# Patient Record
Sex: FEMALE | Race: WHITE | NOT HISPANIC OR LATINO | Employment: OTHER | URBAN - METROPOLITAN AREA
[De-identification: names, ages, dates, MRNs, and addresses within clinical notes are randomized per-mention and may not be internally consistent; named-entity substitution may affect disease eponyms.]

---

## 2017-01-19 ENCOUNTER — CLINICAL SUPPORT NO REQUIREMENTS (OUTPATIENT)
Dept: CARDIOLOGY | Facility: CLINIC | Age: 77
End: 2017-01-19

## 2017-01-19 DIAGNOSIS — Z45.018 PACEMAKER REPROGRAMMING/CHECK: Primary | ICD-10-CM

## 2017-01-19 PROCEDURE — 93280 PM DEVICE PROGR EVAL DUAL: CPT | Performed by: INTERNAL MEDICINE

## 2017-04-20 ENCOUNTER — CLINICAL SUPPORT NO REQUIREMENTS (OUTPATIENT)
Dept: CARDIOLOGY | Facility: CLINIC | Age: 77
End: 2017-04-20

## 2017-04-20 DIAGNOSIS — Z95.0 CARDIAC PACEMAKER IN SITU: Primary | ICD-10-CM

## 2017-04-20 PROCEDURE — 93288 INTERROG EVL PM/LDLS PM IP: CPT | Performed by: INTERNAL MEDICINE

## 2017-05-02 ENCOUNTER — OFFICE VISIT (OUTPATIENT)
Dept: CARDIOLOGY | Facility: CLINIC | Age: 77
End: 2017-05-02

## 2017-05-02 VITALS
WEIGHT: 151 LBS | BODY MASS INDEX: 26.75 KG/M2 | SYSTOLIC BLOOD PRESSURE: 115 MMHG | DIASTOLIC BLOOD PRESSURE: 77 MMHG | HEART RATE: 80 BPM

## 2017-05-02 DIAGNOSIS — I10 BENIGN ESSENTIAL HYPERTENSION: ICD-10-CM

## 2017-05-02 DIAGNOSIS — Z98.890 S/P TVR (TRICUSPID VALVE REPAIR): ICD-10-CM

## 2017-05-02 DIAGNOSIS — N18.30 CKD (CHRONIC KIDNEY DISEASE) STAGE 3, GFR 30-59 ML/MIN (HCC): ICD-10-CM

## 2017-05-02 DIAGNOSIS — Z95.2 S/P AVR (AORTIC VALVE REPLACEMENT): Primary | ICD-10-CM

## 2017-05-02 DIAGNOSIS — E78.2 MIXED HYPERLIPIDEMIA: ICD-10-CM

## 2017-05-02 PROBLEM — E78.5 HYPERLIPIDEMIA: Status: ACTIVE | Noted: 2017-05-02

## 2017-05-02 PROCEDURE — 93000 ELECTROCARDIOGRAM COMPLETE: CPT | Performed by: INTERNAL MEDICINE

## 2017-05-02 PROCEDURE — 99213 OFFICE O/P EST LOW 20 MIN: CPT | Performed by: INTERNAL MEDICINE

## 2017-05-02 RX ORDER — NALOXEGOL OXALATE 25 MG/1
TABLET, FILM COATED ORAL
Refills: 0 | COMMUNITY
Start: 2017-04-10 | End: 2017-05-02

## 2017-08-05 DIAGNOSIS — R60.9 EDEMA: ICD-10-CM

## 2017-08-07 RX ORDER — BUMETANIDE 2 MG/1
TABLET ORAL
Qty: 90 TABLET | Refills: 1 | Status: SHIPPED | OUTPATIENT
Start: 2017-08-07 | End: 2018-02-13 | Stop reason: SDUPTHER

## 2017-08-17 ENCOUNTER — CLINICAL SUPPORT NO REQUIREMENTS (OUTPATIENT)
Dept: CARDIOLOGY | Facility: CLINIC | Age: 77
End: 2017-08-17

## 2017-08-17 DIAGNOSIS — Z95.0 CARDIAC PACEMAKER: Primary | ICD-10-CM

## 2017-08-17 PROCEDURE — 93288 INTERROG EVL PM/LDLS PM IP: CPT | Performed by: INTERNAL MEDICINE

## 2017-11-01 ENCOUNTER — TRANSCRIBE ORDERS (OUTPATIENT)
Dept: ADMINISTRATIVE | Facility: HOSPITAL | Age: 77
End: 2017-11-01

## 2017-11-01 DIAGNOSIS — Z12.31 VISIT FOR SCREENING MAMMOGRAM: Primary | ICD-10-CM

## 2017-12-04 ENCOUNTER — HOSPITAL ENCOUNTER (OUTPATIENT)
Dept: MAMMOGRAPHY | Facility: HOSPITAL | Age: 77
Discharge: HOME | End: 2017-12-04
Admitting: FAMILY MEDICINE

## 2017-12-04 DIAGNOSIS — Z12.31 VISIT FOR SCREENING MAMMOGRAM: ICD-10-CM

## 2017-12-04 PROCEDURE — 77063 BREAST TOMOSYNTHESIS BI: CPT

## 2017-12-04 PROCEDURE — G0202 SCR MAMMO BI INCL CAD: HCPCS

## 2017-12-06 ENCOUNTER — TELEPHONE (OUTPATIENT)
Dept: CARDIOLOGY | Facility: CLINIC | Age: 77
End: 2017-12-06

## 2017-12-06 ENCOUNTER — OFFICE VISIT (OUTPATIENT)
Dept: CARDIOLOGY | Facility: CLINIC | Age: 77
End: 2017-12-06

## 2017-12-06 VITALS
SYSTOLIC BLOOD PRESSURE: 128 MMHG | HEIGHT: 63 IN | BODY MASS INDEX: 26.93 KG/M2 | WEIGHT: 152 LBS | HEART RATE: 76 BPM | DIASTOLIC BLOOD PRESSURE: 84 MMHG

## 2017-12-06 DIAGNOSIS — N18.30 CKD (CHRONIC KIDNEY DISEASE) STAGE 3, GFR 30-59 ML/MIN (HCC): ICD-10-CM

## 2017-12-06 DIAGNOSIS — Z98.890 S/P TVR (TRICUSPID VALVE REPAIR): ICD-10-CM

## 2017-12-06 DIAGNOSIS — Z95.2 S/P AVR (AORTIC VALVE REPLACEMENT): Primary | ICD-10-CM

## 2017-12-06 DIAGNOSIS — E78.2 MIXED HYPERLIPIDEMIA: ICD-10-CM

## 2017-12-06 DIAGNOSIS — I10 BENIGN ESSENTIAL HYPERTENSION: ICD-10-CM

## 2017-12-06 DIAGNOSIS — Z98.890 S/P MVR (MITRAL VALVE REPAIR): ICD-10-CM

## 2017-12-06 DIAGNOSIS — Z95.0 PACEMAKER: ICD-10-CM

## 2017-12-06 PROCEDURE — 93000 ELECTROCARDIOGRAM COMPLETE: CPT | Performed by: INTERNAL MEDICINE

## 2017-12-06 PROCEDURE — 99214 OFFICE O/P EST MOD 30 MIN: CPT | Performed by: INTERNAL MEDICINE

## 2017-12-06 NOTE — PROGRESS NOTES
Subjective:     Encounter Date:12/06/2017      Patient ID: Carly Gotti is a 77 y.o. female.    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    The patient was previously followed by Dr. Lopez who she last saw in 5/2017.  Her prior cardiac history includes mitral and tricuspid valve repair and aortic valve replacement back in 11/2015 by Dr. Veliz.  Postoperatively she developed symptomatic sick sinus syndrome and required dual chamber permanent pacemaker placement.  Since then she's done relatively well although she reports that she never quite recovered back to her baseline following the surgery.  She today denies any chest pain, significant shortness of breath, palpitations, PND or orthopnea, presyncope or syncope, or significant lower extremity edema.  She's had no significant changes in her medications.    Review of Systems   Constitution: Negative for weakness and malaise/fatigue.   HENT: Negative for hearing loss, hoarse voice, nosebleeds and sore throat.    Eyes: Negative for pain.   Cardiovascular: Positive for dyspnea on exertion and leg swelling. Negative for chest pain, claudication, cyanosis, irregular heartbeat, near-syncope, orthopnea, palpitations, paroxysmal nocturnal dyspnea and syncope.   Respiratory: Negative for shortness of breath and snoring.    Endocrine: Negative for cold intolerance, heat intolerance,  polydipsia, polyphagia and polyuria.   Skin: Negative for itching and rash.   Musculoskeletal: Negative for arthritis, falls, joint pain, joint swelling, muscle cramps, muscle weakness and myalgias.   Gastrointestinal: Negative for constipation, diarrhea, dysphagia, heartburn, hematemesis, hematochezia, melena, nausea and vomiting.   Genitourinary: Negative for frequency, hematuria and hesitancy.   Neurological: Negative for excessive daytime sleepiness, dizziness, headaches, light-headedness and numbness.   Psychiatric/Behavioral: Negative for depression. The patient is not nervous/anxious.        Current Outpatient Prescriptions:   •  aspirin 325 MG tablet, Take by mouth daily., Disp: , Rfl:   •  atorvastatin (LIPITOR) 20 MG tablet, take 1 tablet by mouth at bedtime, Disp: , Rfl: 0  •  bumetanide (BUMEX) 2 MG tablet, take 1 tablet by mouth once daily, Disp: 90 tablet, Rfl: 1  •  buPROPion XL (WELLBUTRIN XL) 300 MG 24 hr tablet, Take 300 mg by mouth Daily., Disp: , Rfl: 0  •  Coenzyme Q10 200 MG tablet, Take 1 tablet/day by mouth Daily., Disp: , Rfl:   •  estradiol (ESTRACE) 0.5 MG tablet, Take by mouth daily., Disp: , Rfl:   •  eszopiclone (LUNESTA) 3 MG tablet, Take 3 mg by mouth every night. Take immediately before bedtime, Disp: , Rfl:   •  fenofibrate micronized (LOFIBRA) 67 MG capsule, Take by mouth daily., Disp: , Rfl:   •  FLUoxetine (PROzac) 20 MG capsule, Take 60 mg by mouth daily., Disp: , Rfl:   •  folic acid (FOLVITE) 400 MCG tablet, Take 1 tablet by mouth Daily., Disp: 30 tablet, Rfl: 0  •  HYDROcodone-acetaminophen (NORCO)  MG per tablet, Take 1 tablet by mouth Every 4 (Four) Hours As Needed., Disp: , Rfl:   •  Multiple Vitamins-Minerals (OCUVITE ADULT 50+ PO), Take 1 tablet by mouth daily., Disp: , Rfl:   •  nebivolol (BYSTOLIC) 10 MG tablet, Take 10 mg by mouth 2 (Two) Times a Day., Disp: , Rfl:     Past Medical History:   Diagnosis Date   • Aneurysm    • Aortic valve disease    • Arthritis   "  • Atrophic kidney    • CAD (coronary artery disease)    • Depression    • Dysphagia    • Essential hypertension    • [place] (hard of hearing)    • Hyperlipidemia    • Mitral valve disease    • Pulmonary hypertension    • Sick sinus syndrome    • Sleep apnea    • Spinal stenosis      Past Surgical History:   Procedure Laterality Date   • AORTIC VALVE REPAIR/REPLACEMENT  2015    Dr Veliz   • CARDIAC CATHETERIZATION     •  SECTION     • HYSTERECTOMY     • MITRAL VALVE ANNULOPLASTY  2015    Dr Veliz   • TRICUSPID VALVE SURGERY  2015    Dr Veliz     Family History   Problem Relation Age of Onset   • ALS Mother    • Hypertension Father      Social History   Substance Use Topics   • Smoking status: Former Smoker     Quit date:    • Smokeless tobacco: Never Used   • Alcohol use No             ECG 12 Lead  Date/Time: 2017 2:46 PM  Performed by: MARIANELA GARCIA  Authorized by: MARIANELA GARCIA   Comparison: compared with previous ECG   Similar to previous ECG  Rhythm: sinus rhythm  Comments: Atrial paced rhythm               Objective:         Visit Vitals   • /84 (BP Location: Right arm, Patient Position: Sitting)  Comment: lft 136 86   • Pulse 76   • Ht 160 cm (63\")   • Wt 68.9 kg (152 lb)   • BMI 26.93 kg/m2          Physical Exam    Lab Review:       Assessment:          Diagnosis Plan   1. S/P AVR (aortic valve replacement)  Adult Transthoracic Echo Complete W/ Cont if Necessary Per Protocol   2. S/P TVR (tricuspid valve repair)  Adult Transthoracic Echo Complete W/ Cont if Necessary Per Protocol   3. S/P MVR (mitral valve repair)  Adult Transthoracic Echo Complete W/ Cont if Necessary Per Protocol   4. Benign essential hypertension     5. Mixed hyperlipidemia     6. CKD (chronic kidney disease) stage 3, GFR 30-59 ml/min     7. Pacemaker            Plan:       1.  Status post aortic valve replacement.  We'll get a repeat echocardiogram to follow-up on her valve function.  2.  " Status post tricuspid valve repair.  As per #1.  3.  Status post mitral valve repair.  As per # 1.  4.  Hypertension.  Well-controlled on current medications.  5.  Hyperlipidemia.  On atorvastatin followed by Dr. Reyes.  6.  Chronic kidney disease  7.  History of dual-chamber permanent pacemaker placement.  Will get her established in our pacemaker clinic for routine checks.    Will call and discuss the results of the echocardiogram with the patient.  We'll see the patient back again in 6 months.

## 2017-12-06 NOTE — TELEPHONE ENCOUNTER
Called pt regarding date of last pacemaker check. Pt returned call spoke with Luis Carlos stating she had pacemaker checked on 8/17/17. Luis Carlos scheduled patient to come in for check-up on 12/12/17 before Echo is performed. Pt use [employer]. Thanks, Nunu

## 2017-12-12 ENCOUNTER — CLINICAL SUPPORT NO REQUIREMENTS (OUTPATIENT)
Dept: CARDIOLOGY | Facility: CLINIC | Age: 77
End: 2017-12-12

## 2017-12-12 ENCOUNTER — TELEPHONE (OUTPATIENT)
Dept: CARDIOLOGY | Facility: CLINIC | Age: 77
End: 2017-12-12

## 2017-12-12 ENCOUNTER — HOSPITAL ENCOUNTER (OUTPATIENT)
Dept: CARDIOLOGY | Facility: HOSPITAL | Age: 77
Discharge: HOME | End: 2017-12-12
Attending: INTERNAL MEDICINE | Admitting: INTERNAL MEDICINE

## 2017-12-12 VITALS
WEIGHT: 152 LBS | HEIGHT: 63 IN | DIASTOLIC BLOOD PRESSURE: 66 MMHG | SYSTOLIC BLOOD PRESSURE: 116 MMHG | HEART RATE: 88 BPM | BODY MASS INDEX: 26.93 KG/M2

## 2017-12-12 DIAGNOSIS — Z95.2 S/P AVR (AORTIC VALVE REPLACEMENT): ICD-10-CM

## 2017-12-12 DIAGNOSIS — I49.5 SICK SINUS SYNDROME (HCC): Primary | ICD-10-CM

## 2017-12-12 DIAGNOSIS — Z98.890 S/P TVR (TRICUSPID VALVE REPAIR): ICD-10-CM

## 2017-12-12 DIAGNOSIS — Z98.890 S/P MVR (MITRAL VALVE REPAIR): ICD-10-CM

## 2017-12-12 LAB
AORTIC DIMENSIONLESS INDEX: 0.6 (DI)
BH CV ECHO MEAS - ACS: 1.6 CM
BH CV ECHO MEAS - AO MAX PG (FULL): 21.5 MMHG
BH CV ECHO MEAS - AO MAX PG: 26.4 MMHG
BH CV ECHO MEAS - AO MEAN PG (FULL): 10.4 MMHG
BH CV ECHO MEAS - AO MEAN PG: 13.8 MMHG
BH CV ECHO MEAS - AO ROOT AREA (BSA CORRECTED): 2.2
BH CV ECHO MEAS - AO ROOT AREA: 11.2 CM^2
BH CV ECHO MEAS - AO ROOT DIAM: 3.8 CM
BH CV ECHO MEAS - AO V2 MAX: 257 CM/SEC
BH CV ECHO MEAS - AO V2 MEAN: 165.5 CM/SEC
BH CV ECHO MEAS - AO V2 VTI: 51.9 CM
BH CV ECHO MEAS - ASC AORTA: 3.5 CM
BH CV ECHO MEAS - AVA(I,A): 1.2 CM^2
BH CV ECHO MEAS - AVA(I,D): 1.3 CM^2
BH CV ECHO MEAS - AVA(V,A): 0.93 CM^2
BH CV ECHO MEAS - AVA(V,D): 0.93 CM^2
BH CV ECHO MEAS - BSA(HAYCOCK): 1.8 M^2
BH CV ECHO MEAS - BSA: 1.7 M^2
BH CV ECHO MEAS - BZI_BMI: 26.9 KILOGRAMS/M^2
BH CV ECHO MEAS - BZI_METRIC_HEIGHT: 160 CM
BH CV ECHO MEAS - BZI_METRIC_WEIGHT: 68.9 KG
BH CV ECHO MEAS - CONTRAST EF (2CH): 66.7 ML/M^2
BH CV ECHO MEAS - CONTRAST EF 4CH: 64.1 ML/M^2
BH CV ECHO MEAS - EDV(MOD-SP2): 45 ML
BH CV ECHO MEAS - EDV(MOD-SP4): 39 ML
BH CV ECHO MEAS - EDV(TEICH): 52.1 ML
BH CV ECHO MEAS - EF(CUBED): 63.1 %
BH CV ECHO MEAS - EF(MOD-SP2): 66.7 %
BH CV ECHO MEAS - EF(MOD-SP4): 64.1 %
BH CV ECHO MEAS - EF(TEICH): 55.6 %
BH CV ECHO MEAS - ESV(MOD-SP2): 15 ML
BH CV ECHO MEAS - ESV(MOD-SP4): 14 ML
BH CV ECHO MEAS - ESV(TEICH): 23.1 ML
BH CV ECHO MEAS - FS: 28.3 %
BH CV ECHO MEAS - IVS/LVPW: 0.87
BH CV ECHO MEAS - IVSD: 1 CM
BH CV ECHO MEAS - LAT PEAK E' VEL: 7 CM/SEC
BH CV ECHO MEAS - LV DIASTOLIC VOL/BSA (35-75): 22.7 ML/M^2
BH CV ECHO MEAS - LV MASS(C)D: 116.7 GRAMS
BH CV ECHO MEAS - LV MASS(C)DI: 67.8 GRAMS/M^2
BH CV ECHO MEAS - LV MAX PG: 4.9 MMHG
BH CV ECHO MEAS - LV MEAN PG: 3.4 MMHG
BH CV ECHO MEAS - LV SYSTOLIC VOL/BSA (12-30): 8.1 ML/M^2
BH CV ECHO MEAS - LV V1 MAX: 111 CM/SEC
BH CV ECHO MEAS - LV V1 MEAN: 89.2 CM/SEC
BH CV ECHO MEAS - LV V1 VTI: 28.9 CM
BH CV ECHO MEAS - LVIDD: 3.5 CM
BH CV ECHO MEAS - LVIDS: 2.5 CM
BH CV ECHO MEAS - LVLD AP2: 6.7 CM
BH CV ECHO MEAS - LVLD AP4: 7 CM
BH CV ECHO MEAS - LVLS AP2: 6.3 CM
BH CV ECHO MEAS - LVLS AP4: 5.8 CM
BH CV ECHO MEAS - LVOT AREA (M): 2.3 CM^2
BH CV ECHO MEAS - LVOT AREA: 2.1 CM^2
BH CV ECHO MEAS - LVOT DIAM: 1.7 CM
BH CV ECHO MEAS - LVPWD: 1.2 CM
BH CV ECHO MEAS - MED PEAK E' VEL: 6 CM/SEC
BH CV ECHO MEAS - MV A DUR: 0.12 SEC
BH CV ECHO MEAS - MV A MAX VEL: 145.5 CM/SEC
BH CV ECHO MEAS - MV DEC SLOPE: 478.4 CM/SEC^2
BH CV ECHO MEAS - MV DEC TIME: 0.25 SEC
BH CV ECHO MEAS - MV E MAX VEL: 109.4 CM/SEC
BH CV ECHO MEAS - MV E/A: 0.75
BH CV ECHO MEAS - MV MAX PG: 9.4 MMHG
BH CV ECHO MEAS - MV MEAN PG: 4.9 MMHG
BH CV ECHO MEAS - MV P1/2T MAX VEL: 126 CM/SEC
BH CV ECHO MEAS - MV P1/2T: 77.1 MSEC
BH CV ECHO MEAS - MV V2 MAX: 153.1 CM/SEC
BH CV ECHO MEAS - MV V2 MEAN: 103.3 CM/SEC
BH CV ECHO MEAS - MV V2 VTI: 38.9 CM
BH CV ECHO MEAS - MVA P1/2T LCG: 1.7 CM^2
BH CV ECHO MEAS - MVA(P1/2T): 2.9 CM^2
BH CV ECHO MEAS - MVA(VTI): 1.6 CM^2
BH CV ECHO MEAS - PA MAX PG (FULL): 3.1 MMHG
BH CV ECHO MEAS - PA MAX PG: 4.5 MMHG
BH CV ECHO MEAS - PA V2 MAX: 106.4 CM/SEC
BH CV ECHO MEAS - PULM A REVS DUR: 0.09 SEC
BH CV ECHO MEAS - PULM A REVS VEL: 26.6 CM/SEC
BH CV ECHO MEAS - PULM DIAS VEL: 36 CM/SEC
BH CV ECHO MEAS - PULM S/D: 1.3
BH CV ECHO MEAS - PULM SYS VEL: 45.8 CM/SEC
BH CV ECHO MEAS - PVA(V,A): 1.2 CM^2
BH CV ECHO MEAS - PVA(V,D): 1.2 CM^2
BH CV ECHO MEAS - QP/QS: 0.46
BH CV ECHO MEAS - RAP SYSTOLE: 3 MMHG
BH CV ECHO MEAS - RV MAX PG: 1.4 MMHG
BH CV ECHO MEAS - RV MEAN PG: 0.8 MMHG
BH CV ECHO MEAS - RV V1 MAX: 59.2 CM/SEC
BH CV ECHO MEAS - RV V1 MEAN: 42.3 CM/SEC
BH CV ECHO MEAS - RV V1 VTI: 13.2 CM
BH CV ECHO MEAS - RVOT AREA: 2.2 CM^2
BH CV ECHO MEAS - RVOT DIAM: 1.7 CM
BH CV ECHO MEAS - RVSP: 17 MMHG
BH CV ECHO MEAS - SI(AO): 336.4 ML/M^2
BH CV ECHO MEAS - SI(CUBED): 16.2 ML/M^2
BH CV ECHO MEAS - SI(LVOT): 36 ML/M^2
BH CV ECHO MEAS - SI(MOD-SP2): 17.4 ML/M^2
BH CV ECHO MEAS - SI(MOD-SP4): 14.5 ML/M^2
BH CV ECHO MEAS - SI(TEICH): 16.8 ML/M^2
BH CV ECHO MEAS - SUP REN AO DIAM: 1.4 CM
BH CV ECHO MEAS - SV(AO): 578.9 ML
BH CV ECHO MEAS - SV(CUBED): 27.8 ML
BH CV ECHO MEAS - SV(LVOT): 62 ML
BH CV ECHO MEAS - SV(MOD-SP2): 30 ML
BH CV ECHO MEAS - SV(MOD-SP4): 25 ML
BH CV ECHO MEAS - SV(RVOT): 28.6 ML
BH CV ECHO MEAS - SV(TEICH): 28.9 ML
BH CV ECHO MEAS - TAPSE (>1.6): 2.1 CM2
BH CV ECHO MEAS - TR MAX VEL: 194.4 CM/SEC
BH CV XLRA - RV BASE: 2.8 CM
BH CV XLRA - TDI S': 9 CM/SEC
E/E' RATIO: 18
LEFT ATRIUM VOLUME INDEX: 20 ML/M2
TV PEAK GRADIENT: 4 MMHG
TV VTI: 25 CM

## 2017-12-12 PROCEDURE — 93280 PM DEVICE PROGR EVAL DUAL: CPT | Performed by: INTERNAL MEDICINE

## 2017-12-12 PROCEDURE — 93306 TTE W/DOPPLER COMPLETE: CPT

## 2017-12-12 PROCEDURE — 93306 TTE W/DOPPLER COMPLETE: CPT | Performed by: INTERNAL MEDICINE

## 2017-12-12 NOTE — TELEPHONE ENCOUNTER
This pt is now going to follow her pacemaker at [facility].  We checked her in clinic today.  She was previously followed by Dr. Dos Santos.  She remains on your [employer] website.  Can you please remove her, so we can enroll her.

## 2017-12-13 ENCOUNTER — TELEPHONE (OUTPATIENT)
Dept: CARDIOLOGY | Facility: CLINIC | Age: 77
End: 2017-12-13

## 2017-12-13 NOTE — PROGRESS NOTES
Please let patient know that her echocardiogram looked ok.  Her heart pumping function is normal and her valves are functioning well.  thanks

## 2017-12-13 NOTE — TELEPHONE ENCOUNTER
Patient has been released from [facility] so that [facility] can monitor patient 12/13/17-- AH

## 2018-02-13 DIAGNOSIS — R60.9 EDEMA: ICD-10-CM

## 2018-02-13 RX ORDER — BUMETANIDE 2 MG/1
2 TABLET ORAL DAILY
Qty: 90 TABLET | Refills: 1 | Status: SHIPPED | OUTPATIENT
Start: 2018-02-13 | End: 2018-08-14 | Stop reason: SDUPTHER

## 2018-02-14 RX ORDER — BUMETANIDE 2 MG/1
TABLET ORAL
Qty: 90 TABLET | Refills: 1 | Status: SHIPPED | OUTPATIENT
Start: 2018-02-14 | End: 2018-06-13 | Stop reason: SDUPTHER

## 2018-04-02 ENCOUNTER — CLINICAL SUPPORT NO REQUIREMENTS (OUTPATIENT)
Dept: CARDIOLOGY | Facility: CLINIC | Age: 78
End: 2018-04-02

## 2018-04-02 DIAGNOSIS — I49.5 SICK SINUS SYNDROME (HCC): Primary | ICD-10-CM

## 2018-04-02 PROCEDURE — 93294 REM INTERROG EVL PM/LDLS PM: CPT | Performed by: INTERNAL MEDICINE

## 2018-04-02 PROCEDURE — 93296 REM INTERROG EVL PM/IDS: CPT | Performed by: INTERNAL MEDICINE

## 2018-06-11 ENCOUNTER — CLINICAL SUPPORT NO REQUIREMENTS (OUTPATIENT)
Dept: CARDIOLOGY | Facility: CLINIC | Age: 78
End: 2018-06-11

## 2018-06-11 DIAGNOSIS — I49.5 SICK SINUS SYNDROME (HCC): Primary | ICD-10-CM

## 2018-06-13 ENCOUNTER — OFFICE VISIT (OUTPATIENT)
Dept: CARDIOLOGY | Facility: CLINIC | Age: 78
End: 2018-06-13

## 2018-06-13 ENCOUNTER — CLINICAL SUPPORT NO REQUIREMENTS (OUTPATIENT)
Dept: CARDIOLOGY | Facility: CLINIC | Age: 78
End: 2018-06-13

## 2018-06-13 VITALS
WEIGHT: 146 LBS | BODY MASS INDEX: 25.87 KG/M2 | SYSTOLIC BLOOD PRESSURE: 126 MMHG | HEIGHT: 63 IN | DIASTOLIC BLOOD PRESSURE: 80 MMHG | HEART RATE: 81 BPM

## 2018-06-13 DIAGNOSIS — Z98.890 S/P MVR (MITRAL VALVE REPAIR): ICD-10-CM

## 2018-06-13 DIAGNOSIS — I49.5 SICK SINUS SYNDROME (HCC): Primary | ICD-10-CM

## 2018-06-13 DIAGNOSIS — Z98.890 S/P TVR (TRICUSPID VALVE REPAIR): ICD-10-CM

## 2018-06-13 DIAGNOSIS — I10 BENIGN ESSENTIAL HYPERTENSION: ICD-10-CM

## 2018-06-13 DIAGNOSIS — Z95.0 PACEMAKER: ICD-10-CM

## 2018-06-13 DIAGNOSIS — N18.30 CKD (CHRONIC KIDNEY DISEASE) STAGE 3, GFR 30-59 ML/MIN (HCC): ICD-10-CM

## 2018-06-13 DIAGNOSIS — Z95.2 S/P AVR (AORTIC VALVE REPLACEMENT): Primary | ICD-10-CM

## 2018-06-13 DIAGNOSIS — E78.2 MIXED HYPERLIPIDEMIA: ICD-10-CM

## 2018-06-13 PROCEDURE — 99213 OFFICE O/P EST LOW 20 MIN: CPT | Performed by: INTERNAL MEDICINE

## 2018-06-13 PROCEDURE — 93000 ELECTROCARDIOGRAM COMPLETE: CPT | Performed by: INTERNAL MEDICINE

## 2018-06-13 PROCEDURE — 93280 PM DEVICE PROGR EVAL DUAL: CPT | Performed by: INTERNAL MEDICINE

## 2018-06-13 NOTE — PROGRESS NOTES
Subjective:     Encounter Date:06/13/2018      Patient ID: Carly Gotti is a 77 y.o. female.    Chief Complaint:  History of Present Illness    This is a 77-year-old female with a history of moderate to severe aortic regurgitation status post replacement with a bioprosthetic aortic valve, moderate to severe mitral regurgitation status post repair, severe tricuspid regurgitation status post repair, severe pulmonary hypertension, chronic kidney disease with an atrophic kidney, hyperlipidemia, hypertension, aberrant right subclavian artery, obstructive sleep apnea on CPAP, status post dual-chamber pacemaker placement for sick sinus syndrome, depression, who presents for follow up.      The patient was previously followed by Dr. Lopez who she last saw in 5/2017.  Her prior cardiac history includes mitral and tricuspid valve repair and aortic valve replacement back in 11/2015 by Dr. Veliz.  Postoperatively she developed symptomatic sick sinus syndrome and required dual chamber permanent pacemaker placement.  Since then she's done relatively well although she reports that she never quite recovered back to her baseline following the surgery.  Was at her last visit of her echocardiogram to follow-up on her valve function.    Her echocardiogram was performed in 12/2017 and showed normal left ventricular systolic function and wall motion with an estimated ejection fraction 64%, grade 1 diastolic dysfunction, grossly normal-appearing bioprosthetic aortic valve, no mitral valve regurgitation, trace tricuspid valve regurgitation, and a right ventricular systolic pressure of 17 mmHg.    Today she presents for routine follow-up.  She feels well overall.  She denies any chest pain, shortness of breath, PND or orthopnea, near-syncope or syncope, or palpitations.  She has chronic intermittent left lower extremity edema that is unchanged.  Her pacemaker check today shows that she is atrial paced 100% of the time with  no recent episodes.          Review of Systems   Constitution: Positive for malaise/fatigue. Negative for weakness.   HENT: Negative for hearing loss, hoarse voice, nosebleeds and sore throat.    Eyes: Negative for pain.   Cardiovascular: Positive for leg swelling. Negative for chest pain, claudication, cyanosis, dyspnea on exertion, irregular heartbeat, near-syncope, orthopnea, palpitations, paroxysmal nocturnal dyspnea and syncope.   Respiratory: Negative for shortness of breath and snoring.    Endocrine: Negative for cold intolerance, heat intolerance, polydipsia, polyphagia and polyuria.   Skin: Negative for itching and rash.   Musculoskeletal: Negative for arthritis, falls, joint pain, joint swelling, muscle cramps, muscle weakness and myalgias.   Gastrointestinal: Negative for constipation, diarrhea, dysphagia, heartburn, hematemesis, hematochezia, melena, nausea and vomiting.   Genitourinary: Negative for frequency, hematuria and hesitancy.   Neurological: Negative for excessive daytime sleepiness, dizziness, headaches, light-headedness and numbness.   Psychiatric/Behavioral: Negative for depression. The patient is not nervous/anxious.           Current Outpatient Prescriptions:   •  aspirin 325 MG tablet, Take by mouth daily., Disp: , Rfl:   •  atorvastatin (LIPITOR) 20 MG tablet, take 1 tablet by mouth at bedtime, Disp: , Rfl: 0  •  bumetanide (BUMEX) 2 MG tablet, Take 1 tablet by mouth Daily., Disp: 90 tablet, Rfl: 1  •  buPROPion XL (WELLBUTRIN XL) 300 MG 24 hr tablet, Take 300 mg by mouth Daily., Disp: , Rfl: 0  •  Coenzyme Q10 200 MG tablet, Take 1 tablet/day by mouth Daily., Disp: , Rfl:   •  estradiol (ESTRACE) 0.5 MG tablet, Take by mouth daily., Disp: , Rfl:   •  eszopiclone (LUNESTA) 3 MG tablet, Take 3 mg by mouth every night. Take immediately before bedtime, Disp: , Rfl:   •  FLUoxetine (PROzac) 20 MG capsule, Take 60 mg by mouth daily., Disp: , Rfl:   •  folic acid (FOLVITE) 400 MCG tablet, Take  "1 tablet by mouth Daily., Disp: 30 tablet, Rfl: 0  •  HYDROcodone-acetaminophen (NORCO)  MG per tablet, Take 1 tablet by mouth Every 4 (Four) Hours As Needed., Disp: , Rfl:   •  nebivolol (BYSTOLIC) 10 MG tablet, Take 10 mg by mouth 2 (Two) Times a Day., Disp: , Rfl:     Past Medical History:   Diagnosis Date   • Aneurysm    • Aortic valve disease    • Arthritis    • Atrophic kidney    • CAD (coronary artery disease)    • Depression    • Dysphagia    • Essential hypertension    • [place] (hard of hearing)    • Hyperlipidemia    • Mitral valve disease    • Pulmonary hypertension    • Sick sinus syndrome    • Sleep apnea    • Spinal stenosis      Past Surgical History:   Procedure Laterality Date   • AORTIC VALVE REPAIR/REPLACEMENT  2015    Dr Veliz   • CARDIAC CATHETERIZATION     •  SECTION     • HYSTERECTOMY     • MITRAL VALVE ANNULOPLASTY  2015    Dr Veliz   • TRICUSPID VALVE SURGERY  2015    Dr Veliz     Family History   Problem Relation Age of Onset   • ALS Mother    • Hypertension Father      Social History   Substance Use Topics   • Smoking status: Former Smoker     Quit date:    • Smokeless tobacco: Never Used   • Alcohol use No           ECG 12 Lead  Date/Time: 2018 5:08 PM  Performed by: MARIANELA GARCIA  Authorized by: MARIANELA GARCIA   Comparison: compared with previous ECG   Similar to previous ECG  Rhythm: sinus rhythm  Comments: Atrial paced rhythm               Objective:         Visit Vitals  /80 (BP Location: Right arm, Patient Position: Sitting)   Pulse 81   Ht 160 cm (63\")   Wt 66.2 kg (146 lb)   BMI 25.86 kg/m²            Physical Exam   Constitutional: She is oriented to person, place, and time. She appears well-developed and well-nourished.   HENT:   Head: Normocephalic and atraumatic.   Eyes: Conjunctivae, EOM and lids are normal. Pupils are equal, round, and reactive to light.   Neck: Normal range of motion and full passive range of motion " without pain. Neck supple. No JVD present. Carotid bruit is not present.   Cardiovascular: Normal rate, regular rhythm, S1 normal and S2 normal.  Exam reveals no gallop.    No murmur heard.  Pulses:       Radial pulses are 2+ on the right side, and 2+ on the left side.   No bilateral lower extremity edema   Pulmonary/Chest: Effort normal and breath sounds normal.   Abdominal: Soft. Normal appearance.   Lymphadenopathy:     She has no cervical adenopathy.   Neurological: She is alert and oriented to person, place, and time.   Skin: Skin is warm, dry and intact.   Psychiatric: She has a normal mood and affect.       Lab Review:       Assessment:          Diagnosis Plan   1. S/P AVR (aortic valve replacement)     2. S/P TVR (tricuspid valve repair)     3. S/P MVR (mitral valve repair)     4. Benign essential hypertension     5. Mixed hyperlipidemia     6. Pacemaker     7. CKD (chronic kidney disease) stage 3, GFR 30-59 ml/min            Plan:       1.  Status post bioprosthetic aortic valve replacement.  Normal appearing function on last echocardiogram.  2.  Status post tricuspid valve repair.  Only with residual trace tricuspid regurgitation on last echo.  3.  Status post mitral valve appear.  No significant residual regurgitation on last echocardiogram.  4.  Hypertension.  Well-controlled on her current medications.  5.  Status post permanent pacemaker placement.  Interrogation today shows normal function.  6.  Chronic kidney disease  7.  Hyperlipidemia.  On low-dose atorvastatin.    We'll plan on seeing the patient back in 6 months.

## 2018-06-18 ENCOUNTER — TELEPHONE (OUTPATIENT)
Dept: CARDIOLOGY | Facility: CLINIC | Age: 78
End: 2018-06-18

## 2018-06-18 NOTE — TELEPHONE ENCOUNTER
Pt daughter called stating Saturday morning on 6/16/18 pt was rushed to dental urgent care due to tooth pain. Daughter states they extracted 2 teeth, 1 is infected. Daughter questions due to heart problems should patient been on antibiotics? Please advise! Thanks, Nunu

## 2018-06-18 NOTE — TELEPHONE ENCOUNTER
Yes they should give her antibiotics before dental procedures because of her valve replacements.    If she has an infected tooth they may have already started her on antibiotics, did they?

## 2018-06-18 NOTE — TELEPHONE ENCOUNTER
No antibiotics given, daughter states she told them her mom needed antibiotic due to valves being replaced. Daughter states she will call dentist and PCP to get treatment for infection of tooth. Thanks, Nunu

## 2018-08-15 RX ORDER — BUMETANIDE 2 MG/1
TABLET ORAL
Qty: 90 TABLET | Refills: 1 | Status: SHIPPED | OUTPATIENT
Start: 2018-08-15 | End: 2019-02-11 | Stop reason: SDUPTHER

## 2018-09-07 ENCOUNTER — CLINICAL SUPPORT NO REQUIREMENTS (OUTPATIENT)
Dept: CARDIOLOGY | Facility: CLINIC | Age: 78
End: 2018-09-07

## 2018-09-07 DIAGNOSIS — I49.5 SICK SINUS SYNDROME (HCC): Primary | ICD-10-CM

## 2018-09-07 PROCEDURE — 93294 REM INTERROG EVL PM/LDLS PM: CPT | Performed by: INTERNAL MEDICINE

## 2018-09-07 PROCEDURE — 93296 REM INTERROG EVL PM/IDS: CPT | Performed by: INTERNAL MEDICINE

## 2018-12-05 ENCOUNTER — CLINICAL SUPPORT NO REQUIREMENTS (OUTPATIENT)
Dept: CARDIOLOGY | Facility: CLINIC | Age: 78
End: 2018-12-05

## 2018-12-05 DIAGNOSIS — I49.5 SICK SINUS SYNDROME (HCC): Primary | ICD-10-CM

## 2018-12-19 ENCOUNTER — OFFICE VISIT (OUTPATIENT)
Dept: CARDIOLOGY | Facility: CLINIC | Age: 78
End: 2018-12-19

## 2018-12-19 ENCOUNTER — CLINICAL SUPPORT NO REQUIREMENTS (OUTPATIENT)
Dept: CARDIOLOGY | Facility: CLINIC | Age: 78
End: 2018-12-19

## 2018-12-19 VITALS
WEIGHT: 144 LBS | DIASTOLIC BLOOD PRESSURE: 76 MMHG | BODY MASS INDEX: 25.52 KG/M2 | HEART RATE: 82 BPM | SYSTOLIC BLOOD PRESSURE: 138 MMHG | HEIGHT: 63 IN

## 2018-12-19 DIAGNOSIS — I10 BENIGN ESSENTIAL HYPERTENSION: ICD-10-CM

## 2018-12-19 DIAGNOSIS — Z95.0 PACEMAKER: ICD-10-CM

## 2018-12-19 DIAGNOSIS — N18.30 CKD (CHRONIC KIDNEY DISEASE) STAGE 3, GFR 30-59 ML/MIN (HCC): ICD-10-CM

## 2018-12-19 DIAGNOSIS — I49.5 SICK SINUS SYNDROME (HCC): Primary | ICD-10-CM

## 2018-12-19 DIAGNOSIS — E78.2 MIXED HYPERLIPIDEMIA: ICD-10-CM

## 2018-12-19 DIAGNOSIS — Z95.2 S/P AVR (AORTIC VALVE REPLACEMENT): Primary | ICD-10-CM

## 2018-12-19 DIAGNOSIS — Z98.890 S/P TVR (TRICUSPID VALVE REPAIR): ICD-10-CM

## 2018-12-19 DIAGNOSIS — Z98.890 S/P MVR (MITRAL VALVE REPAIR): ICD-10-CM

## 2018-12-19 PROCEDURE — 99213 OFFICE O/P EST LOW 20 MIN: CPT | Performed by: INTERNAL MEDICINE

## 2018-12-19 PROCEDURE — 93000 ELECTROCARDIOGRAM COMPLETE: CPT | Performed by: INTERNAL MEDICINE

## 2018-12-19 PROCEDURE — 93280 PM DEVICE PROGR EVAL DUAL: CPT | Performed by: INTERNAL MEDICINE

## 2018-12-19 NOTE — PROGRESS NOTES
Subjective:     Encounter Date:12/19/2018      Patient ID: Carly Gotti is a 78 y.o. female.    Chief Complaint:  History of Present Illness    This is a 78-year-old female with a history of moderate to severe aortic regurgitation status post replacement with a bioprosthetic aortic valve, moderate to severe mitral regurgitation status post repair, severe tricuspid regurgitation status post repair, severe pulmonary hypertension, chronic kidney disease with an atrophic kidney, hyperlipidemia, hypertension, aberrant right subclavian artery, obstructive sleep apnea on CPAP, status post dual-chamber pacemaker placement for sick sinus syndrome, depression, who presents for follow up.      The patient was previously followed by Dr. Lopez who she last saw in 5/2017.  Her prior cardiac history includes mitral and tricuspid valve repair and aortic valve replacement back in 11/2015 by Dr. Veliz.  Postoperatively she developed symptomatic sick sinus syndrome and required dual chamber permanent pacemaker placement.  Since then she's done relatively well although she reports that she never quite recovered back to her baseline following the surgery.       Following her initial visit with me in 12/2017 I set her up for a repeat echocardiogram that showed normal left ventricular systolic function and wall motion with an estimated ejection fraction 64%, grade 1 diastolic dysfunction, grossly normal-appearing bioprosthetic aortic valve, no mitral valve regurgitation, trace tricuspid valve regurgitation, and a right ventricular systolic pressure of 17 mmHg.    Today she presents for routine follow-up.  She feels well overall.  She has had chest pain, shortness of breath, PND orthopnea, near syncope or syncope, or palpitations.  She reports chronic, intermittent, and stable left lower extremity edema.  She denies any changes in her medications.    Review of Systems   Constitution: Negative for weakness and  malaise/fatigue.   HENT: Negative for hearing loss, hoarse voice, nosebleeds and sore throat.    Eyes: Negative for pain.   Cardiovascular: Positive for leg swelling. Negative for chest pain, claudication, cyanosis, dyspnea on exertion, irregular heartbeat, near-syncope, orthopnea, palpitations, paroxysmal nocturnal dyspnea and syncope.   Respiratory: Negative for shortness of breath and snoring.    Endocrine: Negative for cold intolerance, heat intolerance, polydipsia, polyphagia and polyuria.   Skin: Negative for itching and rash.   Musculoskeletal: Negative for arthritis, falls, joint pain, joint swelling, muscle cramps, muscle weakness and myalgias.   Gastrointestinal: Negative for constipation, diarrhea, dysphagia, heartburn, hematemesis, hematochezia, melena, nausea and vomiting.   Genitourinary: Negative for frequency, hematuria and hesitancy.   Neurological: Negative for excessive daytime sleepiness, dizziness, headaches, light-headedness and numbness.   Psychiatric/Behavioral: Negative for depression. The patient is not nervous/anxious.           Current Outpatient Medications:   •  aspirin 325 MG tablet, Take by mouth daily., Disp: , Rfl:   •  atorvastatin (LIPITOR) 20 MG tablet, take 1 tablet by mouth at bedtime, Disp: , Rfl: 0  •  bumetanide (BUMEX) 2 MG tablet, take 1 tablet by mouth once daily, Disp: 90 tablet, Rfl: 1  •  buPROPion XL (WELLBUTRIN XL) 300 MG 24 hr tablet, Take 300 mg by mouth Daily., Disp: , Rfl: 0  •  Coenzyme Q10 200 MG tablet, Take 1 tablet/day by mouth Daily., Disp: , Rfl:   •  estradiol (ESTRACE) 0.5 MG tablet, Take by mouth daily., Disp: , Rfl:   •  eszopiclone (LUNESTA) 3 MG tablet, Take 3 mg by mouth every night. Take immediately before bedtime, Disp: , Rfl:   •  FLUoxetine (PROzac) 20 MG capsule, Take 60 mg by mouth daily., Disp: , Rfl:   •  folic acid (FOLVITE) 400 MCG tablet, Take 1 tablet by mouth Daily., Disp: 30 tablet, Rfl: 0  •  HYDROcodone-acetaminophen (NORCO)   "MG per tablet, Take 1 tablet by mouth Every 4 (Four) Hours As Needed., Disp: , Rfl:   •  nebivolol (BYSTOLIC) 10 MG tablet, Take 10 mg by mouth 2 (Two) Times a Day., Disp: , Rfl:     Past Medical History:   Diagnosis Date   • Aneurysm (CMS/HCC)    • Aortic valve disease    • Arthritis    • Atrophic kidney    • CAD (coronary artery disease)    • CKD (chronic kidney disease) stage 3, GFR 30-59 ml/min (CMS/HCC)    • Depression    • Dysphagia    • Essential hypertension    • [place] (hard of hearing)    • Hyperlipidemia    • Mitral valve disease    • Pulmonary hypertension (CMS/HCC)    • Sick sinus syndrome (CMS/HCC)    • Sleep apnea    • Spinal stenosis      Past Surgical History:   Procedure Laterality Date   • AORTIC VALVE REPAIR/REPLACEMENT  2015    Dr Veliz   • CARDIAC CATHETERIZATION     •  SECTION     • HYSTERECTOMY     • MITRAL VALVE ANNULOPLASTY  2015    Dr Veliz   • TRICUSPID VALVE SURGERY  2015    Dr Veliz     Family History   Problem Relation Age of Onset   • ALS Mother    • Hypertension Father      Social History     Tobacco Use   • Smoking status: Former Smoker     Last attempt to quit: 1988     Years since quittin.9   • Smokeless tobacco: Never Used   Substance Use Topics   • Alcohol use: No   • Drug use: No           ECG 12 Lead  Date/Time: 2018 3:10 PM  Performed by: Laura Pak MD  Authorized by: Laura Pak MD   Comparison: compared with previous ECG   Comments: Atrial paced rhythm               Objective:         Visit Vitals  /76   Pulse 82   Ht 160 cm (63\")   Wt 65.3 kg (144 lb)   BMI 25.51 kg/m²          Physical Exam   Constitutional: She is oriented to person, place, and time. She appears well-developed and well-nourished.   HENT:   Head: Normocephalic and atraumatic.   Eyes: Conjunctivae, EOM and lids are normal. Pupils are equal, round, and reactive to light.   Neck: Normal range of motion and full passive range of motion without pain. " Neck supple. No JVD present. Carotid bruit is not present.   Cardiovascular: Normal rate, regular rhythm, S1 normal and S2 normal. Exam reveals no gallop.   Murmur heard.   Systolic murmur is present with a grade of 2/6.  Pulses:       Radial pulses are 2+ on the right side, and 2+ on the left side.   Pulmonary/Chest: Effort normal and breath sounds normal.   Abdominal: Soft. Normal appearance.   Lymphadenopathy:     She has no cervical adenopathy.   Neurological: She is alert and oriented to person, place, and time.   Skin: Skin is warm, dry and intact.   Psychiatric: She has a normal mood and affect.       Lab Review:       Assessment:          Diagnosis Plan   1. S/P AVR (aortic valve replacement)     2. S/P MVR (mitral valve repair)     3. S/P TVR (tricuspid valve repair)     4. Benign essential hypertension     5. Mixed hyperlipidemia     6. Pacemaker     7. CKD (chronic kidney disease) stage 3, GFR 30-59 ml/min (CMS/[facility])            Plan:       1.  Status post bioprosthetic aortic valve replacement.  She had normal-looking function on her last echo in 12/2017.  She does have a systolic murmur on exam but with no symptoms and echo within the last year is unremarkable.  We can just monitor this for now and plan on a repeat echocardiogram possibly in the next year if she remains stable.  2.  Status post tricuspid valve repair.  Essentially normal function on her last echocardiogram.  3.  Status post mitral valve repair.  Significant residual regurgitation or last echocardiogram.  Plan is per #1.  4.  Hypertension.  Well controlled on her current management.  5.  Status post permanent pacemaker placement.  Her interrogation today showed normal function with atrial pacing at 100%.  6.  Hyperlipidemia.  On low-dose atorvastatin which is managed by Dr. Reyes.  7.  Chronic kidney disease stage III    We'll plan on seeing the patient back again in one year or sooner if further issues arise.

## 2019-01-08 ENCOUNTER — TRANSCRIBE ORDERS (OUTPATIENT)
Dept: ADMINISTRATIVE | Facility: HOSPITAL | Age: 79
End: 2019-01-08

## 2019-01-08 DIAGNOSIS — Z12.39 SCREENING BREAST EXAMINATION: Primary | ICD-10-CM

## 2019-02-11 RX ORDER — BUMETANIDE 2 MG/1
2 TABLET ORAL DAILY
Qty: 90 TABLET | Refills: 1 | Status: SHIPPED | OUTPATIENT
Start: 2019-02-11 | End: 2019-08-10 | Stop reason: SDUPTHER

## 2019-02-27 ENCOUNTER — TRANSCRIBE ORDERS (OUTPATIENT)
Dept: ADMINISTRATIVE | Facility: HOSPITAL | Age: 79
End: 2019-02-27

## 2019-02-27 DIAGNOSIS — R10.31 RIGHT LOWER QUADRANT PAIN: ICD-10-CM

## 2019-02-27 DIAGNOSIS — R10.32 LEFT LOWER QUADRANT PAIN: Primary | ICD-10-CM

## 2019-02-28 ENCOUNTER — HOSPITAL ENCOUNTER (OUTPATIENT)
Dept: CT IMAGING | Facility: HOSPITAL | Age: 79
Discharge: HOME | End: 2019-02-28
Admitting: FAMILY MEDICINE

## 2019-02-28 DIAGNOSIS — R10.32 LEFT LOWER QUADRANT PAIN: ICD-10-CM

## 2019-02-28 DIAGNOSIS — R10.31 RIGHT LOWER QUADRANT PAIN: ICD-10-CM

## 2019-02-28 LAB — CREAT BLDA-MCNC: 1.7 MG/DL (ref 0.6–1.3)

## 2019-02-28 PROCEDURE — 82565 ASSAY OF CREATININE: CPT

## 2019-02-28 PROCEDURE — 74176 CT ABD & PELVIS W/O CONTRAST: CPT

## 2019-03-04 ENCOUNTER — HOSPITAL ENCOUNTER (EMERGENCY)
Facility: HOSPITAL | Age: 79
Discharge: HOME | End: 2019-03-04
Attending: EMERGENCY MEDICINE | Admitting: EMERGENCY MEDICINE

## 2019-03-04 VITALS
HEART RATE: 73 BPM | TEMPERATURE: 97.3 F | DIASTOLIC BLOOD PRESSURE: 92 MMHG | OXYGEN SATURATION: 98 % | HEIGHT: 63 IN | BODY MASS INDEX: 24.79 KG/M2 | WEIGHT: 139.9 LBS | RESPIRATION RATE: 20 BRPM | SYSTOLIC BLOOD PRESSURE: 146 MMHG

## 2019-03-04 DIAGNOSIS — R19.7 DIARRHEA, UNSPECIFIED TYPE: Primary | ICD-10-CM

## 2019-03-04 DIAGNOSIS — R11.0 NAUSEA: ICD-10-CM

## 2019-03-04 DIAGNOSIS — R10.9 ABDOMINAL PAIN, UNSPECIFIED ABDOMINAL LOCATION: ICD-10-CM

## 2019-03-04 LAB
ALBUMIN SERPL-MCNC: 4.5 G/DL (ref 3.5–5.2)
ALBUMIN/GLOB SERPL: 1.7 G/DL
ALP SERPL-CCNC: 49 U/L (ref 39–117)
ALT SERPL W P-5'-P-CCNC: 44 U/L (ref 1–33)
ANION GAP SERPL CALCULATED.3IONS-SCNC: 12.8 MMOL/L
AST SERPL-CCNC: 47 U/L (ref 1–32)
BASOPHILS # BLD AUTO: 0.01 10*3/MM3 (ref 0–0.2)
BASOPHILS NFR BLD AUTO: 0.1 % (ref 0–1.5)
BILIRUB SERPL-MCNC: 0.6 MG/DL (ref 0.1–1.2)
BILIRUB UR QL STRIP: NEGATIVE
BUN BLD-MCNC: 22 MG/DL (ref 8–23)
BUN/CREAT SERPL: 14.5 (ref 7–25)
CALCIUM SPEC-SCNC: 10.1 MG/DL (ref 8.6–10.5)
CHLORIDE SERPL-SCNC: 102 MMOL/L (ref 98–107)
CLARITY UR: CLEAR
CO2 SERPL-SCNC: 24.2 MMOL/L (ref 22–29)
COLOR UR: YELLOW
CREAT BLD-MCNC: 1.52 MG/DL (ref 0.57–1)
DEPRECATED RDW RBC AUTO: 47.9 FL (ref 37–54)
EOSINOPHIL # BLD AUTO: 0.11 10*3/MM3 (ref 0–0.4)
EOSINOPHIL NFR BLD AUTO: 1.3 % (ref 0.3–6.2)
ERYTHROCYTE [DISTWIDTH] IN BLOOD BY AUTOMATED COUNT: 13.6 % (ref 12.3–15.4)
GFR SERPL CREATININE-BSD FRML MDRD: 33 ML/MIN/1.73
GLOBULIN UR ELPH-MCNC: 2.6 GM/DL
GLUCOSE BLD-MCNC: 92 MG/DL (ref 65–99)
GLUCOSE UR STRIP-MCNC: NEGATIVE MG/DL
HCT VFR BLD AUTO: 44.6 % (ref 34–46.6)
HGB BLD-MCNC: 14.2 G/DL (ref 12–15.9)
HGB UR QL STRIP.AUTO: NEGATIVE
IMM GRANULOCYTES # BLD AUTO: 0.02 10*3/MM3 (ref 0–0.05)
IMM GRANULOCYTES NFR BLD AUTO: 0.2 % (ref 0–0.5)
KETONES UR QL STRIP: NEGATIVE
LEUKOCYTE ESTERASE UR QL STRIP.AUTO: NEGATIVE
LIPASE SERPL-CCNC: 49 U/L (ref 13–60)
LYMPHOCYTES # BLD AUTO: 0.88 10*3/MM3 (ref 0.7–3.1)
LYMPHOCYTES NFR BLD AUTO: 10.6 % (ref 19.6–45.3)
MCH RBC QN AUTO: 30.3 PG (ref 26.6–33)
MCHC RBC AUTO-ENTMCNC: 31.8 G/DL (ref 31.5–35.7)
MCV RBC AUTO: 95.3 FL (ref 79–97)
MONOCYTES # BLD AUTO: 0.9 10*3/MM3 (ref 0.1–0.9)
MONOCYTES NFR BLD AUTO: 10.9 % (ref 5–12)
NEUTROPHILS # BLD AUTO: 6.37 10*3/MM3 (ref 1.4–7)
NEUTROPHILS NFR BLD AUTO: 76.9 % (ref 42.7–76)
NITRITE UR QL STRIP: NEGATIVE
NRBC BLD AUTO-RTO: 0 /100 WBC (ref 0–0)
PH UR STRIP.AUTO: 8 [PH] (ref 5–8)
PLATELET # BLD AUTO: 207 10*3/MM3 (ref 140–450)
PMV BLD AUTO: 12.1 FL (ref 6–12)
POTASSIUM BLD-SCNC: 3.8 MMOL/L (ref 3.5–5.2)
PROT SERPL-MCNC: 7.1 G/DL (ref 6–8.5)
PROT UR QL STRIP: NEGATIVE
RBC # BLD AUTO: 4.68 10*6/MM3 (ref 3.77–5.28)
SODIUM BLD-SCNC: 139 MMOL/L (ref 136–145)
SP GR UR STRIP: 1.01 (ref 1–1.03)
UROBILINOGEN UR QL STRIP: NORMAL
WBC NRBC COR # BLD: 8.29 10*3/MM3 (ref 3.4–10.8)

## 2019-03-04 PROCEDURE — 99284 EMERGENCY DEPT VISIT MOD MDM: CPT

## 2019-03-04 PROCEDURE — 96375 TX/PRO/DX INJ NEW DRUG ADDON: CPT

## 2019-03-04 PROCEDURE — 85025 COMPLETE CBC W/AUTO DIFF WBC: CPT | Performed by: EMERGENCY MEDICINE

## 2019-03-04 PROCEDURE — 81003 URINALYSIS AUTO W/O SCOPE: CPT | Performed by: EMERGENCY MEDICINE

## 2019-03-04 PROCEDURE — 96374 THER/PROPH/DIAG INJ IV PUSH: CPT

## 2019-03-04 PROCEDURE — 83690 ASSAY OF LIPASE: CPT | Performed by: EMERGENCY MEDICINE

## 2019-03-04 PROCEDURE — 96361 HYDRATE IV INFUSION ADD-ON: CPT

## 2019-03-04 PROCEDURE — 80053 COMPREHEN METABOLIC PANEL: CPT | Performed by: EMERGENCY MEDICINE

## 2019-03-04 PROCEDURE — P9612 CATHETERIZE FOR URINE SPEC: HCPCS

## 2019-03-04 PROCEDURE — 25010000002 MORPHINE PER 10 MG: Performed by: EMERGENCY MEDICINE

## 2019-03-04 PROCEDURE — 25010000002 ONDANSETRON PER 1 MG: Performed by: EMERGENCY MEDICINE

## 2019-03-04 RX ORDER — MORPHINE SULFATE 2 MG/ML
4 INJECTION, SOLUTION INTRAMUSCULAR; INTRAVENOUS ONCE
Status: COMPLETED | OUTPATIENT
Start: 2019-03-04 | End: 2019-03-04

## 2019-03-04 RX ORDER — SODIUM CHLORIDE 0.9 % (FLUSH) 0.9 %
10 SYRINGE (ML) INJECTION AS NEEDED
Status: DISCONTINUED | OUTPATIENT
Start: 2019-03-04 | End: 2019-03-04 | Stop reason: HOSPADM

## 2019-03-04 RX ORDER — ONDANSETRON 2 MG/ML
4 INJECTION INTRAMUSCULAR; INTRAVENOUS ONCE
Status: COMPLETED | OUTPATIENT
Start: 2019-03-04 | End: 2019-03-04

## 2019-03-04 RX ORDER — DIPHENOXYLATE HYDROCHLORIDE AND ATROPINE SULFATE 2.5; .025 MG/1; MG/1
1 TABLET ORAL 4 TIMES DAILY PRN
Qty: 12 TABLET | Refills: 0 | Status: SHIPPED | OUTPATIENT
Start: 2019-03-04 | End: 2019-03-22 | Stop reason: HOSPADM

## 2019-03-04 RX ORDER — ONDANSETRON 4 MG/1
4 TABLET, ORALLY DISINTEGRATING ORAL EVERY 8 HOURS PRN
Qty: 12 TABLET | Refills: 0 | Status: ON HOLD | OUTPATIENT
Start: 2019-03-04 | End: 2019-06-27

## 2019-03-04 RX ADMIN — SODIUM CHLORIDE 1000 ML: 9 INJECTION, SOLUTION INTRAVENOUS at 19:52

## 2019-03-04 RX ADMIN — MORPHINE SULFATE 4 MG: 2 INJECTION, SOLUTION INTRAMUSCULAR; INTRAVENOUS at 18:19

## 2019-03-04 RX ADMIN — ONDANSETRON 4 MG: 2 INJECTION INTRAMUSCULAR; INTRAVENOUS at 18:17

## 2019-03-04 NOTE — ED NOTES
"Pt currently on abx. Pt here for worsening lower abd pain moving into bilat lower back x 1 week, had CT scan done 2/28. Pt reports trouble urinating, and having \"diarrhea every time I pee\".      Rayne Loaiza, RN  03/04/19 [id number]    "

## 2019-03-05 NOTE — ED PROVIDER NOTES
EMERGENCY DEPARTMENT ENCOUNTER    CHIEF COMPLAINT  Chief Complaint: Diarrhea   History given by: patient   History limited by: n/a  Room Number: 19/19  PMD: Reyes, Miriam Mercado, MD      HPI:  Pt is a 78 y.o. female who presents complaining of diarrhea that has been ongoing for the 5 days. She describes the stool as watery, and denies noticed blood in the stool. She also complains of lower abd pain and cramping, nausea, and decreased appetite. Pt states that she was seen by her PMD after the onset of sx, and had a CT abd/pelvis 4 days ago. Pt states that her sx have not improved. Pt states that she was started on abx (unsure which one) by her PMD around the time of onset of the diarrhea    Duration:  5 days   Onset: gradual  Timing: constant   Location: GI   Radiation: n/a   Quality: diarrhea  Intensity/Severity: moderate  Progression: unchanged   Associated Symptoms: abd pain, nausea, decreased appetite  Aggravating Factors: none    PAST MEDICAL HISTORY  Active Ambulatory Problems     Diagnosis Date Noted   • S/P AVR (aortic valve replacement) 04/04/2016   • S/P TVR (tricuspid valve repair) 04/04/2016   • S/P MVR (mitral valve repair) 04/04/2016   • Benign essential hypertension 05/03/2016   • Chest tightness 05/03/2016   • Mitral and aortic valve disease 05/03/2016   • CKD (chronic kidney disease) stage 3, GFR 30-59 ml/min (CMS/HCC) 11/01/2016   • Small bowel obstruction (CMS/HCC) 12/21/2016   • Abdominal pain 12/22/2016   • Slow transit constipation 12/27/2016   • Hyperlipidemia 05/02/2017   • Pacemaker 12/06/2017     Resolved Ambulatory Problems     Diagnosis Date Noted   • No Resolved Ambulatory Problems     Past Medical History:   Diagnosis Date   • Aneurysm (CMS/HCC)    • Aortic valve disease    • Arthritis    • Atrophic kidney    • Bradycardia    • CAD (coronary artery disease)    • CKD (chronic kidney disease) stage 4, GFR 15-29 ml/min (CMS/HCC)    • Depression    • Dysphagia    • Essential hypertension     • [place] (hard of hearing)    • Hyperlipidemia    • Mitral valve disease    • Pulmonary hypertension (CMS/HCC)    • Sick sinus syndrome (CMS/HCC)    • Sleep apnea    • Spinal stenosis        PAST SURGICAL HISTORY  Past Surgical History:   Procedure Laterality Date   • AORTIC VALVE REPAIR/REPLACEMENT  2015    Dr Veliz   • CARDIAC CATHETERIZATION     • CARDIAC DEFIBRILLATOR PLACEMENT     •  SECTION     • HYSTERECTOMY     • MITRAL VALVE ANNULOPLASTY  2015    Dr Veliz   • TRICUSPID VALVE SURGERY  2015    Dr Veliz       FAMILY HISTORY  Family History   Problem Relation Age of Onset   • ALS Mother    • Hypertension Father        SOCIAL HISTORY  Social History     Socioeconomic History   • Marital status:      Spouse name: Not on file   • Number of children: Not on file   • Years of education: Not on file   • Highest education level: Not on file   Social Needs   • Financial resource strain: Not on file   • Food insecurity - worry: Not on file   • Food insecurity - inability: Not on file   • Transportation needs - medical: Not on file   • Transportation needs - non-medical: Not on file   Occupational History   • Not on file   Tobacco Use   • Smoking status: Former Smoker     Last attempt to quit: 1988     Years since quittin.1   • Smokeless tobacco: Never Used   Substance and Sexual Activity   • Alcohol use: No   • Drug use: No   • Sexual activity: Defer   Other Topics Concern   • Not on file   Social History Narrative   • Not on file       ALLERGIES  Patient has no known allergies.    REVIEW OF SYSTEMS  Review of Systems   Constitutional: Positive for appetite change. Negative for fever.   HENT: Negative for sore throat.    Eyes: Negative.    Respiratory: Negative for cough and shortness of breath.    Cardiovascular: Negative for chest pain.   Gastrointestinal: Positive for abdominal pain, diarrhea and nausea. Negative for vomiting.   Genitourinary: Negative for dysuria.    Musculoskeletal: Negative for neck pain.   Skin: Negative for rash.   Neurological: Negative for weakness, numbness and headaches.   Hematological: Negative.    Psychiatric/Behavioral: Negative.    All other systems reviewed and are negative.      PHYSICAL EXAM  ED Triage Vitals   Temp Heart Rate Resp BP SpO2   03/04/19 1749 03/04/19 1749 03/04/19 1749 03/04/19 1800 03/04/19 1749   97.3 °F (36.3 °C) 79 18 (!) 178/109 100 %      Temp src Heart Rate Source Patient Position BP Location FiO2 (%)   03/04/19 1749 03/04/19 1749 -- -- --   Tympanic Monitor          Physical Exam   Constitutional: She is oriented to person, place, and time. No distress.   HENT:   Head: Normocephalic and atraumatic.   Mouth/Throat: Mucous membranes are dry.   Eyes: EOM are normal. Pupils are equal, round, and reactive to light.   Neck: Normal range of motion. Neck supple.   Cardiovascular: Normal rate, regular rhythm and normal heart sounds.   Pulmonary/Chest: Effort normal and breath sounds normal. No respiratory distress.   Abdominal: Soft. There is no tenderness. There is no rebound and no guarding.   Musculoskeletal: Normal range of motion. She exhibits no edema.   Neurological: She is alert and oriented to person, place, and time. She has normal sensation and normal strength.   Skin: Skin is warm and dry. No rash noted.   Psychiatric: Mood and affect normal.   Nursing note and vitals reviewed.      LAB RESULTS  Lab Results (last 24 hours)     Procedure Component Value Units Date/Time    CBC & Differential [[id number]] Collected:  03/04/19 1805    Specimen:  Blood Updated:  03/04/19 1826    Narrative:       The following orders were created for panel order CBC & Differential.  Procedure                               Abnormality         Status                     ---------                               -----------         ------                     CBC Auto Differential[[id number]]        Abnormal            Final result                  Please view results for these tests on the individual orders.    Comprehensive Metabolic Panel [[id number]]  (Abnormal) Collected:  03/04/19 1805    Specimen:  Blood Updated:  03/04/19 1848     Glucose 92 mg/dL      BUN 22 mg/dL      Creatinine 1.52 mg/dL      Sodium 139 mmol/L      Potassium 3.8 mmol/L      Chloride 102 mmol/L      CO2 24.2 mmol/L      Calcium 10.1 mg/dL      Total Protein 7.1 g/dL      Albumin 4.50 g/dL      ALT (SGPT) 44 U/L      AST (SGOT) 47 U/L      Alkaline Phosphatase 49 U/L      Total Bilirubin 0.6 mg/dL      eGFR Non African Amer 33 mL/min/1.73      Globulin 2.6 gm/dL      A/G Ratio 1.7 g/dL      BUN/Creatinine Ratio 14.5     Anion Gap 12.8 mmol/L     Narrative:       The MDRD GFR formula is only valid for adults with stable renal function between ages 18 and 70.    Lipase [[id number]]  (Normal) Collected:  03/04/19 1805    Specimen:  Blood Updated:  03/04/19 1848     Lipase 49 U/L     CBC Auto Differential [[id number]]  (Abnormal) Collected:  03/04/19 1805    Specimen:  Blood Updated:  03/04/19 1826     WBC 8.29 10*3/mm3      RBC 4.68 10*6/mm3      Hemoglobin 14.2 g/dL      Hematocrit 44.6 %      MCV 95.3 fL      MCH 30.3 pg      MCHC 31.8 g/dL      RDW 13.6 %      RDW-SD 47.9 fl      MPV 12.1 fL      Platelets 207 10*3/mm3      Neutrophil % 76.9 %      Lymphocyte % 10.6 %      Monocyte % 10.9 %      Eosinophil % 1.3 %      Basophil % 0.1 %      Immature Grans % 0.2 %      Neutrophils, Absolute 6.37 10*3/mm3      Lymphocytes, Absolute 0.88 10*3/mm3      Monocytes, Absolute 0.90 10*3/mm3      Eosinophils, Absolute 0.11 10*3/mm3      Basophils, Absolute 0.01 10*3/mm3      Immature Grans, Absolute 0.02 10*3/mm3      nRBC 0.0 /100 WBC     Urinalysis With Microscopic If Indicated (No Culture) - Urine, Catheter [[id number]]  (Normal) Collected:  03/04/19 1952    Specimen:  Urine, Catheter Updated:  03/04/19 2006     Color, UA Yellow     Appearance, UA Clear     pH, UA 8.0     Specific Gravity,  UA 1.006     Glucose, UA Negative     Ketones, UA Negative     Bilirubin, UA Negative     Blood, UA Negative     Protein, UA Negative     Leuk Esterase, UA Negative     Nitrite, UA Negative     Urobilinogen, UA 0.2 E.U./dL    Narrative:       Urine microscopic not indicated.          I ordered the above labs and reviewed the results    PROCEDURES  Procedures      PROGRESS AND CONSULTS       18:13  CMP, CBC, lipase, and UA. Morphine and Zofran ordered to treat pain and nausea.     19:06  BP- (!) 182/98 HR- 73 Temp- 97.3 °F (36.3 °C) (Tympanic) O2 sat- 99%  Informed pt of the plan for labs. Pt understands and agrees with the plan, all questions answered.    19:26  IVF ordered for hydration.     20:25  BP- 172/95 HR- 77 Temp- 97.3 °F (36.3 °C) (Tympanic) O2 sat- 100%  Rechecked the patient who is in NAD and is resting comfortably. Pt reports improvement of the IVF and medication. Informed pt that the labs show NAD. Plan for d/c with rx for Lomotil to treat diarrhea and Zofran to treat nausea  Pt understands and agrees with the plan, all questions answered.    MEDICAL DECISION MAKING  Results were reviewed/discussed with the patient and they were also made aware of online access. Pt also made aware that some labs, such as cultures, will not be resulted during ER visit and follow up with PMD is necessary.     MDM  Number of Diagnoses or Management Options     Amount and/or Complexity of Data Reviewed  Clinical lab tests: reviewed and ordered (WBC=8.29)  Decide to obtain previous medical records or to obtain history from someone other than the patient: yes  Review and summarize past medical records: yes (CT abd/pelvis on 2/28/19 showed atrophic left kidney, NAD. )    Patient Progress  Patient progress: stable         DIAGNOSIS  Final diagnoses:   Diarrhea, unspecified type   Nausea   Abdominal pain, unspecified abdominal location       DISPOSITION  DISCHARGE    Patient discharged in stable condition.    Reviewed  implications of results, diagnosis, meds, responsibility to follow up, warning signs and symptoms of possible worsening, potential complications and reasons to return to ER.    Patient/Family voiced understanding of above instructions.    Discussed plan for discharge, as there is no emergent indication for admission. Patient referred to primary care provider for BP management due to today's BP. Pt/family is agreeable and understands need for follow up and repeat testing.  Pt is aware that discharge does not mean that nothing is wrong but it indicates no emergency is present that requires admission and they must continue care with follow-up as given below or physician of their choice.     FOLLOW-UP  Reyes, Miriam Mercado, MD  [address] [address]  [phone]    Schedule an appointment as soon as possible for a visit            Medication List      New Prescriptions    diphenoxylate-atropine 2.5-0.025 MG per tablet  Commonly known as:  LOMOTIL  Take 1 tablet by mouth 4 (Four) Times a Day As Needed for Diarrhea.     ondansetron ODT 4 MG disintegrating tablet  Commonly known as:  ZOFRAN ODT  Take 1 tablet by mouth Every 8 (Eight) Hours As Needed for Nausea or   Vomiting.          Latest Documented Vital Signs:  As of 11:25 PM  BP- 146/92 HR- 73 Temp- 97.3 °F (36.3 °C) (Tympanic) O2 sat- 98%    --  Documentation assistance provided by jasmin Jansen for Dr Riggins.  Information recorded by the scribe was done at my direction and has been verified and validated by me.        Bonnie Jansen  03/04/19 2147       Jordan Riggins MD  03/04/19 1231

## 2019-03-18 ENCOUNTER — APPOINTMENT (OUTPATIENT)
Dept: CT IMAGING | Facility: HOSPITAL | Age: 79
End: 2019-03-18

## 2019-03-18 ENCOUNTER — HOSPITAL ENCOUNTER (INPATIENT)
Facility: HOSPITAL | Age: 79
LOS: 4 days | Discharge: NURSING, REHAB OR LONG-TERM CARE | End: 2019-03-22
Attending: EMERGENCY MEDICINE | Admitting: HOSPITALIST

## 2019-03-18 DIAGNOSIS — R10.84 GENERALIZED ABDOMINAL PAIN: ICD-10-CM

## 2019-03-18 DIAGNOSIS — Z74.09 IMPAIRED FUNCTIONAL MOBILITY AND ENDURANCE: ICD-10-CM

## 2019-03-18 DIAGNOSIS — K52.9 COLITIS: Primary | ICD-10-CM

## 2019-03-18 DIAGNOSIS — D72.829 LEUKOCYTOSIS, UNSPECIFIED TYPE: ICD-10-CM

## 2019-03-18 DIAGNOSIS — R19.7 DIARRHEA, UNSPECIFIED TYPE: ICD-10-CM

## 2019-03-18 PROBLEM — R74.8 ELEVATED LIPASE: Status: ACTIVE | Noted: 2019-03-18

## 2019-03-18 PROBLEM — A04.72 C. DIFFICILE COLITIS: Status: ACTIVE | Noted: 2019-03-18

## 2019-03-18 PROBLEM — K56.7 ILEUS: Status: ACTIVE | Noted: 2019-03-18

## 2019-03-18 LAB
ADV 40+41 DNA STL QL NAA+NON-PROBE: NOT DETECTED
ALBUMIN SERPL-MCNC: 4.3 G/DL (ref 3.5–5.2)
ALBUMIN/GLOB SERPL: 1.8 G/DL
ALP SERPL-CCNC: 55 U/L (ref 39–117)
ALT SERPL W P-5'-P-CCNC: 33 U/L (ref 1–33)
ANION GAP SERPL CALCULATED.3IONS-SCNC: 14.6 MMOL/L
AST SERPL-CCNC: 28 U/L (ref 1–32)
ASTRO TYP 1-8 RNA STL QL NAA+NON-PROBE: NOT DETECTED
BASOPHILS # BLD AUTO: 0.04 10*3/MM3 (ref 0–0.2)
BASOPHILS NFR BLD AUTO: 0.2 % (ref 0–1.5)
BILIRUB SERPL-MCNC: 0.4 MG/DL (ref 0.1–1.2)
BILIRUB UR QL STRIP: NEGATIVE
BUN BLD-MCNC: 25 MG/DL (ref 8–23)
BUN/CREAT SERPL: 16.7 (ref 7–25)
C CAYETANENSIS DNA STL QL NAA+NON-PROBE: NOT DETECTED
C DIFF TOX GENS STL QL NAA+PROBE: POSITIVE
CALCIUM SPEC-SCNC: 9.7 MG/DL (ref 8.6–10.5)
CAMPY SP DNA.DIARRHEA STL QL NAA+PROBE: NOT DETECTED
CHLORIDE SERPL-SCNC: 99 MMOL/L (ref 98–107)
CLARITY UR: CLEAR
CO2 SERPL-SCNC: 28.4 MMOL/L (ref 22–29)
COLOR UR: YELLOW
CREAT BLD-MCNC: 1.5 MG/DL (ref 0.57–1)
CRYPTOSP STL CULT: NOT DETECTED
D-LACTATE SERPL-SCNC: 1.2 MMOL/L (ref 0.5–2)
DEPRECATED RDW RBC AUTO: 49.9 FL (ref 37–54)
E COLI DNA SPEC QL NAA+PROBE: NOT DETECTED
E HISTOLYT AG STL-ACNC: NOT DETECTED
EAEC PAA PLAS AGGR+AATA ST NAA+NON-PRB: NOT DETECTED
EC STX1 + STX2 GENES STL NAA+PROBE: NOT DETECTED
EOSINOPHIL # BLD AUTO: 0.08 10*3/MM3 (ref 0–0.4)
EOSINOPHIL NFR BLD AUTO: 0.4 % (ref 0.3–6.2)
EPEC EAE GENE STL QL NAA+NON-PROBE: NOT DETECTED
ERYTHROCYTE [DISTWIDTH] IN BLOOD BY AUTOMATED COUNT: 13.7 % (ref 12.3–15.4)
ETEC LTA+ST1A+ST1B TOX ST NAA+NON-PROBE: NOT DETECTED
G LAMBLIA DNA SPEC QL NAA+PROBE: NOT DETECTED
GFR SERPL CREATININE-BSD FRML MDRD: 33 ML/MIN/1.73
GLOBULIN UR ELPH-MCNC: 2.4 GM/DL
GLUCOSE BLD-MCNC: 115 MG/DL (ref 65–99)
GLUCOSE UR STRIP-MCNC: NEGATIVE MG/DL
HCT VFR BLD AUTO: 43.1 % (ref 34–46.6)
HGB BLD-MCNC: 13.8 G/DL (ref 12–15.9)
HGB UR QL STRIP.AUTO: NEGATIVE
IMM GRANULOCYTES # BLD AUTO: 0.11 10*3/MM3 (ref 0–0.05)
IMM GRANULOCYTES NFR BLD AUTO: 0.6 % (ref 0–0.5)
KETONES UR QL STRIP: NEGATIVE
LEUKOCYTE ESTERASE UR QL STRIP.AUTO: NEGATIVE
LIPASE SERPL-CCNC: 85 U/L (ref 13–60)
LYMPHOCYTES # BLD AUTO: 0.32 10*3/MM3 (ref 0.7–3.1)
LYMPHOCYTES NFR BLD AUTO: 1.7 % (ref 19.6–45.3)
MCH RBC QN AUTO: 31.5 PG (ref 26.6–33)
MCHC RBC AUTO-ENTMCNC: 32 G/DL (ref 31.5–35.7)
MCV RBC AUTO: 98.4 FL (ref 79–97)
MONOCYTES # BLD AUTO: 1.2 10*3/MM3 (ref 0.1–0.9)
MONOCYTES NFR BLD AUTO: 6.4 % (ref 5–12)
NEUTROPHILS # BLD AUTO: 16.88 10*3/MM3 (ref 1.4–7)
NEUTROPHILS NFR BLD AUTO: 90.7 % (ref 42.7–76)
NITRITE UR QL STRIP: NEGATIVE
NOROVIRUS GI+II RNA STL QL NAA+NON-PROBE: NOT DETECTED
NRBC BLD AUTO-RTO: 0 /100 WBC (ref 0–0)
P SHIGELLOIDES DNA STL QL NAA+NON-PROBE: NOT DETECTED
PH UR STRIP.AUTO: 7 [PH] (ref 5–8)
PLATELET # BLD AUTO: 205 10*3/MM3 (ref 140–450)
PMV BLD AUTO: 12.3 FL (ref 6–12)
POTASSIUM BLD-SCNC: 4.2 MMOL/L (ref 3.5–5.2)
PROT SERPL-MCNC: 6.7 G/DL (ref 6–8.5)
PROT UR QL STRIP: NEGATIVE
RBC # BLD AUTO: 4.38 10*6/MM3 (ref 3.77–5.28)
RV RNA STL NAA+PROBE: NOT DETECTED
SALMONELLA DNA SPEC QL NAA+PROBE: NOT DETECTED
SAPO I+II+IV+V RNA STL QL NAA+NON-PROBE: NOT DETECTED
SHIGELLA SP+EIEC IPAH STL QL NAA+PROBE: NOT DETECTED
SODIUM BLD-SCNC: 142 MMOL/L (ref 136–145)
SP GR UR STRIP: 1.01 (ref 1–1.03)
UROBILINOGEN UR QL STRIP: NORMAL
V CHOLERAE DNA SPEC QL NAA+PROBE: NOT DETECTED
VIBRIO DNA SPEC NAA+PROBE: NOT DETECTED
WBC NRBC COR # BLD: 18.63 10*3/MM3 (ref 3.4–10.8)
YERSINIA STL CULT: NOT DETECTED

## 2019-03-18 PROCEDURE — 87493 C DIFF AMPLIFIED PROBE: CPT | Performed by: HOSPITALIST

## 2019-03-18 PROCEDURE — 99222 1ST HOSP IP/OBS MODERATE 55: CPT | Performed by: INTERNAL MEDICINE

## 2019-03-18 PROCEDURE — 83690 ASSAY OF LIPASE: CPT | Performed by: EMERGENCY MEDICINE

## 2019-03-18 PROCEDURE — 36415 COLL VENOUS BLD VENIPUNCTURE: CPT

## 2019-03-18 PROCEDURE — 81003 URINALYSIS AUTO W/O SCOPE: CPT | Performed by: EMERGENCY MEDICINE

## 2019-03-18 PROCEDURE — 74176 CT ABD & PELVIS W/O CONTRAST: CPT

## 2019-03-18 PROCEDURE — 87999 UNLISTED MICROBIOLOGY PX: CPT | Performed by: EMERGENCY MEDICINE

## 2019-03-18 PROCEDURE — 25010000002 MORPHINE PER 10 MG: Performed by: HOSPITALIST

## 2019-03-18 PROCEDURE — 87040 BLOOD CULTURE FOR BACTERIA: CPT | Performed by: HOSPITALIST

## 2019-03-18 PROCEDURE — 83605 ASSAY OF LACTIC ACID: CPT | Performed by: HOSPITALIST

## 2019-03-18 PROCEDURE — 25010000002 ONDANSETRON PER 1 MG: Performed by: NURSE PRACTITIONER

## 2019-03-18 PROCEDURE — 25010000002 ONDANSETRON PER 1 MG: Performed by: EMERGENCY MEDICINE

## 2019-03-18 PROCEDURE — 25010000002 MORPHINE PER 10 MG: Performed by: EMERGENCY MEDICINE

## 2019-03-18 PROCEDURE — 85025 COMPLETE CBC W/AUTO DIFF WBC: CPT | Performed by: EMERGENCY MEDICINE

## 2019-03-18 PROCEDURE — 80053 COMPREHEN METABOLIC PANEL: CPT | Performed by: EMERGENCY MEDICINE

## 2019-03-18 PROCEDURE — 99285 EMERGENCY DEPT VISIT HI MDM: CPT

## 2019-03-18 RX ORDER — BUPROPION HYDROCHLORIDE 300 MG/1
300 TABLET ORAL DAILY
Status: DISCONTINUED | OUTPATIENT
Start: 2019-03-18 | End: 2019-03-22 | Stop reason: HOSPADM

## 2019-03-18 RX ORDER — MORPHINE SULFATE 2 MG/ML
1 INJECTION, SOLUTION INTRAMUSCULAR; INTRAVENOUS
Status: DISCONTINUED | OUTPATIENT
Start: 2019-03-18 | End: 2019-03-21

## 2019-03-18 RX ORDER — ONDANSETRON 4 MG/1
4 TABLET, ORALLY DISINTEGRATING ORAL EVERY 6 HOURS PRN
Status: DISCONTINUED | OUTPATIENT
Start: 2019-03-18 | End: 2019-03-22 | Stop reason: HOSPADM

## 2019-03-18 RX ORDER — SODIUM CHLORIDE 9 MG/ML
100 INJECTION, SOLUTION INTRAVENOUS CONTINUOUS
Status: DISCONTINUED | OUTPATIENT
Start: 2019-03-18 | End: 2019-03-18

## 2019-03-18 RX ORDER — ASPIRIN 325 MG
325 TABLET ORAL DAILY
Status: DISCONTINUED | OUTPATIENT
Start: 2019-03-18 | End: 2019-03-22 | Stop reason: HOSPADM

## 2019-03-18 RX ORDER — NEBIVOLOL 10 MG/1
10 TABLET ORAL
Status: DISCONTINUED | OUTPATIENT
Start: 2019-03-18 | End: 2019-03-22 | Stop reason: HOSPADM

## 2019-03-18 RX ORDER — DONEPEZIL HYDROCHLORIDE 10 MG/1
10 TABLET, FILM COATED ORAL NIGHTLY
Refills: 4 | COMMUNITY
Start: 2019-02-21 | End: 2019-03-22 | Stop reason: HOSPADM

## 2019-03-18 RX ORDER — SODIUM CHLORIDE 0.9 % (FLUSH) 0.9 %
3-10 SYRINGE (ML) INJECTION AS NEEDED
Status: DISCONTINUED | OUTPATIENT
Start: 2019-03-18 | End: 2019-03-19

## 2019-03-18 RX ORDER — SODIUM CHLORIDE 0.9 % (FLUSH) 0.9 %
10 SYRINGE (ML) INJECTION AS NEEDED
Status: DISCONTINUED | OUTPATIENT
Start: 2019-03-18 | End: 2019-03-19

## 2019-03-18 RX ORDER — SODIUM CHLORIDE, SODIUM LACTATE, POTASSIUM CHLORIDE, CALCIUM CHLORIDE 600; 310; 30; 20 MG/100ML; MG/100ML; MG/100ML; MG/100ML
75 INJECTION, SOLUTION INTRAVENOUS CONTINUOUS
Status: DISCONTINUED | OUTPATIENT
Start: 2019-03-18 | End: 2019-03-20

## 2019-03-18 RX ORDER — ACETAMINOPHEN 325 MG/1
650 TABLET ORAL EVERY 4 HOURS PRN
Status: DISCONTINUED | OUTPATIENT
Start: 2019-03-18 | End: 2019-03-22 | Stop reason: HOSPADM

## 2019-03-18 RX ORDER — NITROGLYCERIN 0.4 MG/1
0.4 TABLET SUBLINGUAL
Status: DISCONTINUED | OUTPATIENT
Start: 2019-03-18 | End: 2019-03-22 | Stop reason: HOSPADM

## 2019-03-18 RX ORDER — SODIUM CHLORIDE 0.9 % (FLUSH) 0.9 %
3 SYRINGE (ML) INJECTION EVERY 12 HOURS SCHEDULED
Status: DISCONTINUED | OUTPATIENT
Start: 2019-03-18 | End: 2019-03-19

## 2019-03-18 RX ORDER — ONDANSETRON 4 MG/1
4 TABLET, FILM COATED ORAL EVERY 6 HOURS PRN
Status: DISCONTINUED | OUTPATIENT
Start: 2019-03-18 | End: 2019-03-22 | Stop reason: HOSPADM

## 2019-03-18 RX ORDER — HYDROCODONE BITARTRATE AND ACETAMINOPHEN 10; 325 MG/1; MG/1
1 TABLET ORAL EVERY 4 HOURS PRN
Status: DISCONTINUED | OUTPATIENT
Start: 2019-03-18 | End: 2019-03-22 | Stop reason: HOSPADM

## 2019-03-18 RX ORDER — ONDANSETRON 2 MG/ML
4 INJECTION INTRAMUSCULAR; INTRAVENOUS ONCE
Status: COMPLETED | OUTPATIENT
Start: 2019-03-18 | End: 2019-03-18

## 2019-03-18 RX ORDER — SODIUM CHLORIDE 0.9 % (FLUSH) 0.9 %
3 SYRINGE (ML) INJECTION EVERY 12 HOURS SCHEDULED
Status: DISCONTINUED | OUTPATIENT
Start: 2019-03-18 | End: 2019-03-22 | Stop reason: HOSPADM

## 2019-03-18 RX ORDER — MORPHINE SULFATE 2 MG/ML
2 INJECTION, SOLUTION INTRAMUSCULAR; INTRAVENOUS ONCE
Status: COMPLETED | OUTPATIENT
Start: 2019-03-18 | End: 2019-03-18

## 2019-03-18 RX ORDER — FLUOXETINE HYDROCHLORIDE 20 MG/1
60 CAPSULE ORAL DAILY
Status: DISCONTINUED | OUTPATIENT
Start: 2019-03-18 | End: 2019-03-22 | Stop reason: HOSPADM

## 2019-03-18 RX ORDER — SACCHAROMYCES BOULARDII 250 MG
250 CAPSULE ORAL 2 TIMES DAILY
Status: DISCONTINUED | OUTPATIENT
Start: 2019-03-18 | End: 2019-03-22 | Stop reason: HOSPADM

## 2019-03-18 RX ORDER — ONDANSETRON 2 MG/ML
4 INJECTION INTRAMUSCULAR; INTRAVENOUS EVERY 6 HOURS PRN
Status: DISCONTINUED | OUTPATIENT
Start: 2019-03-18 | End: 2019-03-22 | Stop reason: HOSPADM

## 2019-03-18 RX ORDER — LANOLIN ALCOHOL/MO/W.PET/CERES
400 CREAM (GRAM) TOPICAL DAILY
Status: DISCONTINUED | OUTPATIENT
Start: 2019-03-18 | End: 2019-03-22 | Stop reason: HOSPADM

## 2019-03-18 RX ORDER — SODIUM CHLORIDE 0.9 % (FLUSH) 0.9 %
1-10 SYRINGE (ML) INJECTION AS NEEDED
Status: DISCONTINUED | OUTPATIENT
Start: 2019-03-18 | End: 2019-03-22 | Stop reason: HOSPADM

## 2019-03-18 RX ADMIN — MORPHINE SULFATE 1 MG: 2 INJECTION, SOLUTION INTRAMUSCULAR; INTRAVENOUS at 12:11

## 2019-03-18 RX ADMIN — MORPHINE SULFATE 2 MG: 2 INJECTION, SOLUTION INTRAMUSCULAR; INTRAVENOUS at 05:08

## 2019-03-18 RX ADMIN — ONDANSETRON HYDROCHLORIDE 4 MG: 2 SOLUTION INTRAMUSCULAR; INTRAVENOUS at 06:29

## 2019-03-18 RX ADMIN — ONDANSETRON HYDROCHLORIDE 4 MG: 2 SOLUTION INTRAMUSCULAR; INTRAVENOUS at 16:39

## 2019-03-18 RX ADMIN — SODIUM CHLORIDE, PRESERVATIVE FREE 3 ML: 5 INJECTION INTRAVENOUS at 20:29

## 2019-03-18 RX ADMIN — MORPHINE SULFATE 1 MG: 2 INJECTION, SOLUTION INTRAMUSCULAR; INTRAVENOUS at 20:29

## 2019-03-18 RX ADMIN — MORPHINE SULFATE 1 MG: 2 INJECTION, SOLUTION INTRAMUSCULAR; INTRAVENOUS at 16:19

## 2019-03-18 RX ADMIN — FLUOXETINE HYDROCHLORIDE 60 MG: 20 CAPSULE ORAL at 09:17

## 2019-03-18 RX ADMIN — ONDANSETRON HYDROCHLORIDE 4 MG: 2 SOLUTION INTRAMUSCULAR; INTRAVENOUS at 05:08

## 2019-03-18 RX ADMIN — VANCOMYCIN 125 MG: KIT at 23:26

## 2019-03-18 RX ADMIN — HYDROCODONE BITARTRATE AND ACETAMINOPHEN 1 TABLET: 10; 325 TABLET ORAL at 19:21

## 2019-03-18 RX ADMIN — VANCOMYCIN 125 MG: KIT at 13:40

## 2019-03-18 RX ADMIN — HYDROCODONE BITARTRATE AND ACETAMINOPHEN 1 TABLET: 10; 325 TABLET ORAL at 10:33

## 2019-03-18 RX ADMIN — SODIUM CHLORIDE, POTASSIUM CHLORIDE, SODIUM LACTATE AND CALCIUM CHLORIDE 75 ML/HR: 600; 310; 30; 20 INJECTION, SOLUTION INTRAVENOUS at 16:40

## 2019-03-18 RX ADMIN — SODIUM CHLORIDE 100 ML/HR: 9 INJECTION, SOLUTION INTRAVENOUS at 06:32

## 2019-03-18 RX ADMIN — ONDANSETRON HYDROCHLORIDE 4 MG: 2 SOLUTION INTRAMUSCULAR; INTRAVENOUS at 23:26

## 2019-03-18 RX ADMIN — HYDROCODONE BITARTRATE AND ACETAMINOPHEN 1 TABLET: 10; 325 TABLET ORAL at 15:13

## 2019-03-18 RX ADMIN — SODIUM CHLORIDE, PRESERVATIVE FREE 3 ML: 5 INJECTION INTRAVENOUS at 10:02

## 2019-03-18 RX ADMIN — HYDROCODONE BITARTRATE AND ACETAMINOPHEN 1 TABLET: 10; 325 TABLET ORAL at 06:29

## 2019-03-18 RX ADMIN — Medication 400 MCG: at 09:16

## 2019-03-18 RX ADMIN — METRONIDAZOLE 500 MG: 500 INJECTION, SOLUTION INTRAVENOUS at 09:16

## 2019-03-18 RX ADMIN — Medication 250 MG: at 20:28

## 2019-03-18 RX ADMIN — SODIUM CHLORIDE 1000 ML: 9 INJECTION, SOLUTION INTRAVENOUS at 05:07

## 2019-03-18 RX ADMIN — VANCOMYCIN 125 MG: KIT at 17:53

## 2019-03-18 RX ADMIN — NEBIVOLOL HYDROCHLORIDE 10 MG: 10 TABLET ORAL at 09:16

## 2019-03-18 RX ADMIN — ASPIRIN 325 MG: 325 TABLET ORAL at 09:17

## 2019-03-18 RX ADMIN — BUPROPION HYDROCHLORIDE 300 MG: 300 TABLET, EXTENDED RELEASE ORAL at 09:17

## 2019-03-18 RX ADMIN — HYDROCODONE BITARTRATE AND ACETAMINOPHEN 1 TABLET: 10; 325 TABLET ORAL at 23:26

## 2019-03-18 RX ADMIN — SODIUM CHLORIDE, PRESERVATIVE FREE 3 ML: 5 INJECTION INTRAVENOUS at 12:12

## 2019-03-18 NOTE — PLAN OF CARE
Problem: Fall Risk (Adult)  Goal: Identify Related Risk Factors and Signs and Symptoms  Outcome: Ongoing (interventions implemented as appropriate)    Goal: Absence of Fall  Outcome: Ongoing (interventions implemented as appropriate)      Problem: Patient Care Overview  Goal: Plan of Care Review  Outcome: Ongoing (interventions implemented as appropriate)   03/18/19 1700   Coping/Psychosocial   Plan of Care Reviewed With patient   Plan of Care Review   Progress improving   OTHER   Outcome Summary No falls. Prn pain meds as requested. C/o nausea and eating very little. Stool positive for C-Diff this am. Started on po Vancomycin. Cont to monitor.     Goal: Individualization and Mutuality  Outcome: Ongoing (interventions implemented as appropriate)    Goal: Discharge Needs Assessment  Outcome: Ongoing (interventions implemented as appropriate)    Goal: Interprofessional Rounds/Family Conf  Outcome: Ongoing (interventions implemented as appropriate)      Problem: Pain, Acute (Adult)  Goal: Identify Related Risk Factors and Signs and Symptoms  Outcome: Ongoing (interventions implemented as appropriate)    Goal: Acceptable Pain Control/Comfort Level  Outcome: Ongoing (interventions implemented as appropriate)      Problem: Diarrhea (Adult)  Goal: Identify Related Risk Factors and Signs and Symptoms  Outcome: Ongoing (interventions implemented as appropriate)    Goal: Improved/Reduced Symptoms  Outcome: Ongoing (interventions implemented as appropriate)

## 2019-03-18 NOTE — PLAN OF CARE
Problem: Fall Risk (Adult)  Goal: Identify Related Risk Factors and Signs and Symptoms  Outcome: Ongoing (interventions implemented as appropriate)   03/18/19 0655   Fall Risk (Adult)   Related Risk Factors (Fall Risk) fatigue/slow reaction;depression/anxiety;fear of falling;environment unfamiliar   Signs and Symptoms (Fall Risk) presence of risk factors     Goal: Absence of Fall  Outcome: Ongoing (interventions implemented as appropriate)   03/18/19 0655   Fall Risk (Adult)   Absence of Fall making progress toward outcome       Problem: Patient Care Overview  Goal: Plan of Care Review  Outcome: Ongoing (interventions implemented as appropriate)   03/18/19 0655   Coping/Psychosocial   Plan of Care Reviewed With patient   Plan of Care Review   Progress no change   OTHER   Outcome Summary Pain meds as needed, IVF, IV antibx, NPO, nausea treated, attended, paced, will monitor closely,      Goal: Individualization and Mutuality  Outcome: Ongoing (interventions implemented as appropriate)   03/18/19 0655   Individualization   Patient Specific Preferences none   Patient Specific Goals (Include Timeframe) control pain, treat infection, prevent fall   Patient Specific Interventions oral pain meds, IV antibx, bed alrm on       Problem: Pain, Acute (Adult)  Goal: Identify Related Risk Factors and Signs and Symptoms  Outcome: Ongoing (interventions implemented as appropriate)   03/18/19 0655   Pain, Acute (Adult)   Related Risk Factors (Acute Pain) persistent pain;fear;positioning   Signs and Symptoms (Acute Pain) verbalization of pain descriptors;fatigue/weakness;guarding/abnormal posturing/positioning;nausea/vomiting/anorexia;questions meaning of pain     Goal: Acceptable Pain Control/Comfort Level  Outcome: Ongoing (interventions implemented as appropriate)   03/18/19 0655   Pain, Acute (Adult)   Acceptable Pain Control/Comfort Level making progress toward outcome       Problem: Diarrhea (Adult)  Goal: Identify Related Risk  Factors and Signs and Symptoms  Outcome: Ongoing (interventions implemented as appropriate)   03/18/19 0655   Diarrhea (Adult)   Related Risk Factors (Diarrhea) infectious process;anxiety/stress   Signs and Symptoms (Diarrhea) nausea and vomiting;decreased appetite;abdominal pain/cramps     Goal: Improved/Reduced Symptoms  Outcome: Ongoing (interventions implemented as appropriate)   03/18/19 0655   Diarrhea (Adult)   Improved/Reduced Symptoms making progress toward outcome

## 2019-03-18 NOTE — CONSULTS
[facility], [facility]  Initial Inpatient Consult Note    Referring Provider: Dr. Maradiaga    Reason for Consultation: C. difficile colitis    Subjective     History of present illness:    78 y.o. female with a history of chronic kidney disease, hypertension, dysphasia, admitted with diarrhea abdominal pain and fever.  Pain began 3 weeks ago, lower quadrants bilaterally, worse with movement better at rest.  Pain is worse with meals, no relation to bowel movements.  It is colicky in nature.  She was seeing bright red blood per rectum, mucus.    She has tried antibiotics from her primary care doctor and Lomotil.  No previous colonoscopy    Past Medical History:  Past Medical History:   Diagnosis Date   • Aneurysm (CMS/HCC)    • Aortic valve disease    • Arthritis    • Atrophic kidney    • Bradycardia    • CAD (coronary artery disease)    • CKD (chronic kidney disease) stage 4, GFR 15-29 ml/min (CMS/[facility])    • Depression    • Dysphagia    • Essential hypertension    • [place] (hard of hearing)    • Hyperlipidemia    • Mitral valve disease    • Pulmonary hypertension (CMS/HCC)    • Sick sinus syndrome (CMS/[facility])    • Sleep apnea    • Spinal stenosis      Past Surgical History:  Past Surgical History:   Procedure Laterality Date   • AORTIC VALVE REPAIR/REPLACEMENT  2015    Dr Veliz   • CARDIAC CATHETERIZATION     • CARDIAC DEFIBRILLATOR PLACEMENT     •  SECTION     • HYSTERECTOMY     • MITRAL VALVE ANNULOPLASTY  2015    Dr Veliz   • TRICUSPID VALVE SURGERY  2015    Dr Veliz      Social History:   Social History     Tobacco Use   • Smoking status: Former Smoker     Last attempt to quit: 1988     Years since quittin.2   • Smokeless tobacco: Never Used   Substance Use Topics   • Alcohol use: No      Family History:  Family History   Problem Relation Age of Onset   • ALS Mother    • Hypertension Father        Home Meds:  Medications Prior to Admission   Medication Sig Dispense Refill  Last Dose   • aspirin 325 MG tablet Take by mouth daily.   Taking   • atorvastatin (LIPITOR) 20 MG tablet take 1 tablet by mouth at bedtime  0 Taking   • bumetanide (BUMEX) 2 MG tablet Take 1 tablet by mouth Daily. 90 tablet 1    • buPROPion XL (WELLBUTRIN XL) 300 MG 24 hr tablet Take 300 mg by mouth Daily.  0 Taking   • Coenzyme Q10 200 MG tablet Take 1 tablet/day by mouth Daily.   Taking   • diphenoxylate-atropine (LOMOTIL) 2.5-0.025 MG per tablet Take 1 tablet by mouth 4 (Four) Times a Day As Needed for Diarrhea. 12 tablet 0    • estradiol (ESTRACE) 0.5 MG tablet Take by mouth daily.   Taking   • eszopiclone (LUNESTA) 3 MG tablet Take 3 mg by mouth every night. Take immediately before bedtime   Taking   • FLUoxetine (PROzac) 20 MG capsule Take 60 mg by mouth daily.   Taking   • folic acid (FOLVITE) 400 MCG tablet Take 1 tablet by mouth Daily. 30 tablet 0 Taking   • HYDROcodone-acetaminophen (NORCO)  MG per tablet Take 1 tablet by mouth Every 4 (Four) Hours As Needed.   Taking   • nebivolol (BYSTOLIC) 10 MG tablet Take 10 mg by mouth 2 (Two) Times a Day.   Taking   • ondansetron ODT (ZOFRAN ODT) 4 MG disintegrating tablet Take 1 tablet by mouth Every 8 (Eight) Hours As Needed for Nausea or Vomiting. 12 tablet 0    • donepezil (ARICEPT) 10 MG tablet 10 mg Every Night.  4      Current Meds:     aspirin 325 mg Oral Daily   buPROPion  mg Oral Daily   FLUoxetine 60 mg Oral Daily   folic acid 400 mcg Oral Daily   nebivolol 10 mg Oral Q24H   sodium chloride 3 mL Intravenous Q12H   sodium chloride 3 mL Intravenous Q12H   vancomycin 125 mg Oral Q6H     Allergies:  No Known Allergies  Review of Systems  She has weakness and fatigue all other systems reviewed and negative     Objective     Vital Signs  Temp:  [98.7 °F (37.1 °C)-100.9 °F (38.3 °C)] 99.4 °F (37.4 °C)  Heart Rate:  [73-78] 74  Resp:  [16-18] 16  BP: (105-140)/(58-72) 105/58  Physical Exam:  General Appearance:    Alert, cooperative, in no acute  distress   Head:    Normocephalic, without obvious abnormality, atraumatic   Eyes:            Lids and lashes normal, conjunctivae and sclerae normal, no   icterus   Throat:   No oral lesions, no thrush, oral mucosa moist   Neck:   No adenopathy, supple, trachea midline, no thyromegaly, no   carotid bruit, no JVD   Lungs:     Clear to auscultation,respirations regular, even and                   unlabored    Heart:    Regular rhythm and normal rate, normal S1 and S2, no            murmur, no gallop, no rub, no click   Chest Wall:    No abnormalities observed   Abdomen:     Normal bowel sounds, no masses, no organomegaly, soft        tender, non-distended, no guarding, no rebound                 tenderness   Rectal:     Deferred   Extremities:   no edema, no cyanosis, no redness   Skin:   No bleeding, bruising or rash   Lymph nodes:   No palpable adenopathy   Psychiatric:  Judgement and insight: normal   Orientation to person place and time: normal   Mood and affect: normal   Results Review:   I reviewed the patient's new clinical results.    Results from last 7 days   Lab Units 03/18/19  0335   WBC 10*3/mm3 18.63*   HEMOGLOBIN g/dL 13.8   HEMATOCRIT % 43.1   PLATELETS 10*3/mm3 205     Results from last 7 days   Lab Units 03/18/19  0335   SODIUM mmol/L 142   POTASSIUM mmol/L 4.2   CHLORIDE mmol/L 99   CO2 mmol/L 28.4   BUN mg/dL 25*   CREATININE mg/dL 1.50*   CALCIUM mg/dL 9.7   BILIRUBIN mg/dL 0.4   ALK PHOS U/L 55   ALT (SGPT) U/L 33   AST (SGOT) U/L 28   GLUCOSE mg/dL 115*         Lab Results   Lab Value Date/Time    LIPASE 85 (H) 03/18/2019 0335    LIPASE 49 03/04/2019 1805    LIPASE 46 12/21/2016 1842       Radiology:  CT Abdomen Pelvis Without Contrast   Final Result          Assessment/Plan   Patient Active Problem List   Diagnosis   • S/P AVR (aortic valve replacement)   • S/P TVR (tricuspid valve repair)   • S/P MVR (mitral valve repair)   • Benign essential hypertension   • Chest tightness   • Mitral and  aortic valve disease   • CKD (chronic kidney disease) stage 3, GFR 30-59 ml/min (CMS/HCC)   • Small bowel obstruction (CMS/HCC)   • Abdominal pain   • Slow transit constipation   • Hyperlipidemia   • Pacemaker   • Colitis   • Leukocytosis   • Elevated lipase   • C. difficile colitis   • Ileus (CMS/HCC)     Problem list    C. difficile colitis  Diarrhea  Abdominal pain  Abnormal imaging with mild wall thickening of the colon  Abnormal imaging with mild ileus      Assessment/Plan    I would continue p.o. vancomycin, with her white blood cell count greater than 15, I would use vancomycin instead of Flagyl in this scenario  Begin a probiotic  I see no indication to add vancomycin enemas, she has a mild ileus but p.o. vancomycin should work without issue  I would recommend an outpatient colonoscopy in about 6-8 weeks  Lipase can be mildly elevated in gastrointestinal tract infection, no evidence of pancreatitis    I discussed the patients findings and my recommendations with patient and nursing staff.    Blayne Cai MD

## 2019-03-18 NOTE — ED PROVIDER NOTES
EMERGENCY DEPARTMENT ENCOUNTER    CHIEF COMPLAINT  Chief Complaint: Diarrhea  History given by: patient   History limited by: n/a   Room Number: N640/1  PMD: Reyes, Miriam Mercado, MD      HPI:  Pt is a 78 y.o. female who presents complaining of diarrhea that has been ongoing for the past 3 weeks. Pt reports 4-5 episodes of watery stool each day, and denies blood in the stool. She also complains of lower abd cramping, but denies nausea or vomiting. Pt states that she was seen in the ED after the onset of the sx, and was d/c home with an rx for lomotil.    Duration:  3 weeks   Onset: gradual  Timin-5 times per day  Location: GI   Radiation: n/a   Quality: watery stool  Intensity/Severity: moderate  Progression: unchanged   Associated Symptoms: abd pain  Aggravating Factors: none  Alleviating Factors: none    PAST MEDICAL HISTORY  Active Ambulatory Problems     Diagnosis Date Noted   • S/P AVR (aortic valve replacement) 2016   • S/P TVR (tricuspid valve repair) 2016   • S/P MVR (mitral valve repair) 2016   • Benign essential hypertension 2016   • Chest tightness 2016   • Mitral and aortic valve disease 2016   • CKD (chronic kidney disease) stage 3, GFR 30-59 ml/min (CMS/HCC) 2016   • Small bowel obstruction (CMS/HCC) 2016   • Abdominal pain 2016   • Slow transit constipation 2016   • Hyperlipidemia 2017   • Pacemaker 2017   • Colitis 2019     Resolved Ambulatory Problems     Diagnosis Date Noted   • No Resolved Ambulatory Problems     Past Medical History:   Diagnosis Date   • Aneurysm (CMS/HCC)    • Aortic valve disease    • Arthritis    • Atrophic kidney    • Bradycardia    • CAD (coronary artery disease)    • CKD (chronic kidney disease) stage 4, GFR 15-29 ml/min (CMS/HCC)    • Depression    • Dysphagia    • Essential hypertension    • [place] (hard of hearing)    • Hyperlipidemia    • Mitral valve disease    • Pulmonary hypertension  (CMS/[facility])    • Sick sinus syndrome (CMS/[facility])    • Sleep apnea    • Spinal stenosis        PAST SURGICAL HISTORY  Past Surgical History:   Procedure Laterality Date   • AORTIC VALVE REPAIR/REPLACEMENT  2015    Dr Veliz   • CARDIAC CATHETERIZATION     • CARDIAC DEFIBRILLATOR PLACEMENT     •  SECTION     • HYSTERECTOMY     • MITRAL VALVE ANNULOPLASTY  2015    Dr Veliz   • TRICUSPID VALVE SURGERY  2015    Dr Veliz       FAMILY HISTORY  Family History   Problem Relation Age of Onset   • ALS Mother    • Hypertension Father        SOCIAL HISTORY  Social History     Socioeconomic History   • Marital status:      Spouse name: Not on file   • Number of children: Not on file   • Years of education: Not on file   • Highest education level: Not on file   Social Needs   • Financial resource strain: Not on file   • Food insecurity - worry: Not on file   • Food insecurity - inability: Not on file   • Transportation needs - medical: Not on file   • Transportation needs - non-medical: Not on file   Occupational History   • Not on file   Tobacco Use   • Smoking status: Former Smoker     Last attempt to quit: 1988     Years since quittin.2   • Smokeless tobacco: Never Used   Substance and Sexual Activity   • Alcohol use: No   • Drug use: No   • Sexual activity: Defer   Other Topics Concern   • Not on file   Social History Narrative   • Not on file       ALLERGIES  Patient has no known allergies.    REVIEW OF SYSTEMS  Review of Systems   Constitutional: Negative for fever.   HENT: Negative for sore throat.    Eyes: Negative.    Respiratory: Negative for cough and shortness of breath.    Cardiovascular: Negative for chest pain.   Gastrointestinal: Positive for abdominal pain and diarrhea. Negative for vomiting.   Genitourinary: Negative for dysuria.   Musculoskeletal: Negative for neck pain.   Skin: Negative for rash.   Neurological: Negative for weakness, numbness and headaches.    Hematological: Negative.    Psychiatric/Behavioral: Negative.    All other systems reviewed and are negative.      PHYSICAL EXAM  ED Triage Vitals [03/18/19 0310]   Temp Heart Rate Resp BP SpO2   (!) 100.9 °F (38.3 °C) 73 16 140/65 98 %      Temp src Heart Rate Source Patient Position BP Location FiO2 (%)   Tympanic -- -- -- --       Physical Exam   Constitutional: She is oriented to person, place, and time and well-developed, well-nourished, and in no distress. No distress.   HENT:   Head: Normocephalic and atraumatic.   Eyes: EOM are normal. Pupils are equal, round, and reactive to light.   Neck: Normal range of motion. Neck supple.   Cardiovascular: Normal rate, regular rhythm and normal heart sounds.   Pulmonary/Chest: Effort normal and breath sounds normal. No respiratory distress.   Abdominal: Soft. She exhibits distension (diffuse, lower). There is no tenderness. There is no rebound and no guarding.   Musculoskeletal: Normal range of motion. She exhibits no edema.   Neurological: She is alert and oriented to person, place, and time.   Skin: Skin is warm and dry. No rash noted.   Psychiatric: Mood and affect normal.   Nursing note and vitals reviewed.      LAB RESULTS  Lab Results (last 24 hours)     Procedure Component Value Units Date/Time    Gastrointestinal Panel, PCR - Stool, Per Rectum [[id number]]  (Normal) Collected:  03/18/19 0711    Specimen:  Stool from Per Rectum Updated:  03/18/19 0855     Campylobacter Not Detected     Plesiomonas shigelloides Not Detected     Salmonella Not Detected     Vibrio Not Detected     Vibrio cholerae Not Detected     Yersinia enterocolitica Not Detected     Enteroaggregative E. coli (EAEC) Not Detected     Enteropathogenic E. coli (EPEC) Not Detected     Enterotoxigenic E. coli (ETEC) lt/st Not Detected     Shiga-like toxin-producing E. coli (STEC) stx1/stx2 Not Detected     E. coli O157 Not Detected     Shigella/Enteroinvasive E. coli (EIEC) Not Detected      Cryptosporidium Not Detected     Cyclospora cayetanensis Not Detected     Entamoeba histolytica Not Detected     Giardia lamblia Not Detected     Adenovirus F40/41 Not Detected     Astrovirus Not Detected     Norovirus GI/GII Not Detected     Rotavirus A Not Detected     Sapovirus (I, II, IV or V) Not Detected    Narrative:       If Aeromonas, Staphylococcus aureus or Bacillus cereus are suspected, please order [id number]: Stool Culture, Aeromonas, S aureus, B Cereus.    Clostridium Difficile Toxin - Stool, Per Rectum [[id number]] Collected:  03/18/19 0711    Specimen:  Stool from Per Rectum Updated:  03/18/19 0900    Narrative:       The following orders were created for panel order Clostridium Difficile Toxin - Stool, Per Rectum.  Procedure                               Abnormality         Status                     ---------                               -----------         ------                     Clostridium Difficile To...[[id number]]  Abnormal            Final result                 Please view results for these tests on the individual orders.    Clostridium Difficile Toxin, PCR - Stool, Per Rectum [[id number]]  (Abnormal) Collected:  03/18/19 0711    Specimen:  Stool from Per Rectum Updated:  03/18/19 0900     C. Difficile Toxins by PCR Positive    Lipase [[id number]] Collected:  03/19/19 0615    Specimen:  Blood Updated:  03/19/19 0645    Basic Metabolic Panel [[id number]] Collected:  03/19/19 0615    Specimen:  Blood Updated:  03/19/19 0645          I ordered the above labs and reviewed the results    PROCEDURES  Procedures      PROGRESS AND CONSULTS       03:10  CMP, CBC, lipase, and UA ordered.     03;56  BP- 140/65 HR- 73 Temp- (!) 100.9 °F (38.3 °C) (Tympanic) O2 sat- 98%  Informed pt of the plan for labs. Pt understands and agrees with the plan, all questions answered.    04:22  GI PCR panel ordered. Call placed to [facility] for admission.    04;29  Discussed pt's case with TORSTEN Abdul ([facility]), who agrees  to admit the pt.     04:53  BP- 140/65 HR- 73 Temp- (!) 100.9 °F (38.3 °C) (Tympanic) O2 sat- 98%  Rechecked the patient who is in NAD and is resting comfortably.informed pt that her WBC is markedly elevated compared to the other day, informed her of the plan for admission. Pt understands and agrees with the plan, all questions answered.    MEDICAL DECISION MAKING  Results were reviewed/discussed with the patient and they were also made aware of online access. Pt also made aware that some labs, such as cultures, will not be resulted during ER visit and follow up with PMD is necessary.     MDM  Number of Diagnoses or Management Options     Amount and/or Complexity of Data Reviewed  Clinical lab tests: ordered and reviewed (WBC=18.63)  Decide to obtain previous medical records or to obtain history from someone other than the patient: yes  Review and summarize past medical records: yes (Seen in the ED on 3/4/19 s/t diarrhea. Pt was d/c home with rx for Lomotil )  Discuss the patient with other providers: yes ([facility])    Patient Progress  Patient progress: stable         DIAGNOSIS  Final diagnoses:   Colitis   Diarrhea, unspecified type   Leukocytosis, unspecified type   Generalized abdominal pain       DISPOSITION  ADMISSION    Discussed treatment plan and reason for admission with pt/family and admitting physician.  Pt/family voiced understanding of the plan for admission for further testing/treatment as needed.       Latest Documented Vital Signs:  As of 6:47 AM  BP- 96/53 HR- 74 Temp- 98 °F (36.7 °C) (Oral) O2 sat- 92%    --  Documentation assistance provided by jasmin Jansen for Dr Riggins.  Information recorded by the jasmin was done at my direction and has been verified and validated by me.        Bonnie Jansen  03/18/19 8960       Jordan Riggins MD  03/19/19 0044

## 2019-03-18 NOTE — ED TRIAGE NOTES
Pt c/o abd pain, nausea and diarrhea x3 weeks-c/o fever. Pt has kidney failure and has been taking meds as prescribed. Denies c/p ,denies SOB.

## 2019-03-18 NOTE — H&P
[facility]    Patient Identification:  Name: Carly Gotti  Age: 78 y.o.  Sex: female  :  1940  MRN: [id number]         Primary Care Physician: Reyes, Miriam Mercado, MD    Chief Complaint   Patient presents with   • Abdominal Pain   • Nausea   • Diarrhea     Subjective   Patient is a 78 y.o. female with a history of hypertension, dysphasia, HLD, CKD 4 and SSS s/p ppm who presents to [facility] ER with complaints of diarrhea, abdominal pain and fever.  This began 3 weeks ago and has progressively gotten worse.  She saw her primary care physician who put her on an antibiotic for 10 days.  She was also seen in the ER and started on Lomotil. She is having 4-5 episodes of diarrhea a day and reports some mixed blood, denies mucus.  She denies history of Crohn's and ulcerative colitis.  She reports subjective fevers and nausea but denies vomiting, chest pain, palpitations, shortness of air and edema.  Her pain is located in her lower quadrants, colicky in nature and radiates to her back. Pain medication slightly relieved symptoms, movement and eating makes symptoms worse.    Workup in ER showed lipase 85, white count 18.6, negative GI PCR, positive C difficile toxin, CT abdomen/pelvis showed fluid throughout the colon, mild wall thickening of the rectum and low sigmoid. The cecum is within the low pelvis adjacent to this area and is mildly dilated, question regional ileus. blood cultures were drawn and are pending.      Past Medical History:   Diagnosis Date   • Aneurysm (CMS/HCC)    • Aortic valve disease    • Arthritis    • Atrophic kidney    • Bradycardia    • CAD (coronary artery disease)    • CKD (chronic kidney disease) stage 4, GFR 15-29 ml/min (CMS/HCC)    • Depression    • Dysphagia    • Essential hypertension    • [place] (hard of hearing)    • Hyperlipidemia    • Mitral valve disease    • Pulmonary hypertension (CMS/HCC)    • Sick  sinus syndrome (CMS/HCC)    • Sleep apnea    • Spinal stenosis      Past Surgical History:   Procedure Laterality Date   • AORTIC VALVE REPAIR/REPLACEMENT  2015    Dr Veliz   • CARDIAC CATHETERIZATION     • CARDIAC DEFIBRILLATOR PLACEMENT     •  SECTION     • HYSTERECTOMY     • MITRAL VALVE ANNULOPLASTY  2015    Dr Veliz   • TRICUSPID VALVE SURGERY  2015    Dr Veliz     Current medications reviewed.    Family History   Problem Relation Age of Onset   • ALS Mother    • Hypertension Father      Social History     Tobacco Use   • Smoking status: Former Smoker     Last attempt to quit: 1988     Years since quittin.2   • Smokeless tobacco: Never Used   Substance Use Topics   • Alcohol use: No   • Drug use: No     Review of Systems   Constitutional: Positive for fever. Negative for chills.   HENT: Negative for congestion and dental problem.    Eyes: Negative for discharge and itching.   Respiratory: Negative for chest tightness and shortness of breath.    Cardiovascular: Negative for chest pain and leg swelling.   Gastrointestinal: Positive for abdominal pain, diarrhea and nausea. Negative for abdominal distention and vomiting.   Endocrine: Negative for cold intolerance and heat intolerance.   Genitourinary: Negative for difficulty urinating and dysuria.   Musculoskeletal: Positive for back pain. Negative for gait problem.   Skin: Negative for color change and pallor.   Allergic/Immunologic: Negative for environmental allergies and food allergies.   Neurological: Positive for headaches. Negative for dizziness.   Hematological: Negative for adenopathy. Does not bruise/bleed easily.   Psychiatric/Behavioral: Negative for agitation and behavioral problems.     Objective      Vital Signs  Temp:  [98.7 °F (37.1 °C)-100.9 °F (38.3 °C)] 99.4 °F (37.4 °C)  Heart Rate:  [73-78] 74  Resp:  [16-18] 16  BP: (105-140)/(58-72) 105/58  Body mass index is 25.53 kg/m².    Physical Exam    Constitutional: She is oriented to person, place, and time. She appears well-developed and well-nourished. She has a sickly appearance.   HENT:   Head: Normocephalic and atraumatic.   Eyes: EOM are normal. Pupils are equal, round, and reactive to light.   Neck: Neck supple.   Cardiovascular: Normal rate and regular rhythm.   Murmur heard.  Pulmonary/Chest: Effort normal and breath sounds normal. No respiratory distress.   Abdominal: Soft. She exhibits no distension. There is tenderness. There is no rebound and no guarding.   Musculoskeletal: Normal range of motion. She exhibits no edema.   Neurological: She is alert and oriented to person, place, and time.   Skin: Skin is warm and dry.   Psychiatric: She has a normal mood and affect. Her behavior is normal.   Nursing note and vitals reviewed.    Results Review:  I reviewed the patient's new clinical results.  I reviewed the patient's new imaging results and agree with the interpretation.  I personally viewed and interpreted the patient's EKG/Telemetry data.    Lab Results   Component Value Date    ANIONGAP 14.6 03/18/2019     Estimated Creatinine Clearance: 27.2 mL/min (A) (by C-G formula based on SCr of 1.5 mg/dL (H)).    Assessment/Plan   Active Hospital Problems    Diagnosis  POA   • **C. difficile colitis [A04.72]  Unknown   • Leukocytosis [D72.829]  Unknown   • Elevated lipase [R74.8]  Unknown   • Ileus (CMS/HCC) [K56.7]  Unknown      Resolved Hospital Problems   No resolved problems to display.     78 y.o. female with diarrhea for three weeks.     C. difficile colitis  -PO vancomycin q6h  -DC IV flagyl  -monitor labs  -IVFs  -pain control    Questionable ileus  -consult GI  -clear liquid diet    Elevated lipase  -re check in AM    CKD 4  -creatinine stable    Fever  -blood cultures pending    TORSTEN Stanley  03/18/19  11:19 AM    Addendum: I have reviewed the history and plan as obtained by TORSTEN Guo. I have independently examined the  patient. My exam confirms her physical findings and I agree with the plan as listed above, with the addition to the followin-year-old admitted with diarrhea for 3 weeks.  She went to see her primary care physician and she was placed on antibiotic for 10 days.  She also was given Lomotil.  She has subjective fevers and nausea and decreased appetite.  On exam she has diffuse abdominal tenderness. No guarding or rebound.  She is awake alert, nontoxic appearing.  Heart is regular.  Her lungs are clear.  She has no edema.  C. difficile toxin is positive.  Physician stool PCR is negative.  WDC is 18,000.  Creatinine was 1.50.  CT scan shows wall thickening of the rectum in the low sigmoid and possibly regional ileus.  Plan: PO vancomycin.  GI consult.  Clear liquid diet. IVF.    David Maradiaga MD  19  3:19 PM

## 2019-03-19 PROBLEM — A41.9 SEPSIS (HCC): Status: ACTIVE | Noted: 2019-03-19

## 2019-03-19 LAB
ANION GAP SERPL CALCULATED.3IONS-SCNC: 10.3 MMOL/L
BASOPHILS # BLD AUTO: 0.01 10*3/MM3 (ref 0–0.2)
BASOPHILS NFR BLD AUTO: 0.1 % (ref 0–1.5)
BUN BLD-MCNC: 30 MG/DL (ref 8–23)
BUN/CREAT SERPL: 15.6 (ref 7–25)
CALCIUM SPEC-SCNC: 8.8 MG/DL (ref 8.6–10.5)
CHLORIDE SERPL-SCNC: 101 MMOL/L (ref 98–107)
CO2 SERPL-SCNC: 22.7 MMOL/L (ref 22–29)
CREAT BLD-MCNC: 1.92 MG/DL (ref 0.57–1)
DEPRECATED RDW RBC AUTO: 52.4 FL (ref 37–54)
EOSINOPHIL # BLD AUTO: 0.01 10*3/MM3 (ref 0–0.4)
EOSINOPHIL NFR BLD AUTO: 0.1 % (ref 0.3–6.2)
ERYTHROCYTE [DISTWIDTH] IN BLOOD BY AUTOMATED COUNT: 14.4 % (ref 12.3–15.4)
GFR SERPL CREATININE-BSD FRML MDRD: 25 ML/MIN/1.73
GLUCOSE BLD-MCNC: 107 MG/DL (ref 65–99)
HCT VFR BLD AUTO: 36 % (ref 34–46.6)
HGB BLD-MCNC: 11.5 G/DL (ref 12–15.9)
IMM GRANULOCYTES # BLD AUTO: 0.04 10*3/MM3 (ref 0–0.05)
IMM GRANULOCYTES NFR BLD AUTO: 0.6 % (ref 0–0.5)
LARGE PLATELETS: NORMAL
LIPASE SERPL-CCNC: 14 U/L (ref 13–60)
LYMPHOCYTES # BLD AUTO: 0.37 10*3/MM3 (ref 0.7–3.1)
LYMPHOCYTES NFR BLD AUTO: 5.2 % (ref 19.6–45.3)
MCH RBC QN AUTO: 31.5 PG (ref 26.6–33)
MCHC RBC AUTO-ENTMCNC: 31.9 G/DL (ref 31.5–35.7)
MCV RBC AUTO: 98.6 FL (ref 79–97)
MONOCYTES # BLD AUTO: 0.58 10*3/MM3 (ref 0.1–0.9)
MONOCYTES NFR BLD AUTO: 8.2 % (ref 5–12)
NEUTROPHILS # BLD AUTO: 6.05 10*3/MM3 (ref 1.4–7)
NEUTROPHILS NFR BLD AUTO: 85.8 % (ref 42.7–76)
NRBC BLD AUTO-RTO: 0 /100 WBC (ref 0–0)
PLATELET # BLD AUTO: 132 10*3/MM3 (ref 140–450)
PMV BLD AUTO: 12.5 FL (ref 6–12)
POTASSIUM BLD-SCNC: 3.8 MMOL/L (ref 3.5–5.2)
RBC # BLD AUTO: 3.65 10*6/MM3 (ref 3.77–5.28)
RBC MORPH BLD: NORMAL
SODIUM BLD-SCNC: 134 MMOL/L (ref 136–145)
WBC MORPH BLD: NORMAL
WBC NRBC COR # BLD: 7.06 10*3/MM3 (ref 3.4–10.8)

## 2019-03-19 PROCEDURE — 83690 ASSAY OF LIPASE: CPT | Performed by: NURSE PRACTITIONER

## 2019-03-19 PROCEDURE — 85025 COMPLETE CBC W/AUTO DIFF WBC: CPT | Performed by: NURSE PRACTITIONER

## 2019-03-19 PROCEDURE — 97161 PT EVAL LOW COMPLEX 20 MIN: CPT

## 2019-03-19 PROCEDURE — 85007 BL SMEAR W/DIFF WBC COUNT: CPT | Performed by: NURSE PRACTITIONER

## 2019-03-19 PROCEDURE — 25010000002 ONDANSETRON PER 1 MG: Performed by: NURSE PRACTITIONER

## 2019-03-19 PROCEDURE — 80048 BASIC METABOLIC PNL TOTAL CA: CPT | Performed by: NURSE PRACTITIONER

## 2019-03-19 PROCEDURE — 99232 SBSQ HOSP IP/OBS MODERATE 35: CPT | Performed by: INTERNAL MEDICINE

## 2019-03-19 RX ORDER — LOPERAMIDE HYDROCHLORIDE 2 MG/1
4 CAPSULE ORAL ONCE
Status: COMPLETED | OUTPATIENT
Start: 2019-03-19 | End: 2019-03-19

## 2019-03-19 RX ADMIN — SODIUM CHLORIDE, PRESERVATIVE FREE 3 ML: 5 INJECTION INTRAVENOUS at 08:20

## 2019-03-19 RX ADMIN — HYDROCODONE BITARTRATE AND ACETAMINOPHEN 1 TABLET: 10; 325 TABLET ORAL at 04:02

## 2019-03-19 RX ADMIN — BUPROPION HYDROCHLORIDE 300 MG: 300 TABLET, EXTENDED RELEASE ORAL at 08:19

## 2019-03-19 RX ADMIN — HYDROCODONE BITARTRATE AND ACETAMINOPHEN 1 TABLET: 10; 325 TABLET ORAL at 20:48

## 2019-03-19 RX ADMIN — HYDROCODONE BITARTRATE AND ACETAMINOPHEN 1 TABLET: 10; 325 TABLET ORAL at 08:19

## 2019-03-19 RX ADMIN — ONDANSETRON HYDROCHLORIDE 4 MG: 2 SOLUTION INTRAMUSCULAR; INTRAVENOUS at 05:19

## 2019-03-19 RX ADMIN — Medication 400 MCG: at 08:19

## 2019-03-19 RX ADMIN — LOPERAMIDE HYDROCHLORIDE 4 MG: 2 CAPSULE ORAL at 16:39

## 2019-03-19 RX ADMIN — FLUOXETINE HYDROCHLORIDE 60 MG: 20 CAPSULE ORAL at 08:19

## 2019-03-19 RX ADMIN — ASPIRIN 325 MG: 325 TABLET ORAL at 08:19

## 2019-03-19 RX ADMIN — ONDANSETRON HYDROCHLORIDE 4 MG: 2 SOLUTION INTRAMUSCULAR; INTRAVENOUS at 10:20

## 2019-03-19 RX ADMIN — SODIUM CHLORIDE, POTASSIUM CHLORIDE, SODIUM LACTATE AND CALCIUM CHLORIDE 75 ML/HR: 600; 310; 30; 20 INJECTION, SOLUTION INTRAVENOUS at 05:19

## 2019-03-19 RX ADMIN — VANCOMYCIN 125 MG: KIT at 23:23

## 2019-03-19 RX ADMIN — Medication 250 MG: at 20:48

## 2019-03-19 RX ADMIN — VANCOMYCIN 125 MG: KIT at 17:51

## 2019-03-19 RX ADMIN — Medication 250 MG: at 08:19

## 2019-03-19 RX ADMIN — ONDANSETRON HYDROCHLORIDE 4 MG: 2 SOLUTION INTRAMUSCULAR; INTRAVENOUS at 23:23

## 2019-03-19 RX ADMIN — SODIUM CHLORIDE, POTASSIUM CHLORIDE, SODIUM LACTATE AND CALCIUM CHLORIDE 75 ML/HR: 600; 310; 30; 20 INJECTION, SOLUTION INTRAVENOUS at 18:24

## 2019-03-19 RX ADMIN — VANCOMYCIN 125 MG: KIT at 12:43

## 2019-03-19 RX ADMIN — VANCOMYCIN 125 MG: KIT at 05:19

## 2019-03-19 RX ADMIN — HYDROCODONE BITARTRATE AND ACETAMINOPHEN 1 TABLET: 10; 325 TABLET ORAL at 12:43

## 2019-03-19 RX ADMIN — ONDANSETRON HYDROCHLORIDE 4 MG: 2 SOLUTION INTRAMUSCULAR; INTRAVENOUS at 16:39

## 2019-03-19 RX ADMIN — SODIUM CHLORIDE, PRESERVATIVE FREE 3 ML: 5 INJECTION INTRAVENOUS at 20:48

## 2019-03-19 RX ADMIN — HYDROCODONE BITARTRATE AND ACETAMINOPHEN 1 TABLET: 10; 325 TABLET ORAL at 16:39

## 2019-03-19 RX ADMIN — NEBIVOLOL HYDROCHLORIDE 10 MG: 10 TABLET ORAL at 08:19

## 2019-03-19 RX ADMIN — SODIUM CHLORIDE, PRESERVATIVE FREE 3 ML: 5 INJECTION INTRAVENOUS at 08:19

## 2019-03-19 NOTE — PROGRESS NOTES
"    Name: Carly Gotti ADMIT: 3/18/2019   : 1940  PCP: Reyes, Miriam Mercado, MD    MRN: [id number] LOS: 1 days   AGE/SEX: 78 y.o. female  ROOM: [facility]     Subjective   Subjective   Subjective:  Symptoms:  Improved.  She reports weakness and diarrhea.  No shortness of breath or chest pain.  (Feels generally improved compared to admission.  No new complaints.).    Diet:  Dietary issues: tolerating clears.  No nausea or vomiting.    Activity level: Impaired due to weakness.    Pain:  She complains of pain that is mild.  She reports pain is improving.  Pain is partially controlled.         Objective   Objective   Vital Signs  Temp:  [98 °F (36.7 °C)-99.4 °F (37.4 °C)] 98.9 °F (37.2 °C)  Heart Rate:  [73-75] 75  Resp:  [16] 16  BP: ()/(44-58) 107/58  SpO2:  [92 %-94 %] 94 %  on   ;   Device (Oxygen Therapy): room air  Body mass index is 25.53 kg/m².  Objective:  General Appearance:  Comfortable and in no acute distress.    Vital signs: (most recent): Blood pressure 128/68, pulse 75, temperature 98 °F (36.7 °C), temperature source Oral, resp. rate 16, height 160 cm (63\"), weight 65.4 kg (144 lb 1.6 oz), SpO2 100 %.  No fever.    Output: Producing stool.    Lungs:  Normal effort.  Breath sounds clear to auscultation.    Heart: Normal rate.  Regular rhythm.  Positive for murmur.    Abdomen: Abdomen is soft and non-distended.  Hyperactive bowel sounds.   There is generalized tenderness.     Extremities: Normal range of motion.  There is no dependent edema.    Neurological: Patient is alert and oriented to person, place and time.    Skin:  Warm and dry.        Results Review:       I reviewed the patient's new clinical results.  Results from last 7 days   Lab Units 19  0615 19  0335   WBC 10*3/mm3 7.06 18.63*   HEMOGLOBIN g/dL 11.5* 13.8   PLATELETS 10*3/mm3 132* 205     Results from last 7 days   Lab Units 19  0615 19  0335   SODIUM mmol/L 134* 142   POTASSIUM mmol/L 3.8 4.2 "   CHLORIDE mmol/L 101 99   CO2 mmol/L 22.7 28.4   BUN mg/dL 30* 25*   CREATININE mg/dL 1.92* 1.50*   GLUCOSE mg/dL 107* 115*   Estimated Creatinine Clearance: 21.3 mL/min (A) (by C-G formula based on SCr of 1.92 mg/dL (H)).  Results from last 7 days   Lab Units 03/18/19  0335   ALBUMIN g/dL 4.30   BILIRUBIN mg/dL 0.4   ALK PHOS U/L 55   AST (SGOT) U/L 28   ALT (SGPT) U/L 33     Results from last 7 days   Lab Units 03/19/19  0615 03/18/19  0335   CALCIUM mg/dL 8.8 9.7   ALBUMIN g/dL  --  4.30     Results from last 7 days   Lab Units 03/18/19  0521   LACTATE mmol/L 1.2     Results from last 7 days   Lab Units 03/18/19  0711   CDIFFPCR  Positive*             aspirin 325 mg Oral Daily   buPROPion  mg Oral Daily   FLUoxetine 60 mg Oral Daily   folic acid 400 mcg Oral Daily   nebivolol 10 mg Oral Q24H   saccharomyces boulardii 250 mg Oral BID   sodium chloride 3 mL Intravenous Q12H   sodium chloride 3 mL Intravenous Q12H   vancomycin 125 mg Oral Q6H       lactated ringers 75 mL/hr Last Rate: 75 mL/hr (03/19/19 0519)   Pharmacy Consult     Diet Clear Liquid       Assessment/Plan     Active Hospital Problems    Diagnosis  POA   • **C. difficile colitis [A04.72]  Yes   • Sepsis (CMS/[facility]) [A41.9]  Yes   • Elevated lipase [R74.8]  Yes   • Ileus (CMS/[facility]) [K56.7]  Yes   • CKD (chronic kidney disease) stage 3, GFR 30-59 ml/min (CMS/[facility]) [N18.3]  Yes   • Benign essential hypertension [I10]  Yes   • S/P MVR (mitral valve repair) [Z98.890]  Not Applicable   • S/P TVR (tricuspid valve repair) [Z98.890]  Not Applicable   • S/P AVR (aortic valve replacement) [Z95.2]  Not Applicable      Resolved Hospital Problems   No resolved problems to display.       Ms. Gotti is a 78 y.o. with a history of valvular heart disease and prior bowel obstruction who has been admitted with CDiff colitis suspected based on history and confirmed by positive Cdiff toxin assay.    · Patient has sepsis, present on admit, due to cdiff colitis evidenced  by fever, leukocytosis and elevated lactic acid.  This now seems resolved with proper treatment.   · Cr elevation likely represents CKD3 and is not far from baseline.  Continue to monitor.    · Appreciate GI eval.  No indication for vanc enema at this time. Ileus resolving.   · Continue contact isolation precautions.  · She still has profuse diarrhea.  She is on appropriate antibiotic therapy will give single dose of Imodium 4 mg.  · Likely advance diet tomorrow.  · Continue current IV fluids.  Bumex held for now given diarrhea.    VTE Prophylaxis - SCDs   Code Status - Full code  Disposition - Anticipate discharge to home in 2-3 days.      Oskar Triana MD  [facility]  03/19/19  8:57 AM

## 2019-03-19 NOTE — PLAN OF CARE
Problem: Fall Risk (Adult)  Goal: Identify Related Risk Factors and Signs and Symptoms  Outcome: Ongoing (interventions implemented as appropriate)    Goal: Absence of Fall  Outcome: Ongoing (interventions implemented as appropriate)      Problem: Patient Care Overview  Goal: Plan of Care Review  Outcome: Ongoing (interventions implemented as appropriate)   03/19/19 1722   Coping/Psychosocial   Plan of Care Reviewed With patient   Plan of Care Review   Progress improving   OTHER   Outcome Summary No falls. C/o nausea x1 so far. Persistent diarrhea and Immodium x1 dose given as ordered. Monitor for results. Pain better controlled this shift with po pain meds. Cont to monitor.     Goal: Individualization and Mutuality  Outcome: Ongoing (interventions implemented as appropriate)    Goal: Discharge Needs Assessment  Outcome: Ongoing (interventions implemented as appropriate)    Goal: Interprofessional Rounds/Family Conf  Outcome: Ongoing (interventions implemented as appropriate)      Problem: Pain, Acute (Adult)  Goal: Identify Related Risk Factors and Signs and Symptoms  Outcome: Ongoing (interventions implemented as appropriate)    Goal: Acceptable Pain Control/Comfort Level  Outcome: Ongoing (interventions implemented as appropriate)      Problem: Diarrhea (Adult)  Goal: Identify Related Risk Factors and Signs and Symptoms  Outcome: Ongoing (interventions implemented as appropriate)    Goal: Improved/Reduced Symptoms  Outcome: Ongoing (interventions implemented as appropriate)

## 2019-03-19 NOTE — THERAPY EVALUATION
Acute Care - Physical Therapy Initial Evaluation  [facility]     Patient Name: Carly Gotti  : 1940  MRN: [id number]  Today's Date: 3/19/2019   Onset of Illness/Injury or Date of Surgery: 19  Date of Referral to PT: 19  Referring Physician: Kong      Admit Date: 3/18/2019    Visit Dx:     ICD-10-CM ICD-9-CM   1. Colitis K52.9 558.9   2. Diarrhea, unspecified type R19.7 787.91   3. Leukocytosis, unspecified type D72.829 288.60   4. Generalized abdominal pain R10.84 789.07   5. Impaired functional mobility and endurance Z74.09 V49.89     Patient Active Problem List   Diagnosis   • S/P AVR (aortic valve replacement)   • S/P TVR (tricuspid valve repair)   • S/P MVR (mitral valve repair)   • Benign essential hypertension   • Chest tightness   • Mitral and aortic valve disease   • CKD (chronic kidney disease) stage 3, GFR 30-59 ml/min (CMS/HCC)   • Small bowel obstruction (CMS/HCC)   • Abdominal pain   • Slow transit constipation   • Hyperlipidemia   • Pacemaker   • Colitis   • Elevated lipase   • C. difficile colitis   • Ileus (CMS/HCC)   • Sepsis (CMS/HCC)     Past Medical History:   Diagnosis Date   • Aneurysm (CMS/HCC)    • Aortic valve disease    • Arthritis    • Atrophic kidney    • Bradycardia    • CAD (coronary artery disease)    • CKD (chronic kidney disease) stage 4, GFR 15-29 ml/min (CMS/HCC)    • Depression    • Dysphagia    • Essential hypertension    • [place] (hard of hearing)    • Hyperlipidemia    • Mitral valve disease    • Pulmonary hypertension (CMS/HCC)    • Sick sinus syndrome (CMS/HCC)    • Sleep apnea    • Spinal stenosis      Past Surgical History:   Procedure Laterality Date   • AORTIC VALVE REPAIR/REPLACEMENT  2015    Dr Veliz   • CARDIAC CATHETERIZATION     • CARDIAC DEFIBRILLATOR PLACEMENT     •  SECTION     • HYSTERECTOMY     • MITRAL VALVE ANNULOPLASTY  2015    Dr Veliz   • TRICUSPID VALVE SURGERY  2015    Dr Veliz        PT ASSESSMENT  (last 12 hours)      Physical Therapy Evaluation     Row Name 03/19/19 1320          PT Evaluation Time/Intention    Subjective Information  complains of;weakness;fatigue;dizziness  -SV     Document Type  evaluation  -SV     Mode of Treatment  individual therapy;physical therapy  -SV     Patient Effort  good  -SV     Symptoms Noted During/After Treatment  dizziness;fatigue;shortness of breath  -SV     Comment  Cdiff positive  -SV     Row Name 03/19/19 1320          General Information    Patient Profile Reviewed?  yes  -SV     Onset of Illness/Injury or Date of Surgery  03/18/19  -SV     Referring Physician  Ray  -SV     Patient Observations  alert;cooperative;agree to therapy  -SV     Patient/Family Observations  none  -SV     General Observations of Patient  NAD  -SV     Prior Level of Function  independent:  -SV     Equipment Currently Used at Home  none  -SV     Pertinent History of Current Functional Problem  Pt reports diarrhea for 3 weeks. Pt admit iwth Cdiff, sepsis PMH: HTN, SBO, Pacemaker, CKD, AVR, mitral valve repair  -SV     Existing Precautions/Restrictions  -- falls risk  -SV     Row Name 03/19/19 1320          Relationship/Environment    Primary Source of Support/Comfort  child(silvano)  -SV     Row Name 03/19/19 1320          Cognitive Assessment/Intervention- PT/OT    Orientation Status (Cognition)  oriented x 4  -SV     Follows Commands (Cognition)  WFL  -SV     Row Name 03/19/19 1320          Bed Mobility Assessment/Treatment    Bed Mobility Assessment/Treatment  supine-sit-supine  -SV     Supine-Sit-Supine [place] (Bed Mobility)  minimum assist (75% patient effort)  -SV     Assistive Device (Bed Mobility)  bed rails;head of bed elevated  -SV     Row Name 03/19/19 1320          Transfer Assessment/Treatment    Transfer Assessment/Treatment  sit-stand transfer;stand-sit transfer  -SV     Sit-Stand [place] (Transfers)  minimum assist (75% patient effort)  -SV     Stand-Sit [place]  (Transfers)  contact guard;minimum assist (75% patient effort)  -SV     Row Name 03/19/19 1320          Sit-Stand Transfer    Assistive Device (Sit-Stand Transfers)  -- HHA  -SV     Row Name 03/19/19 1320          Stand-Sit Transfer    Assistive Device (Stand-Sit Transfers)  -- HHA  -SV     Row Name 03/19/19 1320          Gait/Stairs Assessment/Training    Gait/Stairs Assessment/Training  gait/ambulation independence  -SV     [place] Level (Gait)  minimum assist (75% patient effort)  -SV     Assistive Device (Gait)  -- HHA  -SV     Distance in Feet (Gait)  10,25' seated rest  -SV     Deviations/Abnormal Patterns (Gait)  -- shuffle steps, flexed posture  -SV     Row Name 03/19/19 1320          General ROM    GENERAL ROM COMMENTS  BUE, BLE arom grossly observed with mobility wfl  -SV     Row Name 03/19/19 1320          MMT (Manual Muscle Testing)    General MMT Comments  BUE/BLE gross strength observed >3/5   -SV     Row Name 03/19/19 1320          Pain Assessment    Additional Documentation  -- abd pain   -SV     Row Name 03/19/19 1320          Physical Therapy Clinical Impression    Date of Referral to PT  03/19/19  -SV     PT Diagnosis (PT Clinical Impression)  sepsis, general weakness  -SV     Functional Level at Time of Evaluation (PT Clinical Impression)  minasst oob and magdy 25' HHA  -SV     Patient/Family Goals Statement (PT Clinical Impression)  indep amb  -SV     Criteria for Skilled Interventions Met (PT Clinical Impression)  treatment indicated  -SV     Pathology/Pathophysiology Noted (Describe Specifically for Each System)  musculoskeletal;pulmonary  -SV     Impairments Found (describe specific impairments)  aerobic capacity/endurance;gait, locomotion, and balance  -SV     Functional Limitations in Following Categories (Describe Specific Limitations)  self-care;home management;community/leisure  -SV     Rehab Potential (PT Clinical Summary)  good, to achieve stated therapy goals  -SV     Predicted  Duration of Therapy (PT)  1-2 weeks  -SV     Row Name 03/19/19 1320          Vital Signs    Pre SpO2 (%)  95  -SV     O2 Delivery Pre Treatment  room air  -SV     Intra SpO2 (%)  94  -SV     O2 Delivery Intra Treatment  room air  -SV     Post SpO2 (%)  95  -SV     O2 Delivery Post Treatment  room air  -SV     Pre Patient Position  Supine  -SV     Intra Patient Position  Standing  -SV     Post Patient Position  Supine  -SV     Row Name 03/19/19 1320          Physical Therapy Goals    Bed Mobility Goal Selection (PT)  bed mobility, PT goal 1  -SV     Transfer Goal Selection (PT)  transfer, PT goal 1  -SV     Gait Training Goal Selection (PT)  gait training, PT goal 1  -SV     Row Name 03/19/19 1320          Bed Mobility Goal 1 (PT)    Activity/Assistive Device (Bed Mobility Goal 1, PT)  sit to supine/supine to sit  -SV     [place] Level/Cues Needed (Bed Mobility Goal 1, PT)  independent  -SV     Time Frame (Bed Mobility Goal 1, PT)  1 week  -SV     Row Name 03/19/19 1320          Transfer Goal 1 (PT)    Activity/Assistive Device (Transfer Goal 1, PT)  sit-to-stand/stand-to-sit  -SV     [place] Level/Cues Needed (Transfer Goal 1, PT)  independent  -SV     Time Frame (Transfer Goal 1, PT)  1 week  -SV     Barriers (Transfers Goal 1, PT)  least vs no Ad  -SV     Row Name 03/19/19 1320          Gait Training Goal 1 (PT)    Activity/Assistive Device (Gait Training Goal 1, PT)  gait (walking locomotion)  -SV     [place] Level (Gait Training Goal 1, PT)  independent  -SV     Distance (Gait Goal 1, PT)  200  -SV     Time Frame (Gait Training Goal 1, PT)  1 week  -SV     Barriers (Gait Training Goal 1, PT)  least vs noAD  -SV     Row Name 03/19/19 1320          Positioning and Restraints    Pre-Treatment Position  in bed  -SV     Post Treatment Position  bed  -SV     In Bed  notified nsg;fowlers;call light within reach;encouraged to call for assist  -SV       User Key  (r) = Recorded By, (t) = Taken By, (c) =  Cosigned By    Initials Name Provider Type    Gabriela Miller, PT Physical Therapist          PT Recommendation and Plan  Anticipated Discharge Disposition (PT): home with home health  Planned Therapy Interventions (PT Eval): balance training, bed mobility training, gait training, home exercise program, patient/family education, stair training, strengthening, stretching, transfer training  Therapy Frequency (PT Clinical Impression): daily  Outcome Summary/Treatment Plan (PT)  Anticipated Discharge Disposition (PT): home with home health  Plan of Care Reviewed With: patient  Outcome Summary: Pt admit with Ddiff + and sepsis. Pt reports diarrhea for over 3 weeks. Pt is generally weak , requiring external asst of 1 for OOB and for amb. Pt is at risk for falls at this time and will likely benefit from cont skilled PT for progression toward indep amb with least vs no AD prior to home.   Outcome Measures     Row Name 03/19/19 1400             How much help from another person do you currently need...    Turning from your back to your side while in flat bed without using bedrails?  4  -SV      Moving from lying on back to sitting on the side of a flat bed without bedrails?  3  -SV      Moving to and from a bed to a chair (including a wheelchair)?  3  -SV      Standing up from a chair using your arms (e.g., wheelchair, bedside chair)?  3  -SV      Climbing 3-5 steps with a railing?  2  -SV      To walk in hospital room?  3  -SV      AM-PAC 6 Clicks Score  18  -SV        User Key  (r) = Recorded By, (t) = Taken By, (c) = Cosigned By    Initials Name Provider Type    Gabriela Miller, PT Physical Therapist         Time Calculation:   PT Charges     Row Name 03/19/19 1400             Time Calculation    Start Time  1320  -SV      Stop Time  1350  -SV      Time Calculation (min)  30 min  -SV      PT Received On  03/19/19  -SV      PT - Next Appointment  03/20/19  -SV      PT Goal Re-Cert Due Date  03/26/19  -SV         User Key  (r) = Recorded By, (t) = Taken By, (c) = Cosigned By    Initials Name Provider Type    SV Gabriela Walker, PT Physical Therapist        Therapy Charges for Today     Code Description Service Date Service Provider Modifiers Qty    38258872896 HC PT EVAL LOW COMPLEXITY 1 3/19/2019 Gabriela Walker, PT GP 1          PT G-Codes  AM-PAC 6 Clicks Score: 18      Gabriela Walker, PT  3/19/2019

## 2019-03-19 NOTE — PLAN OF CARE
Problem: Patient Care Overview  Goal: Plan of Care Review  Outcome: Ongoing (interventions implemented as appropriate)   03/19/19 2854   Coping/Psychosocial   Plan of Care Reviewed With patient   OTHER   Outcome Summary Pt admit with Ddiff + and sepsis. Pt reports diarrhea for over 3 weeks. Pt is generally weak , requiring external asst of 1 for OOB and for amb. Pt is at risk for falls at this time and will likely benefit from cont skilled PT for progression toward indep amb with least vs no AD prior to home.

## 2019-03-19 NOTE — PROGRESS NOTES
Adult Nutrition  Assessment/PES    Patient Name:  Carly Gotti  YOB: 1940  MRN: [id number]  Admit Date:  3/18/2019    Assessment Date:  3/19/2019    Comments:  Assessment triggered by MST score of 4 per nurse admission screen.  Admitted with abdominal pain, nausea, diarrhea x 3 weeks, worsening.  Mild ileus.  Positive for Cdiff.      Currently on clear liquid diet.  Will follow up once diet is advanced.    Reason for Assessment     Row Name 03/19/19 1359          Reason for Assessment    Reason For Assessment  identified at risk by screening criteria     Diagnosis  cardiac disease;renal disease;neurologic conditions;psychosocial;pulmonary disease;infection/sepsis HTN, dysphagia, CKD4, SSS, aneurysm, CAD, depression, [place], sleep apnea; adm with Cdiff     Identified At Risk by Screening Criteria  MST SCORE 2+         Nutrition/Diet History     Row Name 03/19/19 1400          Nutrition/Diet History    Typical Food/Fluid Intake  currently on clear liquid diet         Anthropometrics     Row Name 03/19/19 1403          Admit Weight    Admit Weight  -- 144# 3/18        Body Mass Index (BMI)    BMI Assessment  BMI 25-29.9: overweight         Labs/Tests/Procedures/Meds     Row Name 03/19/19 1403          Labs/Procedures/Meds    Lab Results Reviewed  reviewed, pertinent     Lab Results Comments  Gluc, Na+, Creat, BUN, GFR, Hgb        Diagnostic Tests/Procedures    Diagnostic Test/Procedure Reviewed  reviewed, pertinent        Medications    Pertinent Medications Reviewed  reviewed, pertinent     Pertinent Medications Comments  folic acid, florastor, vanc         Physical Findings     Row Name 03/19/19 1404          Physical Findings    Overall Physical Appearance  overweight     Gastrointestinal  diarrhea;nausea;other (see comments) abd pain     Skin  -- B=20, intact           Nutrition Prescription Ordered     Row Name 03/19/19 1404          Nutrition Prescription PO    Current PO Diet  Clear Liquid          Evaluation of Received Nutrient/Fluid Intake     Row Name 03/19/19 1404          PO Evaluation    Number of Meals  3     % PO Intake  25 CLD               Problem/Interventions:  Problem 1     Row Name 03/19/19 1405          Nutrition Diagnoses Problem 1    Problem 1  Inadequate Intake/Infusion     Inadequate Intake Type  Oral     Macronutrient  Kcal;Protein     Etiology (related to)  MNT for Treatment/Condition     Signs/Symptoms (evidenced by)  Clear Liquid Diet                 Intervention Goal     Row Name 03/19/19 1405          Intervention Goal    General  Maintain nutrition;Reduce/improve symptoms;Disease management/therapy     PO  Advance diet;Tolerate PO;PO intake (%)     PO Intake %  75 %     Weight  No significant weight loss         Nutrition Intervention     Row Name 03/19/19 1405          Nutrition Intervention    RD/Tech Action  Follow Tx progress;Care plan reviewd           Education/Evaluation     Row Name 03/19/19 1401          Education    Education  Will Instruct as appropriate        Monitor/Evaluation    Monitor  Per protocol;I&O;Pertinent labs;Weight;Symptoms     Education Follow-up  Reinforce PRN           Electronically signed by:  Britney Her RD  03/19/19 2:06 PM

## 2019-03-19 NOTE — PROGRESS NOTES
[facility]  Inpatient Progress Note    Reason for Follow Up: C. difficile colitis, nausea, ileus    Subjective     Interval History:     She is having nausea with regurgitation while trying clear liquids, but she does tell me she is improved compared to day of admission with less abdominal pain and diarrhea, he is having incontinence in the bed and that is frustrating her the most      Current Facility-Administered Medications:   •  acetaminophen (TYLENOL) tablet 650 mg, 650 mg, Oral, Q4H PRN, Oma Sy, APRIAM  •  aspirin tablet 325 mg, 325 mg, Oral, Daily, Demetrio Castaneda MD, 325 mg at 03/19/19 0819  •  buPROPion XL (WELLBUTRIN XL) 24 hr tablet 300 mg, 300 mg, Oral, Daily, Demetrio Castaneda MD, 300 mg at 03/19/19 0819  •  FLUoxetine (PROzac) capsule 60 mg, 60 mg, Oral, Daily, Demetrio Castaneda MD, 60 mg at 03/19/19 0819  •  folic acid (FOLVITE) tablet 400 mcg, 400 mcg, Oral, Daily, Demetrio Castaneda MD, 400 mcg at 03/19/19 0819  •  HYDROcodone-acetaminophen (NORCO)  MG per tablet 1 tablet, 1 tablet, Oral, Q4H PRN, Demetrio Castaneda MD, 1 tablet at 03/19/19 0819  •  lactated ringers infusion, 75 mL/hr, Intravenous, Continuous, David Maradiaga MD, Last Rate: 75 mL/hr at 03/19/19 0519, 75 mL/hr at 03/19/19 0519  •  morphine injection 1 mg, 1 mg, Intravenous, Q2H PRN, David Maradiaga MD, 1 mg at 03/18/19 2029  •  nebivolol (BYSTOLIC) tablet 10 mg, 10 mg, Oral, Q24H, Demetrio Castaneda MD, 10 mg at 03/19/19 0819  •  nitroglycerin (NITROSTAT) SL tablet 0.4 mg, 0.4 mg, Sublingual, Q5 Min PRN, Linnette Melo, APRN  •  ondansetron (ZOFRAN) tablet 4 mg, 4 mg, Oral, Q6H PRN **OR** ondansetron ODT (ZOFRAN-ODT) disintegrating tablet 4 mg, 4 mg, Oral, Q6H PRN **OR** ondansetron (ZOFRAN) injection 4 mg, 4 mg, Intravenous, Q6H PRN, Oma Sy APRN, 4 mg at 03/19/19 0519  •  Pharmacy Consult, , Does not apply, Continuous PRN, Linnette Melo APRN  •   saccharomyces boulardii (FLORASTOR) capsule 250 mg, 250 mg, Oral, BID, Blayne Cai MD, 250 mg at 03/19/19 0819  •  sodium chloride 0.9 % flush 1-10 mL, 1-10 mL, Intravenous, PRN, Oma Sy, APRN  •  [COMPLETED] Insert peripheral IV, , , Once **AND** sodium chloride 0.9 % flush 10 mL, 10 mL, Intravenous, PRN, Jordan Riggins MD  •  sodium chloride 0.9 % flush 3 mL, 3 mL, Intravenous, Q12H, Oma Sy, APRN, 3 mL at 03/19/19 0820  •  sodium chloride 0.9 % flush 3 mL, 3 mL, Intravenous, Q12H, Linnette Melo APRN, 3 mL at 03/19/19 0819  •  sodium chloride 0.9 % flush 3-10 mL, 3-10 mL, Intravenous, PRN, Linnette Melo, APRN  •  vancomycin oral solution reconstituted 125 mg, 125 mg, Oral, Q6H, MeloLinnette anne, APRN, 125 mg at 03/19/19 0519  Review of Systems:    She still with abdominal pain, diarrhea and anal incontinence, all other systems reviewed and negative    Objective     Vital Signs  Temp:  [98 °F (36.7 °C)-99.4 °F (37.4 °C)] 98.9 °F (37.2 °C)  Heart Rate:  [73-75] 75  Resp:  [16] 16  BP: ()/(44-58) 107/58  Body mass index is 25.53 kg/m².    Intake/Output Summary (Last 24 hours) at 3/19/2019 0829  Last data filed at 3/19/2019 0600  Gross per 24 hour   Intake 1395 ml   Output --   Net 1395 ml     No intake/output data recorded.     Physical Exam:   General: patient awake, alert and cooperative   Eyes: Normal lids and lashes, no scleral icterus   Neck: supple, normal ROM   Skin: warm and dry, not jaundiced   Cardiovascular: regular rhythm and rate, no murmurs auscultated   Pulm: clear to auscultation bilaterally, regular and unlabored   Abdomen: soft, nontender, nondistended; normal bowel sounds   Rectal: deferred   Extremities: no rash or edema   Psychiatric: Normal mood and behavior; memory intact     Results Review:     I reviewed the patient's new clinical results.    Results from last 7 days   Lab Units 03/19/19  0615 03/18/19  0335   WBC 10*3/mm3 7.06 18.63*    HEMOGLOBIN g/dL 11.5* 13.8   HEMATOCRIT % 36.0 43.1   PLATELETS 10*3/mm3 132* 205     Results from last 7 days   Lab Units 03/19/19  0615 03/18/19  0335   SODIUM mmol/L 134* 142   POTASSIUM mmol/L 3.8 4.2   CHLORIDE mmol/L 101 99   CO2 mmol/L 22.7 28.4   BUN mg/dL 30* 25*   CREATININE mg/dL 1.92* 1.50*   CALCIUM mg/dL 8.8 9.7   BILIRUBIN mg/dL  --  0.4   ALK PHOS U/L  --  55   ALT (SGPT) U/L  --  33   AST (SGOT) U/L  --  28   GLUCOSE mg/dL 107* 115*         Lab Results   Lab Value Date/Time    LIPASE 14 03/19/2019 0615    LIPASE 85 (H) 03/18/2019 0335    LIPASE 49 03/04/2019 1805    LIPASE 46 12/21/2016 1842       Radiology:  CT Abdomen Pelvis Without Contrast   Final Result          Assessment/Plan     Patient Active Problem List   Diagnosis   • S/P AVR (aortic valve replacement)   • S/P TVR (tricuspid valve repair)   • S/P MVR (mitral valve repair)   • Benign essential hypertension   • Chest tightness   • Mitral and aortic valve disease   • CKD (chronic kidney disease) stage 3, GFR 30-59 ml/min (CMS/HCC)   • Small bowel obstruction (CMS/HCC)   • Abdominal pain   • Slow transit constipation   • Hyperlipidemia   • Pacemaker   • Colitis   • Leukocytosis   • Elevated lipase   • C. difficile colitis   • Ileus (CMS/HCC)     Problem list     C. difficile colitis  Diarrhea  Abdominal pain  Abnormal imaging with mild wall thickening of the colon  Abnormal imaging with mild ileus        Assessment/Plan     I would continue p.o. vancomycin, with her white blood cell count greater than 15 (on admission), I would use vancomycin instead of Flagyl in this scenario.  Leukocytosis has resolved   probiotic  I see no indication to add vancomycin enemas, she has a mild ileus but p.o. vancomycin should work without issue, and she is improving not only with regard to symptoms but also wbc count  I would recommend an outpatient colonoscopy in about 6-8 weeks  Lipase can be mildly elevated in gastrointestinal tract infection, no  evidence of pancreatitis      I discussed the patients findings and my recommendations with patient and nursing staff.    Blayne Cai MD

## 2019-03-19 NOTE — PROGRESS NOTES
Discharge Planning Assessment  [facility]     Patient Name: Carly Gotti  MRN: [id number]  Today's Date: 3/19/2019    Admit Date: 3/18/2019    Discharge Needs Assessment     Row Name 03/19/19 1107       Living Environment    Lives With  child(silvano), adult    Current Living Arrangements  home/apartment/condo    Primary Care Provided by  self    Family Caregiver if Needed  child(silvano), adult       Transition Planning    Patient/Family Anticipates Transition to  home with family    Transportation Anticipated  car, drives self;family or friend will provide       Discharge Needs Assessment    Readmission Within the Last 30 Days  no previous admission in last 30 days    Concerns to be Addressed  basic needs    Equipment Currently Used at Home  bipap/cpap    Equipment Needed After Discharge  none        Discharge Plan     Row Name 03/19/19 1111       Plan    Plan  To be determined; awaiting PT eval     Patient/Family in Agreement with Plan  yes    Plan Comments  Spoke with pt at bedside. Role of CCP explained. Facesheet info verified. Pharmacy verified. Pt denies any issues affording her medications or remembering to take them. Pt denies having a Living Will or a POA. Pt wears a CPAP at  provided by [employer]. Pt has used [facility] in the past. Pt has been to [facility] in the past. Pt lives with her dtr at [address] [place] [address]. They live in a S/S home without any exterior steps. Pt was [employer]'s PTA and still drives. Pt is unsure of her plan for dc. Await PT eval. Dtr will provide transportation at [place]. CCP to follow. JChasteenRN/CCP         Destination      No service coordination in this encounter.      Durable Medical Equipment      No service coordination in this encounter.      Dialysis/Infusion      No service coordination in this encounter.      Home Medical Care      No service coordination in this encounter.      Community Resources      No service coordination in this encounter.           Demographic Summary    No documentation.       Functional Status    No documentation.       Psychosocial    No documentation.       Abuse/Neglect    No documentation.       Legal    No documentation.       Substance Abuse    No documentation.       Patient Forms    No documentation.           Rayne Sheehan RN

## 2019-03-19 NOTE — PLAN OF CARE
Problem: Fall Risk (Adult)  Goal: Identify Related Risk Factors and Signs and Symptoms  Outcome: Ongoing (interventions implemented as appropriate)   03/19/19 0318   Fall Risk (Adult)   Related Risk Factors (Fall Risk) fatigue/slow reaction;depression/anxiety;environment unfamiliar;fear of falling   Signs and Symptoms (Fall Risk) presence of risk factors     Goal: Absence of Fall  Outcome: Ongoing (interventions implemented as appropriate)   03/19/19 0318   Fall Risk (Adult)   Absence of Fall making progress toward outcome       Problem: Patient Care Overview  Goal: Plan of Care Review  Outcome: Ongoing (interventions implemented as appropriate)   03/19/19 0318   Coping/Psychosocial   Plan of Care Reviewed With patient   Plan of Care Review   Progress improving   OTHER   Outcome Summary Feeling better as per pt, less nausea, pain at back head getting better, BM multiple times and still watery, tolerating some clears, will monitor closely     Goal: Individualization and Mutuality  Outcome: Ongoing (interventions implemented as appropriate)   03/19/19 0318   Individualization   Patient Specific Preferences none   Patient Specific Goals (Include Timeframe) treat infection, control pain and prevent fall, feel beeter   Patient Specific Interventions Vanc PO, oral and IV pain meds, IV nausea meds       Problem: Pain, Acute (Adult)  Goal: Identify Related Risk Factors and Signs and Symptoms  Outcome: Ongoing (interventions implemented as appropriate)   03/19/19 0318   Pain, Acute (Adult)   Related Risk Factors (Acute Pain) persistent pain;positioning   Signs and Symptoms (Acute Pain) verbalization of pain descriptors;fatigue/weakness;nausea/vomiting/anorexia;questions meaning of pain;constipation/diarrhea     Goal: Acceptable Pain Control/Comfort Level  Outcome: Ongoing (interventions implemented as appropriate)   03/19/19 0318   Pain, Acute (Adult)   Acceptable Pain Control/Comfort Level making progress toward outcome        Problem: Diarrhea (Adult)  Goal: Identify Related Risk Factors and Signs and Symptoms  Outcome: Ongoing (interventions implemented as appropriate)   03/19/19 0318   Diarrhea (Adult)   Related Risk Factors (Diarrhea) infectious process;inflammatory process;anxiety/stress   Signs and Symptoms (Diarrhea) nausea and vomiting;decreased appetite;headache;loose, watery stool;urgency;fecal incontinence     Goal: Improved/Reduced Symptoms  Outcome: Ongoing (interventions implemented as appropriate)   03/19/19 0318   Diarrhea (Adult)   Improved/Reduced Symptoms making progress toward outcome

## 2019-03-20 PROBLEM — D69.6 THROMBOCYTOPENIA (HCC): Status: ACTIVE | Noted: 2019-03-20

## 2019-03-20 LAB
ANION GAP SERPL CALCULATED.3IONS-SCNC: 14.7 MMOL/L
BUN BLD-MCNC: 23 MG/DL (ref 8–23)
BUN/CREAT SERPL: 14.9 (ref 7–25)
CALCIUM SPEC-SCNC: 9.1 MG/DL (ref 8.6–10.5)
CHLORIDE SERPL-SCNC: 103 MMOL/L (ref 98–107)
CO2 SERPL-SCNC: 20.3 MMOL/L (ref 22–29)
CREAT BLD-MCNC: 1.54 MG/DL (ref 0.57–1)
DEPRECATED RDW RBC AUTO: 49.5 FL (ref 37–54)
ERYTHROCYTE [DISTWIDTH] IN BLOOD BY AUTOMATED COUNT: 14.1 % (ref 12.3–15.4)
GFR SERPL CREATININE-BSD FRML MDRD: 32 ML/MIN/1.73
GLUCOSE BLD-MCNC: 69 MG/DL (ref 65–99)
HCT VFR BLD AUTO: 36.8 % (ref 34–46.6)
HGB BLD-MCNC: 12.2 G/DL (ref 12–15.9)
MCH RBC QN AUTO: 31.4 PG (ref 26.6–33)
MCHC RBC AUTO-ENTMCNC: 33.2 G/DL (ref 31.5–35.7)
MCV RBC AUTO: 94.6 FL (ref 79–97)
PLATELET # BLD AUTO: 115 10*3/MM3 (ref 140–450)
PMV BLD AUTO: 13.2 FL (ref 6–12)
POTASSIUM BLD-SCNC: 3.8 MMOL/L (ref 3.5–5.2)
RBC # BLD AUTO: 3.89 10*6/MM3 (ref 3.77–5.28)
SODIUM BLD-SCNC: 138 MMOL/L (ref 136–145)
WBC NRBC COR # BLD: 6.78 10*3/MM3 (ref 3.4–10.8)

## 2019-03-20 PROCEDURE — 97110 THERAPEUTIC EXERCISES: CPT

## 2019-03-20 PROCEDURE — 99232 SBSQ HOSP IP/OBS MODERATE 35: CPT | Performed by: INTERNAL MEDICINE

## 2019-03-20 PROCEDURE — 25010000002 ONDANSETRON PER 1 MG: Performed by: NURSE PRACTITIONER

## 2019-03-20 PROCEDURE — 80048 BASIC METABOLIC PNL TOTAL CA: CPT | Performed by: HOSPITALIST

## 2019-03-20 PROCEDURE — 85027 COMPLETE CBC AUTOMATED: CPT | Performed by: HOSPITALIST

## 2019-03-20 RX ORDER — LOPERAMIDE HYDROCHLORIDE 2 MG/1
2 CAPSULE ORAL 3 TIMES DAILY
Status: DISCONTINUED | OUTPATIENT
Start: 2019-03-20 | End: 2019-03-21

## 2019-03-20 RX ORDER — LOPERAMIDE HYDROCHLORIDE 2 MG/1
2 CAPSULE ORAL 3 TIMES DAILY PRN
Status: DISCONTINUED | OUTPATIENT
Start: 2019-03-20 | End: 2019-03-20

## 2019-03-20 RX ADMIN — NEBIVOLOL HYDROCHLORIDE 10 MG: 10 TABLET ORAL at 09:02

## 2019-03-20 RX ADMIN — BUPROPION HYDROCHLORIDE 300 MG: 300 TABLET, EXTENDED RELEASE ORAL at 09:02

## 2019-03-20 RX ADMIN — VANCOMYCIN 125 MG: KIT at 05:43

## 2019-03-20 RX ADMIN — VANCOMYCIN 125 MG: KIT at 18:01

## 2019-03-20 RX ADMIN — Medication 400 MCG: at 09:02

## 2019-03-20 RX ADMIN — ONDANSETRON HYDROCHLORIDE 4 MG: 2 SOLUTION INTRAMUSCULAR; INTRAVENOUS at 05:42

## 2019-03-20 RX ADMIN — SODIUM CHLORIDE, PRESERVATIVE FREE 3 ML: 5 INJECTION INTRAVENOUS at 21:04

## 2019-03-20 RX ADMIN — LOPERAMIDE HYDROCHLORIDE 2 MG: 2 CAPSULE ORAL at 15:14

## 2019-03-20 RX ADMIN — LOPERAMIDE HYDROCHLORIDE 2 MG: 2 CAPSULE ORAL at 21:04

## 2019-03-20 RX ADMIN — HYDROCODONE BITARTRATE AND ACETAMINOPHEN 1 TABLET: 10; 325 TABLET ORAL at 05:43

## 2019-03-20 RX ADMIN — ONDANSETRON HYDROCHLORIDE 4 MG: 2 SOLUTION INTRAMUSCULAR; INTRAVENOUS at 17:05

## 2019-03-20 RX ADMIN — Medication 250 MG: at 21:04

## 2019-03-20 RX ADMIN — VANCOMYCIN 125 MG: KIT at 11:55

## 2019-03-20 RX ADMIN — ASPIRIN 325 MG: 325 TABLET ORAL at 09:02

## 2019-03-20 RX ADMIN — Medication 250 MG: at 09:02

## 2019-03-20 RX ADMIN — FLUOXETINE HYDROCHLORIDE 60 MG: 20 CAPSULE ORAL at 09:01

## 2019-03-20 NOTE — PROGRESS NOTES
Continued Stay Note  [facility]     Patient Name: Carly Gotti  MRN: [id number]  Today's Date: 3/20/2019    Admit Date: 3/18/2019    Discharge Plan     Row Name 03/20/19 1427       Plan    Plan  [facility], referral pending     Plan Comments  PT nichelle noted. Pt would like to go to [facility] if JON needed at [place], referral made to Mariya/Julio. Pt states she doesnt have anybody at home to help her and if she is still needing help going to the bathroom and getting cleaned up, like she is today then she will need to go to [facility]. CCP to follow. JChastjamieRN/CCP         Discharge Codes    No documentation.             Rayne Sheehan RN

## 2019-03-20 NOTE — PLAN OF CARE
Problem: Fall Risk (Adult)  Goal: Identify Related Risk Factors and Signs and Symptoms  Outcome: Outcome(s) achieved Date Met: 03/20/19    Goal: Absence of Fall  Outcome: Ongoing (interventions implemented as appropriate)      Problem: Patient Care Overview  Goal: Plan of Care Review  Outcome: Ongoing (interventions implemented as appropriate)   03/20/19 2896   Coping/Psychosocial   Plan of Care Reviewed With patient   Plan of Care Review   Progress no change   OTHER   Outcome Summary Loose liquid stools continue. Denies nausea or pain. Rudi bland diet. Started imodium. Cont to monitor.       Problem: Pain, Acute (Adult)  Goal: Identify Related Risk Factors and Signs and Symptoms  Outcome: Outcome(s) achieved Date Met: 03/20/19    Goal: Acceptable Pain Control/Comfort Level  Outcome: Ongoing (interventions implemented as appropriate)      Problem: Diarrhea (Adult)  Goal: Identify Related Risk Factors and Signs and Symptoms  Outcome: Outcome(s) achieved Date Met: 03/20/19    Goal: Improved/Reduced Symptoms  Outcome: Ongoing (interventions implemented as appropriate)

## 2019-03-20 NOTE — PROGRESS NOTES
"    Name: Carly Gotti ADMIT: 3/18/2019   : 1940  PCP: Reyes, Miriam Mercado, MD    MRN: [id number] LOS: 2 days   AGE/SEX: 78 y.o. female  ROOM: [facility]     Subjective   Subjective   Subjective:  Symptoms:  Improved.  She reports weakness and diarrhea.  No shortness of breath or chest pain.  (Feels generally improved compared to admission.  No new complaints.).    Diet:  Dietary issues: tolerating clears.  No nausea or vomiting.    Activity level: Impaired due to weakness.    Pain:  She complains of pain that is mild.  She reports pain is improving.  Pain is partially controlled.         Objective   Objective   Vital Signs  Temp:  [98 °F (36.7 °C)-98.8 °F (37.1 °C)] 98.3 °F (36.8 °C)  Heart Rate:  [75-78] 75  Resp:  [16] 16  BP: (107-140)/(58-80) 140/71  SpO2:  [92 %-100 %] 92 %  on   ;   Device (Oxygen Therapy): room air  Body mass index is 25.53 kg/m².  Objective:  General Appearance:  Comfortable and in no acute distress.    Vital signs: (most recent): Blood pressure 140/71, pulse 75, temperature 98.3 °F (36.8 °C), temperature source Oral, resp. rate 16, height 160 cm (63\"), weight 65.4 kg (144 lb 1.6 oz), SpO2 92 %.  No fever.    Output: Producing stool.    Lungs:  Normal effort.  Breath sounds clear to auscultation.    Heart: Normal rate.  Regular rhythm.  Positive for murmur.    Abdomen: Abdomen is soft and non-distended.  Hyperactive bowel sounds.   There is generalized tenderness.     Extremities: Normal range of motion.  There is no dependent edema.    Neurological: Patient is alert and oriented to person, place and time.    Skin:  Warm and dry.          Results Review:       I reviewed the patient's new clinical results.  Results from last 7 days   Lab Units 19  0623 19  0615 19  0335   WBC 10*3/mm3 6.78 7.06 18.63*   HEMOGLOBIN g/dL 12.2 11.5* 13.8   PLATELETS 10*3/mm3 115* 132* 205     Results from last 7 days   Lab Units 19  0623 19  0615 19  0335   SODIUM " mmol/L 138 134* 142   POTASSIUM mmol/L 3.8 3.8 4.2   CHLORIDE mmol/L 103 101 99   CO2 mmol/L 20.3* 22.7 28.4   BUN mg/dL 23 30* 25*   CREATININE mg/dL 1.54* 1.92* 1.50*   GLUCOSE mg/dL 69 107* 115*   Estimated Creatinine Clearance: 26.5 mL/min (A) (by C-G formula based on SCr of 1.54 mg/dL (H)).  Results from last 7 days   Lab Units 03/18/19  0335   ALBUMIN g/dL 4.30   BILIRUBIN mg/dL 0.4   ALK PHOS U/L 55   AST (SGOT) U/L 28   ALT (SGPT) U/L 33     Results from last 7 days   Lab Units 03/20/19  0623 03/19/19  0615 03/18/19  0335   CALCIUM mg/dL 9.1 8.8 9.7   ALBUMIN g/dL  --   --  4.30     Results from last 7 days   Lab Units 03/18/19  0521   LACTATE mmol/L 1.2     Results from last 7 days   Lab Units 03/18/19  0711   CDIFFPCR  Positive*         aspirin 325 mg Oral Daily   buPROPion  mg Oral Daily   FLUoxetine 60 mg Oral Daily   folic acid 400 mcg Oral Daily   nebivolol 10 mg Oral Q24H   saccharomyces boulardii 250 mg Oral BID   sodium chloride 3 mL Intravenous Q12H   vancomycin 125 mg Oral Q6H       lactated ringers 75 mL/hr Last Rate: 75 mL/hr (03/19/19 1824)   Pharmacy Consult     Diet Clear Liquid       Assessment/Plan     Active Hospital Problems    Diagnosis  POA   • **C. difficile colitis [A04.72]  Yes   • Sepsis (CMS/HCC) [A41.9]  Yes   • Elevated lipase [R74.8]  Yes   • Ileus (CMS/HCC) [K56.7]  Yes   • CKD (chronic kidney disease) stage 3, GFR 30-59 ml/min (CMS/HCC) [N18.3]  Yes   • Benign essential hypertension [I10]  Yes   • S/P MVR (mitral valve repair) [Z98.890]  Not Applicable   • S/P TVR (tricuspid valve repair) [Z98.890]  Not Applicable   • S/P AVR (aortic valve replacement) [Z95.2]  Not Applicable      Resolved Hospital Problems   No resolved problems to display.       Ms. Gotti is a 78 y.o. with a history of valvular heart disease and prior bowel obstruction who has been admitted with CDiff colitis suspected based on history and confirmed by positive Cdiff toxin assay.    · Patient has  sepsis, present on admit, due to cdiff colitis evidenced by fever, leukocytosis and elevated lactic acid.  This now seems resolved with proper treatment.   · Cr elevation likely represents CKD3 and is not far from baseline.  Continue to monitor.    · Appreciate GI eval.  No indication for vanc enema at this time. Ileus resolving.    · Continue contact isolation precautions.  · She still has profuse diarrhea.  Will schedule Imodium.  · Tolerating regular diet.  · Continue current IV fluids.  Bumex held for now given diarrhea.    VTE Prophylaxis - SCDs   Code Status - Full code  Disposition - Anticipate discharge to home in 2-3 days.      Oskar Triana MD  [facility]  03/20/19  8:57 AM

## 2019-03-20 NOTE — PLAN OF CARE
Problem: Fall Risk (Adult)  Goal: Identify Related Risk Factors and Signs and Symptoms  Outcome: Ongoing (interventions implemented as appropriate)   03/20/19 0446   Fall Risk (Adult)   Related Risk Factors (Fall Risk) environment unfamiliar;fatigue/slow reaction;depression/anxiety   Signs and Symptoms (Fall Risk) presence of risk factors     Goal: Absence of Fall  Outcome: Ongoing (interventions implemented as appropriate)   03/20/19 0446   Fall Risk (Adult)   Absence of Fall making progress toward outcome       Problem: Patient Care Overview  Goal: Plan of Care Review  Outcome: Ongoing (interventions implemented as appropriate)   03/20/19 0446   Coping/Psychosocial   Plan of Care Reviewed With patient   Plan of Care Review   Progress improving   OTHER   Outcome Summary Slept better, less headache and nausea, pt feeling better, stool still loose, will monitor closely, pt ambulating with help now to the bathroom     Goal: Individualization and Mutuality  Outcome: Ongoing (interventions implemented as appropriate)   03/20/19 0446   Individualization   Patient Specific Preferences NONE   Patient Specific Goals (Include Timeframe) treat infection, control pain and nausea, prevent fall, go home soon   Patient Specific Interventions Vanc PO, oral pain meds and IV nausea meds       Problem: Pain, Acute (Adult)  Goal: Identify Related Risk Factors and Signs and Symptoms  Outcome: Ongoing (interventions implemented as appropriate)   03/20/19 0446   Pain, Acute (Adult)   Related Risk Factors (Acute Pain) infection;disease process   Signs and Symptoms (Acute Pain) verbalization of pain descriptors;nausea/vomiting/anorexia;constipation/diarrhea     Goal: Acceptable Pain Control/Comfort Level  Outcome: Ongoing (interventions implemented as appropriate)   03/20/19 0446   Pain, Acute (Adult)   Acceptable Pain Control/Comfort Level making progress toward outcome       Problem: Diarrhea (Adult)  Goal: Identify Related Risk Factors  and Signs and Symptoms  Outcome: Ongoing (interventions implemented as appropriate)   03/20/19 0446   Diarrhea (Adult)   Related Risk Factors (Diarrhea) antibiotics;infectious process;inflammatory process   Signs and Symptoms (Diarrhea) nausea and vomiting;loose, watery stool;urgency     Goal: Improved/Reduced Symptoms  Outcome: Ongoing (interventions implemented as appropriate)   03/20/19 0446   Diarrhea (Adult)   Improved/Reduced Symptoms making progress toward outcome

## 2019-03-20 NOTE — DISCHARGE PLACEMENT REQUEST
"Leena Villegas (78 y.o. Female)     Date of Birth Social Security Number Address Home Phone MRN    1940  [id number]  [place] [address] [phone] [id number]    Anabaptism Marital Status          Zoroastrian        Admission Date Admission Type Admitting Provider Attending Provider Department, Room/Bed    3/18/19 Emergency David Maradiaga MD Ray, Jonathan, MD [address], [address]    Discharge Date Discharge Disposition Discharge Destination                       Attending Provider:  Oskar Triana MD    Allergies:  No Known Allergies    Isolation:  Spore   Infection:  C.difficile (03/18/19)   Code Status:  CPR    Ht:  160 cm (63\")   Wt:  65.4 kg (144 lb 1.6 oz)    Admission Cmt:  None   Principal Problem:  C. difficile colitis [A04.72]                 Active Insurance as of 3/18/2019     Primary Coverage     Payor Plan Insurance Group Employer/Plan Group    MEDICARE MEDICARE A & B      Payor Plan Address Payor Plan Phone Number Payor Plan Fax Number Effective Dates    [address] [phone]  8/1/2003 - None Entered    [facility] [id number]       Subscriber Name Subscriber Birth Date Member ID       LEENA VILLEGAS 1940 [id number]           Secondary Coverage     Payor Plan Insurance Group Employer/Plan Group    [facility] SUPP [id number]     Payor Plan Address Payor Plan Phone Number Payor Plan Fax Number Effective Dates    [address]   11/1/2016 - None Entered    [facility] [address]       Subscriber Name Subscriber Birth Date Member ID       LEENA VILLEGAS 1940 [id number]                 Emergency Contacts      (Rel.) Home Phone Work Phone Mobile Phone    Weilage,Chena (Daughter) [phone]    justinmingo valencia (Son) [phone] -- --              "

## 2019-03-20 NOTE — THERAPY TREATMENT NOTE
Acute Care - Physical Therapy Treatment Note  [facility]     Patient Name: Carly Gotti  : 1940  MRN: [id number]  Today's Date: 3/20/2019  Onset of Illness/Injury or Date of Surgery: 19  Date of Referral to PT: 19  Referring Physician: Kong    Admit Date: 3/18/2019    Visit Dx:    ICD-10-CM ICD-9-CM   1. Colitis K52.9 558.9   2. Diarrhea, unspecified type R19.7 787.91   3. Leukocytosis, unspecified type D72.829 288.60   4. Generalized abdominal pain R10.84 789.07   5. Impaired functional mobility and endurance Z74.09 V49.89     Patient Active Problem List   Diagnosis   • S/P AVR (aortic valve replacement)   • S/P TVR (tricuspid valve repair)   • S/P MVR (mitral valve repair)   • Benign essential hypertension   • Chest tightness   • Mitral and aortic valve disease   • CKD (chronic kidney disease) stage 3, GFR 30-59 ml/min (CMS/HCC)   • Small bowel obstruction (CMS/HCC)   • Abdominal pain   • Slow transit constipation   • Hyperlipidemia   • Pacemaker   • Colitis   • Elevated lipase   • C. difficile colitis   • Ileus (CMS/HCC)   • Sepsis (CMS/HCC)   • Thrombocytopenia (CMS/HCC)       Therapy Treatment    Rehabilitation Treatment Summary     Row Name 19 0850             Treatment Time/Intention    Discipline  physical therapist  -      Document Type  therapy note (daily note)  -LH      Subjective Information  no complaints  -LH      Mode of Treatment  individual therapy;physical therapy  -LH      Therapy Frequency (PT Clinical Impression)  daily  -LH      Patient Effort  good  -LH      Patient Response to Treatment  current tx limited by BM issues  -LH      Recorded by [LH] Jalyn Alan, PT 19 0852      Row Name 19 0850             Cognitive Assessment/Intervention- PT/OT    Orientation Status (Cognition)  oriented x 4  -LH      Follows Commands (Cognition)  WFL  -LH      Recorded by [LH] Jalyn Alan, PT 19 0852      Row Name 19 0850             Bed Mobility  Assessment/Treatment    Supine-Sit-Supine Shiloh (Bed Mobility)  minimum assist (75% patient effort);verbal cues  -      Assistive Device (Bed Mobility)  bed rails  -LH      Recorded by [] Jalyn Alan, PT 03/20/19 0852      Row Name 03/20/19 0850             Sit-Stand Transfer    Sit-Stand Shiloh (Transfers)  contact guard;verbal cues  -      Assistive Device (Sit-Stand Transfers)  -- HHA  -LH      Recorded by [] Jalyn Alan, PT 03/20/19 0852      Row Name 03/20/19 0850             Stand-Sit Transfer    Stand-Sit [place]iloh (Transfers)  contact guard;verbal cues HHA  -LH      Recorded by [] Jalyn Alan, PT 03/20/19 0852      Row Name 03/20/19 0850             Gait/Stairs Assessment/Training    Shiloh Level (Gait)  contact guard;verbal cues  -      Assistive Device (Gait)  -- HHA  -LH      Distance in Feet (Gait)  15  -      Pattern (Gait)  step-through  -      Deviations/Abnormal Patterns (Gait)  gait speed decreased  -      Comment (Gait/Stairs)  assist to bathroom, BM limiting gait distance  -LH      Recorded by [] Jalyn Alan, PT 03/20/19 0852      Row Name 03/20/19 0850             Positioning and Restraints    Pre-Treatment Position  in bed  -      Post Treatment Position  bathroom  -      Bathroom  sitting;call light within reach;encouraged to call for assist;with nsg aide assisting  -LH      Recorded by [] Jalyn Alan, PT 03/20/19 0852      Row Name 03/20/19 0850             Pain Assessment    Additional Documentation  Pain Scale: FACES Pre/Post-Treatment (Group)  -LH      Recorded by [] Jalyn Alan, PT 03/20/19 0852      Row Name 03/20/19 0850             Pain Scale: FACES Pre/Post-Treatment    Pain: FACES Scale, Pretreatment  0-->no hurt  -LH      Pain: FACES Scale, Post-Treatment  0-->no hurt  -LH      Recorded by [] Jalyn Alan, PT 03/20/19 0852        User Key  (r) = Recorded By, (t) = Taken By, (c) = Cosigned By    Initials Name Effective  Dates Discipline     Jalyn Alan, PT 04/03/18 -  PT                   Physical Therapy Education     Title: PT OT SLP Therapies (In Progress)     Topic: Physical Therapy (In Progress)     Point: Mobility training (In Progress)     Learning Progress Summary           Patient Acceptance, E, NR by  at 3/20/2019  8:52 AM                   Point: Home exercise program (In Progress)     Learning Progress Summary           Patient Acceptance, E, NR by  at 3/20/2019  8:52 AM                               User Key     Initials Effective Dates Name Provider Type Discipline     04/03/18 -  Jalyn Alan, PT Physical Therapist PT                PT Recommendation and Plan  Therapy Frequency (PT Clinical Impression): daily  Plan of Care Reviewed With: patient  Outcome Summary: pt agreeable to ambulate this date CGA/HHA however limited by BM/bathroom issues. will cont to monitor progress.   Outcome Measures     Row Name 03/20/19 0800 03/19/19 1400          How much help from another person do you currently need...    Turning from your back to your side while in flat bed without using bedrails?  3  -  4  -SV     Moving from lying on back to sitting on the side of a flat bed without bedrails?  3  -  3  -SV     Moving to and from a bed to a chair (including a wheelchair)?  3  -  3  -SV     Standing up from a chair using your arms (e.g., wheelchair, bedside chair)?  3  -  3  -SV     Climbing 3-5 steps with a railing?  3  -  2  -SV     To walk in hospital room?  3  -  3  -SV     AM-PAC 6 Clicks Score  18  -  18  -SV        Functional Assessment    Outcome Measure Options  AM-PAC 6 Clicks Basic Mobility (PT)  -  --       User Key  (r) = Recorded By, (t) = Taken By, (c) = Cosigned By    Initials Name Provider Type     Jalyn Alan, PT Physical Therapist    SV Gabriela Walker, PT Physical Therapist         Time Calculation:   PT Charges     Row Name 03/20/19 0854             Time Calculation    Start Time   0837  -      Stop Time  0847  -      Time Calculation (min)  10 min  -      PT Received On  03/20/19  -      PT - Next Appointment  03/21/19  -        User Key  (r) = Recorded By, (t) = Taken By, (c) = Cosigned By    Initials Name Provider Type     Jalyn Alan, PT Physical Therapist        Therapy Charges for Today     Code Description Service Date Service Provider Modifiers Qty    18929247215 HC PT THER PROC EA 15 MIN 3/20/2019 Jalyn Alan, PT GP 1          PT G-Codes  Outcome Measure Options: AM-PAC 6 Clicks Basic Mobility (PT)  AM-PAC 6 Clicks Score: 18    Jalyn Alan, PT  3/20/2019

## 2019-03-20 NOTE — PLAN OF CARE
Problem: Patient Care Overview  Goal: Plan of Care Review   03/20/19 0899   Coping/Psychosocial   Plan of Care Reviewed With patient   OTHER   Outcome Summary pt agreeable to ambulate this date CGA/HHA however limited by BM/bathroom issues. will cont to monitor progress.

## 2019-03-20 NOTE — PROGRESS NOTES
[facility]  Inpatient Progress Note    Reason for Follow Up: C. difficile colitis, ileus    Subjective     Interval History:   Symptoms improving, no abdominal pain, no nausea or vomiting, tolerating clear liquids, ileus resolving, still with loose stools    Current Facility-Administered Medications:   •  acetaminophen (TYLENOL) tablet 650 mg, 650 mg, Oral, Q4H PRN, Oma Sy, APRN  •  aspirin tablet 325 mg, 325 mg, Oral, Daily, Demetrio Castaneda MD, 325 mg at 03/20/19 0902  •  buPROPion XL (WELLBUTRIN XL) 24 hr tablet 300 mg, 300 mg, Oral, Daily, Demetrio Castaneda MD, 300 mg at 03/20/19 0902  •  FLUoxetine (PROzac) capsule 60 mg, 60 mg, Oral, Daily, Demetrio Castaneda MD, 60 mg at 03/20/19 0901  •  folic acid (FOLVITE) tablet 400 mcg, 400 mcg, Oral, Daily, Demetrio Castaneda MD, 400 mcg at 03/20/19 0902  •  HYDROcodone-acetaminophen (NORCO)  MG per tablet 1 tablet, 1 tablet, Oral, Q4H PRN, Demetrio Castaneda MD, 1 tablet at 03/20/19 0543  •  loperamide (IMODIUM) capsule 2 mg, 2 mg, Oral, TID PRN, Oskar Triana MD  •  morphine injection 1 mg, 1 mg, Intravenous, Q2H PRN, David Maradiaga MD, 1 mg at 03/18/19 2029  •  nebivolol (BYSTOLIC) tablet 10 mg, 10 mg, Oral, Q24H, Demetrio Castaneda MD, 10 mg at 03/20/19 0902  •  nitroglycerin (NITROSTAT) SL tablet 0.4 mg, 0.4 mg, Sublingual, Q5 Min PRN, Linnette Melo APRN  •  ondansetron (ZOFRAN) tablet 4 mg, 4 mg, Oral, Q6H PRN **OR** ondansetron ODT (ZOFRAN-ODT) disintegrating tablet 4 mg, 4 mg, Oral, Q6H PRN **OR** ondansetron (ZOFRAN) injection 4 mg, 4 mg, Intravenous, Q6H PRN, Oma Sy APRN, 4 mg at 03/20/19 0542  •  Pharmacy Consult, , Does not apply, Continuous PRN, Linnette Melo, TORSTEN  •  saccharomyces boulardii (FLORASTOR) capsule 250 mg, 250 mg, Oral, BID, Blayne Cai MD, 250 mg at 03/20/19 0902  •  sodium chloride 0.9 % flush 1-10 mL, 1-10 mL, Intravenous, PRN, Oma Sy, APRN  •   sodium chloride 0.9 % flush 3 mL, 3 mL, Intravenous, Q12H, Oma Sy, APRN, 3 mL at 03/19/19 2048  •  vancomycin oral solution reconstituted 125 mg, 125 mg, Oral, Q6H, MeloTcLinnette JANENE, APRN, 125 mg at 03/20/19 1155  Review of Systems:    He endorses weakness and fatigue, anal incontinence all other systems reviewed and    Objective     Vital Signs  Temp:  [98 °F (36.7 °C)-98.8 °F (37.1 °C)] 98.2 °F (36.8 °C)  Heart Rate:  [75-81] 81  Resp:  [16] 16  BP: (107-144)/(58-80) 144/76  Body mass index is 25.53 kg/m².    Intake/Output Summary (Last 24 hours) at 3/20/2019 1329  Last data filed at 3/20/2019 1322  Gross per 24 hour   Intake 1585 ml   Output --   Net 1585 ml     I/O this shift:  In: 480 [P.O.:480]  Out: -      Physical Exam:   General: patient awake, alert and cooperative   Eyes: Normal lids and lashes, no scleral icterus   Neck: supple, normal ROM   Skin: warm and dry, not jaundiced   Cardiovascular: regular rhythm and rate, no murmurs auscultated   Pulm: clear to auscultation bilaterally, regular and unlabored   Abdomen: soft, nontender, nondistended; normal bowel sounds   Rectal: deferred   Extremities: no rash or edema   Psychiatric: Normal mood and behavior; memory intact     Results Review:     I reviewed the patient's new clinical results.    Results from last 7 days   Lab Units 03/20/19  0623 03/19/19  0615 03/18/19  0335   WBC 10*3/mm3 6.78 7.06 18.63*   HEMOGLOBIN g/dL 12.2 11.5* 13.8   HEMATOCRIT % 36.8 36.0 43.1   PLATELETS 10*3/mm3 115* 132* 205     Results from last 7 days   Lab Units 03/20/19  0623 03/19/19  0615 03/18/19  0335   SODIUM mmol/L 138 134* 142   POTASSIUM mmol/L 3.8 3.8 4.2   CHLORIDE mmol/L 103 101 99   CO2 mmol/L 20.3* 22.7 28.4   BUN mg/dL 23 30* 25*   CREATININE mg/dL 1.54* 1.92* 1.50*   CALCIUM mg/dL 9.1 8.8 9.7   BILIRUBIN mg/dL  --   --  0.4   ALK PHOS U/L  --   --  55   ALT (SGPT) U/L  --   --  33   AST (SGOT) U/L  --   --  28   GLUCOSE mg/dL 69 107* 115*          Lab Results   Lab Value Date/Time    LIPASE 14 03/19/2019 0615    LIPASE 85 (H) 03/18/2019 0335    LIPASE 49 03/04/2019 1805    LIPASE 46 12/21/2016 1842       Radiology:  CT Abdomen Pelvis Without Contrast   Final Result          Assessment/Plan     Patient Active Problem List   Diagnosis   • S/P AVR (aortic valve replacement)   • S/P TVR (tricuspid valve repair)   • S/P MVR (mitral valve repair)   • Benign essential hypertension   • Chest tightness   • Mitral and aortic valve disease   • CKD (chronic kidney disease) stage 3, GFR 30-59 ml/min (CMS/HCC)   • Small bowel obstruction (CMS/HCC)   • Abdominal pain   • Slow transit constipation   • Hyperlipidemia   • Pacemaker   • Colitis   • Elevated lipase   • C. difficile colitis   • Ileus (CMS/HCC)   • Sepsis (CMS/HCC)   • Thrombocytopenia (CMS/HCC)   Problem list     C. difficile colitis  Diarrhea  Abdominal pain  Abnormal imaging with mild wall thickening of the colon  Abnormal imaging with mild ileus        Assessment/Plan     I would continue p.o. vancomycin, with her white blood cell count greater than 15 (on admission), I would use vancomycin instead of Flagyl in this scenario.  Leukocytosis has resolved   add probiotic  I see no indication to add vancomycin enemas, she has a mild ileus but p.o. vancomycin should work without issue, and she is improving not only with regard to symptoms but also wbc count, agree with advancing her diet as ileus has resolved  I would recommend an outpatient colonoscopy in about 6-8 weeks  Lipase can be mildly elevated in gastrointestinal tract infection, no evidence of pancreatitis    Dr. Triana has added Imodium that is fine, Questran is also an option    I discussed the patients findings and my recommendations with patient and nursing staff.    Blayne Cai MD

## 2019-03-21 LAB
ANION GAP SERPL CALCULATED.3IONS-SCNC: 10.3 MMOL/L
BUN BLD-MCNC: 18 MG/DL (ref 8–23)
BUN/CREAT SERPL: 12.9 (ref 7–25)
CALCIUM SPEC-SCNC: 9.4 MG/DL (ref 8.6–10.5)
CHLORIDE SERPL-SCNC: 106 MMOL/L (ref 98–107)
CO2 SERPL-SCNC: 24.7 MMOL/L (ref 22–29)
CREAT BLD-MCNC: 1.39 MG/DL (ref 0.57–1)
DEPRECATED RDW RBC AUTO: 50.5 FL (ref 37–54)
ERYTHROCYTE [DISTWIDTH] IN BLOOD BY AUTOMATED COUNT: 13.9 % (ref 12.3–15.4)
GFR SERPL CREATININE-BSD FRML MDRD: 36 ML/MIN/1.73
GLUCOSE BLD-MCNC: 83 MG/DL (ref 65–99)
HCT VFR BLD AUTO: 35.7 % (ref 34–46.6)
HGB BLD-MCNC: 11.2 G/DL (ref 12–15.9)
MAGNESIUM SERPL-MCNC: 1.9 MG/DL (ref 1.6–2.4)
MCH RBC QN AUTO: 30.9 PG (ref 26.6–33)
MCHC RBC AUTO-ENTMCNC: 31.4 G/DL (ref 31.5–35.7)
MCV RBC AUTO: 98.6 FL (ref 79–97)
PLATELET # BLD AUTO: 146 10*3/MM3 (ref 140–450)
PMV BLD AUTO: 12.3 FL (ref 6–12)
POTASSIUM BLD-SCNC: 3.5 MMOL/L (ref 3.5–5.2)
RBC # BLD AUTO: 3.62 10*6/MM3 (ref 3.77–5.28)
SODIUM BLD-SCNC: 141 MMOL/L (ref 136–145)
WBC NRBC COR # BLD: 5.1 10*3/MM3 (ref 3.4–10.8)

## 2019-03-21 PROCEDURE — 99232 SBSQ HOSP IP/OBS MODERATE 35: CPT | Performed by: INTERNAL MEDICINE

## 2019-03-21 PROCEDURE — 97110 THERAPEUTIC EXERCISES: CPT

## 2019-03-21 PROCEDURE — 25010000002 ONDANSETRON PER 1 MG: Performed by: NURSE PRACTITIONER

## 2019-03-21 PROCEDURE — 80048 BASIC METABOLIC PNL TOTAL CA: CPT | Performed by: HOSPITALIST

## 2019-03-21 PROCEDURE — 83735 ASSAY OF MAGNESIUM: CPT | Performed by: HOSPITALIST

## 2019-03-21 PROCEDURE — 85027 COMPLETE CBC AUTOMATED: CPT | Performed by: HOSPITALIST

## 2019-03-21 RX ORDER — TEMAZEPAM 15 MG/1
15 CAPSULE ORAL NIGHTLY PRN
Status: DISCONTINUED | OUTPATIENT
Start: 2019-03-21 | End: 2019-03-22 | Stop reason: HOSPADM

## 2019-03-21 RX ORDER — DIPHENOXYLATE HYDROCHLORIDE AND ATROPINE SULFATE 2.5; .025 MG/1; MG/1
1 TABLET ORAL 4 TIMES DAILY
Status: DISCONTINUED | OUTPATIENT
Start: 2019-03-21 | End: 2019-03-22 | Stop reason: HOSPADM

## 2019-03-21 RX ORDER — LOPERAMIDE HYDROCHLORIDE 2 MG/1
4 CAPSULE ORAL 3 TIMES DAILY
Status: DISCONTINUED | OUTPATIENT
Start: 2019-03-21 | End: 2019-03-21

## 2019-03-21 RX ADMIN — TEMAZEPAM 15 MG: 15 CAPSULE ORAL at 21:56

## 2019-03-21 RX ADMIN — HYDROCODONE BITARTRATE AND ACETAMINOPHEN 1 TABLET: 10; 325 TABLET ORAL at 12:22

## 2019-03-21 RX ADMIN — DIPHENOXYLATE HYDROCHLORIDE AND ATROPINE SULFATE 1 TABLET: 2.5; .025 TABLET ORAL at 21:56

## 2019-03-21 RX ADMIN — VANCOMYCIN 125 MG: KIT at 18:00

## 2019-03-21 RX ADMIN — VANCOMYCIN 125 MG: KIT at 05:43

## 2019-03-21 RX ADMIN — VANCOMYCIN 125 MG: KIT at 23:53

## 2019-03-21 RX ADMIN — Medication 400 MCG: at 08:39

## 2019-03-21 RX ADMIN — ONDANSETRON HYDROCHLORIDE 4 MG: 2 SOLUTION INTRAMUSCULAR; INTRAVENOUS at 05:43

## 2019-03-21 RX ADMIN — Medication 250 MG: at 19:52

## 2019-03-21 RX ADMIN — FLUOXETINE HYDROCHLORIDE 60 MG: 20 CAPSULE ORAL at 08:39

## 2019-03-21 RX ADMIN — VANCOMYCIN 125 MG: KIT at 01:00

## 2019-03-21 RX ADMIN — BUPROPION HYDROCHLORIDE 300 MG: 300 TABLET, EXTENDED RELEASE ORAL at 08:39

## 2019-03-21 RX ADMIN — Medication 250 MG: at 08:39

## 2019-03-21 RX ADMIN — DIPHENOXYLATE HYDROCHLORIDE AND ATROPINE SULFATE 1 TABLET: 2.5; .025 TABLET ORAL at 18:00

## 2019-03-21 RX ADMIN — NEBIVOLOL HYDROCHLORIDE 10 MG: 10 TABLET ORAL at 08:39

## 2019-03-21 RX ADMIN — ASPIRIN 325 MG: 325 TABLET ORAL at 08:39

## 2019-03-21 RX ADMIN — VANCOMYCIN 125 MG: KIT at 12:10

## 2019-03-21 RX ADMIN — LOPERAMIDE HYDROCHLORIDE 2 MG: 2 CAPSULE ORAL at 08:39

## 2019-03-21 NOTE — THERAPY TREATMENT NOTE
Acute Care - Physical Therapy Treatment Note  [facility]     Patient Name: Carly Gotti  : 1940  MRN: [id number]  Today's Date: 3/21/2019  Onset of Illness/Injury or Date of Surgery: 19  Date of Referral to PT: 19  Referring Physician: Kong    Admit Date: 3/18/2019    Visit Dx:    ICD-10-CM ICD-9-CM   1. Colitis K52.9 558.9   2. Diarrhea, unspecified type R19.7 787.91   3. Leukocytosis, unspecified type D72.829 288.60   4. Generalized abdominal pain R10.84 789.07   5. Impaired functional mobility and endurance Z74.09 V49.89     Patient Active Problem List   Diagnosis   • S/P AVR (aortic valve replacement)   • S/P TVR (tricuspid valve repair)   • S/P MVR (mitral valve repair)   • Benign essential hypertension   • Chest tightness   • Mitral and aortic valve disease   • CKD (chronic kidney disease) stage 3, GFR 30-59 ml/min (CMS/HCC)   • Small bowel obstruction (CMS/HCC)   • Abdominal pain   • Slow transit constipation   • Hyperlipidemia   • Pacemaker   • Colitis   • Elevated lipase   • C. difficile colitis   • Ileus (CMS/HCC)   • Sepsis (CMS/HCC)   • Thrombocytopenia (CMS/HCC)       Therapy Treatment    Rehabilitation Treatment Summary     Row Name 19 1535             Treatment Time/Intention    Discipline  physical therapist  -      Document Type  therapy note (daily note)  -LH      Subjective Information  no complaints  -LH      Mode of Treatment  individual therapy;physical therapy  -LH      Therapy Frequency (PT Clinical Impression)  daily  -LH      Patient Effort  good  -LH      Recorded by [] Jalyn Alan, PT 19 153      Row Name 19 1535             Cognitive Assessment/Intervention- PT/OT    Orientation Status (Cognition)  oriented x 4  -LH      Follows Commands (Cognition)  WFL  -LH      Recorded by [] Jalyn Alan, PT 19      Row Name 19 1535             Bed Mobility Assessment/Treatment    Bed Mobility Assessment/Treatment   supine-sit;sit-supine  -      Supine-Sit [place] (Bed Mobility)  contact guard  -      Sit-Supine [place] (Bed Mobility)  not tested  -      Assistive Device (Bed Mobility)  bed rails  -LH      Recorded by [] Jalyn Alan, PT 03/21/19 1536      Row Name 03/21/19 1535             Sit-Stand Transfer    Sit-Stand [place] (Transfers)  contact guard  -      Assistive Device (Sit-Stand Transfers)  walker, front-wheeled  -LH      Recorded by [] Jalyn Alan, PT 03/21/19 1536      Row Name 03/21/19 1535             Stand-Sit Transfer    Stand-Sit [place] (Transfers)  contact guard  -      Assistive Device (Stand-Sit Transfers)  walker, front-wheeled  -LH      Recorded by [] Jalyn Alan, PT 03/21/19 1536      Row Name 03/21/19 1535             Gait/Stairs Assessment/Training    [place] Level (Gait)  contact guard;verbal cues  -      Assistive Device (Gait)  walker, front-wheeled  -      Distance in Feet (Gait)  150  -      Pattern (Gait)  swing-through  -      Deviations/Abnormal Patterns (Gait)  balwinder decreased  -LH      Recorded by [] Jalyn Alan, PT 03/21/19 1536      Row Name 03/21/19 1535             Motor Skills Assessment/Interventions    Additional Documentation  Therapeutic Exercise (Group);Therapeutic Exercise Interventions (Group)  -      Recorded by [] Jalyn Alan, PT 03/21/19 1536      Row Name 03/21/19 1535             Therapeutic Exercise    Therapeutic Exercise  seated, lower extremities  -      Additional Documentation  Therapeutic Exercise (Row)  -      Recorded by [] Jalyn Alan, PT 03/21/19 1536      Row Name 03/21/19 1535             Lower Extremity Seated Therapeutic Exercise    Performed, Seated Lower Extremity (Therapeutic Exercise)  LAQ (long arc quad), knee extension;ankle dorsiflexion/plantarflexion  -      Exercise Type, Seated Lower Extremity (Therapeutic Exercise)  AROM (active range of motion)  -      Sets/Reps Detail,  Seated Lower Extremity (Therapeutic Exercise)  1/5  -      Recorded by [] Jalyn Alan, PT 03/21/19 1536      Row Name 03/21/19 1535             Positioning and Restraints    Pre-Treatment Position  in bed  -      Post Treatment Position  bed  -      In Bed  call light within reach;encouraged to call for assist;exit alarm on;sitting EOB;notified nsg  -      Recorded by [] Jalyn Alan, PT 03/21/19 1536      Row Name 03/21/19 1535             Pain Scale: FACES Pre/Post-Treatment    Pain: FACES Scale, Pretreatment  0-->no hurt  -LH      Pain: FACES Scale, Post-Treatment  0-->no hurt  -LH      Recorded by [] Jalyn Alan, PT 03/21/19 1536        User Key  (r) = Recorded By, (t) = Taken By, (c) = Cosigned By    Initials Name Effective Dates Discipline     Jalyn Alan, PT 04/03/18 -  PT                   Physical Therapy Education     Title: PT OT SLP Therapies (In Progress)     Topic: Physical Therapy (In Progress)     Point: Mobility training (In Progress)     Learning Progress Summary           Patient Acceptance, E, NR by  at 3/21/2019  3:36 PM    Acceptance, E, NR by  at 3/20/2019  8:52 AM                   Point: Home exercise program (In Progress)     Learning Progress Summary           Patient Acceptance, E, NR by  at 3/21/2019  3:36 PM    Acceptance, E, NR by  at 3/20/2019  8:52 AM                               User Key     Initials Effective Dates Name Provider Type Discipline     04/03/18 -  Jalyn Alan, PT Physical Therapist PT                PT Recommendation and Plan  Therapy Frequency (PT Clinical Impression): daily  Plan of Care Reviewed With: patient  Outcome Summary: pt w good tolerance to inc gait distance this session, CGA 150ft rwx. pt reports not sleeping well, inquired about restarting home med [place]- Rn notified. will cont to progress mobility as yi, HHC vs SNU.   Outcome Measures     Row Name 03/21/19 1500 03/20/19 0800 03/19/19 1400       How much help from  another person do you currently need...    Turning from your back to your side while in flat bed without using bedrails?  4  -  3  -  4  -SV    Moving from lying on back to sitting on the side of a flat bed without bedrails?  4  -  3  -  3  -SV    Moving to and from a bed to a chair (including a wheelchair)?  3  -  3  -  3  -SV    Standing up from a chair using your arms (e.g., wheelchair, bedside chair)?  3  -  3  -  3  -SV    Climbing 3-5 steps with a railing?  3  -  3  -  2  -SV    To walk in hospital room?  3  -  3  -LH  3  -SV    AM-PAC 6 Clicks Score  20  -  18  -  18  -SV       Functional Assessment    Outcome Measure Options  AM-PAC 6 Clicks Basic Mobility (PT)  -  AM-PAC 6 Clicks Basic Mobility (PT)  -  --      User Key  (r) = Recorded By, (t) = Taken By, (c) = Cosigned By    Initials Name Provider Type     Jalyn Alan, PT Physical Therapist     Gabriela Walker, PT Physical Therapist         Time Calculation:   PT Charges     Row Name 03/21/19 1538             Time Calculation    Start Time  1520  -      Stop Time  1532  -      Time Calculation (min)  12 min  -      PT Received On  03/21/19  -      PT - Next Appointment  03/22/19  -        User Key  (r) = Recorded By, (t) = Taken By, (c) = Cosigned By    Initials Name Provider Type     Jalyn Alan, PT Physical Therapist        Therapy Charges for Today     Code Description Service Date Service Provider Modifiers Qty    07415042259 HC PT THER PROC EA 15 MIN 3/20/2019 Jalyn Alan, PT GP 1    20529471626 HC PT THER PROC EA 15 MIN 3/21/2019 Jalyn Alan, PT GP 1          PT G-Codes  Outcome Measure Options: AM-PAC 6 Clicks Basic Mobility (PT)  AM-PAC 6 Clicks Score: 20    Jalyn Alan PT  3/21/2019

## 2019-03-21 NOTE — PROGRESS NOTES
[facility]  Inpatient Progress Note    Reason for Follow Up:  c diff colitis    Subjective     Interval History:   Still having diarrhea but it has improved.  Not much abdominal pain.  Tolerating diet    Current Facility-Administered Medications:   •  acetaminophen (TYLENOL) tablet 650 mg, 650 mg, Oral, Q4H PRN, Oma Sy, APRN  •  aspirin tablet 325 mg, 325 mg, Oral, Daily, Demetrio Castaneda MD, 325 mg at 03/21/19 0839  •  buPROPion XL (WELLBUTRIN XL) 24 hr tablet 300 mg, 300 mg, Oral, Daily, Demetrio Castaneda MD, 300 mg at 03/21/19 0839  •  diphenoxylate-atropine (LOMOTIL) 2.5-0.025 MG per tablet 1 tablet, 1 tablet, Oral, 4x Daily, Oskar Triana MD  •  FLUoxetine (PROzac) capsule 60 mg, 60 mg, Oral, Daily, Demetrio Castaneda MD, 60 mg at 03/21/19 0839  •  folic acid (FOLVITE) tablet 400 mcg, 400 mcg, Oral, Daily, Demetrio Castaneda MD, 400 mcg at 03/21/19 0839  •  HYDROcodone-acetaminophen (NORCO)  MG per tablet 1 tablet, 1 tablet, Oral, Q4H PRN, Demetrio Castaneda MD, 1 tablet at 03/21/19 1222  •  nebivolol (BYSTOLIC) tablet 10 mg, 10 mg, Oral, Q24H, Demetrio Castaneda MD, 10 mg at 03/21/19 0839  •  nitroglycerin (NITROSTAT) SL tablet 0.4 mg, 0.4 mg, Sublingual, Q5 Min PRN, Linnette Melo, APRN  •  ondansetron (ZOFRAN) tablet 4 mg, 4 mg, Oral, Q6H PRN **OR** ondansetron ODT (ZOFRAN-ODT) disintegrating tablet 4 mg, 4 mg, Oral, Q6H PRN **OR** ondansetron (ZOFRAN) injection 4 mg, 4 mg, Intravenous, Q6H PRN, Oma Sy, APRN, 4 mg at 03/21/19 0543  •  Pharmacy Consult, , Does not apply, Continuous PRN, Linnette Melo APRN  •  saccharomyces boulardii (FLORASTOR) capsule 250 mg, 250 mg, Oral, BID, Blayne Cai MD, 250 mg at 03/21/19 0839  •  sodium chloride 0.9 % flush 1-10 mL, 1-10 mL, Intravenous, PRN, Oma Sy, APRIAM  •  sodium chloride 0.9 % flush 3 mL, 3 mL, Intravenous, Q12H, Oma Sy APRN, 3 mL at 03/20/19 2150  •   vancomycin oral solution reconstituted 125 mg, 125 mg, Oral, Q6H, Linnette Melo APRN, 125 mg at 03/21/19 1210  Review of Systems:    All systems were reviewed and negative except for:  Behavioral/Psych: positive for  sleep disturbance    Objective     Vital Signs  Temp:  [97.8 °F (36.6 °C)-98.8 °F (37.1 °C)] 97.8 °F (36.6 °C)  Heart Rate:  [73-87] 87  Resp:  [16] 16  BP: (110-134)/(64-79) 133/68  Body mass index is 25.53 kg/m².    Intake/Output Summary (Last 24 hours) at 3/21/2019 1707  Last data filed at 3/21/2019 1053  Gross per 24 hour   Intake 450 ml   Output --   Net 450 ml     I/O this shift:  In: 210 [P.O.:210]  Out: -      Physical Exam:   General: patient awake, alert and cooperative   Eyes: Normal lids and lashes, no scleral icterus   Neck: supple, normal ROM   Skin: warm and dry, not jaundiced   Abdomen: soft, nontender, nondistended      Psychiatric: Normal mood and behavior; memory intact     Results Review:     I reviewed the patient's new clinical results.    Results from last 7 days   Lab Units 03/21/19  0642 03/20/19  0623 03/19/19  0615   WBC 10*3/mm3 5.10 6.78 7.06   HEMOGLOBIN g/dL 11.2* 12.2 11.5*   HEMATOCRIT % 35.7 36.8 36.0   PLATELETS 10*3/mm3 146 115* 132*     Results from last 7 days   Lab Units 03/21/19  0642 03/20/19  0623 03/19/19  0615 03/18/19  0335   SODIUM mmol/L 141 138 134* 142   POTASSIUM mmol/L 3.5 3.8 3.8 4.2   CHLORIDE mmol/L 106 103 101 99   CO2 mmol/L 24.7 20.3* 22.7 28.4   BUN mg/dL 18 23 30* 25*   CREATININE mg/dL 1.39* 1.54* 1.92* 1.50*   CALCIUM mg/dL 9.4 9.1 8.8 9.7   BILIRUBIN mg/dL  --   --   --  0.4   ALK PHOS U/L  --   --   --  55   ALT (SGPT) U/L  --   --   --  33   AST (SGOT) U/L  --   --   --  28   GLUCOSE mg/dL 83 69 107* 115*         Lab Results   Lab Value Date/Time    LIPASE 14 03/19/2019 0615    LIPASE 85 (H) 03/18/2019 0335    LIPASE 49 03/04/2019 1805    LIPASE 46 12/21/2016 1842       Radiology:  CT Abdomen Pelvis Without Contrast   Final Result           Assessment/Plan     Patient Active Problem List   Diagnosis   • S/P AVR (aortic valve replacement)   • S/P TVR (tricuspid valve repair)   • S/P MVR (mitral valve repair)   • Benign essential hypertension   • Chest tightness   • Mitral and aortic valve disease   • CKD (chronic kidney disease) stage 3, GFR 30-59 ml/min (CMS/HCC)   • Small bowel obstruction (CMS/HCC)   • Abdominal pain   • Slow transit constipation   • Hyperlipidemia   • Pacemaker   • Colitis   • Elevated lipase   • C. difficile colitis   • Ileus (CMS/HCC)   • Sepsis (CMS/HCC)   • Thrombocytopenia (CMS/HCC)     Assesssment:  1. c diff colitis- presented with fever, leukocytosis and elevated lactic acid   2. Diarrhea due to #2    Plan:  Continue vanc x 14 days    Consider cholestyramine if diarrhea does not start to roderick but patient reports improvement - has lomotil prn    Will continue to follow    I discussed the patients findings and my recommendations with patient.    Brittnee Mccauley MD

## 2019-03-21 NOTE — PROGRESS NOTES
Continued Stay Note  [facility]     Patient Name: Carly Gotti  MRN: [id number]  Today's Date: 3/21/2019    Admit Date: 3/18/2019    Discharge Plan     Row Name 03/21/19 1603       Plan    Plan  [facility], accepted, bed available     Plan Comments  Spoke with Mariya/[facility] pt has been accepted to [facility] and has a bed available tomorrow. Pt updated at bedside. Pt states [facility] will provide transportation at [place]. CCP to follow. JChasteenRN/CCP         Discharge Codes    No documentation.             Rayne Sheehan RN

## 2019-03-21 NOTE — PLAN OF CARE
Problem: Fall Risk (Adult)  Goal: Absence of Fall  Outcome: Ongoing (interventions implemented as appropriate)      Problem: Patient Care Overview  Goal: Plan of Care Review  Outcome: Ongoing (interventions implemented as appropriate)   03/21/19 0529   Coping/Psychosocial   Plan of Care Reviewed With patient   Plan of Care Review   Progress improving   OTHER   Outcome Summary Pt rested well. No c/o pain. Tolerating GI bland diet. Cont PO Vanc. VSS, no s/s acute distress, will continue to monitor     Goal: Individualization and Mutuality  Outcome: Ongoing (interventions implemented as appropriate)      Problem: Pain, Acute (Adult)  Goal: Acceptable Pain Control/Comfort Level  Outcome: Ongoing (interventions implemented as appropriate)      Problem: Diarrhea (Adult)  Goal: Improved/Reduced Symptoms  Outcome: Ongoing (interventions implemented as appropriate)

## 2019-03-21 NOTE — PROGRESS NOTES
"    Name: Carly Gotti ADMIT: 3/18/2019   : 1940  PCP: Reyes, Miriam Mercado, MD    MRN: [id number] LOS: 3 days   AGE/SEX: 78 y.o. female  ROOM: [facility]     Subjective   Subjective   Subjective:  Symptoms:  Improved.  She reports weakness and diarrhea (6x since last night).  No shortness of breath or chest pain.  (Feels generally improved compared to admission.  No new complaints.).    Diet:  Dietary issues: tolerating clears.  No nausea or vomiting.    Activity level: Impaired due to weakness.    Pain:  She complains of pain that is mild.  She reports pain is improving.  Pain is partially controlled.         Objective   Objective   Vital Signs  Temp:  [98.1 °F (36.7 °C)-98.8 °F (37.1 °C)] 98.8 °F (37.1 °C)  Heart Rate:  [73-81] 78  Resp:  [16] 16  BP: (110-144)/(64-79) 134/79  SpO2:  [91 %-96 %] 91 %  on   ;   Device (Oxygen Therapy): room air  Body mass index is 25.53 kg/m².  Objective:  General Appearance:  Comfortable and in no acute distress.    Vital signs: (most recent): Blood pressure 134/79, pulse 78, temperature 98.8 °F (37.1 °C), temperature source Oral, resp. rate 16, height 160 cm (63\"), weight 65.4 kg (144 lb 1.6 oz), SpO2 91 %.  No fever.    Output: Producing stool.    Lungs:  Normal effort.  Breath sounds clear to auscultation.    Heart: Normal rate.  Regular rhythm.  Positive for murmur.    Abdomen: Abdomen is soft and non-distended.  Hyperactive bowel sounds.   There is generalized tenderness.     Extremities: Normal range of motion.  There is no dependent edema.    Neurological: Patient is alert and oriented to person, place and time.    Skin:  Warm and dry.          Results Review:       I reviewed the patient's new clinical results.  Results from last 7 days   Lab Units 19  0623 19  0615 19  0335   WBC 10*3/mm3 6.78 7.06 18.63*   HEMOGLOBIN g/dL 12.2 11.5* 13.8   PLATELETS 10*3/mm3 115* 132* 205     Results from last 7 days   Lab Units 19  0642 19  0623 " 03/19/19  0615 03/18/19  0335   SODIUM mmol/L 141 138 134* 142   POTASSIUM mmol/L 3.5 3.8 3.8 4.2   CHLORIDE mmol/L 106 103 101 99   CO2 mmol/L 24.7 20.3* 22.7 28.4   BUN mg/dL 18 23 30* 25*   CREATININE mg/dL 1.39* 1.54* 1.92* 1.50*   GLUCOSE mg/dL 83 69 107* 115*   Estimated Creatinine Clearance: 29.4 mL/min (A) (by C-G formula based on SCr of 1.39 mg/dL (H)).  Results from last 7 days   Lab Units 03/18/19  0335   ALBUMIN g/dL 4.30   BILIRUBIN mg/dL 0.4   ALK PHOS U/L 55   AST (SGOT) U/L 28   ALT (SGPT) U/L 33     Results from last 7 days   Lab Units 03/21/19  0642 03/20/19  0623 03/19/19  0615 03/18/19  0335   CALCIUM mg/dL 9.4 9.1 8.8 9.7   ALBUMIN g/dL  --   --   --  4.30   MAGNESIUM mg/dL 1.9  --   --   --      Results from last 7 days   Lab Units 03/18/19  0521   LACTATE mmol/L 1.2     Results from last 7 days   Lab Units 03/18/19  0711   CDIFFPCR  Positive*         aspirin 325 mg Oral Daily   buPROPion  mg Oral Daily   FLUoxetine 60 mg Oral Daily   folic acid 400 mcg Oral Daily   loperamide 2 mg Oral TID   nebivolol 10 mg Oral Q24H   saccharomyces boulardii 250 mg Oral BID   sodium chloride 3 mL Intravenous Q12H   vancomycin 125 mg Oral Q6H       Pharmacy Consult    Diet Regular; GI Soft/[place]       Assessment/Plan     Active Hospital Problems    Diagnosis  POA   • **C. difficile colitis [A04.72]  Yes   • Thrombocytopenia (CMS/HCC) [D69.6]  No   • Sepsis (CMS/HCC) [A41.9]  Yes   • Elevated lipase [R74.8]  Yes   • Ileus (CMS/HCC) [K56.7]  Yes   • CKD (chronic kidney disease) stage 3, GFR 30-59 ml/min (CMS/HCC) [N18.3]  Yes   • Benign essential hypertension [I10]  Yes   • S/P MVR (mitral valve repair) [Z98.890]  Not Applicable   • S/P TVR (tricuspid valve repair) [Z98.890]  Not Applicable   • S/P AVR (aortic valve replacement) [Z95.2]  Not Applicable      Resolved Hospital Problems   No resolved problems to display.       Ms. Gotti is a 78 y.o. with a history of valvular heart disease and prior bowel  obstruction who has been admitted with CDiff colitis suspected based on history and confirmed by positive Cdiff toxin assay.    · Patient has sepsis, present on admit, due to cdiff colitis evidenced by fever, leukocytosis and elevated lactic acid.  This now seems resolved with proper treatment.   · Cr elevation likely represents CKD3 and is not far from baseline.  Continue to monitor.    · Appreciate GI eval.  No indication for vanc enema at this time. Ileus resolving.    · Continue contact isolation precautions.  · She still has profuse diarrhea.  Will try Lomotil.  · Tolerating regular diet.    VTE Prophylaxis - SCDs   Code Status - Full code  Disposition - Anticipate discharge to home in 1-2 days.      Oskar Triana MD  [facility]  03/21/19  8:57 AM

## 2019-03-21 NOTE — PLAN OF CARE
Problem: Fall Risk (Adult)  Goal: Absence of Fall  Outcome: Ongoing (interventions implemented as appropriate)      Problem: Patient Care Overview  Goal: Plan of Care Review  Outcome: Ongoing (interventions implemented as appropriate)   03/21/19 6818   Coping/Psychosocial   Plan of Care Reviewed With patient   Plan of Care Review   Progress no change   OTHER   Outcome Summary Frequent liquid BMs. Started lomotil. Rudi po vanc. [place] diet. Cont to monitor.       Problem: Pain, Acute (Adult)  Goal: Acceptable Pain Control/Comfort Level  Outcome: Ongoing (interventions implemented as appropriate)      Problem: Diarrhea (Adult)  Goal: Improved/Reduced Symptoms  Outcome: Ongoing (interventions implemented as appropriate)

## 2019-03-21 NOTE — PLAN OF CARE
Problem: Patient Care Overview  Goal: Plan of Care Review   03/21/19 9284   Coping/Psychosocial   Plan of Care Reviewed With patient   OTHER   Outcome Summary pt w good tolerance to inc gait distance this session, CGA 150ft rwx. pt reports not sleeping well, inquired about restarting home med Lunesta- Rn notified. will cont to progress mobility as yi, HHC vs SNU.

## 2019-03-22 VITALS
BODY MASS INDEX: 25.53 KG/M2 | HEIGHT: 63 IN | RESPIRATION RATE: 16 BRPM | HEART RATE: 75 BPM | DIASTOLIC BLOOD PRESSURE: 90 MMHG | OXYGEN SATURATION: 97 % | SYSTOLIC BLOOD PRESSURE: 141 MMHG | TEMPERATURE: 98 F | WEIGHT: 144.1 LBS

## 2019-03-22 LAB
ANION GAP SERPL CALCULATED.3IONS-SCNC: 12.8 MMOL/L
BUN BLD-MCNC: 15 MG/DL (ref 8–23)
BUN/CREAT SERPL: 13.3 (ref 7–25)
CALCIUM SPEC-SCNC: 8.7 MG/DL (ref 8.6–10.5)
CHLORIDE SERPL-SCNC: 108 MMOL/L (ref 98–107)
CO2 SERPL-SCNC: 23.2 MMOL/L (ref 22–29)
CREAT BLD-MCNC: 1.13 MG/DL (ref 0.57–1)
DEPRECATED RDW RBC AUTO: 51 FL (ref 37–54)
ERYTHROCYTE [DISTWIDTH] IN BLOOD BY AUTOMATED COUNT: 14.1 % (ref 12.3–15.4)
GFR SERPL CREATININE-BSD FRML MDRD: 46 ML/MIN/1.73
GLUCOSE BLD-MCNC: 113 MG/DL (ref 65–99)
HCT VFR BLD AUTO: 35.1 % (ref 34–46.6)
HGB BLD-MCNC: 11.2 G/DL (ref 12–15.9)
MCH RBC QN AUTO: 31.3 PG (ref 26.6–33)
MCHC RBC AUTO-ENTMCNC: 31.9 G/DL (ref 31.5–35.7)
MCV RBC AUTO: 98 FL (ref 79–97)
PLATELET # BLD AUTO: 151 10*3/MM3 (ref 140–450)
PMV BLD AUTO: 12.2 FL (ref 6–12)
POTASSIUM BLD-SCNC: 3.4 MMOL/L (ref 3.5–5.2)
RBC # BLD AUTO: 3.58 10*6/MM3 (ref 3.77–5.28)
SODIUM BLD-SCNC: 144 MMOL/L (ref 136–145)
WBC NRBC COR # BLD: 4.77 10*3/MM3 (ref 3.4–10.8)

## 2019-03-22 PROCEDURE — 80048 BASIC METABOLIC PNL TOTAL CA: CPT | Performed by: HOSPITALIST

## 2019-03-22 PROCEDURE — 97110 THERAPEUTIC EXERCISES: CPT

## 2019-03-22 PROCEDURE — 25010000002 ONDANSETRON PER 1 MG: Performed by: NURSE PRACTITIONER

## 2019-03-22 PROCEDURE — 85027 COMPLETE CBC AUTOMATED: CPT | Performed by: HOSPITALIST

## 2019-03-22 RX ORDER — HYDROCODONE BITARTRATE AND ACETAMINOPHEN 10; 325 MG/1; MG/1
1 TABLET ORAL EVERY 4 HOURS PRN
Qty: 20 TABLET | Refills: 0 | Status: SHIPPED | OUTPATIENT
Start: 2019-03-22 | End: 2021-02-08 | Stop reason: SDUPTHER

## 2019-03-22 RX ORDER — DIPHENOXYLATE HYDROCHLORIDE AND ATROPINE SULFATE 2.5; .025 MG/1; MG/1
1 TABLET ORAL 4 TIMES DAILY
Qty: 20 TABLET | Refills: 0 | Status: ON HOLD | OUTPATIENT
Start: 2019-03-22 | End: 2019-06-27

## 2019-03-22 RX ORDER — SACCHAROMYCES BOULARDII 250 MG
250 CAPSULE ORAL 2 TIMES DAILY
Status: ON HOLD
Start: 2019-03-22 | End: 2019-06-27

## 2019-03-22 RX ADMIN — ASPIRIN 325 MG: 325 TABLET ORAL at 09:34

## 2019-03-22 RX ADMIN — VANCOMYCIN 125 MG: KIT at 09:33

## 2019-03-22 RX ADMIN — BUPROPION HYDROCHLORIDE 300 MG: 300 TABLET, EXTENDED RELEASE ORAL at 09:34

## 2019-03-22 RX ADMIN — FLUOXETINE HYDROCHLORIDE 60 MG: 20 CAPSULE ORAL at 09:34

## 2019-03-22 RX ADMIN — SODIUM CHLORIDE, PRESERVATIVE FREE 3 ML: 5 INJECTION INTRAVENOUS at 09:34

## 2019-03-22 RX ADMIN — DIPHENOXYLATE HYDROCHLORIDE AND ATROPINE SULFATE 1 TABLET: 2.5; .025 TABLET ORAL at 09:34

## 2019-03-22 RX ADMIN — ONDANSETRON HYDROCHLORIDE 4 MG: 2 SOLUTION INTRAMUSCULAR; INTRAVENOUS at 12:14

## 2019-03-22 RX ADMIN — Medication 250 MG: at 09:34

## 2019-03-22 RX ADMIN — DIPHENOXYLATE HYDROCHLORIDE AND ATROPINE SULFATE 1 TABLET: 2.5; .025 TABLET ORAL at 12:21

## 2019-03-22 RX ADMIN — VANCOMYCIN 125 MG: KIT at 12:14

## 2019-03-22 RX ADMIN — Medication 400 MCG: at 09:34

## 2019-03-22 RX ADMIN — NEBIVOLOL HYDROCHLORIDE 10 MG: 10 TABLET ORAL at 09:34

## 2019-03-22 NOTE — DISCHARGE SUMMARY
Patient Name: Carly Gotti  : 1940  MRN: [id number]    Date of Admission: 3/18/2019  Date of Discharge:  3/22/2019  Primary Care Physician: Reyes, Miriam Mercado, MD      Hospital Course     Chief Complaint:   Abdominal Pain; Nausea; and Diarrhea      Active Hospital Problems    Diagnosis  POA   • **C. difficile colitis [A04.72]  Yes   • Thrombocytopenia (CMS/HCC) [D69.6]  No   • Sepsis (CMS/HCC) [A41.9]  Yes   • Elevated lipase [R74.8]  Yes   • Ileus (CMS/HCC) [K56.7]  Yes   • CKD (chronic kidney disease) stage 3, GFR 30-59 ml/min (CMS/[facility]) [N18.3]  Yes   • Benign essential hypertension [I10]  Yes   • S/P MVR (mitral valve repair) [Z98.890]  Not Applicable   • S/P TVR (tricuspid valve repair) [Z98.890]  Not Applicable   • S/P AVR (aortic valve replacement) [Z95.2]  Not Applicable      Resolved Hospital Problems   No resolved problems to display.        Hospital Course:  Ms. Gotti is a 78 y.o. female with a history of valvular heart disease and previous bowel obstruction who presented to [facility] initially complaining of diarrhea. Please see the admitting history and physical for further details. She was found to have positive C. difficile toxin assay and was admitted to the hospital for further evaluation and treatment.  She was given oral vancomycin and was seen by gastroenterology.  They agreed with diagnosis and planned treatment.  Her symptoms have improved but her diarrhea has persisted.  She has been started on Lomotil and has had some improvement in the volume of diarrhea.  She has no vomiting and is tolerating a diet.  She seems on pace to have around 4-6 stools per day but this seems to be improving with the Lomotil.  Will continue scheduled Lomotil for now until diarrhea resolves.  She will complete 10-day course of treatment with vancomycin.  I recommend holding Bumex until her diarrhea resolves.  She has had no signs of congestive heart failure during her stay or  volume overload.  With her diarrhea she would be at risk for dehydration.  She is otherwise stable and appears well enough for discharge to a SNU facility prior to transitioning back home.      Day of Discharge     HPI:   Feels better.  Less diarrhea overnight.  No abdominal pain.  Mild nausea but no vomiting.  Tolerating a diet    Physical Exam:  Temp:  [96.2 °F (35.7 °C)-98 °F (36.7 °C)] 98 °F (36.7 °C)  Heart Rate:  [75-87] 75  Resp:  [16-17] 16  BP: (131-141)/(68-90) 141/90  Body mass index is 25.53 kg/m².  Physical Exam   Constitutional: She is oriented to person, place, and time. No distress.   Cardiovascular: Normal rate and regular rhythm.   Murmur heard.  Pulmonary/Chest: Effort normal and breath sounds normal.   Abdominal: Soft. Bowel sounds are normal. She exhibits no distension. There is no tenderness.   Musculoskeletal: Normal range of motion. She exhibits no edema.   Neurological: She is alert and oriented to person, place, and time.   Skin: Skin is warm and dry. She is not diaphoretic.       Consultants     Consult Orders (all) (From admission, onward)    Start     Ordered    03/18/19 1112  Inpatient Gastroenterology Consult  Once     Specialty:  Gastroenterology  Provider:  Brittnee Mccauley MD    03/18/19 1112        Pertinent Labs and Procedures     Results from last 7 days   Lab Units 03/22/19  0456 03/21/19  0642 03/20/19  0623 03/19/19  0615   WBC 10*3/mm3 4.77 5.10 6.78 7.06   HEMOGLOBIN g/dL 11.2* 11.2* 12.2 11.5*   PLATELETS 10*3/mm3 151 146 115* 132*     Results from last 7 days   Lab Units 03/22/19  0456 03/21/19  0642 03/20/19  0623 03/19/19  0615   SODIUM mmol/L 144 141 138 134*   POTASSIUM mmol/L 3.4* 3.5 3.8 3.8   CHLORIDE mmol/L 108* 106 103 101   CO2 mmol/L 23.2 24.7 20.3* 22.7   BUN mg/dL 15 18 23 30*   CREATININE mg/dL 1.13* 1.39* 1.54* 1.92*   GLUCOSE mg/dL 113* 83 69 107*   Estimated Creatinine Clearance: 36.1 mL/min (A) (by C-G formula based on SCr of 1.13 mg/dL (H)).  Results  from last 7 days   Lab Units 03/18/19  0335   ALBUMIN g/dL 4.30   BILIRUBIN mg/dL 0.4   ALK PHOS U/L 55   AST (SGOT) U/L 28   ALT (SGPT) U/L 33     Results from last 7 days   Lab Units 03/22/19  0456 03/21/19  0642 03/20/19  0623 03/19/19  0615 03/18/19  0335   CALCIUM mg/dL 8.7 9.4 9.1 8.8 9.7   ALBUMIN g/dL  --   --   --   --  4.30   MAGNESIUM mg/dL  --  1.9  --   --   --      Results from last 7 days   Lab Units 03/19/19  0615 03/18/19  0335   LIPASE U/L 14 85*       Results from last 7 days   Lab Units 03/18/19  0540 03/18/19  0521   BLOODCX  No growth at 4 days No growth at 4 days     CT ABDOMEN PELVIS WITHOUT CONTRAST  Fluid is demonstrated throughout the colon, there is mild  wall thickening of the rectum and low sigmoid. The cecum is within the  low pelvis adjacent to this area and is mildly dilated, question  regional ileus. The small bowel is satisfactory in appearance and the  remainder is unremarkable.    Test Results Pending at Discharge      Order Current Status    Blood Culture - Blood, Arm, Left Preliminary result    Blood Culture - Blood, Wrist, Right Preliminary result        Discharge Details        Discharge Medications      New Medications      Instructions Start Date   saccharomyces boulardii 250 MG capsule  Commonly known as:  FLORASTOR   250 mg, Oral, 2 Times Daily      vancomycin 50 MG/ML reconstituted solution oral solution reconstituted   125 mg, Oral, Every 6 Hours Scheduled         Changes to Medications      Instructions Start Date   diphenoxylate-atropine 2.5-0.025 MG per tablet  Commonly known as:  LOMOTIL  What changed:    · when to take this  · reasons to take this   1 tablet, Oral, 4 Times Daily      HYDROcodone-acetaminophen  MG per tablet  Commonly known as:  NORCO  What changed:  reasons to take this   1 tablet, Oral, Every 4 Hours PRN         Continue These Medications      Instructions Start Date   aspirin 325 MG tablet   Oral, Daily      atorvastatin 20 MG  tablet  Commonly known as:  LIPITOR   take 1 tablet by mouth at bedtime      bumetanide 2 MG tablet  Commonly known as:  BUMEX   2 mg, Oral, Daily      buPROPion  MG 24 hr tablet  Commonly known as:  WELLBUTRIN XL   300 mg, Oral, Daily      Coenzyme Q10 200 MG tablet   1 tablet/day, Oral, Daily      estradiol 0.5 MG tablet  Commonly known as:  ESTRACE   Oral, Daily      FLUoxetine 20 MG capsule  Commonly known as:  PROzac   60 mg, Oral, Daily      folic acid 400 MCG tablet  Commonly known as:  FOLVITE   400 mcg, Oral, Daily      LUNESTA 3 MG tablet  Generic drug:  eszopiclone   3 mg, Oral, Nightly, Take immediately before bedtime       nebivolol 10 MG tablet  Commonly known as:  BYSTOLIC   10 mg, Oral, 2 Times Daily      ondansetron ODT 4 MG disintegrating tablet  Commonly known as:  ZOFRAN ODT   4 mg, Oral, Every 8 Hours PRN         Stop These Medications    donepezil 10 MG tablet  Commonly known as:  ARICEPT            No Known Allergies      Discharge Disposition:  Skilled Nursing Facility (DC - External)    Discharge Diet:  Diet Order   Procedures   • Diet Regular; GI Soft/[place]       Discharge Activity:   Activity Instructions     Activity as Tolerated            CODE STATUS:    Code Status and Medical Interventions:   Ordered at: 03/18/19 0455     Code Status:    CPR     Medical Interventions (Level of Support Prior to Arrest):    Full       Future Appointments   Date Time Provider Department Center   3/27/2019 12:00 AM YOLETTE REMOTE, JESSY SANTILLAN CD [facility] None   12/18/2019 12:00 PM YOLETTE IN OFFICE, JESSY SANTILLAN CD [facility] None   12/18/2019 12:30 PM Laura Pak MD [facility] None      Contact information for follow-up providers     Reyes, Miriam Mercado, MD Follow up in 2 week(s).    Specialty:  Family Medicine  Contact information:  [address] [facility] [address] [phone]             Alfredo Ga MD .    Specialty:  Pain Medicine  Contact information:  [address]  [place] [address]  [phone]                   Contact information for after-discharge care     Destination     [facility] Follow up.    Service:  Skilled Nursing  Contact information:  [address] [facility] [address] [phone]                           Time Spent on Discharge:  Greater than 30 minutes      Electronically signed by Oskar Triana MD, 3/22/2019, 12:49 PM

## 2019-03-22 NOTE — THERAPY TREATMENT NOTE
Acute Care - Physical Therapy Treatment Note  [facility]     Patient Name: Carly Gotti  : 1940  MRN: [id number]  Today's Date: 3/22/2019  Onset of Illness/Injury or Date of Surgery: 19  Date of Referral to PT: 19  Referring Physician: Kong    Admit Date: 3/18/2019    Visit Dx:    ICD-10-CM ICD-9-CM   1. Colitis K52.9 558.9   2. Diarrhea, unspecified type R19.7 787.91   3. Leukocytosis, unspecified type D72.829 288.60   4. Generalized abdominal pain R10.84 789.07   5. Impaired functional mobility and endurance Z74.09 V49.89     Patient Active Problem List   Diagnosis   • S/P AVR (aortic valve replacement)   • S/P TVR (tricuspid valve repair)   • S/P MVR (mitral valve repair)   • Benign essential hypertension   • Chest tightness   • Mitral and aortic valve disease   • CKD (chronic kidney disease) stage 3, GFR 30-59 ml/min (CMS/HCC)   • Small bowel obstruction (CMS/HCC)   • Abdominal pain   • Slow transit constipation   • Hyperlipidemia   • Pacemaker   • Colitis   • Elevated lipase   • C. difficile colitis   • Ileus (CMS/HCC)   • Sepsis (CMS/HCC)   • Thrombocytopenia (CMS/HCC)       Therapy Treatment    Rehabilitation Treatment Summary     Row Name 19             Treatment Time/Intention    Discipline  physical therapist  -      Document Type  therapy note (daily note)  -      Subjective Information  no complaints  -      Mode of Treatment  individual therapy;physical therapy  -LH      Patient/Family Observations  pt supine in bed no acute distress  -      Therapy Frequency (PT Clinical Impression)  daily  -LH      Patient Effort  good  -LH      Recorded by [] Jalyn Alan, PT 19      Row Name 19             Cognitive Assessment/Intervention- PT/OT    Orientation Status (Cognition)  oriented x 4  -LH      Follows Commands (Cognition)  WFL  -LH      Recorded by [] Jalyn Alan, PT 19      Row Name 19             Bed Mobility  Assessment/Treatment    Supine-Sit [place] (Bed Mobility)  contact guard  -LH      Sit-Supine [place] (Bed Mobility)  not tested  -LH      Recorded by [] Jalyn Alan, PT 03/22/19 0944      Row Name 03/22/19 0942             Sit-Stand Transfer    Sit-Stand [place] (Transfers)  contact guard  -      Assistive Device (Sit-Stand Transfers)  walker, front-wheeled  -LH      Recorded by [] Jalyn Alan, PT 03/22/19 0944      Row Name 03/22/19 0942             Stand-Sit Transfer    Stand-Sit [place] (Transfers)  contact guard  -      Assistive Device (Stand-Sit Transfers)  walker, front-wheeled  -LH      Recorded by [] Jalyn Alan, PT 03/22/19 0944      Row Name 03/22/19 0942             Gait/Stairs Assessment/Training    Tavernier Level (Gait)  contact guard;verbal cues  -      Assistive Device (Gait)  walker, front-wheeled  -LH      Distance in Feet (Gait)  120  -LH      Pattern (Gait)  swing-through  -LH      Deviations/Abnormal Patterns (Gait)  balwinder decreased  -LH      Recorded by [] Jalyn Alan, PT 03/22/19 0944      Row Name 03/22/19 0942             Positioning and Restraints    Pre-Treatment Position  in bed  -LH      Post Treatment Position  chair  -LH      In Chair  sitting;call light within reach;encouraged to call for assist;exit alarm on pad in place, nsg to find room box  -LH      Recorded by [] Jalyn Alan, PT 03/22/19 0944      Row Name 03/22/19 0942             Pain Scale: FACES Pre/Post-Treatment    Pain: FACES Scale, Pretreatment  0-->no hurt  -LH      Pain: FACES Scale, Post-Treatment  0-->no hurt  -LH      Recorded by [] Jalyn Alan, PT 03/22/19 0944        User Key  (r) = Recorded By, (t) = Taken By, (c) = Cosigned By    Initials Name Effective Dates Discipline     Jalyn Alan, PT 04/03/18 -  PT                   Physical Therapy Education     Title: PT OT SLP Therapies (In Progress)     Topic: Physical Therapy (In Progress)     Point: Mobility  training (In Progress)     Learning Progress Summary           Patient Acceptance, E, NR by  at 3/22/2019  9:45 AM    Acceptance, E, NR by  at 3/21/2019  3:36 PM    Acceptance, E, NR by  at 3/20/2019  8:52 AM                   Point: Home exercise program (In Progress)     Learning Progress Summary           Patient Acceptance, E, NR by  at 3/22/2019  9:45 AM    Acceptance, E, NR by  at 3/21/2019  3:36 PM    Acceptance, E, NR by  at 3/20/2019  8:52 AM                               User Key     Initials Effective Dates Name Provider Type Discipline     04/03/18 -  Jalyn Alan, PT Physical Therapist PT                PT Recommendation and Plan  Therapy Frequency (PT Clinical Impression): daily  Plan of Care Reviewed With: patient  Progress: improving  Outcome Summary: pt continued to be agreeable and cooperative w PT, good tolerance to household ambulation CGA rwx. pt reports slept finally last night. will cont to prepare for DC home vs SNU.   Outcome Measures     Row Name 03/22/19 0945 03/21/19 1500 03/20/19 0800       How much help from another person do you currently need...    Turning from your back to your side while in flat bed without using bedrails?  4  -  4  -  3  -LH    Moving from lying on back to sitting on the side of a flat bed without bedrails?  4  -  4  -  3  -LH    Moving to and from a bed to a chair (including a wheelchair)?  3  -  3  -  3  -LH    Standing up from a chair using your arms (e.g., wheelchair, bedside chair)?  3  -  3  -  3  -LH    Climbing 3-5 steps with a railing?  3  -  3  -  3  -LH    To walk in hospital room?  3  -  3  -LH  3  -LH    AM-PAC 6 Clicks Score  20  -  20  -  18  -LH       Functional Assessment    Outcome Measure Options  AM-PAC 6 Clicks Basic Mobility (PT)  -  AM-PAC 6 Clicks Basic Mobility (PT)  -  AM-PAC 6 Clicks Basic Mobility (PT)  -    Row Name 03/19/19 1400             How much help from another person do you  currently need...    Turning from your back to your side while in flat bed without using bedrails?  4  -SV      Moving from lying on back to sitting on the side of a flat bed without bedrails?  3  -SV      Moving to and from a bed to a chair (including a wheelchair)?  3  -SV      Standing up from a chair using your arms (e.g., wheelchair, bedside chair)?  3  -SV      Climbing 3-5 steps with a railing?  2  -SV      To walk in hospital room?  3  -SV      AM-PAC 6 Clicks Score  18  -SV        User Key  (r) = Recorded By, (t) = Taken By, (c) = Cosigned By    Initials Name Provider Type     Jalyn Alan, PT Physical Therapist    SV Gabriela Walker, PT Physical Therapist         Time Calculation:   PT Charges     Row Name 03/22/19 0945             Time Calculation    Start Time  0915  -      Stop Time  0926  -      Time Calculation (min)  11 min  -      PT Received On  03/22/19  -      PT - Next Appointment  03/23/19  -        User Key  (r) = Recorded By, (t) = Taken By, (c) = Cosigned By    Initials Name Provider Type     Jalyn Alan, PT Physical Therapist        Therapy Charges for Today     Code Description Service Date Service Provider Modifiers Qty    80143107361 HC PT THER PROC EA 15 MIN 3/21/2019 Jalyn Alan, PT GP 1    98516175351 HC PT THER PROC EA 15 MIN 3/22/2019 Jalyn Alan, PT GP 1          PT G-Codes  Outcome Measure Options: AM-PAC 6 Clicks Basic Mobility (PT)  AM-PAC 6 Clicks Score: 20    Jalyn Alan, PT  3/22/2019

## 2019-03-22 NOTE — PLAN OF CARE
Problem: Patient Care Overview  Goal: Plan of Care Review   03/22/19 0944   Coping/Psychosocial   Plan of Care Reviewed With patient   Plan of Care Review   Progress improving   OTHER   Outcome Summary pt continued to be agreeable and cooperative w PT, good tolerance to household ambulation CGA rwx. pt reports slept finally last night. will cont to prepare for DC home vs SNU.

## 2019-03-22 NOTE — PROGRESS NOTES
Continued Stay Note  [facility]     Patient Name: Carly Gotti  MRN: [id number]  Today's Date: 3/22/2019    Admit Date: 3/18/2019    Discharge Plan     Row Name 03/22/19 1433       Plan    Plan  Bradley Alicia     Plan Comments  Pt is being discharged today to [place]. Spoke with pt at bedside, Pts family will provide transportation at [place]. Mariya/[employer] updated. Kimberly DECKER updated. JChasteenRN/CCP         Discharge Codes    No documentation.       Expected Discharge Date and Time     Expected Discharge Date Expected Discharge Time    Mar 22, 2019             Rayne Sheehan RN

## 2019-03-22 NOTE — PLAN OF CARE
Problem: Fall Risk (Adult)  Goal: Absence of Fall  Outcome: Ongoing (interventions implemented as appropriate)      Problem: Patient Care Overview  Goal: Plan of Care Review  Outcome: Ongoing (interventions implemented as appropriate)   03/22/19 0546   Coping/Psychosocial   Plan of Care Reviewed With patient   Plan of Care Review   Progress improving   OTHER   Outcome Summary Pt rested well. No c/o pain or nausea. Restoril given at bedtime. BM less frequent. VSS, no s/s acute distress, will continue to monitor     Goal: Individualization and Mutuality  Outcome: Ongoing (interventions implemented as appropriate)      Problem: Pain, Acute (Adult)  Goal: Acceptable Pain Control/Comfort Level  Outcome: Ongoing (interventions implemented as appropriate)      Problem: Diarrhea (Adult)  Goal: Improved/Reduced Symptoms  Outcome: Ongoing (interventions implemented as appropriate)

## 2019-03-23 LAB
BACTERIA SPEC AEROBE CULT: NORMAL
BACTERIA SPEC AEROBE CULT: NORMAL

## 2019-03-25 NOTE — PROGRESS NOTES
Case Management Discharge Note    Final Note: Pt dc'd to [facility]     Destination - Selection Complete      Service Provider Request Status Selected Services Address Phone Number Fax Number    [facility] Selected Skilled Nursing [address] [address] [phone]      Durable Medical Equipment      No service has been selected for the patient.      Dialysis/Infusion      No service has been selected for the patient.      Home Medical Care      No service has been selected for the patient.      Community Resources      No service has been selected for the patient.        Transportation Services  Private: Car    Final Discharge Disposition Code: 03 - skilled nursing facility (SNF)

## 2019-04-05 ENCOUNTER — CLINICAL SUPPORT NO REQUIREMENTS (OUTPATIENT)
Dept: CARDIOLOGY | Facility: CLINIC | Age: 79
End: 2019-04-05

## 2019-04-05 DIAGNOSIS — I49.5 SICK SINUS SYNDROME (HCC): Primary | ICD-10-CM

## 2019-04-05 PROCEDURE — 93294 REM INTERROG EVL PM/LDLS PM: CPT | Performed by: INTERNAL MEDICINE

## 2019-04-05 PROCEDURE — 93296 REM INTERROG EVL PM/IDS: CPT | Performed by: INTERNAL MEDICINE

## 2019-05-08 ENCOUNTER — TRANSCRIBE ORDERS (OUTPATIENT)
Dept: ADMINISTRATIVE | Facility: HOSPITAL | Age: 79
End: 2019-05-08

## 2019-05-08 DIAGNOSIS — R13.19 CERVICAL DYSPHAGIA: Primary | ICD-10-CM

## 2019-05-10 ENCOUNTER — APPOINTMENT (OUTPATIENT)
Dept: GENERAL RADIOLOGY | Facility: HOSPITAL | Age: 79
End: 2019-05-10

## 2019-05-22 ENCOUNTER — HOSPITAL ENCOUNTER (OUTPATIENT)
Dept: GENERAL RADIOLOGY | Facility: HOSPITAL | Age: 79
Discharge: HOME | End: 2019-05-22
Admitting: OTOLARYNGOLOGY

## 2019-05-22 DIAGNOSIS — R13.19 CERVICAL DYSPHAGIA: ICD-10-CM

## 2019-05-22 PROCEDURE — 92611 MOTION FLUOROSCOPY/SWALLOW: CPT

## 2019-05-22 PROCEDURE — 74230 X-RAY XM SWLNG FUNCJ C+: CPT

## 2019-05-22 RX ADMIN — BARIUM SULFATE 50 ML: 400 SUSPENSION ORAL at 13:50

## 2019-05-22 RX ADMIN — BARIUM SULFATE 4 ML: 980 POWDER, FOR SUSPENSION ORAL at 13:50

## 2019-05-22 RX ADMIN — BARIUM SULFATE 65 ML: 960 POWDER, FOR SUSPENSION ORAL at 13:50

## 2019-05-22 NOTE — MBS/VFSS/FEES
Outpatient Speech Language Pathology   Adult Swallow Initial Evaluation  [facility]     Patient Name: Carly Gotti  : 1940  MRN: [id number]  Today's Date: 2019         Visit Date: 2019   Patient Active Problem List   Diagnosis   • S/P AVR (aortic valve replacement)   • S/P TVR (tricuspid valve repair)   • S/P MVR (mitral valve repair)   • Benign essential hypertension   • Chest tightness   • Mitral and aortic valve disease   • CKD (chronic kidney disease) stage 3, GFR 30-59 ml/min (CMS/HCC)   • Small bowel obstruction (CMS/HCC)   • Abdominal pain   • Slow transit constipation   • Hyperlipidemia   • Pacemaker   • Colitis   • Elevated lipase   • C. difficile colitis   • Ileus (CMS/HCC)   • Sepsis (CMS/HCC)   • Thrombocytopenia (CMS/HCC)        Past Medical History:   Diagnosis Date   • Aneurysm (CMS/HCC)    • Aortic valve disease    • Arthritis    • Atrophic kidney    • Bradycardia    • CAD (coronary artery disease)    • CKD (chronic kidney disease) stage 4, GFR 15-29 ml/min (CMS/HCC)    • Depression    • Dysphagia    • Essential hypertension    • [place] (hard of hearing)    • Hyperlipidemia    • Mitral valve disease    • Pulmonary hypertension (CMS/HCC)    • Sick sinus syndrome (CMS/HCC)    • Sleep apnea    • Spinal stenosis         Past Surgical History:   Procedure Laterality Date   • AORTIC VALVE REPAIR/REPLACEMENT  2015    Dr Veliz   • CARDIAC CATHETERIZATION     • CARDIAC DEFIBRILLATOR PLACEMENT     •  SECTION     • ENDOSCOPY  2012    Hypertonic lower esophageal sphincter, gastritis. Path: Chronic gastritis, moderate.    • HYSTERECTOMY     • MITRAL VALVE ANNULOPLASTY  2015    Dr Veliz   • TRICUSPID VALVE SURGERY  2015    Dr Veliz         Visit Dx:     ICD-10-CM ICD-9-CM   1. Cervical dysphagia R13.19 787.29           SLP Adult Swallow Evaluation - 19 1300        Rehab Evaluation    Document Type  evaluation   -    Subjective Information   "complains of dysphagia with solids   -    Patient Observations  alert;cooperative   -    Patient Effort  excellent   -       General Information    Patient Profile Reviewed  yes   -    Pertinent History Of Current Problem  Pt c/o obstruction at level of larynx with solids x1 a meal. Pt reported, \"sometimes it goes down with water and sometimes in comes back up\". VFSS 2015 revealing a functional swallow.    -    Current Method of Nutrition  regular textures;thin liquids   -    Precautions/Limitations, Vision  WFL with corrective lenses   -    Precautions/Limitations, Hearing  WFL;for purposes of eval   -    Prior Level of Function-Communication  WFL   -    Prior Level of Function-Swallowing  no diet consistency restrictions   -    Plans/Goals Discussed with  patient;agreed upon   [facility]    Barriers to Rehab  none identified   -    Patient's Goals for Discharge  eat/drink without coughing/choking   -       Pain Assessment    Additional Documentation  Pain Scale: Numbers Pre/Post-Treatment (Group)   -       Pain Scale: Numbers Pre/Post-Treatment    Pain Scale: Numbers, Pretreatment  0/10 - no pain   -    Pain Scale: Numbers, Post-Treatment  0/10 - no pain   -       Oral Musculature and Cranial Nerve Assessment    Oral Motor General Assessment  [facility]   -       MBS/VFSS Interpretation    VFSS Summary  Pt presents with an age appropriate oropharyngeal swallow with question of esophageal dysphagia. Question small anterior cervical esophageal web. Normal swallow initiation observed with liquids. Transient shallow penetration during the swallow with nectar via cup. Deeper transient penetration during the swallow with thins via cup. Backflow observed to pyriforms post swallow. Intermittent, transient, and deep penetration noted with thins via straw. Backflow past cricopharyngeus observed when patient was instructed to clear her throat. Normal swallow initiation and bolus propulsion with puree. " Rotary mastication pattern with mech soft and regular. Spillage to pyriforms before the swallow with thin portion of mech soft without penetration. No significant residue post swallow with solids. Intra esophageal reflux observed upon esophageal scan with nectar thick liquid. Question possible spasms. Slow esophageal clearance. SLP recs GI consult. Pt reports GI appointment is scheduled for the next date.  Pacemaker and sternal wires observed upon esophageal scan.    -       SLP Communication to Radiology    Summary Statement  Pt presents with an age appropriate oropharyngeal swallow with question of esophageal dysphagia. Question small anterior cervical esophageal web. Transient penetration with thins and nectar. Adequate bolus preparation and manipulation with solids. Intra esophageal reflux observed upon esophageal scan with nectar thick liquid. Question possible spasms. Slow esophageal clearance. Pacemaker and sternal wires observed upon esophageal scan.     -       Clinical Impression    SLP Swallowing Diagnosis  functional oral phase;functional pharyngeal phase;esophageal dysfunction   -    Functional Impact  risk of aspiration/pneumonia   -    Swallow Criteria for Skilled Therapeutic Interventions Met  no problems identified which require skilled intervention   -       Recommendations    Therapy Frequency (Swallow)  evaluation only   -    SLP Diet Recommendation  regular textures;thin liquids   -    Recommended Precautions and Strategies  other (see comments) reflux precautions   -    SLP Rec. for Method of Medication Administration  meds whole;with thin liquids;as tolerated   -    Anticipated Dischage Disposition  home   -    Demonstrates Need for Referral to Another Service  gastroenterology   -      User Key  (r) = Recorded By, (t) = Taken By, (c) = Cosigned By    Initials Name Provider Type     May Jansen MS CCC-SLP Speech and Language Pathologist                        OP SLP  Education     Row Name 05/22/19 1545       Education    Barriers to Learning  No barriers identified  -    Education Provided  Described results of evaluation  -    Assessed  Learning needs;Learning motivation;Learning preferences;Learning readiness  -    Learning Motivation  Strong  -    Learning Method  Explanation  -    Teaching Response  Verbalized understanding  -      User Key  (r) = Recorded By, (t) = Taken By, (c) = Cosigned By    Initials Name Effective Dates     May Jansen MS CCC-SLP 03/07/18 -               OP SLP Assessment/Plan - 05/22/19 1543        SLP Assessment    Functional Problems  Swallowing   -    Impact on Function: Swallowing  Risk of pneumonia;Risk of aspiration;Other (comment) from reflux    from reflux  -    Clinical Impression: Swallowing  esophageal dysphagia   -    Patient/caregiver participated in establishment of treatment plan and goals  Yes   -    Patient would benefit from skilled therapy intervention  No   -       SLP Plan    Plan Comments  f/u with GI   -      User Key  (r) = Recorded By, (t) = Taken By, (c) = Cosigned By    Initials Name Provider Type     aMy Jansen MS CCC-SLP Speech and Language Pathologist              SLP Outcome Measures (last 72 hours)      SLP Outcome Measures     Row Name 05/22/19 1500             SLP Outcome Measures    Outcome Measure Used?  Adult NOMS  -         Adult FCM Scores    FCM Chosen  Swallowing  -      Swallowing FCM Score  7  -        User Key  (r) = Recorded By, (t) = Taken By, (c) = Cosigned By    Initials Name Effective Dates     May Jansen MS CCC-SLP 03/07/18 -                Time Calculation:   SLP Start Time: 1300    Therapy Charges for Today     Code Description Service Date Service Provider Modifiers Qty    06175040530 HC ST MOTION FLUORO EVAL SWALLOW 4 5/22/2019 May Jansen MS CCC-SLP GN 1                   May Jansen MS CCC-SLP  5/22/2019

## 2019-05-23 ENCOUNTER — OFFICE VISIT (OUTPATIENT)
Dept: GASTROENTEROLOGY | Facility: CLINIC | Age: 79
End: 2019-05-23

## 2019-05-23 VITALS
WEIGHT: 142.8 LBS | SYSTOLIC BLOOD PRESSURE: 136 MMHG | DIASTOLIC BLOOD PRESSURE: 78 MMHG | HEIGHT: 63 IN | TEMPERATURE: 98.1 F | BODY MASS INDEX: 25.3 KG/M2

## 2019-05-23 DIAGNOSIS — K56.7 ILEUS (HCC): ICD-10-CM

## 2019-05-23 DIAGNOSIS — A04.72 C. DIFFICILE COLITIS: Primary | ICD-10-CM

## 2019-05-23 DIAGNOSIS — R74.8 ELEVATED LIPASE: ICD-10-CM

## 2019-05-23 DIAGNOSIS — K59.01 SLOW TRANSIT CONSTIPATION: Chronic | ICD-10-CM

## 2019-05-23 DIAGNOSIS — R13.10 DYSPHAGIA, UNSPECIFIED TYPE: ICD-10-CM

## 2019-05-23 DIAGNOSIS — Q39.4 ESOPHAGEAL WEB: ICD-10-CM

## 2019-05-23 PROCEDURE — 99214 OFFICE O/P EST MOD 30 MIN: CPT | Performed by: NURSE PRACTITIONER

## 2019-05-23 NOTE — PROGRESS NOTES
Chief Complaint   Patient presents with   • Follow-up   • Abdominal Pain   • Diarrhea       Carly Gotti is a  78 y.o. female here for a hospital follow up visit for C-diff.     HPI  79 yo f presents today for hospital follow up visit for C-diff colitis, ileus and elevated Lipase. She is a patient of Dr. Cai. She was seen by our service at [facility] on 3/18/19-3/22/19. She was having abd pain, diarrhea and tested POSITIVE for C-diff.     She had CT scan done that showed:    CONCLUSION: Fluid is demonstrated throughout the colon, there is mild  wall thickening of the rectum and low sigmoid. The cecum is within the  low pelvis adjacent to this area and is mildly dilated, question  regional ileus. The small bowel is satisfactory in appearance and the  remainder is unremarkable.       She was started on Vancomycin and IVFs. Her Bumex was held due to dehydration. Diarrhea persisted and she was given lomotil. She was then discharged to Rehab facility. She admits she finished the Vancomycin and was able to come off the lomotil. She reports normally she is constipated. She admits with taking NORCO for her chronic pain she tends to be constipated several times a month. She will take a dulcolax OTC sometimes. Her last colonoscopy she thinks was in 2011 and it was normal per her account. She denies any previous colon polyps or FH colon cancer.       She also has hx dysphagia and had Video Swallow study done yesterday that showed:    Pt presents with an age appropriate oropharyngeal swallow with question of esophageal dysphagia. Question small anterior cervical esophageal web. Transient penetration with thins and nectar. Adequate bolus preparation and manipulation with solids. Intra esophageal reflux observed upon esophageal scan with nectar thick liquid. Question possible spasms. Slow esophageal clearance. Pacemaker and sternal wires observed upon esophageal scan. She admits she also has hx  chronic nausea and occasional heartburn. She does not take anything routinely for this. She admits she has had trouble swallowing for years. She cannot recall a previous EGD but her chart has one listed from  with hx chronic gastritis. She denies any abd pain, vomiting, diarrhea, rectal bleeding or melena. She admits her appetite is ok and her weight is stable. Lipase level was elevated but the repeat level before discharge was normal.       Past Medical History:   Diagnosis Date   • Aneurysm (CMS/[facility])    • Aortic valve disease    • Arthritis    • Atrophic kidney    • Bradycardia    • CAD (coronary artery disease)    • CKD (chronic kidney disease) stage 4, GFR 15-29 ml/min (CMS/[facility])    • Depression    • Dysphagia    • Essential hypertension    • [place] (hard of hearing)    • Hyperlipidemia    • Mitral valve disease    • Pulmonary hypertension (CMS/[facility])    • Sick sinus syndrome (CMS/[facility])    • Sleep apnea    • Spinal stenosis        Past Surgical History:   Procedure Laterality Date   • AORTIC VALVE REPAIR/REPLACEMENT  2015    Dr Veliz   • CARDIAC CATHETERIZATION     • CARDIAC DEFIBRILLATOR PLACEMENT     •  SECTION     • ENDOSCOPY  2012    Hypertonic lower esophageal sphincter, gastritis. Path: Chronic gastritis, moderate.    • HYSTERECTOMY     • MITRAL VALVE ANNULOPLASTY  2015    Dr Veliz   • TRICUSPID VALVE SURGERY  2015    Dr Veliz       Scheduled Meds:    Continuous Infusions:  No current facility-administered medications for this visit.     PRN Meds:.    No Known Allergies    Social History     Socioeconomic History   • Marital status:      Spouse name: Not on file   • Number of children: Not on file   • Years of education: Not on file   • Highest education level: Not on file   Tobacco Use   • Smoking status: Former Smoker     Last attempt to quit: 1988     Years since quittin.4   • Smokeless tobacco: Never Used   Substance and Sexual Activity   • Alcohol use: No    • Drug use: No   • Sexual activity: Defer       Family History   Problem Relation Age of Onset   • ALS Mother    • Hypertension Father        Review of Systems   Constitutional: Negative for appetite change, chills, diaphoresis, fatigue, fever and unexpected weight change.   HENT: Positive for trouble swallowing. Negative for nosebleeds, postnasal drip, sore throat and voice change.    Respiratory: Negative for cough, choking, chest tightness, shortness of breath and wheezing.    Cardiovascular: Negative for chest pain.   Gastrointestinal: Positive for constipation and nausea. Negative for abdominal distention, abdominal pain, anal bleeding, blood in stool, diarrhea, rectal pain and vomiting.   Endocrine: Negative for polydipsia, polyphagia and polyuria.   Musculoskeletal: Negative for gait problem.   Skin: Negative for rash and wound.   Allergic/Immunologic: Negative for food allergies.   Neurological: Negative for dizziness, speech difficulty and light-headedness.   Psychiatric/Behavioral: Negative for confusion, self-injury, sleep disturbance and suicidal ideas.       Vitals:    05/23/19 1526   BP: 136/78   Temp: 98.1 °F (36.7 °C)       Physical Exam   Constitutional: She is oriented to person, place, and time. She appears well-developed and well-nourished. She does not appear ill. No distress.   HENT:   Head: Normocephalic.   Eyes: Pupils are equal, round, and reactive to light.   Cardiovascular: Normal rate, regular rhythm and normal heart sounds.   Pulmonary/Chest: Effort normal and breath sounds normal.   Abdominal: Soft. Bowel sounds are normal. She exhibits no distension and no mass. There is no hepatosplenomegaly. There is no tenderness. There is no rebound and no guarding. No hernia.   Musculoskeletal: Normal range of motion.   Neurological: She is alert and oriented to person, place, and time.   Skin: Skin is warm and dry.   Psychiatric: She has a normal mood and affect. Her speech is normal and  behavior is normal. Judgment normal.       No images are attached to the encounter.    Assessment & Plan     1. C. difficile colitis  - CBC & Differential  - Comprehensive Metabolic Panel  - Lipase  - Amylase  - Case Request; Standing    2. Ileus (CMS/HCC)  - Case Request; Standing    3. Slow transit constipation  - Case Request; Standing    4. Dysphagia, unspecified type  - Case Request; Standing    5. Elevated lipase  - Lipase  - Amylase    6. Esophageal web  - Case Request; Standing      I reviewed the hospital records with the patient. She seems to be doing well. Diarrhea has resolved. Now having issues with chronic constipation again secondary to narcotic pain meds. Recommend she take the dulcolax more routinely for now. Will recheck labs today. She is due for colonoscopy to assess healing. With her continued dysphagia and abnormal swallow study she needs EGD with possible dilation as well. Will have her schedule EGD and colonoscopy with Dr. Cai in the next few weeks. Patient is agreeable to the scopes. Call office with any issues.

## 2019-05-24 LAB
ALBUMIN SERPL-MCNC: 4.1 G/DL (ref 3.5–5.2)
ALBUMIN/GLOB SERPL: 1.7 G/DL
ALP SERPL-CCNC: 57 U/L (ref 39–117)
ALT SERPL-CCNC: 31 U/L (ref 1–33)
AMYLASE SERPL-CCNC: 148 U/L (ref 28–100)
AST SERPL-CCNC: 31 U/L (ref 1–32)
BASOPHILS # BLD AUTO: 0.02 10*3/MM3 (ref 0–0.2)
BASOPHILS NFR BLD AUTO: 0.4 % (ref 0–1.5)
BILIRUB SERPL-MCNC: 0.3 MG/DL (ref 0.2–1.2)
BUN SERPL-MCNC: 38 MG/DL (ref 8–23)
BUN/CREAT SERPL: 22.6 (ref 7–25)
CALCIUM SERPL-MCNC: 9.8 MG/DL (ref 8.6–10.5)
CHLORIDE SERPL-SCNC: 99 MMOL/L (ref 98–107)
CO2 SERPL-SCNC: 27.8 MMOL/L (ref 22–29)
CREAT SERPL-MCNC: 1.68 MG/DL (ref 0.57–1)
EOSINOPHIL # BLD AUTO: 0.15 10*3/MM3 (ref 0–0.4)
EOSINOPHIL NFR BLD AUTO: 2.7 % (ref 0.3–6.2)
ERYTHROCYTE [DISTWIDTH] IN BLOOD BY AUTOMATED COUNT: 13.8 % (ref 12.3–15.4)
GLOBULIN SER CALC-MCNC: 2.4 GM/DL
GLUCOSE SERPL-MCNC: 83 MG/DL (ref 65–99)
HCT VFR BLD AUTO: 40.8 % (ref 34–46.6)
HGB BLD-MCNC: 12.9 G/DL (ref 12–15.9)
IMM GRANULOCYTES # BLD AUTO: 0.02 10*3/MM3 (ref 0–0.05)
IMM GRANULOCYTES NFR BLD AUTO: 0.4 % (ref 0–0.5)
LIPASE SERPL-CCNC: 104 U/L (ref 13–60)
LYMPHOCYTES # BLD AUTO: 0.97 10*3/MM3 (ref 0.7–3.1)
LYMPHOCYTES NFR BLD AUTO: 17.2 % (ref 19.6–45.3)
MCH RBC QN AUTO: 31.5 PG (ref 26.6–33)
MCHC RBC AUTO-ENTMCNC: 31.6 G/DL (ref 31.5–35.7)
MCV RBC AUTO: 99.5 FL (ref 79–97)
MONOCYTES # BLD AUTO: 0.76 10*3/MM3 (ref 0.1–0.9)
MONOCYTES NFR BLD AUTO: 13.5 % (ref 5–12)
NEUTROPHILS # BLD AUTO: 3.73 10*3/MM3 (ref 1.7–7)
NEUTROPHILS NFR BLD AUTO: 65.8 % (ref 42.7–76)
NRBC BLD AUTO-RTO: 0 /100 WBC (ref 0–0.2)
PLATELET # BLD AUTO: 205 10*3/MM3 (ref 140–450)
POTASSIUM SERPL-SCNC: 4.5 MMOL/L (ref 3.5–5.2)
PROT SERPL-MCNC: 6.5 G/DL (ref 6–8.5)
RBC # BLD AUTO: 4.1 10*6/MM3 (ref 3.77–5.28)
SODIUM SERPL-SCNC: 140 MMOL/L (ref 136–145)
WBC # BLD AUTO: 5.65 10*3/MM3 (ref 3.4–10.8)

## 2019-05-30 ENCOUNTER — TELEPHONE (OUTPATIENT)
Dept: GASTROENTEROLOGY | Facility: CLINIC | Age: 79
End: 2019-05-30

## 2019-05-30 DIAGNOSIS — A04.72 C. DIFFICILE COLITIS: Primary | ICD-10-CM

## 2019-05-30 DIAGNOSIS — R74.8 ELEVATED LIPASE: ICD-10-CM

## 2019-05-30 NOTE — TELEPHONE ENCOUNTER
----- Message from TORSTEN Doe sent at 5/28/2019  8:49 AM EDT -----  Please call patient and see how she is doing and go over her labs with her. CREAT and BUN are elevated. She needs to be drinking more water. WBC back to normal. Hgb stable. Lipase and Amylase are back up. Not sure why they are elevated again after being normal before she left the hospital? How is the patient feeling today? Diarrhea still gone? Constipation better? Would like to repeat labs in 1 week (CBC, CMP, amylase and lipase). Thanks.

## 2019-05-30 NOTE — TELEPHONE ENCOUNTER
Call to pt.  Advise per M Patti that BUN/Cr elevated.  Needs to be drinking more water.  WBC back to normal.  Hgb stable.  Lipase and Amylase are back up.  Not sure why they are elevated again after being normal before left the hosp.      Pt verb understanding.  States follows with Nephrologist.    States not feeling well - continually nauseated.  States using dulcolax as instructed with good management of constipation.  Denies diarrhea.    Lab appt scheduled for 6/5 @ 2pm.  Order placed for cbc, cmp, amylase,lipase - message to M Patti.

## 2019-06-06 LAB
ALBUMIN SERPL-MCNC: 4.1 G/DL (ref 3.5–5.2)
ALBUMIN/GLOB SERPL: 2.2 G/DL
ALP SERPL-CCNC: 48 U/L (ref 39–117)
ALT SERPL-CCNC: 22 U/L (ref 1–33)
AMYLASE SERPL-CCNC: 83 U/L (ref 28–100)
AST SERPL-CCNC: 23 U/L (ref 1–32)
BASOPHILS # BLD AUTO: 0.03 10*3/MM3 (ref 0–0.2)
BASOPHILS NFR BLD AUTO: 0.5 % (ref 0–1.5)
BILIRUB SERPL-MCNC: 0.4 MG/DL (ref 0.2–1.2)
BUN SERPL-MCNC: 22 MG/DL (ref 8–23)
BUN/CREAT SERPL: 16.1 (ref 7–25)
CALCIUM SERPL-MCNC: 10.1 MG/DL (ref 8.6–10.5)
CHLORIDE SERPL-SCNC: 105 MMOL/L (ref 98–107)
CO2 SERPL-SCNC: 29.2 MMOL/L (ref 22–29)
CREAT SERPL-MCNC: 1.37 MG/DL (ref 0.57–1)
EOSINOPHIL # BLD AUTO: 0.1 10*3/MM3 (ref 0–0.4)
EOSINOPHIL NFR BLD AUTO: 1.7 % (ref 0.3–6.2)
ERYTHROCYTE [DISTWIDTH] IN BLOOD BY AUTOMATED COUNT: 14.2 % (ref 12.3–15.4)
GLOBULIN SER CALC-MCNC: 1.9 GM/DL
GLUCOSE SERPL-MCNC: 103 MG/DL (ref 65–99)
HCT VFR BLD AUTO: 40.1 % (ref 34–46.6)
HGB BLD-MCNC: 12.6 G/DL (ref 12–15.9)
IMM GRANULOCYTES # BLD AUTO: 0.01 10*3/MM3 (ref 0–0.05)
IMM GRANULOCYTES NFR BLD AUTO: 0.2 % (ref 0–0.5)
LIPASE SERPL-CCNC: 84 U/L (ref 13–60)
LYMPHOCYTES # BLD AUTO: 0.89 10*3/MM3 (ref 0.7–3.1)
LYMPHOCYTES NFR BLD AUTO: 14.9 % (ref 19.6–45.3)
MCH RBC QN AUTO: 31.4 PG (ref 26.6–33)
MCHC RBC AUTO-ENTMCNC: 31.4 G/DL (ref 31.5–35.7)
MCV RBC AUTO: 100 FL (ref 79–97)
MONOCYTES # BLD AUTO: 0.66 10*3/MM3 (ref 0.1–0.9)
MONOCYTES NFR BLD AUTO: 11.1 % (ref 5–12)
NEUTROPHILS # BLD AUTO: 4.27 10*3/MM3 (ref 1.7–7)
NEUTROPHILS NFR BLD AUTO: 71.6 % (ref 42.7–76)
NRBC BLD AUTO-RTO: 0 /100 WBC (ref 0–0.2)
PLATELET # BLD AUTO: 183 10*3/MM3 (ref 140–450)
POTASSIUM SERPL-SCNC: 4.1 MMOL/L (ref 3.5–5.2)
PROT SERPL-MCNC: 6 G/DL (ref 6–8.5)
RBC # BLD AUTO: 4.01 10*6/MM3 (ref 3.77–5.28)
SODIUM SERPL-SCNC: 144 MMOL/L (ref 136–145)
WBC # BLD AUTO: 5.96 10*3/MM3 (ref 3.4–10.8)

## 2019-06-18 ENCOUNTER — TELEPHONE (OUTPATIENT)
Dept: GASTROENTEROLOGY | Facility: CLINIC | Age: 79
End: 2019-06-18

## 2019-06-18 ENCOUNTER — OFFICE VISIT (OUTPATIENT)
Dept: GASTROENTEROLOGY | Facility: CLINIC | Age: 79
End: 2019-06-18

## 2019-06-18 VITALS
TEMPERATURE: 97.9 F | HEIGHT: 63 IN | SYSTOLIC BLOOD PRESSURE: 110 MMHG | BODY MASS INDEX: 25.12 KG/M2 | DIASTOLIC BLOOD PRESSURE: 82 MMHG | WEIGHT: 141.8 LBS

## 2019-06-18 DIAGNOSIS — R13.10 DYSPHAGIA, UNSPECIFIED TYPE: ICD-10-CM

## 2019-06-18 DIAGNOSIS — K59.01 SLOW TRANSIT CONSTIPATION: Primary | ICD-10-CM

## 2019-06-18 DIAGNOSIS — K62.5 RECTAL BLEEDING: ICD-10-CM

## 2019-06-18 DIAGNOSIS — Q39.4 ESOPHAGEAL WEB: ICD-10-CM

## 2019-06-18 DIAGNOSIS — A04.72 C. DIFFICILE COLITIS: ICD-10-CM

## 2019-06-18 PROCEDURE — 99214 OFFICE O/P EST MOD 30 MIN: CPT | Performed by: NURSE PRACTITIONER

## 2019-06-18 RX ORDER — FAMOTIDINE 40 MG/1
40 TABLET, FILM COATED ORAL DAILY
Qty: 90 TABLET | Refills: 3 | Status: SHIPPED | OUTPATIENT
Start: 2019-06-18 | End: 2020-07-30 | Stop reason: SDUPTHER

## 2019-06-18 NOTE — TELEPHONE ENCOUNTER
----- Message from Blayne Cai MD sent at 6/18/2019  4:15 PM EDT -----  Regarding: yes    Sure she has to be done at [facility] because she has a defibrillator, so put her in either next Monday, Tuesday June 24, 25 or July 1 or 2 thx!    ----- Message -----  From: Lissett Flannery, TORSTEN  Sent: 6/18/2019   3:40 PM  To: Blayne Cai MD    I saw this patient in follow-up.  She was hospitalized in May for C. difficile colitis and ileus.  Diarrhea has resolved.  She is struggling with rectal bleeding constipation now.  Also has issues with dysphagia.  She is planned for an EGD and colonoscopy in July.  Any chance we can move this up?  Thanks BG

## 2019-06-18 NOTE — PROGRESS NOTES
Chief Complaint   Patient presents with   • Follow-up   • Nausea   • Constipation     rectal bleeding     HPI    Carly Gotti is a  78 y.o. female here for a follow up visit for C. difficile colitis.    This is an established patient of Dr. Richardsons, new to me.  She was last seen in office in May for hospital follow-up for C. difficile colitis and ileus.  Patient was discharged to rehab facility on vancomycin.  In the past patient struggled with opioid-induced constipation.    Patient also has a history of dysphagia.  Recent video swallow study with possible small anterior cervical esophageal web.  Reflux with nectar thick liquids.  Possible spasms.    On visit today she complains of rectal bleeding, lower abdominal cramping, and constipation.  Her bowels are moving every 2 to 3 days.  She is taking Dulcolax once a day.  She is on Norco daily.  The blood is bright red and present on the toilet paper several times a week.    Patient still struggling with dysphagia almost daily.  Denies regurgitation.  Will drink water for relief.  Mainly with meats and breads.  No issues with liquids.  There is associated nausea but no vomiting.  Daily acid reflux.  Patient is not on acid suppression medication.    Labs from 6/5/2019 with lipase 84 (down from 104), amylase 83, BUN 22, creatinine 1.37, normal liver function.  White blood cells 5.96, hemoglobin 12.6, hematocrit 40.1, platelet count 183.    Patient is scheduled for EGD and colonoscopy in July.  Past Medical History:   Diagnosis Date   • Aneurysm (CMS/HCC)    • Aortic valve disease    • Arthritis    • Atrophic kidney    • Bradycardia    • CAD (coronary artery disease)    • CKD (chronic kidney disease) stage 4, GFR 15-29 ml/min (CMS/HCC)    • Depression    • Dysphagia    • Essential hypertension    • [place] (hard of hearing)    • Hyperlipidemia    • Mitral valve disease    • Pulmonary hypertension (CMS/HCC)    • Sick sinus syndrome (CMS/HCC)    • Sleep apnea    • Spinal  stenosis        Past Surgical History:   Procedure Laterality Date   • AORTIC VALVE REPAIR/REPLACEMENT  2015    Dr Veliz   • CARDIAC CATHETERIZATION     • CARDIAC DEFIBRILLATOR PLACEMENT     •  SECTION     • ENDOSCOPY  2012    Hypertonic lower esophageal sphincter, gastritis. Path: Chronic gastritis, moderate.    • HYSTERECTOMY     • MITRAL VALVE ANNULOPLASTY  2015    Dr Veliz   • TRICUSPID VALVE SURGERY  2015    Dr Veliz       Scheduled Meds:  Outpatient Encounter Medications as of 2019   Medication Sig Dispense Refill   • aspirin 325 MG tablet Take by mouth daily.     • atorvastatin (LIPITOR) 20 MG tablet take 1 tablet by mouth at bedtime  0   • bumetanide (BUMEX) 2 MG tablet Take 1 tablet by mouth Daily. 90 tablet 1   • buPROPion XL (WELLBUTRIN XL) 300 MG 24 hr tablet Take 300 mg by mouth Daily.  0   • Coenzyme Q10 200 MG tablet Take 1 tablet/day by mouth Daily.     • diphenoxylate-atropine (LOMOTIL) 2.5-0.025 MG per tablet Take 1 tablet by mouth 4 (Four) Times a Day. 20 tablet 0   • estradiol (ESTRACE) 0.5 MG tablet Take by mouth daily.     • eszopiclone (LUNESTA) 3 MG tablet Take 3 mg by mouth every night. Take immediately before bedtime     • FLUoxetine (PROzac) 20 MG capsule Take 60 mg by mouth daily.     • folic acid (FOLVITE) 400 MCG tablet Take 1 tablet by mouth Daily. 30 tablet 0   • HYDROcodone-acetaminophen (NORCO)  MG per tablet Take 1 tablet by mouth Every 4 (Four) Hours As Needed for Moderate Pain . 20 tablet 0   • nebivolol (BYSTOLIC) 10 MG tablet Take 10 mg by mouth 2 (Two) Times a Day.     • ondansetron ODT (ZOFRAN ODT) 4 MG disintegrating tablet Take 1 tablet by mouth Every 8 (Eight) Hours As Needed for Nausea or Vomiting. 12 tablet 0   • saccharomyces boulardii (FLORASTOR) 250 MG capsule Take 1 capsule by mouth 2 (Two) Times a Day.     • famotidine (PEPCID) 40 MG tablet Take 1 tablet by mouth Daily. 90 tablet 3     No facility-administered  encounter medications on file as of 2019.        Continuous Infusions:  No current facility-administered medications for this visit.     PRN Meds:.    No Known Allergies    Social History     Socioeconomic History   • Marital status:      Spouse name: Not on file   • Number of children: Not on file   • Years of education: Not on file   • Highest education level: Not on file   Tobacco Use   • Smoking status: Former Smoker     Last attempt to quit: 1988     Years since quittin.4   • Smokeless tobacco: Never Used   Substance and Sexual Activity   • Alcohol use: No   • Drug use: No   • Sexual activity: Defer       Family History   Problem Relation Age of Onset   • ALS Mother    • Hypertension Father        Review of Systems   Constitutional: Negative for activity change, appetite change, fatigue, fever and unexpected weight change.   HENT: Positive for trouble swallowing.    Respiratory: Negative for apnea, cough, choking, chest tightness, shortness of breath and wheezing.    Cardiovascular: Negative for chest pain, palpitations and leg swelling.   Gastrointestinal: Positive for anal bleeding, constipation and nausea. Negative for abdominal distention, abdominal pain, blood in stool, diarrhea, rectal pain and vomiting.       Vitals:    19 1503   BP: 110/82   Temp: 97.9 °F (36.6 °C)       Physical Exam   Constitutional: She is oriented to person, place, and time. She appears well-developed and well-nourished.   Eyes: Pupils are equal, round, and reactive to light.   Cardiovascular: Normal rate, regular rhythm and normal heart sounds.   Pulmonary/Chest: Effort normal and breath sounds normal. No respiratory distress. She has no wheezes.   Abdominal: Soft. Bowel sounds are normal. She exhibits no distension and no mass. There is no tenderness. There is no guarding. No hernia.   Musculoskeletal: Normal range of motion.   Neurological: She is alert and oriented to person, place, and time.   Skin: Skin  is warm and dry. Capillary refill takes less than 2 seconds.   Psychiatric: She has a normal mood and affect. Her behavior is normal.       No images are attached to the encounter.    Carly was seen today for follow-up, nausea and constipation.    Diagnoses and all orders for this visit:    Slow transit constipation    Dysphagia, unspecified type    Rectal bleeding    C. difficile colitis    Esophageal web    Other orders  -     famotidine (PEPCID) 40 MG tablet; Take 1 tablet by mouth Daily.      Impression:    This is an 78-year-old female seen today in follow-up with multiple GI complaints.  Recently treated for C. difficile colitis with vancomycin.  Now struggling with constipation, rectal bleeding.  This point recommend trial of Linzess 72 mcg taken once daily, samples provided. Hold Dulcolax.  Continue probiotics.    Regarding dysphagia and nausea, recommend patient start daily H2 blocker therapy.  Would consider PPI however patient with chronic kidney disease.  I will update Dr. Cai as to patient issues.  She is currently scheduled for EGD and colonoscopy in July, may need to consider moving up procedures.    Further recommendations pending endoscopic evaluation.

## 2019-06-26 ENCOUNTER — HOSPITAL ENCOUNTER (OUTPATIENT)
Facility: HOSPITAL | Age: 79
Setting detail: OBSERVATION
LOS: 1 days | Discharge: HOME | End: 2019-06-29
Attending: EMERGENCY MEDICINE | Admitting: HOSPITALIST

## 2019-06-26 ENCOUNTER — TELEPHONE (OUTPATIENT)
Dept: GASTROENTEROLOGY | Facility: CLINIC | Age: 79
End: 2019-06-26

## 2019-06-26 DIAGNOSIS — A04.71 RECURRENT CLOSTRIDIUM DIFFICILE DIARRHEA: Primary | ICD-10-CM

## 2019-06-26 DIAGNOSIS — E86.0 DEHYDRATION: ICD-10-CM

## 2019-06-26 LAB
ALBUMIN SERPL-MCNC: 4.2 G/DL (ref 3.5–5.2)
ALBUMIN/GLOB SERPL: 2 G/DL
ALP SERPL-CCNC: 48 U/L (ref 39–117)
ALT SERPL W P-5'-P-CCNC: 23 U/L (ref 1–33)
ANION GAP SERPL CALCULATED.3IONS-SCNC: 11 MMOL/L (ref 5–15)
AST SERPL-CCNC: 26 U/L (ref 1–32)
BASOPHILS # BLD AUTO: 0.01 10*3/MM3 (ref 0–0.2)
BASOPHILS NFR BLD AUTO: 0.1 % (ref 0–1.5)
BILIRUB SERPL-MCNC: 0.3 MG/DL (ref 0.2–1.2)
BILIRUB UR QL STRIP: NEGATIVE
BUN BLD-MCNC: 41 MG/DL (ref 8–23)
BUN/CREAT SERPL: 24.3 (ref 7–25)
CALCIUM SPEC-SCNC: 9.8 MG/DL (ref 8.6–10.5)
CHLORIDE SERPL-SCNC: 104 MMOL/L (ref 98–107)
CLARITY UR: CLEAR
CO2 SERPL-SCNC: 25 MMOL/L (ref 22–29)
COLOR UR: YELLOW
CREAT BLD-MCNC: 1.69 MG/DL (ref 0.57–1)
D-LACTATE SERPL-SCNC: 1.4 MMOL/L (ref 0.5–2)
DEPRECATED RDW RBC AUTO: 50.6 FL (ref 37–54)
EOSINOPHIL # BLD AUTO: 0.11 10*3/MM3 (ref 0–0.4)
EOSINOPHIL NFR BLD AUTO: 1.2 % (ref 0.3–6.2)
ERYTHROCYTE [DISTWIDTH] IN BLOOD BY AUTOMATED COUNT: 14.2 % (ref 12.3–15.4)
GFR SERPL CREATININE-BSD FRML MDRD: 29 ML/MIN/1.73
GLOBULIN UR ELPH-MCNC: 2.1 GM/DL
GLUCOSE BLD-MCNC: 136 MG/DL (ref 65–99)
GLUCOSE UR STRIP-MCNC: NEGATIVE MG/DL
HCT VFR BLD AUTO: 39.4 % (ref 34–46.6)
HGB BLD-MCNC: 12.8 G/DL (ref 12–15.9)
HGB UR QL STRIP.AUTO: NEGATIVE
IMM GRANULOCYTES # BLD AUTO: 0.01 10*3/MM3 (ref 0–0.05)
IMM GRANULOCYTES NFR BLD AUTO: 0.1 % (ref 0–0.5)
KETONES UR QL STRIP: NEGATIVE
LEUKOCYTE ESTERASE UR QL STRIP.AUTO: NEGATIVE
LIPASE SERPL-CCNC: 55 U/L (ref 13–60)
LYMPHOCYTES # BLD AUTO: 0.25 10*3/MM3 (ref 0.7–3.1)
LYMPHOCYTES NFR BLD AUTO: 2.8 % (ref 19.6–45.3)
MCH RBC QN AUTO: 31.4 PG (ref 26.6–33)
MCHC RBC AUTO-ENTMCNC: 32.5 G/DL (ref 31.5–35.7)
MCV RBC AUTO: 96.8 FL (ref 79–97)
MONOCYTES # BLD AUTO: 0.52 10*3/MM3 (ref 0.1–0.9)
MONOCYTES NFR BLD AUTO: 5.7 % (ref 5–12)
NEUTROPHILS # BLD AUTO: 8.16 10*3/MM3 (ref 1.7–7)
NEUTROPHILS NFR BLD AUTO: 90.2 % (ref 42.7–76)
NITRITE UR QL STRIP: NEGATIVE
PH UR STRIP.AUTO: 6 [PH] (ref 5–8)
PLATELET # BLD AUTO: 207 10*3/MM3 (ref 140–450)
PMV BLD AUTO: 11.7 FL (ref 6–12)
POTASSIUM BLD-SCNC: 4 MMOL/L (ref 3.5–5.2)
PROT SERPL-MCNC: 6.3 G/DL (ref 6–8.5)
PROT UR QL STRIP: NEGATIVE
RBC # BLD AUTO: 4.07 10*6/MM3 (ref 3.77–5.28)
SODIUM BLD-SCNC: 140 MMOL/L (ref 136–145)
SP GR UR STRIP: 1.01 (ref 1–1.03)
UROBILINOGEN UR QL STRIP: NORMAL
WBC NRBC COR # BLD: 9.05 10*3/MM3 (ref 3.4–10.8)

## 2019-06-26 PROCEDURE — 87493 C DIFF AMPLIFIED PROBE: CPT | Performed by: EMERGENCY MEDICINE

## 2019-06-26 PROCEDURE — 99284 EMERGENCY DEPT VISIT MOD MDM: CPT

## 2019-06-26 PROCEDURE — 85025 COMPLETE CBC W/AUTO DIFF WBC: CPT | Performed by: PHYSICIAN ASSISTANT

## 2019-06-26 PROCEDURE — 87999 UNLISTED MICROBIOLOGY PX: CPT | Performed by: EMERGENCY MEDICINE

## 2019-06-26 PROCEDURE — 83690 ASSAY OF LIPASE: CPT | Performed by: PHYSICIAN ASSISTANT

## 2019-06-26 PROCEDURE — 80053 COMPREHEN METABOLIC PANEL: CPT | Performed by: PHYSICIAN ASSISTANT

## 2019-06-26 PROCEDURE — 81003 URINALYSIS AUTO W/O SCOPE: CPT | Performed by: PHYSICIAN ASSISTANT

## 2019-06-26 PROCEDURE — 83605 ASSAY OF LACTIC ACID: CPT | Performed by: PHYSICIAN ASSISTANT

## 2019-06-26 RX ORDER — SODIUM CHLORIDE 0.9 % (FLUSH) 0.9 %
10 SYRINGE (ML) INJECTION AS NEEDED
Status: DISCONTINUED | OUTPATIENT
Start: 2019-06-26 | End: 2019-06-28

## 2019-06-26 RX ADMIN — SODIUM CHLORIDE 500 ML: 9 INJECTION, SOLUTION INTRAVENOUS at 22:08

## 2019-06-26 NOTE — TELEPHONE ENCOUNTER
Patient called, advised as per Diana CROSS's note. She verb understanding and is in agreement with the plan. Requesting her EGD be moved up. Advised will send an update to the schedulers. She verb understanding.

## 2019-06-26 NOTE — TELEPHONE ENCOUNTER
----- Message from TORSTEN Doe sent at 6/6/2019  8:22 AM EDT -----  Please call patient and let her know the labs are much better. CREAT is still elevated but better than previous. She needs to drink more water. Amylase is back to normal. Lipase is still elevated but much lower than previous. That is good.

## 2019-06-27 ENCOUNTER — APPOINTMENT (OUTPATIENT)
Dept: CT IMAGING | Facility: HOSPITAL | Age: 79
End: 2019-06-27

## 2019-06-27 PROBLEM — R10.84 GENERALIZED ABDOMINAL PAIN: Status: ACTIVE | Noted: 2019-06-27

## 2019-06-27 PROBLEM — A04.71 RECURRENT CLOSTRIDIUM DIFFICILE DIARRHEA: Status: ACTIVE | Noted: 2019-06-27

## 2019-06-27 PROBLEM — N18.4 CKD (CHRONIC KIDNEY DISEASE) STAGE 4, GFR 15-29 ML/MIN (HCC): Status: ACTIVE | Noted: 2019-06-27

## 2019-06-27 LAB
ADV 40+41 DNA STL QL NAA+NON-PROBE: NOT DETECTED
ANION GAP SERPL CALCULATED.3IONS-SCNC: 7.6 MMOL/L (ref 5–15)
ASTRO TYP 1-8 RNA STL QL NAA+NON-PROBE: NOT DETECTED
BUN BLD-MCNC: 34 MG/DL (ref 8–23)
BUN/CREAT SERPL: 22.2 (ref 7–25)
C CAYETANENSIS DNA STL QL NAA+NON-PROBE: NOT DETECTED
C DIFF TOX GENS STL QL NAA+PROBE: POSITIVE
CALCIUM SPEC-SCNC: 8.9 MG/DL (ref 8.6–10.5)
CAMPY SP DNA.DIARRHEA STL QL NAA+PROBE: NOT DETECTED
CHLORIDE SERPL-SCNC: 107 MMOL/L (ref 98–107)
CO2 SERPL-SCNC: 28.4 MMOL/L (ref 22–29)
CREAT BLD-MCNC: 1.53 MG/DL (ref 0.57–1)
CRYPTOSP STL CULT: NOT DETECTED
DEPRECATED RDW RBC AUTO: 51.4 FL (ref 37–54)
E COLI DNA SPEC QL NAA+PROBE: NOT DETECTED
E HISTOLYT AG STL-ACNC: NOT DETECTED
EAEC PAA PLAS AGGR+AATA ST NAA+NON-PRB: NOT DETECTED
EC STX1 + STX2 GENES STL NAA+PROBE: NOT DETECTED
EPEC EAE GENE STL QL NAA+NON-PROBE: DETECTED
ERYTHROCYTE [DISTWIDTH] IN BLOOD BY AUTOMATED COUNT: 14.1 % (ref 12.3–15.4)
ETEC LTA+ST1A+ST1B TOX ST NAA+NON-PROBE: NOT DETECTED
G LAMBLIA DNA SPEC QL NAA+PROBE: NOT DETECTED
GFR SERPL CREATININE-BSD FRML MDRD: 33 ML/MIN/1.73
GLUCOSE BLD-MCNC: 82 MG/DL (ref 65–99)
HCT VFR BLD AUTO: 36.6 % (ref 34–46.6)
HGB BLD-MCNC: 11.5 G/DL (ref 12–15.9)
MCH RBC QN AUTO: 31.3 PG (ref 26.6–33)
MCHC RBC AUTO-ENTMCNC: 31.4 G/DL (ref 31.5–35.7)
MCV RBC AUTO: 99.7 FL (ref 79–97)
NOROVIRUS GI+II RNA STL QL NAA+NON-PROBE: NOT DETECTED
P SHIGELLOIDES DNA STL QL NAA+NON-PROBE: NOT DETECTED
PLATELET # BLD AUTO: 175 10*3/MM3 (ref 140–450)
PMV BLD AUTO: 11.2 FL (ref 6–12)
POTASSIUM BLD-SCNC: 3.7 MMOL/L (ref 3.5–5.2)
RBC # BLD AUTO: 3.67 10*6/MM3 (ref 3.77–5.28)
RV RNA STL NAA+PROBE: NOT DETECTED
SALMONELLA DNA SPEC QL NAA+PROBE: NOT DETECTED
SAPO I+II+IV+V RNA STL QL NAA+NON-PROBE: NOT DETECTED
SHIGELLA SP+EIEC IPAH STL QL NAA+PROBE: NOT DETECTED
SODIUM BLD-SCNC: 143 MMOL/L (ref 136–145)
V CHOLERAE DNA SPEC QL NAA+PROBE: NOT DETECTED
VIBRIO DNA SPEC NAA+PROBE: NOT DETECTED
WBC NRBC COR # BLD: 5.66 10*3/MM3 (ref 3.4–10.8)
YERSINIA STL CULT: NOT DETECTED

## 2019-06-27 PROCEDURE — 80048 BASIC METABOLIC PNL TOTAL CA: CPT | Performed by: NURSE PRACTITIONER

## 2019-06-27 PROCEDURE — 25810000003 SODIUM CHLORIDE 0.9 % WITH KCL 20 MEQ 20-0.9 MEQ/L-% SOLUTION: Performed by: NURSE PRACTITIONER

## 2019-06-27 PROCEDURE — 99222 1ST HOSP IP/OBS MODERATE 55: CPT | Performed by: INTERNAL MEDICINE

## 2019-06-27 PROCEDURE — 74176 CT ABD & PELVIS W/O CONTRAST: CPT

## 2019-06-27 PROCEDURE — 85027 COMPLETE CBC AUTOMATED: CPT | Performed by: NURSE PRACTITIONER

## 2019-06-27 PROCEDURE — 36415 COLL VENOUS BLD VENIPUNCTURE: CPT | Performed by: NURSE PRACTITIONER

## 2019-06-27 PROCEDURE — 96375 TX/PRO/DX INJ NEW DRUG ADDON: CPT

## 2019-06-27 PROCEDURE — 25010000002 ONDANSETRON PER 1 MG: Performed by: EMERGENCY MEDICINE

## 2019-06-27 RX ORDER — FAMOTIDINE 40 MG/1
40 TABLET, FILM COATED ORAL DAILY
Status: DISCONTINUED | OUTPATIENT
Start: 2019-06-27 | End: 2019-06-28

## 2019-06-27 RX ORDER — HYDROCODONE BITARTRATE AND ACETAMINOPHEN 10; 325 MG/1; MG/1
1 TABLET ORAL EVERY 4 HOURS PRN
Status: DISCONTINUED | OUTPATIENT
Start: 2019-06-27 | End: 2019-06-28

## 2019-06-27 RX ORDER — SODIUM CHLORIDE 0.9 % (FLUSH) 0.9 %
3 SYRINGE (ML) INJECTION EVERY 12 HOURS SCHEDULED
Status: DISCONTINUED | OUTPATIENT
Start: 2019-06-27 | End: 2019-06-27

## 2019-06-27 RX ORDER — SODIUM CHLORIDE 0.9 % (FLUSH) 0.9 %
3-10 SYRINGE (ML) INJECTION AS NEEDED
Status: DISCONTINUED | OUTPATIENT
Start: 2019-06-27 | End: 2019-06-27

## 2019-06-27 RX ORDER — ONDANSETRON 4 MG/1
4 TABLET, FILM COATED ORAL EVERY 6 HOURS PRN
Status: DISCONTINUED | OUTPATIENT
Start: 2019-06-27 | End: 2019-06-29 | Stop reason: HOSPADM

## 2019-06-27 RX ORDER — ATORVASTATIN CALCIUM 20 MG/1
20 TABLET, FILM COATED ORAL DAILY
Status: DISCONTINUED | OUTPATIENT
Start: 2019-06-27 | End: 2019-06-29 | Stop reason: HOSPADM

## 2019-06-27 RX ORDER — NEBIVOLOL 10 MG/1
10 TABLET ORAL
Status: DISCONTINUED | OUTPATIENT
Start: 2019-06-27 | End: 2019-06-29 | Stop reason: HOSPADM

## 2019-06-27 RX ORDER — ONDANSETRON 2 MG/ML
4 INJECTION INTRAMUSCULAR; INTRAVENOUS EVERY 6 HOURS PRN
Status: DISCONTINUED | OUTPATIENT
Start: 2019-06-27 | End: 2019-06-29 | Stop reason: HOSPADM

## 2019-06-27 RX ORDER — ZOLPIDEM TARTRATE 5 MG/1
5 TABLET ORAL NIGHTLY
Status: DISCONTINUED | OUTPATIENT
Start: 2019-06-27 | End: 2019-06-29 | Stop reason: HOSPADM

## 2019-06-27 RX ORDER — SODIUM CHLORIDE AND POTASSIUM CHLORIDE 150; 900 MG/100ML; MG/100ML
100 INJECTION, SOLUTION INTRAVENOUS ONCE
Status: COMPLETED | OUTPATIENT
Start: 2019-06-27 | End: 2019-06-27

## 2019-06-27 RX ORDER — ACETAMINOPHEN 325 MG/1
650 TABLET ORAL EVERY 4 HOURS PRN
Status: DISCONTINUED | OUTPATIENT
Start: 2019-06-27 | End: 2019-06-29 | Stop reason: HOSPADM

## 2019-06-27 RX ORDER — ASPIRIN 325 MG
325 TABLET ORAL DAILY
Status: DISCONTINUED | OUTPATIENT
Start: 2019-06-27 | End: 2019-06-29 | Stop reason: HOSPADM

## 2019-06-27 RX ORDER — HYDROCODONE BITARTRATE AND ACETAMINOPHEN 10; 325 MG/1; MG/1
1 TABLET ORAL EVERY 4 HOURS PRN
Status: DISCONTINUED | OUTPATIENT
Start: 2019-06-27 | End: 2019-06-29 | Stop reason: HOSPADM

## 2019-06-27 RX ORDER — SODIUM CHLORIDE 0.9 % (FLUSH) 0.9 %
1-10 SYRINGE (ML) INJECTION AS NEEDED
Status: DISCONTINUED | OUTPATIENT
Start: 2019-06-27 | End: 2019-06-28

## 2019-06-27 RX ORDER — SODIUM CHLORIDE 9 MG/ML
100 INJECTION, SOLUTION INTRAVENOUS CONTINUOUS
Status: DISCONTINUED | OUTPATIENT
Start: 2019-06-27 | End: 2019-06-27

## 2019-06-27 RX ORDER — FENOFIBRATE 48 MG/1
48 TABLET, COATED ORAL DAILY
Status: DISCONTINUED | OUTPATIENT
Start: 2019-06-27 | End: 2019-06-29 | Stop reason: HOSPADM

## 2019-06-27 RX ORDER — SODIUM CHLORIDE 0.9 % (FLUSH) 0.9 %
3 SYRINGE (ML) INJECTION EVERY 12 HOURS SCHEDULED
Status: DISCONTINUED | OUTPATIENT
Start: 2019-06-27 | End: 2019-06-28

## 2019-06-27 RX ORDER — ONDANSETRON 2 MG/ML
4 INJECTION INTRAMUSCULAR; INTRAVENOUS ONCE
Status: COMPLETED | OUTPATIENT
Start: 2019-06-27 | End: 2019-06-27

## 2019-06-27 RX ORDER — BUPROPION HYDROCHLORIDE 300 MG/1
300 TABLET ORAL DAILY
Status: DISCONTINUED | OUTPATIENT
Start: 2019-06-27 | End: 2019-06-29 | Stop reason: HOSPADM

## 2019-06-27 RX ORDER — FENOFIBRATE 54 MG/1
67 TABLET ORAL DAILY
COMMUNITY
End: 2021-06-15 | Stop reason: ALTCHOICE

## 2019-06-27 RX ADMIN — VANCOMYCIN 125 MG: KIT at 08:09

## 2019-06-27 RX ADMIN — ONDANSETRON HYDROCHLORIDE 4 MG: 2 SOLUTION INTRAMUSCULAR; INTRAVENOUS at 00:42

## 2019-06-27 RX ADMIN — ZOLPIDEM TARTRATE 5 MG: 5 TABLET ORAL at 21:04

## 2019-06-27 RX ADMIN — BUPROPION HYDROCHLORIDE 300 MG: 300 TABLET, EXTENDED RELEASE ORAL at 18:06

## 2019-06-27 RX ADMIN — VANCOMYCIN 125 MG: KIT at 23:49

## 2019-06-27 RX ADMIN — ASPIRIN 325 MG: 325 TABLET ORAL at 18:06

## 2019-06-27 RX ADMIN — HYDROCODONE BITARTRATE AND ACETAMINOPHEN 1 TABLET: 10; 325 TABLET ORAL at 09:25

## 2019-06-27 RX ADMIN — SODIUM CHLORIDE 100 ML/HR: 9 INJECTION, SOLUTION INTRAVENOUS at 02:23

## 2019-06-27 RX ADMIN — FENOFIBRATE 48 MG: 48 TABLET, FILM COATED ORAL at 18:07

## 2019-06-27 RX ADMIN — VANCOMYCIN 125 MG: KIT at 00:42

## 2019-06-27 RX ADMIN — POTASSIUM CHLORIDE AND SODIUM CHLORIDE 100 ML/HR: 900; 150 INJECTION, SOLUTION INTRAVENOUS at 14:32

## 2019-06-27 RX ADMIN — SODIUM CHLORIDE, PRESERVATIVE FREE 3 ML: 5 INJECTION INTRAVENOUS at 09:25

## 2019-06-27 RX ADMIN — SODIUM CHLORIDE, PRESERVATIVE FREE 10 ML: 5 INJECTION INTRAVENOUS at 00:42

## 2019-06-27 RX ADMIN — VANCOMYCIN 125 MG: KIT at 14:36

## 2019-06-27 RX ADMIN — FAMOTIDINE 40 MG: 40 TABLET, FILM COATED ORAL at 18:06

## 2019-06-27 RX ADMIN — ATORVASTATIN CALCIUM 20 MG: 20 TABLET, FILM COATED ORAL at 18:06

## 2019-06-27 RX ADMIN — VANCOMYCIN 125 MG: KIT at 18:06

## 2019-06-27 RX ADMIN — NEBIVOLOL HYDROCHLORIDE 10 MG: 10 TABLET ORAL at 18:06

## 2019-06-28 PROBLEM — A04.0 INTESTINAL INFECTION DUE TO ENTEROPATHOGENIC E. COLI: Status: ACTIVE | Noted: 2019-06-28

## 2019-06-28 LAB
ANION GAP SERPL CALCULATED.3IONS-SCNC: 9.2 MMOL/L (ref 5–15)
BUN BLD-MCNC: 27 MG/DL (ref 8–23)
BUN/CREAT SERPL: 15 (ref 7–25)
CALCIUM SPEC-SCNC: 8.8 MG/DL (ref 8.6–10.5)
CHLORIDE SERPL-SCNC: 106 MMOL/L (ref 98–107)
CO2 SERPL-SCNC: 24.8 MMOL/L (ref 22–29)
CREAT BLD-MCNC: 1.8 MG/DL (ref 0.57–1)
DEPRECATED RDW RBC AUTO: 50.7 FL (ref 37–54)
ERYTHROCYTE [DISTWIDTH] IN BLOOD BY AUTOMATED COUNT: 13.7 % (ref 12.3–15.4)
GFR SERPL CREATININE-BSD FRML MDRD: 27 ML/MIN/1.73
GLUCOSE BLD-MCNC: 82 MG/DL (ref 65–99)
HCT VFR BLD AUTO: 34.4 % (ref 34–46.6)
HGB BLD-MCNC: 10.7 G/DL (ref 12–15.9)
MAGNESIUM SERPL-MCNC: 1.9 MG/DL (ref 1.6–2.4)
MCH RBC QN AUTO: 31.5 PG (ref 26.6–33)
MCHC RBC AUTO-ENTMCNC: 31.1 G/DL (ref 31.5–35.7)
MCV RBC AUTO: 101.2 FL (ref 79–97)
PLATELET # BLD AUTO: 146 10*3/MM3 (ref 140–450)
PMV BLD AUTO: 11.8 FL (ref 6–12)
POTASSIUM BLD-SCNC: 4.1 MMOL/L (ref 3.5–5.2)
RBC # BLD AUTO: 3.4 10*6/MM3 (ref 3.77–5.28)
SODIUM BLD-SCNC: 140 MMOL/L (ref 136–145)
WBC NRBC COR # BLD: 4.39 10*3/MM3 (ref 3.4–10.8)

## 2019-06-28 PROCEDURE — 99232 SBSQ HOSP IP/OBS MODERATE 35: CPT | Performed by: INTERNAL MEDICINE

## 2019-06-28 PROCEDURE — 83735 ASSAY OF MAGNESIUM: CPT | Performed by: NURSE PRACTITIONER

## 2019-06-28 PROCEDURE — G0378 HOSPITAL OBSERVATION PER HR: HCPCS

## 2019-06-28 PROCEDURE — 80048 BASIC METABOLIC PNL TOTAL CA: CPT | Performed by: NURSE PRACTITIONER

## 2019-06-28 PROCEDURE — 85027 COMPLETE CBC AUTOMATED: CPT | Performed by: HOSPITALIST

## 2019-06-28 RX ORDER — FAMOTIDINE 20 MG/1
20 TABLET, FILM COATED ORAL DAILY
Status: DISCONTINUED | OUTPATIENT
Start: 2019-06-28 | End: 2019-06-29 | Stop reason: HOSPADM

## 2019-06-28 RX ADMIN — NEBIVOLOL HYDROCHLORIDE 10 MG: 10 TABLET ORAL at 10:01

## 2019-06-28 RX ADMIN — VANCOMYCIN 125 MG: KIT at 19:39

## 2019-06-28 RX ADMIN — VANCOMYCIN 125 MG: KIT at 12:26

## 2019-06-28 RX ADMIN — BUPROPION HYDROCHLORIDE 300 MG: 300 TABLET, EXTENDED RELEASE ORAL at 10:01

## 2019-06-28 RX ADMIN — ASPIRIN 325 MG: 325 TABLET ORAL at 10:01

## 2019-06-28 RX ADMIN — ATORVASTATIN CALCIUM 20 MG: 20 TABLET, FILM COATED ORAL at 10:01

## 2019-06-28 RX ADMIN — ONDANSETRON 4 MG: 4 TABLET, FILM COATED ORAL at 16:03

## 2019-06-28 RX ADMIN — FAMOTIDINE 20 MG: 20 TABLET, FILM COATED ORAL at 10:01

## 2019-06-28 RX ADMIN — SODIUM CHLORIDE, PRESERVATIVE FREE 3 ML: 5 INJECTION INTRAVENOUS at 10:02

## 2019-06-28 RX ADMIN — FENOFIBRATE 48 MG: 48 TABLET, FILM COATED ORAL at 10:02

## 2019-06-29 VITALS
RESPIRATION RATE: 18 BRPM | HEART RATE: 79 BPM | WEIGHT: 143.8 LBS | SYSTOLIC BLOOD PRESSURE: 124 MMHG | OXYGEN SATURATION: 97 % | HEIGHT: 64 IN | DIASTOLIC BLOOD PRESSURE: 66 MMHG | BODY MASS INDEX: 24.55 KG/M2 | TEMPERATURE: 98 F

## 2019-06-29 PROCEDURE — G0378 HOSPITAL OBSERVATION PER HR: HCPCS

## 2019-06-29 RX ADMIN — ASPIRIN 325 MG: 325 TABLET ORAL at 09:15

## 2019-06-29 RX ADMIN — NEBIVOLOL HYDROCHLORIDE 10 MG: 10 TABLET ORAL at 09:15

## 2019-06-29 RX ADMIN — FENOFIBRATE 48 MG: 48 TABLET, FILM COATED ORAL at 09:15

## 2019-06-29 RX ADMIN — BUPROPION HYDROCHLORIDE 300 MG: 300 TABLET, EXTENDED RELEASE ORAL at 09:15

## 2019-06-29 RX ADMIN — VANCOMYCIN 125 MG: KIT at 00:29

## 2019-06-29 RX ADMIN — VANCOMYCIN 125 MG: KIT at 12:03

## 2019-06-29 RX ADMIN — ONDANSETRON 4 MG: 4 TABLET, FILM COATED ORAL at 13:01

## 2019-06-29 RX ADMIN — FAMOTIDINE 20 MG: 20 TABLET, FILM COATED ORAL at 09:15

## 2019-06-29 RX ADMIN — VANCOMYCIN 125 MG: KIT at 05:20

## 2019-06-29 RX ADMIN — ZOLPIDEM TARTRATE 5 MG: 5 TABLET ORAL at 01:30

## 2019-06-29 RX ADMIN — ATORVASTATIN CALCIUM 20 MG: 20 TABLET, FILM COATED ORAL at 09:15

## 2019-06-30 ENCOUNTER — READMISSION MANAGEMENT (OUTPATIENT)
Dept: CALL CENTER | Facility: HOSPITAL | Age: 79
End: 2019-06-30

## 2019-06-30 NOTE — OUTREACH NOTE
Prep Survey      Responses   Facility patient discharged from?  [facility]   Is patient eligible?  Yes   Discharge diagnosis  Abdominal Pain and Diarrhea,  Cdiff   Does the patient have one of the following disease processes/diagnoses(primary or secondary)?  Other   Does the patient have Home health ordered?  No   Is there a DME ordered?  No   Prep survey completed?  Yes          Adriana Talavera RN

## 2019-07-02 ENCOUNTER — READMISSION MANAGEMENT (OUTPATIENT)
Dept: CALL CENTER | Facility: HOSPITAL | Age: 79
End: 2019-07-02

## 2019-07-02 NOTE — OUTREACH NOTE
Medical Week 1 Survey      Responses   Facility patient discharged from?  [facility]   Does the patient have one of the following disease processes/diagnoses(primary or secondary)?  Other   Is there a successful TCM telephone encounter documented?  No   Week 1 attempt successful?  No   Unsuccessful attempts  Attempt 1          Bridget Manuel RN

## 2019-07-03 ENCOUNTER — READMISSION MANAGEMENT (OUTPATIENT)
Dept: CALL CENTER | Facility: HOSPITAL | Age: 79
End: 2019-07-03

## 2019-07-03 NOTE — OUTREACH NOTE
Medical Week 1 Survey      Responses   Facility patient discharged from?  [facility]   Does the patient have one of the following disease processes/diagnoses(primary or secondary)?  Other   Is there a successful TCM telephone encounter documented?  No   Week 1 attempt successful?  No   Unsuccessful attempts  Attempt 2          Jennifer Toure RN

## 2019-07-08 ENCOUNTER — READMISSION MANAGEMENT (OUTPATIENT)
Dept: CALL CENTER | Facility: HOSPITAL | Age: 79
End: 2019-07-08

## 2019-07-08 NOTE — OUTREACH NOTE
Medical Week 2 Survey      Responses   Facility patient discharged from?  [facility]   Does the patient have one of the following disease processes/diagnoses(primary or secondary)?  Other   Week 2 attempt successful?  No   Unsuccessful attempts  Attempt 1          Chyna Leigh RN

## 2019-07-10 ENCOUNTER — READMISSION MANAGEMENT (OUTPATIENT)
Dept: CALL CENTER | Facility: HOSPITAL | Age: 79
End: 2019-07-10

## 2019-07-10 NOTE — OUTREACH NOTE
Medical Week 2 Survey      Responses   Facility patient discharged from?  [facility]   Does the patient have one of the following disease processes/diagnoses(primary or secondary)?  Other   Week 2 attempt successful?  No   Unsuccessful attempts  Attempt 2          Lisa Parisi RN

## 2019-07-23 ENCOUNTER — ANESTHESIA (OUTPATIENT)
Dept: GASTROENTEROLOGY | Facility: HOSPITAL | Age: 79
End: 2019-07-23

## 2019-07-23 ENCOUNTER — CLINICAL SUPPORT NO REQUIREMENTS (OUTPATIENT)
Dept: CARDIOLOGY | Facility: CLINIC | Age: 79
End: 2019-07-23

## 2019-07-23 ENCOUNTER — ANESTHESIA EVENT (OUTPATIENT)
Dept: GASTROENTEROLOGY | Facility: HOSPITAL | Age: 79
End: 2019-07-23

## 2019-07-23 ENCOUNTER — HOSPITAL ENCOUNTER (OUTPATIENT)
Facility: HOSPITAL | Age: 79
Setting detail: HOSPITAL OUTPATIENT SURGERY
Discharge: HOME | End: 2019-07-23
Attending: INTERNAL MEDICINE | Admitting: INTERNAL MEDICINE

## 2019-07-23 VITALS
WEIGHT: 140.1 LBS | BODY MASS INDEX: 24.82 KG/M2 | SYSTOLIC BLOOD PRESSURE: 134 MMHG | RESPIRATION RATE: 16 BRPM | TEMPERATURE: 98.3 F | OXYGEN SATURATION: 97 % | DIASTOLIC BLOOD PRESSURE: 72 MMHG | HEART RATE: 74 BPM | HEIGHT: 63 IN

## 2019-07-23 DIAGNOSIS — I49.5 SICK SINUS SYNDROME (HCC): Primary | ICD-10-CM

## 2019-07-23 DIAGNOSIS — R13.10 DYSPHAGIA, UNSPECIFIED TYPE: ICD-10-CM

## 2019-07-23 DIAGNOSIS — K59.01 SLOW TRANSIT CONSTIPATION: ICD-10-CM

## 2019-07-23 DIAGNOSIS — K56.7 ILEUS (HCC): ICD-10-CM

## 2019-07-23 DIAGNOSIS — Q39.4 ESOPHAGEAL WEB: ICD-10-CM

## 2019-07-23 DIAGNOSIS — A04.72 C. DIFFICILE COLITIS: ICD-10-CM

## 2019-07-23 PROCEDURE — 88305 TISSUE EXAM BY PATHOLOGIST: CPT | Performed by: INTERNAL MEDICINE

## 2019-07-23 PROCEDURE — 93294 REM INTERROG EVL PM/LDLS PM: CPT | Performed by: INTERNAL MEDICINE

## 2019-07-23 PROCEDURE — S0260 H&P FOR SURGERY: HCPCS | Performed by: INTERNAL MEDICINE

## 2019-07-23 PROCEDURE — 43450 DILATE ESOPHAGUS 1/MULT PASS: CPT | Performed by: INTERNAL MEDICINE

## 2019-07-23 PROCEDURE — 25010000002 PROPOFOL 10 MG/ML EMULSION: Performed by: ANESTHESIOLOGY

## 2019-07-23 PROCEDURE — 45378 DIAGNOSTIC COLONOSCOPY: CPT | Performed by: INTERNAL MEDICINE

## 2019-07-23 PROCEDURE — 43239 EGD BIOPSY SINGLE/MULTIPLE: CPT | Performed by: INTERNAL MEDICINE

## 2019-07-23 PROCEDURE — 93296 REM INTERROG EVL PM/IDS: CPT | Performed by: INTERNAL MEDICINE

## 2019-07-23 RX ORDER — SODIUM CHLORIDE 9 MG/ML
1000 INJECTION, SOLUTION INTRAVENOUS CONTINUOUS
Status: DISCONTINUED | OUTPATIENT
Start: 2019-07-23 | End: 2019-07-23 | Stop reason: HOSPADM

## 2019-07-23 RX ORDER — LIDOCAINE HYDROCHLORIDE 20 MG/ML
INJECTION, SOLUTION INFILTRATION; PERINEURAL AS NEEDED
Status: DISCONTINUED | OUTPATIENT
Start: 2019-07-23 | End: 2019-07-23 | Stop reason: SURG

## 2019-07-23 RX ORDER — PROPOFOL 10 MG/ML
VIAL (ML) INTRAVENOUS CONTINUOUS PRN
Status: DISCONTINUED | OUTPATIENT
Start: 2019-07-23 | End: 2019-07-23 | Stop reason: SURG

## 2019-07-23 RX ADMIN — PROPOFOL 140 MCG/KG/MIN: 10 INJECTION, EMULSION INTRAVENOUS at 16:13

## 2019-07-23 RX ADMIN — SODIUM CHLORIDE 1000 ML: 9 INJECTION, SOLUTION INTRAVENOUS at 15:05

## 2019-07-23 RX ADMIN — LIDOCAINE HYDROCHLORIDE 40 MG: 20 INJECTION, SOLUTION INFILTRATION; PERINEURAL at 16:13

## 2019-07-23 NOTE — ANESTHESIA POSTPROCEDURE EVALUATION
"Patient: Carly Gotti    Procedure Summary     Date:  07/23/19 Room / Location:   [facility] /  [facility]    Anesthesia Start:  1609 Anesthesia Stop:  1639    Procedures:       ESOPHAGOGASTRODUODENOSCOPY WITH BIOPSIES AND DILITATION WITH 48F MALDONADO (N/A Esophagus)      COLONOSCOPY TO CECUM AND TERMINAL ILEUM (N/A ) Diagnosis:       C. difficile colitis      Ileus (CMS/HCC)      Slow transit constipation      Dysphagia, unspecified type      Esophageal web      (C. difficile colitis [A04.72])      (Ileus (CMS/HCC) [K56.7])      (Slow transit constipation [K59.01])      (Dysphagia, unspecified type [R13.10])      (Esophageal web [Q39.4])    Surgeon:  Blayne Cai MD Provider:  Marcos Soliman MD    Anesthesia Type:  MAC ASA Status:  3          Anesthesia Type: MAC  Last vitals  BP   137/93 (07/23/19 1446)   Temp   36.8 °C (98.3 °F) (07/23/19 1446)   Pulse   85 (07/23/19 1446)   Resp   14 (07/23/19 1446)     SpO2   96 % (07/23/19 1446)     Post Anesthesia Care and Evaluation    Patient location during evaluation: bedside  Patient participation: complete - patient participated  Level of consciousness: awake  Pain score: 2  Pain management: adequate  Airway patency: patent  Anesthetic complications: No anesthetic complications  PONV Status: none  Cardiovascular status: acceptable  Respiratory status: acceptable  Hydration status: acceptable    Comments: /93 (BP Location: Left arm, Patient Position: Sitting)   Pulse 85   Temp 36.8 °C (98.3 °F) (Oral)   Resp 14   Ht 160 cm (63\")   Wt 63.5 kg (140 lb 1.6 oz)   SpO2 96%   BMI 24.82 kg/m²         "

## 2019-07-23 NOTE — H&P
[facility]  Pre Procedure History & Physical    Chief Complaint:   Time for my colonoscopy    Subjective     HPI:   78 y.o. female     Past Medical History:   Past Medical History:   Diagnosis Date   • Aneurysm (CMS/HCC)    • Aortic valve disease    • Arthritis    • Atrophic kidney    • Bradycardia    • CAD (coronary artery disease)    • CKD (chronic kidney disease) stage 4, GFR 15-29 ml/min (CMS/HCC)    • Depression    • Dysphagia    • Essential hypertension    • [place] (hard of hearing)    • Hyperlipidemia    • Mitral valve disease    • Pulmonary hypertension (CMS/HCC)    • Sick sinus syndrome (CMS/HCC)    • Sleep apnea    • Spinal stenosis        Family History:  Family History   Problem Relation Age of Onset   • ALS Mother    • Hypertension Father        Social History:   reports that she quit smoking about 31 years ago. She has never used smokeless tobacco. She reports that she does not drink alcohol or use drugs.    Medications:   Medications Prior to Admission   Medication Sig Dispense Refill Last Dose   • aspirin 325 MG tablet Take by mouth daily.   7/22/2019 at Unknown time   • atorvastatin (LIPITOR) 20 MG tablet take 1 tablet by mouth at bedtime  0 7/22/2019 at Unknown time   • bumetanide (BUMEX) 2 MG tablet Take 1 tablet by mouth Daily. 90 tablet 1 7/22/2019 at Unknown time   • buPROPion XL (WELLBUTRIN XL) 300 MG 24 hr tablet Take 300 mg by mouth Daily.  0 7/23/2019 at Unknown time   • estradiol (ESTRACE) 0.5 MG tablet Take by mouth daily.   7/22/2019 at Unknown time   • eszopiclone (LUNESTA) 3 MG tablet Take 3 mg by mouth every night. Take immediately before bedtime   7/22/2019 at Unknown time   • HYDROcodone-acetaminophen (NORCO)  MG per tablet Take 1 tablet by mouth Every 4 (Four) Hours As Needed for Moderate Pain . 20 tablet 0 7/23/2019 at 1130   • nebivolol (BYSTOLIC) 10 MG tablet Take 10 mg by mouth 2 (Two) Times a Day.   7/23/2019 at Unknown time   • vancomycin 50 MG/ML  "reconstituted solution oral solution reconstituted Take 2.5 mL by mouth Every Other Day for 14 doses. 35 mL 0 2019 at Unknown time   • Coenzyme Q10 200 MG tablet Take 1 tablet/day by mouth Daily.   Taking   • famotidine (PEPCID) 40 MG tablet Take 1 tablet by mouth Daily. 90 tablet 3 Unknown at Unknown time   • fenofibrate (TRICOR) 54 MG tablet Take 67 mg by mouth Daily.   Unknown at Unknown time   • folic acid (FOLVITE) 400 MCG tablet Take 1 tablet by mouth Daily. 30 tablet 0 Unknown at Unknown time   • [] vancomycin 50 MG/ML reconstituted solution oral solution reconstituted Take 2.5 mL by mouth Daily for 7 doses. 17.5 mL 0        Allergies:  Patient has no known allergies.    ROS:    Pertinent items are noted in HPI     Objective     Blood pressure 137/93, pulse 85, temperature 98.3 °F (36.8 °C), temperature source Oral, resp. rate 14, height 160 cm (63\"), weight 63.5 kg (140 lb 1.6 oz), SpO2 96 %.    Physical Exam   Constitutional: Pt is oriented to person, place, and time and well-developed, well-nourished, and in no distress.   HENT:   Mouth/Throat: Oropharynx is clear and moist.   Neck: Normal range of motion. Neck supple.   Cardiovascular: Normal rate, regular rhythm and normal heart sounds.    Pulmonary/Chest: Effort normal and breath sounds normal. No respiratory distress. No  wheezes.   Abdominal: Soft. Bowel sounds are normal.   Skin: Skin is warm and dry.   Psychiatric: Mood, memory, affect and judgment normal.     Assessment/Plan     Diagnosis:  Encounter for screening for colon cancer    Anticipated Surgical Procedure:  Colonoscopy    The risks, benefits, and alternatives of this procedure have been discussed with the patient or the responsible party- the patient understands and agrees to proceed.                                                                "

## 2019-07-23 NOTE — ANESTHESIA PREPROCEDURE EVALUATION
Anesthesia Evaluation     Patient summary reviewed and Nursing notes reviewed   no history of anesthetic complications:  NPO Solid Status: > 6 hours  NPO Liquid Status: > 6 hours           Airway   Mallampati: II  TM distance: >3 FB  Neck ROM: full  no difficulty expected and No difficulty expected  Dental - normal exam     Pulmonary - normal exam    breath sounds clear to auscultation  (+) sleep apnea,   (-) rhonchi, decreased breath sounds, wheezes, rales, stridor  Cardiovascular - normal exam    NYHA Classification: I  Rhythm: regular  Rate: normal    (+) pacemaker, hypertension, valvular problems/murmurs, CAD, hyperlipidemia,   (-) murmur, weak pulses, friction rub, systolic click, carotid bruits, JVD, peripheral edema      Neuro/Psych- negative ROS  GI/Hepatic/Renal/Endo    (+)   renal disease CRI,     Musculoskeletal (-) negative ROS    Abdominal  - normal exam    Abdomen: soft.   Substance History - negative use     OB/GYN negative ob/gyn ROS         Other - negative ROS                       Anesthesia Plan    ASA 3     MAC     intravenous induction   Anesthetic plan, all risks, benefits, and alternatives have been provided, discussed and informed consent has been obtained with: patient.

## 2019-07-25 LAB
CYTO UR: NORMAL
LAB AP CASE REPORT: NORMAL
PATH REPORT.FINAL DX SPEC: NORMAL
PATH REPORT.GROSS SPEC: NORMAL

## 2019-08-07 ENCOUNTER — TELEPHONE (OUTPATIENT)
Dept: GASTROENTEROLOGY | Facility: CLINIC | Age: 79
End: 2019-08-07

## 2019-08-07 NOTE — TELEPHONE ENCOUNTER
Patient called, advised as per Dr. Cai's note. She verb understanding. F/u visit scheduled for 10/8@1400.

## 2019-08-07 NOTE — TELEPHONE ENCOUNTER
----- Message from Blayne Cai MD sent at 7/29/2019  1:01 PM EDT -----  Pathology benign  Office visit nurse practitioner 6 to 8 weeks

## 2019-08-10 RX ORDER — BUMETANIDE 2 MG/1
TABLET ORAL
Qty: 90 TABLET | Refills: 1 | Status: SHIPPED | OUTPATIENT
Start: 2019-08-10 | End: 2019-12-26

## 2019-10-14 ENCOUNTER — OFFICE VISIT (OUTPATIENT)
Dept: GASTROENTEROLOGY | Facility: CLINIC | Age: 79
End: 2019-10-14

## 2019-10-14 VITALS
DIASTOLIC BLOOD PRESSURE: 78 MMHG | BODY MASS INDEX: 26.29 KG/M2 | TEMPERATURE: 97.7 F | SYSTOLIC BLOOD PRESSURE: 112 MMHG | WEIGHT: 148.4 LBS | HEIGHT: 63 IN

## 2019-10-14 DIAGNOSIS — K59.03 DRUG-INDUCED CONSTIPATION: ICD-10-CM

## 2019-10-14 DIAGNOSIS — R13.10 DYSPHAGIA, UNSPECIFIED TYPE: Primary | ICD-10-CM

## 2019-10-14 PROCEDURE — 99214 OFFICE O/P EST MOD 30 MIN: CPT | Performed by: NURSE PRACTITIONER

## 2019-10-14 RX ORDER — SENNA AND DOCUSATE SODIUM 50; 8.6 MG/1; MG/1
1 TABLET, FILM COATED ORAL DAILY
Qty: 90 TABLET | Refills: 0 | Status: SHIPPED | OUTPATIENT
Start: 2019-10-14 | End: 2020-04-13

## 2019-10-14 NOTE — PROGRESS NOTES
Chief Complaint   Patient presents with   • Difficulty Swallowing   • straining with BM     HPI    Carly Gotti is a  79 y.o. female here for a follow up visit for dysphagia and complaints of straining with a bowel movement.    This is an established patient Dr. Cai is known to me.  She was in the hospital in May for C. difficile colitis and ileus.  She struggles with opioid-induced constipation as well.  She also has a history of dysphasia and prior video swallowing study with possible small anterior cervical esophageal web was noted.  Subsequent bidirectional endoscopic evaluation performed in July which are reviewed as follows:    EGD--web in the upper third of the esophagus which was dilated otherwise normal.  Colonoscopy -- NBIH otherwise normal.    On visit today patient reports slight improvement with swallowing difficulties following dilation.  She still struggling with dense breads and certain meats for example chicken several times a week.  She will drink water for relief.  No nausea or vomiting.  Appetite is good.  Weight is stable.  She is currently off of Pepcid started the last office visit.    Bowels are moving every other day as long as she eats oatmeal for breakfast but she does complain of excessive straining which occasionally leads to rectal bleeding.  She has not tried a stool softener.  She continues on opioids for chronic pain.      Past Medical History:   Diagnosis Date   • Aneurysm (CMS/HCC)    • Aortic valve disease    • Arthritis    • Atrophic kidney    • Bradycardia    • CAD (coronary artery disease)    • CKD (chronic kidney disease) stage 4, GFR 15-29 ml/min (CMS/HCC)    • Depression    • Dysphagia    • Essential hypertension    • [place] (hard of hearing)    • Hyperlipidemia    • Mitral valve disease    • Pulmonary hypertension (CMS/HCC)    • Sick sinus syndrome (CMS/HCC)    • Sleep apnea    • Spinal stenosis        Past Surgical History:   Procedure Laterality Date   • AORTIC VALVE  REPAIR/REPLACEMENT  2015    Dr Veliz   • CARDIAC CATHETERIZATION     • CARDIAC DEFIBRILLATOR PLACEMENT     •  SECTION     • COLONOSCOPY N/A 2019    [facility]   • ENDOSCOPY  2012    Hypertonic lower esophageal sphincter, gastritis. Path: Chronic gastritis, moderate.    • ENDOSCOPY N/A 2019    Web in the upper third of the esophagus. Dilated   • HYSTERECTOMY     • MITRAL VALVE ANNULOPLASTY  2015    Dr Veliz   • TRICUSPID VALVE SURGERY  2015    Dr Veliz       Scheduled Meds:  Outpatient Encounter Medications as of 10/14/2019   Medication Sig Dispense Refill   • aspirin 325 MG tablet Take by mouth daily.     • atorvastatin (LIPITOR) 20 MG tablet take 1 tablet by mouth at bedtime  0   • bumetanide (BUMEX) 2 MG tablet TAKE 1 TABLET BY MOUTH EVERY DAY 90 tablet 1   • buPROPion XL (WELLBUTRIN XL) 300 MG 24 hr tablet Take 300 mg by mouth Daily.  0   • Coenzyme Q10 200 MG tablet Take 1 tablet/day by mouth Daily.     • estradiol (ESTRACE) 0.5 MG tablet Take by mouth daily.     • eszopiclone (LUNESTA) 3 MG tablet Take 3 mg by mouth every night. Take immediately before bedtime     • fenofibrate (TRICOR) 54 MG tablet Take 67 mg by mouth Daily.     • folic acid (FOLVITE) 400 MCG tablet Take 1 tablet by mouth Daily. 30 tablet 0   • HYDROcodone-acetaminophen (NORCO)  MG per tablet Take 1 tablet by mouth Every 4 (Four) Hours As Needed for Moderate Pain . 20 tablet 0   • nebivolol (BYSTOLIC) 10 MG tablet Take 10 mg by mouth 2 (Two) Times a Day.     • famotidine (PEPCID) 40 MG tablet Take 1 tablet by mouth Daily. 90 tablet 3   • sennosides-docusate sodium (SENOKOT-S) 8.6-50 MG tablet Take 1 tablet by mouth Daily. 90 tablet 0     No facility-administered encounter medications on file as of 10/14/2019.        Continuous Infusions:  No current facility-administered medications for this visit.     PRN Meds:.    No Known Allergies    Social History     Socioeconomic History   • Marital  status:      Spouse name: Not on file   • Number of children: Not on file   • Years of education: Not on file   • Highest education level: Not on file   Tobacco Use   • Smoking status: Former Smoker     Last attempt to quit:      Years since quittin.8   • Smokeless tobacco: Never Used   Substance and Sexual Activity   • Alcohol use: No   • Drug use: No   • Sexual activity: Defer       Family History   Problem Relation Age of Onset   • ALS Mother    • Hypertension Father        Review of Systems   Constitutional: Negative for activity change, appetite change, fatigue, fever and unexpected weight change.   HENT: Positive for trouble swallowing.    Respiratory: Negative for apnea, cough, choking, chest tightness, shortness of breath and wheezing.    Cardiovascular: Negative for chest pain, palpitations and leg swelling.   Gastrointestinal: Negative for abdominal distention, abdominal pain, anal bleeding, blood in stool, constipation, diarrhea, nausea, rectal pain and vomiting.       Vitals:    10/14/19 1313   BP: 112/78   Temp: 97.7 °F (36.5 °C)       Physical Exam   Constitutional: She is oriented to person, place, and time. She appears well-developed and well-nourished.   Eyes: Pupils are equal, round, and reactive to light.   Cardiovascular: Normal rate, regular rhythm and normal heart sounds.   Pulmonary/Chest: Effort normal and breath sounds normal. No respiratory distress. She has no wheezes.   Abdominal: Soft. Bowel sounds are normal. She exhibits no distension and no mass. There is no tenderness. There is no guarding. No hernia.   Musculoskeletal: Normal range of motion.   Neurological: She is alert and oriented to person, place, and time.   Skin: Skin is warm and dry. Capillary refill takes less than 2 seconds.   Psychiatric: She has a normal mood and affect. Her behavior is normal.       No images are attached to the encounter.    Carly was seen today for difficulty swallowing and straining  with bm.    Diagnoses and all orders for this visit:    Dysphagia, unspecified type  -     Case Request; Standing  -     Obtain Informed Consent; Standing  -     Case Request    Drug-induced constipation    Other orders  -     sennosides-docusate sodium (SENOKOT-S) 8.6-50 MG tablet; Take 1 tablet by mouth Daily.    Assessment/plan    Dysphagia still an issue despite recent dilation.  I did speak with Dr. Cai regarding her current issues and he would like to move forward with EGD and repeat dilation.    Constipation improved with daily oatmeal but still complains of excessive straining which can often lead to rectal bleeding.  Recommend she start a daily stool softener as above.  Reinforced the benefits of high-fiber diet.  Also recommended she consider MiraLAX 1 capful daily.  We discussed titrating based on stool consistency and frequency.    Follow-up and further recommendations pending endoscopic evaluation and dilation.

## 2019-10-15 ENCOUNTER — TELEPHONE (OUTPATIENT)
Dept: GASTROENTEROLOGY | Facility: CLINIC | Age: 79
End: 2019-10-15

## 2019-10-15 NOTE — TELEPHONE ENCOUNTER
----- Message from TORSTEN Mensah sent at 10/14/2019  4:07 PM EDT -----  Please notify patient that spoke with Dr. aCi regarding her current issues and he would like to move forward with upper endoscopy to redilate her esophagus. Order is in please set this up for the her. Thanks!

## 2019-10-31 NOTE — TELEPHONE ENCOUNTER
We have called x 2 to give pt this message. EGD order was placed on 10*14/19 and scheduling has now called pt x 3 and also mailed a reminder card to call and schedule. Pt will mitzy back when she is ready to schedule EGD.

## 2019-11-20 ENCOUNTER — CLINICAL SUPPORT NO REQUIREMENTS (OUTPATIENT)
Dept: CARDIOLOGY | Facility: CLINIC | Age: 79
End: 2019-11-20

## 2019-11-20 DIAGNOSIS — I49.5 SICK SINUS SYNDROME (HCC): Primary | ICD-10-CM

## 2019-11-20 PROCEDURE — 93294 REM INTERROG EVL PM/LDLS PM: CPT | Performed by: INTERNAL MEDICINE

## 2019-11-20 PROCEDURE — 93296 REM INTERROG EVL PM/IDS: CPT | Performed by: INTERNAL MEDICINE

## 2019-12-19 ENCOUNTER — OFFICE VISIT (OUTPATIENT)
Dept: CARDIOLOGY | Facility: CLINIC | Age: 79
End: 2019-12-19

## 2019-12-19 ENCOUNTER — CLINICAL SUPPORT NO REQUIREMENTS (OUTPATIENT)
Dept: CARDIOLOGY | Facility: CLINIC | Age: 79
End: 2019-12-19

## 2019-12-19 VITALS
DIASTOLIC BLOOD PRESSURE: 78 MMHG | HEART RATE: 70 BPM | SYSTOLIC BLOOD PRESSURE: 130 MMHG | BODY MASS INDEX: 25.69 KG/M2 | WEIGHT: 145 LBS | HEIGHT: 63 IN

## 2019-12-19 DIAGNOSIS — E78.2 MIXED HYPERLIPIDEMIA: ICD-10-CM

## 2019-12-19 DIAGNOSIS — Z95.2 S/P AVR (AORTIC VALVE REPLACEMENT): Primary | ICD-10-CM

## 2019-12-19 DIAGNOSIS — Z98.890 S/P TVR (TRICUSPID VALVE REPAIR): ICD-10-CM

## 2019-12-19 DIAGNOSIS — Z98.890 S/P MVR (MITRAL VALVE REPAIR): ICD-10-CM

## 2019-12-19 DIAGNOSIS — I10 BENIGN ESSENTIAL HYPERTENSION: ICD-10-CM

## 2019-12-19 DIAGNOSIS — I49.5 SICK SINUS SYNDROME (HCC): Primary | ICD-10-CM

## 2019-12-19 DIAGNOSIS — Z95.0 PACEMAKER: ICD-10-CM

## 2019-12-19 PROBLEM — G47.33 OBSTRUCTIVE SLEEP APNEA: Status: ACTIVE | Noted: 2019-12-19

## 2019-12-19 PROCEDURE — 93000 ELECTROCARDIOGRAM COMPLETE: CPT | Performed by: INTERNAL MEDICINE

## 2019-12-19 PROCEDURE — 93280 PM DEVICE PROGR EVAL DUAL: CPT | Performed by: INTERNAL MEDICINE

## 2019-12-19 PROCEDURE — 99214 OFFICE O/P EST MOD 30 MIN: CPT | Performed by: INTERNAL MEDICINE

## 2019-12-19 NOTE — PROGRESS NOTES
Subjective:     Encounter Date:12/19/2019      Patient ID: Carly Gotti is a 79 y.o. female.    Chief Complaint:  History of Present Illness    This is a 79-year-old female with a history of moderate to severe aortic regurgitation status post replacement with a bioprosthetic aortic valve, moderate to severe mitral regurgitation status post repair, severe tricuspid regurgitation status post repair, severe pulmonary hypertension, chronic kidney disease with an atrophic kidney, hyperlipidemia, hypertension, aberrant right subclavian artery, obstructive sleep apnea on CPAP, status post dual-chamber pacemaker placement for sick sinus syndrome, depression, who presents for follow up.      The patient was previously followed by Dr. Lopez who she last saw in 5/2017.  Her prior cardiac history includes mitral and tricuspid valve repair and aortic valve replacement back in 11/2015 by Dr. Veliz.  Postoperatively she developed symptomatic sick sinus syndrome and required dual chamber permanent pacemaker placement.  Since then she's done relatively well although she reports that she never quite recovered back to her baseline following the surgery.       Following her initial visit with me in 12/2017 I set her up for a repeat echocardiogram that showed normal left ventricular systolic function and wall motion with an estimated ejection fraction 64%, grade 1 diastolic dysfunction, grossly normal-appearing bioprosthetic aortic valve, no mitral valve regurgitation, trace tricuspid valve regurgitation, and a right ventricular systolic pressure of 17 mmHg.     She presents today for annual follow-up.  Since I saw her last she has been hospitalized twice for C. difficile colitis.  She reports that since then she has been followed closely by gastroenterology and started feeling little bit better back in September.  Overall her abdominal issues have settled down although she is starting to have symptoms which make her  think that she might be developing some recurrent issues.  She reports fatigue and mild dyspnea with activity since all of these issues started earlier this year.  She admits that she just has not had her strength return because of the chronic abdominal issues.  She denies any chest pain, palpitations, orthopnea, near-syncope or syncope, or lower extremity edema.  She denies any significant changes in her medications.    Review of Systems   Constitution: Positive for decreased appetite and malaise/fatigue.   HENT: Positive for hearing loss. Negative for hoarse voice, nosebleeds and sore throat.    Eyes: Negative for pain.   Cardiovascular: Positive for dyspnea on exertion and leg swelling. Negative for chest pain, claudication, cyanosis, irregular heartbeat, near-syncope, orthopnea, palpitations, paroxysmal nocturnal dyspnea and syncope.   Respiratory: Negative for shortness of breath and snoring.    Endocrine: Negative for cold intolerance, heat intolerance, polydipsia, polyphagia and polyuria.   Skin: Negative for itching and rash.   Musculoskeletal: Negative for arthritis, falls, joint pain, joint swelling, muscle cramps, muscle weakness and myalgias.   Gastrointestinal: Negative for constipation, diarrhea, dysphagia, heartburn, hematemesis, hematochezia, melena, nausea and vomiting.   Genitourinary: Negative for frequency, hematuria and hesitancy.   Neurological: Positive for excessive daytime sleepiness. Negative for dizziness, headaches, light-headedness, numbness and weakness.   Psychiatric/Behavioral: Positive for depression. The patient is not nervous/anxious.          Current Outpatient Medications:   •  aspirin 325 MG tablet, Take by mouth daily., Disp: , Rfl:   •  atorvastatin (LIPITOR) 20 MG tablet, take 1 tablet by mouth at bedtime, Disp: , Rfl: 0  •  bumetanide (BUMEX) 2 MG tablet, TAKE 1 TABLET BY MOUTH EVERY DAY, Disp: 90 tablet, Rfl: 1  •  buPROPion XL (WELLBUTRIN XL) 300 MG 24 hr tablet, Take 300  mg by mouth Daily., Disp: , Rfl: 0  •  Coenzyme Q10 200 MG tablet, Take 1 tablet/day by mouth Daily., Disp: , Rfl:   •  estradiol (ESTRACE) 0.5 MG tablet, Take by mouth daily., Disp: , Rfl:   •  eszopiclone (LUNESTA) 3 MG tablet, Take 3 mg by mouth every night. Take immediately before bedtime, Disp: , Rfl:   •  famotidine (PEPCID) 40 MG tablet, Take 1 tablet by mouth Daily., Disp: 90 tablet, Rfl: 3  •  fenofibrate (TRICOR) 54 MG tablet, Take 67 mg by mouth Daily., Disp: , Rfl:   •  folic acid (FOLVITE) 400 MCG tablet, Take 1 tablet by mouth Daily., Disp: 30 tablet, Rfl: 0  •  HYDROcodone-acetaminophen (NORCO)  MG per tablet, Take 1 tablet by mouth Every 4 (Four) Hours As Needed for Moderate Pain ., Disp: 20 tablet, Rfl: 0  •  nebivolol (BYSTOLIC) 10 MG tablet, Take 10 mg by mouth 2 (Two) Times a Day., Disp: , Rfl:   •  sennosides-docusate sodium (SENOKOT-S) 8.6-50 MG tablet, Take 1 tablet by mouth Daily., Disp: 90 tablet, Rfl: 0    Past Medical History:   Diagnosis Date   • Aneurysm (CMS/HCC)    • Aortic valve disease    • Arthritis    • Atrophic kidney    • Bradycardia    • CAD (coronary artery disease)    • CKD (chronic kidney disease) stage 4, GFR 15-29 ml/min (CMS/HCC)    • Depression    • Dysphagia    • Essential hypertension    • [place] (hard of hearing)    • Hyperlipidemia    • Mitral valve disease    • Pulmonary hypertension (CMS/HCC)    • Sick sinus syndrome (CMS/HCC)    • Sleep apnea    • Spinal stenosis        Past Surgical History:   Procedure Laterality Date   • AORTIC VALVE REPAIR/REPLACEMENT  2015    Dr Veliz   • CARDIAC CATHETERIZATION     • CARDIAC DEFIBRILLATOR PLACEMENT     •  SECTION     • COLONOSCOPY N/A 2019    [facility]   • ENDOSCOPY  2012    Hypertonic lower esophageal sphincter, gastritis. Path: Chronic gastritis, moderate.    • ENDOSCOPY N/A 2019    Web in the upper third of the esophagus. Dilated   • HYSTERECTOMY     • MITRAL VALVE ANNULOPLASTY  2015     "Dr Veliz   • TRICUSPID VALVE SURGERY  2015    Dr Veliz       Family History   Problem Relation Age of Onset   • ALS Mother    • Hypertension Father        Social History     Tobacco Use   • Smoking status: Former Smoker     Last attempt to quit: 1988     Years since quittin.9   • Smokeless tobacco: Never Used   Substance Use Topics   • Alcohol use: No   • Drug use: No         ECG 12 Lead  Date/Time: 2019 12:47 PM  Performed by: Laura Pak MD  Authorized by: Laura Pak MD   Comparison: compared with previous ECG   Similar to previous ECG  Comments: Atrial paced rhythm               Objective:     Visit Vitals  /78   Pulse 70   Ht 160 cm (63\")   Wt 65.8 kg (145 lb)   BMI 25.69 kg/m²         Physical Exam   Constitutional: She is oriented to person, place, and time. She appears well-developed and well-nourished.   HENT:   Head: Normocephalic and atraumatic.   Eyes: Pupils are equal, round, and reactive to light. Conjunctivae, EOM and lids are normal.   Neck: Normal range of motion and full passive range of motion without pain. Neck supple. No JVD present. Carotid bruit is not present.   Cardiovascular: Normal rate, regular rhythm, S1 normal and S2 normal. Exam reveals no gallop.   Murmur heard.   Systolic murmur is present with a grade of 2/6.  Pulses:       Radial pulses are 2+ on the right side, and 2+ on the left side.   No bilateral lower extremity edema   Pulmonary/Chest: Effort normal and breath sounds normal.   Abdominal: Soft. Normal appearance.   Lymphadenopathy:     She has no cervical adenopathy.   Neurological: She is alert and oriented to person, place, and time.   Skin: Skin is warm, dry and intact.   Psychiatric: She has a normal mood and affect.       Lab Review:       Assessment:          Diagnosis Plan   1. S/P AVR (aortic valve replacement)  Adult Transthoracic Echo Complete W/ Cont if Necessary Per Protocol   2. S/P MVR (mitral valve repair)  Adult " Transthoracic Echo Complete W/ Cont if Necessary Per Protocol   3. S/P TVR (tricuspid valve repair)  Adult Transthoracic Echo Complete W/ Cont if Necessary Per Protocol   4. Pacemaker     5. Benign essential hypertension     6. Mixed hyperlipidemia            Plan:       1.  History of bioprosthetic aortic valve replacement.  Normal function on her last echocardiogram in 12/2017.  She does have a mild systolic murmur on exam that is stable and unchanged.  We will plan on a repeat echocardiogram for routine surveillance next year.  2.  History of tricuspid valve repair.  Normal function on last echocardiogram.  Plan for echo next year.  3.  History of mitral valve repair.  No significant regurgitation on her last echocardiogram.  Plan for repeat echo next year.  4.  Hypertension.  Well-controlled on current management.  5.  Dual-chamber permanent pacemaker placement for sick sinus syndrome.  Device interrogation the office today shows that she is 100% atrial paced.  She is had no events on her device.  Continue routine checks.  6.  Hyperlipidemia.  On atorvastatin which is managed by Dr. Reyes.  7.  Chronic kidney disease stage III.    We will plan on seeing the patient back again in 1 year with a repeat echocardiogram.

## 2019-12-26 RX ORDER — BUMETANIDE 2 MG/1
TABLET ORAL
Qty: 90 TABLET | Refills: 1 | Status: SHIPPED | OUTPATIENT
Start: 2019-12-26 | End: 2020-10-08 | Stop reason: SDUPTHER

## 2020-02-18 ENCOUNTER — TELEPHONE (OUTPATIENT)
Dept: CARDIOLOGY | Facility: CLINIC | Age: 80
End: 2020-02-18

## 2020-02-18 DIAGNOSIS — R60.0 BILATERAL LOWER EXTREMITY EDEMA: Primary | ICD-10-CM

## 2020-02-18 NOTE — TELEPHONE ENCOUNTER
Mrs. Gotti calls today complaining of swelling in both legs, but the L leg is the worst.  She has been wearing compression socks, but that doesn't seem to be helping.  She denies any weight gain, SOA, chest pain and palps, but she is concerned because it's been about 10 days with the swelling and it isn't going down.    She does take bumex 2mg QD.    Please advise or call patient to discuss [phone].    Thank you!

## 2020-02-18 NOTE — TELEPHONE ENCOUNTER
Called patient.  Again denies any dyspnea or weight gain.  She does report being more sedentary lately because she has been sick.  It is unusual for the swelling to be greater in one leg versus the other.    I recommended increasing bumex to 2 mg twice a day for a couple of days but would like her to discuss this her nephrologist first.   Also recommended a lower extremity venous doppler ultrasound to look for DVT.  Order placed.

## 2020-02-24 ENCOUNTER — APPOINTMENT (OUTPATIENT)
Dept: CARDIOLOGY | Facility: HOSPITAL | Age: 80
End: 2020-02-24

## 2020-02-25 ENCOUNTER — HOSPITAL ENCOUNTER (OUTPATIENT)
Dept: CARDIOLOGY | Facility: HOSPITAL | Age: 80
Discharge: HOME | End: 2020-02-25
Admitting: INTERNAL MEDICINE

## 2020-02-25 DIAGNOSIS — R60.0 BILATERAL LOWER EXTREMITY EDEMA: ICD-10-CM

## 2020-02-25 LAB
BH CV LOW VAS RIGHT POPLITEAL SPONT: 1
BH CV LOWER VASCULAR LEFT COMMON FEMORAL AUGMENT: NORMAL
BH CV LOWER VASCULAR LEFT COMMON FEMORAL COMPETENT: NORMAL
BH CV LOWER VASCULAR LEFT COMMON FEMORAL COMPRESS: NORMAL
BH CV LOWER VASCULAR LEFT COMMON FEMORAL PHASIC: NORMAL
BH CV LOWER VASCULAR LEFT COMMON FEMORAL SPONT: NORMAL
BH CV LOWER VASCULAR LEFT DISTAL FEMORAL COMPRESS: NORMAL
BH CV LOWER VASCULAR LEFT GASTRONEMIUS COMPRESS: NORMAL
BH CV LOWER VASCULAR LEFT GREATER SAPH AK COMPRESS: NORMAL
BH CV LOWER VASCULAR LEFT GREATER SAPH BK COMPRESS: NORMAL
BH CV LOWER VASCULAR LEFT MID FEMORAL AUGMENT: NORMAL
BH CV LOWER VASCULAR LEFT MID FEMORAL COMPETENT: NORMAL
BH CV LOWER VASCULAR LEFT MID FEMORAL COMPRESS: NORMAL
BH CV LOWER VASCULAR LEFT MID FEMORAL PHASIC: NORMAL
BH CV LOWER VASCULAR LEFT MID FEMORAL SPONT: NORMAL
BH CV LOWER VASCULAR LEFT PERONEAL COMPRESS: NORMAL
BH CV LOWER VASCULAR LEFT POPLITEAL AUGMENT: NORMAL
BH CV LOWER VASCULAR LEFT POPLITEAL COMPETENT: NORMAL
BH CV LOWER VASCULAR LEFT POPLITEAL COMPRESS: NORMAL
BH CV LOWER VASCULAR LEFT POPLITEAL PHASIC: NORMAL
BH CV LOWER VASCULAR LEFT POPLITEAL SPONT: NORMAL
BH CV LOWER VASCULAR LEFT POSTERIOR TIBIAL COMPRESS: NORMAL
BH CV LOWER VASCULAR LEFT PROFUNDA FEMORAL COMPRESS: NORMAL
BH CV LOWER VASCULAR LEFT PROXIMAL FEMORAL COMPRESS: NORMAL
BH CV LOWER VASCULAR LEFT SAPHENOFEMORAL JUNCTION COMPRESS: NORMAL
BH CV LOWER VASCULAR RIGHT COMMON FEMORAL AUGMENT: NORMAL
BH CV LOWER VASCULAR RIGHT COMMON FEMORAL COMPETENT: NORMAL
BH CV LOWER VASCULAR RIGHT COMMON FEMORAL COMPRESS: NORMAL
BH CV LOWER VASCULAR RIGHT COMMON FEMORAL PHASIC: NORMAL
BH CV LOWER VASCULAR RIGHT COMMON FEMORAL SPONT: NORMAL
BH CV LOWER VASCULAR RIGHT DISTAL FEMORAL COMPRESS: NORMAL
BH CV LOWER VASCULAR RIGHT GASTRONEMIUS COMPRESS: NORMAL
BH CV LOWER VASCULAR RIGHT GREATER SAPH AK COMPRESS: NORMAL
BH CV LOWER VASCULAR RIGHT GREATER SAPH BK COMPRESS: NORMAL
BH CV LOWER VASCULAR RIGHT MID FEMORAL AUGMENT: NORMAL
BH CV LOWER VASCULAR RIGHT MID FEMORAL COMPETENT: NORMAL
BH CV LOWER VASCULAR RIGHT MID FEMORAL COMPRESS: NORMAL
BH CV LOWER VASCULAR RIGHT MID FEMORAL PHASIC: NORMAL
BH CV LOWER VASCULAR RIGHT MID FEMORAL SPONT: NORMAL
BH CV LOWER VASCULAR RIGHT PERONEAL COMPRESS: NORMAL
BH CV LOWER VASCULAR RIGHT POPLITEAL AUGMENT: NORMAL
BH CV LOWER VASCULAR RIGHT POPLITEAL COMPETENT: NORMAL
BH CV LOWER VASCULAR RIGHT POPLITEAL COMPRESS: NORMAL
BH CV LOWER VASCULAR RIGHT POPLITEAL PHASIC: NORMAL
BH CV LOWER VASCULAR RIGHT POPLITEAL SPONT: NORMAL
BH CV LOWER VASCULAR RIGHT POSTERIOR TIBIAL COMPRESS: NORMAL
BH CV LOWER VASCULAR RIGHT PROFUNDA FEMORAL COMPRESS: NORMAL
BH CV LOWER VASCULAR RIGHT PROXIMAL FEMORAL COMPRESS: NORMAL
BH CV LOWER VASCULAR RIGHT SAPHENOFEMORAL JUNCTION COMPRESS: NORMAL
BH CV VAS POP FLUID COLLECTED: 1

## 2020-02-25 PROCEDURE — 93970 EXTREMITY STUDY: CPT

## 2020-02-26 DIAGNOSIS — R60.0 BILATERAL LOWER EXTREMITY EDEMA: Primary | ICD-10-CM

## 2020-02-26 NOTE — PROGRESS NOTES
Called and discussed normal venous ultrasound.  She reports swelling is now bilateral.  Recommended that she discuss increasing her bumetanide with her nephrologist like we discussed before.  Also will order an echocardiogram.

## 2020-03-10 ENCOUNTER — HOSPITAL ENCOUNTER (EMERGENCY)
Facility: HOSPITAL | Age: 80
Discharge: HOME | End: 2020-03-11
Attending: EMERGENCY MEDICINE | Admitting: EMERGENCY MEDICINE

## 2020-03-10 ENCOUNTER — APPOINTMENT (OUTPATIENT)
Dept: CT IMAGING | Facility: HOSPITAL | Age: 80
End: 2020-03-10

## 2020-03-10 DIAGNOSIS — R33.9 URINARY RETENTION WITH INCOMPLETE BLADDER EMPTYING: ICD-10-CM

## 2020-03-10 DIAGNOSIS — R11.0 NAUSEA: Primary | ICD-10-CM

## 2020-03-10 LAB
ALBUMIN SERPL-MCNC: 3.9 G/DL (ref 3.5–5.2)
ALBUMIN/GLOB SERPL: 2.2 G/DL
ALP SERPL-CCNC: 59 U/L (ref 39–117)
ALT SERPL W P-5'-P-CCNC: 24 U/L (ref 1–33)
ANION GAP SERPL CALCULATED.3IONS-SCNC: 12.4 MMOL/L (ref 5–15)
AST SERPL-CCNC: 22 U/L (ref 1–32)
BASOPHILS # BLD AUTO: 0.02 10*3/MM3 (ref 0–0.2)
BASOPHILS NFR BLD AUTO: 0.3 % (ref 0–1.5)
BILIRUB SERPL-MCNC: 0.2 MG/DL (ref 0.2–1.2)
BILIRUB UR QL STRIP: NEGATIVE
BUN BLD-MCNC: 26 MG/DL (ref 8–23)
BUN/CREAT SERPL: 21.3 (ref 7–25)
CALCIUM SPEC-SCNC: 9.2 MG/DL (ref 8.6–10.5)
CHLORIDE SERPL-SCNC: 103 MMOL/L (ref 98–107)
CLARITY UR: CLEAR
CO2 SERPL-SCNC: 25.6 MMOL/L (ref 22–29)
COLOR UR: YELLOW
CREAT BLD-MCNC: 1.22 MG/DL (ref 0.57–1)
DEPRECATED RDW RBC AUTO: 41.8 FL (ref 37–54)
EOSINOPHIL # BLD AUTO: 0.09 10*3/MM3 (ref 0–0.4)
EOSINOPHIL NFR BLD AUTO: 1.3 % (ref 0.3–6.2)
ERYTHROCYTE [DISTWIDTH] IN BLOOD BY AUTOMATED COUNT: 12.3 % (ref 12.3–15.4)
GFR SERPL CREATININE-BSD FRML MDRD: 42 ML/MIN/1.73
GLOBULIN UR ELPH-MCNC: 1.8 GM/DL
GLUCOSE BLD-MCNC: 94 MG/DL (ref 65–99)
GLUCOSE UR STRIP-MCNC: NEGATIVE MG/DL
HCT VFR BLD AUTO: 39.2 % (ref 34–46.6)
HGB BLD-MCNC: 12.9 G/DL (ref 12–15.9)
HGB UR QL STRIP.AUTO: NEGATIVE
IMM GRANULOCYTES # BLD AUTO: 0.01 10*3/MM3 (ref 0–0.05)
IMM GRANULOCYTES NFR BLD AUTO: 0.1 % (ref 0–0.5)
KETONES UR QL STRIP: NEGATIVE
LEUKOCYTE ESTERASE UR QL STRIP.AUTO: NEGATIVE
LIPASE SERPL-CCNC: 86 U/L (ref 13–60)
LYMPHOCYTES # BLD AUTO: 1.15 10*3/MM3 (ref 0.7–3.1)
LYMPHOCYTES NFR BLD AUTO: 16.7 % (ref 19.6–45.3)
MCH RBC QN AUTO: 31.3 PG (ref 26.6–33)
MCHC RBC AUTO-ENTMCNC: 32.9 G/DL (ref 31.5–35.7)
MCV RBC AUTO: 95.1 FL (ref 79–97)
MONOCYTES # BLD AUTO: 0.89 10*3/MM3 (ref 0.1–0.9)
MONOCYTES NFR BLD AUTO: 12.9 % (ref 5–12)
NEUTROPHILS # BLD AUTO: 4.73 10*3/MM3 (ref 1.7–7)
NEUTROPHILS NFR BLD AUTO: 68.7 % (ref 42.7–76)
NITRITE UR QL STRIP: NEGATIVE
NRBC BLD AUTO-RTO: 0 /100 WBC (ref 0–0.2)
PH UR STRIP.AUTO: 7 [PH] (ref 5–8)
PLATELET # BLD AUTO: 192 10*3/MM3 (ref 140–450)
PMV BLD AUTO: 11.7 FL (ref 6–12)
POTASSIUM BLD-SCNC: 3.7 MMOL/L (ref 3.5–5.2)
PROT SERPL-MCNC: 5.7 G/DL (ref 6–8.5)
PROT UR QL STRIP: NEGATIVE
RBC # BLD AUTO: 4.12 10*6/MM3 (ref 3.77–5.28)
SODIUM BLD-SCNC: 141 MMOL/L (ref 136–145)
SP GR UR STRIP: 1.01 (ref 1–1.03)
UROBILINOGEN UR QL STRIP: NORMAL
WBC NRBC COR # BLD: 6.89 10*3/MM3 (ref 3.4–10.8)

## 2020-03-10 PROCEDURE — 51798 US URINE CAPACITY MEASURE: CPT

## 2020-03-10 PROCEDURE — 96375 TX/PRO/DX INJ NEW DRUG ADDON: CPT

## 2020-03-10 PROCEDURE — 25010000002 ONDANSETRON PER 1 MG: Performed by: EMERGENCY MEDICINE

## 2020-03-10 PROCEDURE — 99284 EMERGENCY DEPT VISIT MOD MDM: CPT

## 2020-03-10 PROCEDURE — 85025 COMPLETE CBC W/AUTO DIFF WBC: CPT | Performed by: EMERGENCY MEDICINE

## 2020-03-10 PROCEDURE — 74176 CT ABD & PELVIS W/O CONTRAST: CPT

## 2020-03-10 PROCEDURE — 81003 URINALYSIS AUTO W/O SCOPE: CPT | Performed by: EMERGENCY MEDICINE

## 2020-03-10 PROCEDURE — 83690 ASSAY OF LIPASE: CPT | Performed by: EMERGENCY MEDICINE

## 2020-03-10 PROCEDURE — 96374 THER/PROPH/DIAG INJ IV PUSH: CPT

## 2020-03-10 PROCEDURE — 51702 INSERT TEMP BLADDER CATH: CPT

## 2020-03-10 PROCEDURE — 25010000002 LORAZEPAM PER 2 MG: Performed by: EMERGENCY MEDICINE

## 2020-03-10 PROCEDURE — 80053 COMPREHEN METABOLIC PANEL: CPT | Performed by: EMERGENCY MEDICINE

## 2020-03-10 RX ORDER — ONDANSETRON 4 MG/1
4 TABLET, FILM COATED ORAL EVERY 6 HOURS PRN
Qty: 10 TABLET | Refills: 0 | Status: SHIPPED | OUTPATIENT
Start: 2020-03-10 | End: 2022-01-04

## 2020-03-10 RX ORDER — ONDANSETRON 2 MG/ML
4 INJECTION INTRAMUSCULAR; INTRAVENOUS ONCE
Status: COMPLETED | OUTPATIENT
Start: 2020-03-10 | End: 2020-03-10

## 2020-03-10 RX ORDER — LORAZEPAM 2 MG/ML
0.5 INJECTION INTRAMUSCULAR ONCE
Status: COMPLETED | OUTPATIENT
Start: 2020-03-10 | End: 2020-03-10

## 2020-03-10 RX ADMIN — ONDANSETRON 4 MG: 2 INJECTION INTRAMUSCULAR; INTRAVENOUS at 20:44

## 2020-03-10 RX ADMIN — SODIUM CHLORIDE 500 ML: 9 INJECTION, SOLUTION INTRAVENOUS at 20:44

## 2020-03-10 RX ADMIN — LORAZEPAM 0.5 MG: 2 INJECTION INTRAMUSCULAR; INTRAVENOUS at 23:57

## 2020-03-10 NOTE — ED NOTES
Pt presents to ED via EMS from home. Pt called EMS for nausea, and constipation x3 days, and urinary retention that started today. Pt has hx of c-diff as of June 2019. Pt A&Ox4 at this time.      Sergio Piedra, RN  03/10/20 1914

## 2020-03-11 ENCOUNTER — APPOINTMENT (OUTPATIENT)
Dept: CARDIOLOGY | Facility: HOSPITAL | Age: 80
End: 2020-03-11

## 2020-03-11 VITALS
OXYGEN SATURATION: 96 % | DIASTOLIC BLOOD PRESSURE: 82 MMHG | BODY MASS INDEX: 25.69 KG/M2 | WEIGHT: 145 LBS | SYSTOLIC BLOOD PRESSURE: 167 MMHG | HEART RATE: 81 BPM | RESPIRATION RATE: 16 BRPM | HEIGHT: 63 IN | TEMPERATURE: 97.7 F

## 2020-03-11 NOTE — ED PROVIDER NOTES
EMERGENCY DEPARTMENT ENCOUNTER    CHIEF COMPLAINT  Chief Complaint: Constipation  History given by: Patient   History limited by: none   Room Number: 11/11  PMD: Reyes, Miriam Mercado, MD      HPI:  Pt is a 79 y.o. female who presents to the ED via EMS from home complaining of constipation for the past 3 days, with last BM 3 days ago. Pt confirms nausea, loss of appetite, and difficulty urinating today, but denies urgency of urination, abd pain, or diarrhea. Per pt, she has hx of c.diff in the past. Pt states she also has hx of nephrectomy of 1 kidney, and states her remaining kidney only works at 20%.     Duration:  3 days   Onset: gradual   Timing: constant   Location: generalized abd   Radiation: none   Quality: constipation   Intensity/Severity: mild to moderate   Progression: unchanged   Associated Symptoms: difficulty urinating, loss of appetite, nausea   Aggravating Factors: none   Alleviating Factors: none   Previous Episodes: none   Treatment before arrival: none     PAST MEDICAL HISTORY  Active Ambulatory Problems     Diagnosis Date Noted   • S/P AVR (aortic valve replacement) 04/04/2016   • S/P TVR (tricuspid valve repair) 04/04/2016   • S/P MVR (mitral valve repair) 04/04/2016   • Benign essential hypertension 05/03/2016   • Chest tightness 05/03/2016   • Mitral and aortic valve disease 05/03/2016   • CKD (chronic kidney disease) stage 3, GFR 30-59 ml/min (CMS/HCC) 11/01/2016   • Small bowel obstruction (CMS/[facility]) 12/21/2016   • Abdominal pain 12/22/2016   • Slow transit constipation 12/27/2016   • Hyperlipidemia 05/02/2017   • Pacemaker 12/06/2017   • Colitis 03/18/2019   • Elevated lipase 03/18/2019   • C. difficile colitis 03/18/2019   • Ileus (CMS/[facility]) 03/18/2019   • Sepsis (CMS/[facility]) 03/19/2019   • Thrombocytopenia (CMS/[facility]) 03/20/2019   • Dysphagia 05/23/2019   • Esophageal web 05/23/2019   • Recurrent Clostridium difficile diarrhea 06/27/2019   • CKD (chronic kidney disease) stage 4, GFR 15-29  ml/min (CMS/[facility]) 2019   • Generalized abdominal pain 2019   • Intestinal infection due to enteropathogenic E. coli 2019   • Obstructive sleep apnea 2019     Resolved Ambulatory Problems     Diagnosis Date Noted   • No Resolved Ambulatory Problems     Past Medical History:   Diagnosis Date   • Aneurysm (CMS/HCC)    • Aortic valve disease    • Arthritis    • Atrophic kidney    • Bradycardia    • CAD (coronary artery disease)    • Depression    • Essential hypertension    • [place] (hard of hearing)    • Mitral valve disease    • Pulmonary hypertension (CMS/[facility])    • Sick sinus syndrome (CMS/[facility])    • Sleep apnea    • Spinal stenosis        PAST SURGICAL HISTORY  Past Surgical History:   Procedure Laterality Date   • AORTIC VALVE REPAIR/REPLACEMENT  2015    Dr Veliz   • CARDIAC CATHETERIZATION     • CARDIAC DEFIBRILLATOR PLACEMENT     •  SECTION     • COLONOSCOPY N/A 2019    [facility]   • ENDOSCOPY  2012    Hypertonic lower esophageal sphincter, gastritis. Path: Chronic gastritis, moderate.    • ENDOSCOPY N/A 2019    Web in the upper third of the esophagus. Dilated   • HYSTERECTOMY     • MITRAL VALVE ANNULOPLASTY  2015    Dr Veliz   • TRICUSPID VALVE SURGERY  2015    Dr Veliz       FAMILY HISTORY  Family History   Problem Relation Age of Onset   • ALS Mother    • Hypertension Father        SOCIAL HISTORY  Social History     Socioeconomic History   • Marital status:      Spouse name: Not on file   • Number of children: Not on file   • Years of education: Not on file   • Highest education level: Not on file   Tobacco Use   • Smoking status: Former Smoker     Last attempt to quit: 1988     Years since quittin.2   • Smokeless tobacco: Never Used   Substance and Sexual Activity   • Alcohol use: No   • Drug use: No   • Sexual activity: Defer       ALLERGIES  Patient has no known allergies.    REVIEW OF SYSTEMS  Review of Systems   Constitutional:  Positive for appetite change (loss of). Negative for fever.   HENT: Negative for sore throat.    Eyes: Negative.    Respiratory: Negative for cough and shortness of breath.    Cardiovascular: Negative for chest pain.   Gastrointestinal: Positive for constipation and nausea. Negative for abdominal pain, diarrhea and vomiting.   Genitourinary: Positive for difficulty urinating. Negative for dysuria.   Musculoskeletal: Negative for neck pain.   Skin: Negative for rash.   Allergic/Immunologic: Negative.    Neurological: Negative for weakness, numbness and headaches.   Hematological: Negative.    Psychiatric/Behavioral: Negative.    All other systems reviewed and are negative.      PHYSICAL EXAM  ED Triage Vitals [03/10/20 1914]   Temp Heart Rate Resp BP SpO2   97.7 °F (36.5 °C) 75 18 168/87 98 %      Temp src Heart Rate Source Patient Position BP Location FiO2 (%)   -- Monitor Sitting Right arm --       Physical Exam   Constitutional: She is oriented to person, place, and time. No distress.   Anxious   HENT:   Head: Normocephalic and atraumatic.   Mouth/Throat: Mucous membranes are dry.   Eyes: Pupils are equal, round, and reactive to light. EOM are normal.   Neck: Normal range of motion. Neck supple.   Cardiovascular: Normal rate, regular rhythm and normal heart sounds.   Pulmonary/Chest: Effort normal and breath sounds normal. No respiratory distress.   Abdominal: Soft. She exhibits distension (softly). Bowel sounds are hypoactive. There is no tenderness. There is no rebound and no guarding.   Musculoskeletal: Normal range of motion. She exhibits no edema (no pedal).   Neurological: She is alert and oriented to person, place, and time. She has normal sensation and normal strength.   Normal exam   Skin: Skin is warm and dry. No rash noted.   Psychiatric: Mood and affect normal.   Nursing note and vitals reviewed.      LAB RESULTS  Lab Results (last 24 hours)     Procedure Component Value Units Date/Time    CBC &  Differential [[id number]] Collected:  03/10/20 2031    Specimen:  Blood Updated:  03/10/20 2048    Narrative:       The following orders were created for panel order CBC & Differential.  Procedure                               Abnormality         Status                     ---------                               -----------         ------                     CBC Auto Differential[[id number]]        Abnormal            Final result                 Please view results for these tests on the individual orders.    Comprehensive Metabolic Panel [[id number]]  (Abnormal) Collected:  03/10/20 2031    Specimen:  Blood Updated:  03/10/20 2110     Glucose 94 mg/dL      BUN 26 mg/dL      Creatinine 1.22 mg/dL      Sodium 141 mmol/L      Potassium 3.7 mmol/L      Chloride 103 mmol/L      CO2 25.6 mmol/L      Calcium 9.2 mg/dL      Total Protein 5.7 g/dL      Albumin 3.90 g/dL      ALT (SGPT) 24 U/L      AST (SGOT) 22 U/L      Alkaline Phosphatase 59 U/L      Total Bilirubin 0.2 mg/dL      eGFR Non African Amer 42 mL/min/1.73      Globulin 1.8 gm/dL      A/G Ratio 2.2 g/dL      BUN/Creatinine Ratio 21.3     Anion Gap 12.4 mmol/L     Narrative:       GFR Normal >60  Chronic Kidney Disease <60  Kidney Failure <15      Lipase [[id number]]  (Abnormal) Collected:  03/10/20 2031    Specimen:  Blood Updated:  03/10/20 2110     Lipase 86 U/L     CBC Auto Differential [[id number]]  (Abnormal) Collected:  03/10/20 2031    Specimen:  Blood Updated:  03/10/20 2048     WBC 6.89 10*3/mm3      RBC 4.12 10*6/mm3      Hemoglobin 12.9 g/dL      Hematocrit 39.2 %      MCV 95.1 fL      MCH 31.3 pg      MCHC 32.9 g/dL      RDW 12.3 %      RDW-SD 41.8 fl      MPV 11.7 fL      Platelets 192 10*3/mm3      Neutrophil % 68.7 %      Lymphocyte % 16.7 %      Monocyte % 12.9 %      Eosinophil % 1.3 %      Basophil % 0.3 %      Immature Grans % 0.1 %      Neutrophils, Absolute 4.73 10*3/mm3      Lymphocytes, Absolute 1.15 10*3/mm3      Monocytes, Absolute 0.89  10*3/mm3      Eosinophils, Absolute 0.09 10*3/mm3      Basophils, Absolute 0.02 10*3/mm3      Immature Grans, Absolute 0.01 10*3/mm3      nRBC 0.0 /100 WBC     Urinalysis With Microscopic If Indicated (No Culture) - Urine, Clean Catch [[id number]]  (Normal) Collected:  03/10/20 2100    Specimen:  Urine, Clean Catch Updated:  03/10/20 2126     Color, UA Yellow     Appearance, UA Clear     pH, UA 7.0     Specific Gravity, UA 1.014     Glucose, UA Negative     Ketones, UA Negative     Bilirubin, UA Negative     Blood, UA Negative     Protein, UA Negative     Leuk Esterase, UA Negative     Nitrite, UA Negative     Urobilinogen, UA 1.0 E.U./dL    Narrative:       Urine microscopic not indicated.          I ordered the above labs and reviewed the results    RADIOLOGY  CT Abdomen Pelvis Without Contrast   Final Result           1. No acute inflammatory process of bowel is identified, follow-up as   indications persist.           2. A small nonobstructive calcification in the right kidney. No   hydronephrosis or ureteral stones.       This report was finalized on 3/10/2020 8:55 PM by Dr. Isak Mirza M.D.               I ordered the above noted radiological studies. Interpreted by radiologist. Reviewed by me in PACS.       PROCEDURES  Procedures      PROGRESS AND CONSULTS     2021: Upon pt exam, discussed plan for workup in ED including labs and CT scan for further rule out. Offered pt IV fluids and antiemetics in ED as well.     2025: Labs and CT Abd/Pelvis ordered for further evaluation. Zofran and IV fluids ordered for antiemetics and hydration.     2048: RN states pt's bladder scan was 430mL. Discussed plan for in and out cath with plan to assess post-void, as well as determine if pt can urinate on her own in ED.     2144: Pt rechecked and resting comfortably.  Patient had 400 cc removed with an in  and out catheter.  Discussed with pt the results of labs and CT scan with evidence of mild constipation but no  obstruction. Pt states she has not taken any medication at home for management of constipation. Discussed pt's bladder scan in ED as well, and pt states she has had no urge for urination or defecation. Informed pt of plan for further IV fluids in ED to determine if pt is able to urinate by herself in ED. Pt states she has seen nephrologist: Dr Montaño, in the past.     2230: Patient's repeat bladder scan was 40 cc.    2300: Patient taken to the bathroom and states she still cannot urinate.  Repeat bladder scan showed 150 cc.  Patient states she does not have the urge to urinate thus we will place a Darby catheter and have her follow-up with urology as an outpatient.    2320: I discussed the case with Dr. Paredes on-call for her nephrologist who agrees that this is a urology issue and not a nephrology issue.  He agrees with catheter placement and follow-up with urology as an outpatient.    2330: Darby catheter placed with 200 cc of clear urine output.  Patient became anxious during placement and currently anxious about having an indwelling Darby.    2345: Patient alert and oriented x3.  Patient extremely anxious now.  Patient has clear lungs and room air sats of 98%.  Patient requesting anxiety medication prior to discharge.    2355:  I asked Dr Riggins to watch patient after Ativan to make sure she feels better and is stable for discharge.  I believe patient is stable for discharge provided the Ativan makes her more comfortable.      MEDICAL DECISION MAKING  Results were reviewed/discussed with the patient and they were also made aware of online access. Pt also made aware that some labs, such as cultures, will not be resulted during ER visit and follow up with PMD is necessary.     MDM  Number of Diagnoses or Management Options     Amount and/or Complexity of Data Reviewed  Clinical lab tests: ordered and reviewed (Unremarkable labs, UA - normal and WBC - normal )  Tests in the radiology section of CPT®: reviewed and  ordered (CT scan - no evidence of obstruction or abnormality. Mild constipation. )           DIAGNOSIS  Final diagnoses:   Nausea   Urinary retention with incomplete bladder emptying       DISPOSITION  Discharged    Latest Documented Vital Signs:  As of 19:47  BP- 167/82 HR- 81 Temp- 97.7 °F (36.5 °C) O2 sat- 96%    --  Documentation assistance provided by jasmin Villegas for Dr. Scales.  Information recorded by the scribe was done at my direction and has been verified and validated by me.            Steffi Villegas  03/10/20 2200       Angel Scales MD  03/11/20 1949

## 2020-03-11 NOTE — ED NOTES
In and out bladder catheterization performed by this RN and Marichuy ALLEN. 400 mL yellow urine drained. Sample sent to lab. Pt tolerated well     Robert Pimentel RN  03/10/20 [id number]

## 2020-03-11 NOTE — ED NOTES
On insertion of Darby catheter Carly became anxious and was stating she couldn't breathe, SpO2 100% on RA, HR 77 with RR 28. She started coughing and complaining she couldn't swallow. Carly remains AAOx3, placed in a high Fowlers to calm down.     Brad Dumas, RN  03/10/20 [id number]

## 2020-03-23 ENCOUNTER — CLINICAL SUPPORT NO REQUIREMENTS (OUTPATIENT)
Dept: CARDIOLOGY | Facility: CLINIC | Age: 80
End: 2020-03-23

## 2020-03-23 DIAGNOSIS — I49.5 SICK SINUS SYNDROME (HCC): Primary | ICD-10-CM

## 2020-03-23 PROCEDURE — 93296 REM INTERROG EVL PM/IDS: CPT | Performed by: INTERNAL MEDICINE

## 2020-03-23 PROCEDURE — 93294 REM INTERROG EVL PM/LDLS PM: CPT | Performed by: INTERNAL MEDICINE

## 2020-03-25 ENCOUNTER — APPOINTMENT (OUTPATIENT)
Dept: CARDIOLOGY | Facility: HOSPITAL | Age: 80
End: 2020-03-25

## 2020-04-13 RX ORDER — INCONTINENCE PAD,LINER,DISP
EACH MISCELLANEOUS
Qty: 90 TABLET | Refills: 0 | Status: SHIPPED | OUTPATIENT
Start: 2020-04-13 | End: 2021-02-02

## 2020-04-13 NOTE — TELEPHONE ENCOUNTER
/[facility] - I will defer this prescription request to you since she is a [facility] patient and has been seen by ASA JOAQUIN. stacy

## 2020-06-24 ENCOUNTER — CLINICAL SUPPORT NO REQUIREMENTS (OUTPATIENT)
Dept: CARDIOLOGY | Facility: CLINIC | Age: 80
End: 2020-06-24

## 2020-06-24 DIAGNOSIS — I49.5 SICK SINUS SYNDROME (HCC): Primary | ICD-10-CM

## 2020-07-30 RX ORDER — FAMOTIDINE 40 MG/1
TABLET, FILM COATED ORAL
Qty: 90 TABLET | Refills: 3 | OUTPATIENT
Start: 2020-07-30

## 2020-07-30 RX ORDER — FAMOTIDINE 40 MG/1
40 TABLET, FILM COATED ORAL DAILY
Qty: 90 TABLET | Refills: 1 | Status: SHIPPED | OUTPATIENT
Start: 2020-07-30 | End: 2020-08-03 | Stop reason: SDUPTHER

## 2020-08-03 RX ORDER — FAMOTIDINE 40 MG/1
40 TABLET, FILM COATED ORAL DAILY
Qty: 90 TABLET | Refills: 3 | Status: SHIPPED | OUTPATIENT
Start: 2020-08-03 | End: 2020-08-27

## 2020-08-24 ENCOUNTER — TELEPHONE (OUTPATIENT)
Dept: GASTROENTEROLOGY | Facility: CLINIC | Age: 80
End: 2020-08-24

## 2020-08-24 NOTE — TELEPHONE ENCOUNTER
Patient's appt was cancelled today per RW. Staff had reached out to patient to reschedule today's 2:45 with Lissett but was unable to reach her or leave message. Lissett's schedule has changed and was not able to see patient.     Patient was rescheduled to Thur 8/27/20 at 2:15pm with Lissett.

## 2020-08-27 ENCOUNTER — OFFICE VISIT (OUTPATIENT)
Dept: GASTROENTEROLOGY | Facility: CLINIC | Age: 80
End: 2020-08-27

## 2020-08-27 VITALS — TEMPERATURE: 97.1 F | HEIGHT: 63 IN | BODY MASS INDEX: 24.27 KG/M2 | WEIGHT: 137 LBS

## 2020-08-27 DIAGNOSIS — Q39.4 ESOPHAGEAL WEB: ICD-10-CM

## 2020-08-27 DIAGNOSIS — R13.10 DYSPHAGIA, UNSPECIFIED TYPE: Primary | ICD-10-CM

## 2020-08-27 DIAGNOSIS — R11.0 NAUSEA: ICD-10-CM

## 2020-08-27 DIAGNOSIS — K59.03 DRUG-INDUCED CONSTIPATION: ICD-10-CM

## 2020-08-27 PROCEDURE — 99214 OFFICE O/P EST MOD 30 MIN: CPT | Performed by: NURSE PRACTITIONER

## 2020-08-27 RX ORDER — FAMOTIDINE 40 MG/1
40 TABLET, FILM COATED ORAL 2 TIMES DAILY
Qty: 90 TABLET | Refills: 3 | Status: SHIPPED | OUTPATIENT
Start: 2020-08-27 | End: 2020-10-06 | Stop reason: SDUPTHER

## 2020-08-27 NOTE — PROGRESS NOTES
Chief Complaint   Patient presents with   • Nausea   • Gas   • Constipation   • Difficulty Swallowing     HPI    Carly Gotti is a  80 y.o. female here for a follow up visit for dysphagia, nausea, constipation, and gas.  Patient follows Dr. Cai, known to me.  She has a history of C. difficile colitis and ileus.  She struggles with opioid-induced constipation.  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  Patient underwent bidirectional endoscopic evaluation in July 2019 demonstrating web in the upper third of the esophagus which was dilated and nonbleeding internal hemorrhoids.  Patient was recommended to have re-dilation of her esophagus in October 2019 but did not follow-up.    Today she still struggling with esophageal dysphagia.  She basically avoids dense meats and breads.  She drinks water for relief.  She denies regurgitation.  Frequent nausea but no vomiting.  She is on Pepcid 40 mg once daily.  We have been leery of putting her on PPI therapy due to her kidney function.  Her appetite is good.  Her weight is stable.  No abdominal pain.    She manages constipation with stool softeners.  Currently her bowels are moving daily with complete evacuation.  She denies rectal pain or rectal bleeding.  She does have significant gas and bloating which responds slightly to daily probiotics.  She feels like probiotics help with constipation more than anything.    Data reviewed:    CT abdomen pelvis without contrast performed in March with no acute inflammatory process of the bowel, small nonobstructive kidney stone.  Past Medical History:   Diagnosis Date   • Aneurysm (CMS/HCC)    • Aortic valve disease    • Arthritis    • Atrophic kidney    • Bradycardia    • CAD (coronary artery disease)    • CKD (chronic kidney disease) stage 4, GFR 15-29 ml/min (CMS/HCC)    • Depression    • Dysphagia    • Essential hypertension    • [place] (hard of  hearing)    • Hyperlipidemia    • Mitral valve disease    • Pulmonary hypertension (CMS/HCC)    • Sick sinus syndrome (CMS/HCC)    • Sleep apnea    • Spinal stenosis        Past Surgical History:   Procedure Laterality Date   • AORTIC VALVE REPAIR/REPLACEMENT  2015    Dr Veliz   • CARDIAC CATHETERIZATION     • CARDIAC DEFIBRILLATOR PLACEMENT     •  SECTION     • COLONOSCOPY N/A 2019    [facility]   • ENDOSCOPY  2012    Hypertonic lower esophageal sphincter, gastritis. Path: Chronic gastritis, moderate.    • ENDOSCOPY N/A 2019    Web in the upper third of the esophagus. Dilated   • HYSTERECTOMY     • MITRAL VALVE ANNULOPLASTY  2015    Dr Veliz   • TRICUSPID VALVE SURGERY  2015    Dr Veliz       Scheduled Meds:  Outpatient Encounter Medications as of 2020   Medication Sig Dispense Refill   • aspirin 325 MG tablet Take by mouth daily.     • atorvastatin (LIPITOR) 20 MG tablet take 1 tablet by mouth at bedtime  0   • bumetanide (BUMEX) 2 MG tablet TAKE 1 TABLET BY MOUTH EVERY DAY 90 tablet 1   • buPROPion XL (WELLBUTRIN XL) 300 MG 24 hr tablet Take 300 mg by mouth Daily.  0   • Coenzyme Q10 200 MG tablet Take 1 tablet/day by mouth Daily.     • [employer] SENNA PLUS 8.6-50 MG per tablet TAKE 1 TABLET BY MOUTH EVERY DAY 90 tablet 0   • estradiol (ESTRACE) 0.5 MG tablet Take by mouth daily.     • eszopiclone (LUNESTA) 3 MG tablet Take 3 mg by mouth every night. Take immediately before bedtime     • fenofibrate (TRICOR) 54 MG tablet Take 67 mg by mouth Daily.     • folic acid (FOLVITE) 400 MCG tablet Take 1 tablet by mouth Daily. 30 tablet 0   • HYDROcodone-acetaminophen (NORCO)  MG per tablet Take 1 tablet by mouth Every 4 (Four) Hours As Needed for Moderate Pain . 20 tablet 0   • nebivolol (BYSTOLIC) 10 MG tablet Take 10 mg by mouth 2 (Two) Times a Day.     • ondansetron (ZOFRAN) 4 MG tablet Take 1 tablet by mouth Every 6 (Six) Hours As Needed for Nausea or Vomiting. 10 tablet  0   • [DISCONTINUED] famotidine (Pepcid) 40 MG tablet Take 1 tablet by mouth Daily. 90 tablet 3   • famotidine (Pepcid) 40 MG tablet Take 1 tablet by mouth 2 (Two) Times a Day. 90 tablet 3     No facility-administered encounter medications on file as of 2020.        Continuous Infusions:  No current facility-administered medications for this visit.     PRN Meds:.    No Known Allergies    Social History     Socioeconomic History   • Marital status:      Spouse name: Not on file   • Number of children: Not on file   • Years of education: Not on file   • Highest education level: Not on file   Tobacco Use   • Smoking status: Former Smoker     Last attempt to quit:      Years since quittin.6   • Smokeless tobacco: Never Used   Substance and Sexual Activity   • Alcohol use: No   • Drug use: No   • Sexual activity: Defer       Family History   Problem Relation Age of Onset   • ALS Mother    • Hypertension Father        Review of Systems   Constitutional: Negative for activity change, appetite change, fatigue, fever and unexpected weight change.   HENT: Positive for trouble swallowing.    Respiratory: Negative for apnea, cough, choking, chest tightness, shortness of breath and wheezing.    Cardiovascular: Negative for chest pain, palpitations and leg swelling.   Gastrointestinal: Positive for constipation and nausea. Negative for abdominal distention, abdominal pain, anal bleeding, blood in stool, diarrhea, rectal pain and vomiting.       Vitals:    20 1411   Temp: 97.1 °F (36.2 °C)       Physical Exam   Constitutional: She is oriented to person, place, and time. She appears well-developed and well-nourished.   Cardiovascular: Normal rate, regular rhythm and normal heart sounds.   Pulmonary/Chest: Effort normal and breath sounds normal. No respiratory distress. She has no wheezes.   Abdominal: Soft. Bowel sounds are normal. She exhibits no distension and no mass. There is no tenderness. There is  no guarding. No hernia.   Neurological: She is alert and oriented to person, place, and time.   Skin: Skin is warm and dry. Capillary refill takes less than 2 seconds.   Psychiatric: She has a normal mood and affect. Her behavior is normal.       No radiology results for the last 7 days    Carly was seen today for nausea, gas, constipation and difficulty swallowing.    Diagnoses and all orders for this visit:    Dysphagia, unspecified type  -     Case Request; Standing  -     Case Request    Esophageal web  -     Case Request; Standing  -     Case Request    Nausea  -     Case Request; Standing  -     Case Request  -     CBC & Differential  -     Comprehensive Metabolic Panel    Drug-induced constipation    Other orders  -     famotidine (Pepcid) 40 MG tablet; Take 1 tablet by mouth 2 (Two) Times a Day.    Assessment/plan    Dominga 80-year-old female seen today in follow-up with continued issues with dysphagia and nausea.  She has a history esophageal web requiring dilation in the past and was recommend have a follow-up dilation in October 2019 but failed to do so.  Given current symptoms recommend we move forward with EGD and possible dilation with Dr. Cai.  Increase Pepcid to twice daily dosing.  Reviewed antireflux dietary precautions.    Constipation currently well managed on stool softeners.  Given complaints of excessive gas and bloating recommend breath testing to rule out small intestinal bacterial overgrowth.  Labs today with CBC and CMP.    Further recommendations and follow-up pending the aforementioned work-up.

## 2020-08-28 LAB
ALBUMIN SERPL-MCNC: 4.5 G/DL (ref 3.5–5.2)
ALBUMIN/GLOB SERPL: 3 G/DL
ALP SERPL-CCNC: 48 U/L (ref 39–117)
ALT SERPL-CCNC: 21 U/L (ref 1–33)
AST SERPL-CCNC: 24 U/L (ref 1–32)
BASOPHILS # BLD AUTO: 0.01 10*3/MM3 (ref 0–0.2)
BASOPHILS NFR BLD AUTO: 0.2 % (ref 0–1.5)
BILIRUB SERPL-MCNC: 0.4 MG/DL (ref 0–1.2)
BUN SERPL-MCNC: 36 MG/DL (ref 8–23)
BUN/CREAT SERPL: 19 (ref 7–25)
CALCIUM SERPL-MCNC: 9.7 MG/DL (ref 8.6–10.5)
CHLORIDE SERPL-SCNC: 100 MMOL/L (ref 98–107)
CO2 SERPL-SCNC: 29.3 MMOL/L (ref 22–29)
CREAT SERPL-MCNC: 1.89 MG/DL (ref 0.57–1)
EOSINOPHIL # BLD AUTO: 0.1 10*3/MM3 (ref 0–0.4)
EOSINOPHIL NFR BLD AUTO: 1.9 % (ref 0.3–6.2)
ERYTHROCYTE [DISTWIDTH] IN BLOOD BY AUTOMATED COUNT: 11.9 % (ref 12.3–15.4)
GLOBULIN SER CALC-MCNC: 1.5 GM/DL
GLUCOSE SERPL-MCNC: 100 MG/DL (ref 65–99)
HCT VFR BLD AUTO: 39.1 % (ref 34–46.6)
HGB BLD-MCNC: 13 G/DL (ref 12–15.9)
IMM GRANULOCYTES # BLD AUTO: 0.01 10*3/MM3 (ref 0–0.05)
IMM GRANULOCYTES NFR BLD AUTO: 0.2 % (ref 0–0.5)
LYMPHOCYTES # BLD AUTO: 0.98 10*3/MM3 (ref 0.7–3.1)
LYMPHOCYTES NFR BLD AUTO: 19 % (ref 19.6–45.3)
MCH RBC QN AUTO: 32.4 PG (ref 26.6–33)
MCHC RBC AUTO-ENTMCNC: 33.2 G/DL (ref 31.5–35.7)
MCV RBC AUTO: 97.5 FL (ref 79–97)
MONOCYTES # BLD AUTO: 0.63 10*3/MM3 (ref 0.1–0.9)
MONOCYTES NFR BLD AUTO: 12.2 % (ref 5–12)
NEUTROPHILS # BLD AUTO: 3.43 10*3/MM3 (ref 1.7–7)
NEUTROPHILS NFR BLD AUTO: 66.5 % (ref 42.7–76)
NRBC BLD AUTO-RTO: 0 /100 WBC (ref 0–0.2)
PLATELET # BLD AUTO: 199 10*3/MM3 (ref 140–450)
POTASSIUM SERPL-SCNC: 4.5 MMOL/L (ref 3.5–5.2)
PROT SERPL-MCNC: 6 G/DL (ref 6–8.5)
RBC # BLD AUTO: 4.01 10*6/MM3 (ref 3.77–5.28)
SODIUM SERPL-SCNC: 139 MMOL/L (ref 136–145)
WBC # BLD AUTO: 5.16 10*3/MM3 (ref 3.4–10.8)

## 2020-08-28 NOTE — PROGRESS NOTES
Let Carly know that her labs show no evidence of anemia but she does have increase in her kidney function with a creatinine up to 1.89.  She can follow-up with her nephrologist that she has one her PCP for this.  Normal liver function test.

## 2020-08-31 ENCOUNTER — TRANSCRIBE ORDERS (OUTPATIENT)
Dept: SLEEP MEDICINE | Facility: HOSPITAL | Age: 80
End: 2020-08-31

## 2020-08-31 DIAGNOSIS — Z01.818 OTHER SPECIFIED PRE-OPERATIVE EXAMINATION: Primary | ICD-10-CM

## 2020-09-05 ENCOUNTER — LAB (OUTPATIENT)
Dept: LAB | Facility: HOSPITAL | Age: 80
End: 2020-09-05

## 2020-09-05 DIAGNOSIS — Z01.818 OTHER SPECIFIED PRE-OPERATIVE EXAMINATION: ICD-10-CM

## 2020-09-05 PROCEDURE — C9803 HOPD COVID-19 SPEC COLLECT: HCPCS

## 2020-09-05 PROCEDURE — U0004 COV-19 TEST NON-CDC HGH THRU: HCPCS

## 2020-09-07 LAB — SARS-COV-2 RNA RESP QL NAA+PROBE: NOT DETECTED

## 2020-09-08 ENCOUNTER — ANESTHESIA EVENT (OUTPATIENT)
Dept: GASTROENTEROLOGY | Facility: HOSPITAL | Age: 80
End: 2020-09-08

## 2020-09-08 ENCOUNTER — HOSPITAL ENCOUNTER (OUTPATIENT)
Facility: HOSPITAL | Age: 80
Setting detail: HOSPITAL OUTPATIENT SURGERY
Discharge: HOME | End: 2020-09-08
Attending: INTERNAL MEDICINE | Admitting: INTERNAL MEDICINE

## 2020-09-08 ENCOUNTER — ANESTHESIA (OUTPATIENT)
Dept: GASTROENTEROLOGY | Facility: HOSPITAL | Age: 80
End: 2020-09-08

## 2020-09-08 VITALS
DIASTOLIC BLOOD PRESSURE: 73 MMHG | BODY MASS INDEX: 24.1 KG/M2 | TEMPERATURE: 97.9 F | RESPIRATION RATE: 16 BRPM | HEIGHT: 63 IN | WEIGHT: 136 LBS | OXYGEN SATURATION: 97 % | SYSTOLIC BLOOD PRESSURE: 138 MMHG | HEART RATE: 77 BPM

## 2020-09-08 DIAGNOSIS — R11.0 NAUSEA: ICD-10-CM

## 2020-09-08 DIAGNOSIS — Q39.4 ESOPHAGEAL WEB: ICD-10-CM

## 2020-09-08 DIAGNOSIS — R13.10 DYSPHAGIA, UNSPECIFIED TYPE: ICD-10-CM

## 2020-09-08 PROCEDURE — 25010000002 PROPOFOL 10 MG/ML EMULSION: Performed by: NURSE ANESTHETIST, CERTIFIED REGISTERED

## 2020-09-08 PROCEDURE — 43239 EGD BIOPSY SINGLE/MULTIPLE: CPT | Performed by: INTERNAL MEDICINE

## 2020-09-08 PROCEDURE — 43450 DILATE ESOPHAGUS 1/MULT PASS: CPT | Performed by: INTERNAL MEDICINE

## 2020-09-08 PROCEDURE — 88305 TISSUE EXAM BY PATHOLOGIST: CPT | Performed by: INTERNAL MEDICINE

## 2020-09-08 RX ORDER — SODIUM CHLORIDE, SODIUM LACTATE, POTASSIUM CHLORIDE, CALCIUM CHLORIDE 600; 310; 30; 20 MG/100ML; MG/100ML; MG/100ML; MG/100ML
30 INJECTION, SOLUTION INTRAVENOUS CONTINUOUS PRN
Status: DISCONTINUED | OUTPATIENT
Start: 2020-09-08 | End: 2020-09-08 | Stop reason: HOSPADM

## 2020-09-08 RX ORDER — PROPOFOL 10 MG/ML
VIAL (ML) INTRAVENOUS CONTINUOUS PRN
Status: DISCONTINUED | OUTPATIENT
Start: 2020-09-08 | End: 2020-09-08 | Stop reason: SURG

## 2020-09-08 RX ORDER — PROPOFOL 10 MG/ML
VIAL (ML) INTRAVENOUS AS NEEDED
Status: DISCONTINUED | OUTPATIENT
Start: 2020-09-08 | End: 2020-09-08 | Stop reason: SURG

## 2020-09-08 RX ADMIN — PROPOFOL 200 MCG/KG/MIN: 10 INJECTION, EMULSION INTRAVENOUS at 14:44

## 2020-09-08 RX ADMIN — PROPOFOL 70 MG: 10 INJECTION, EMULSION INTRAVENOUS at 14:44

## 2020-09-08 RX ADMIN — SODIUM CHLORIDE, POTASSIUM CHLORIDE, SODIUM LACTATE AND CALCIUM CHLORIDE 30 ML/HR: 600; 310; 30; 20 INJECTION, SOLUTION INTRAVENOUS at 14:35

## 2020-09-08 NOTE — DISCHARGE INSTRUCTIONS

## 2020-09-10 ENCOUNTER — TELEPHONE (OUTPATIENT)
Dept: GASTROENTEROLOGY | Facility: CLINIC | Age: 80
End: 2020-09-10

## 2020-09-10 NOTE — TELEPHONE ENCOUNTER
----- Message from TORSTEN Mensah sent at 8/27/2020  2:41 PM EDT -----  Please mail SIBO breath testing kit to the patient's home.

## 2020-09-12 NOTE — ANESTHESIA POSTPROCEDURE EVALUATION
"Patient: Carly Gotti    Procedure Summary     Date: 09/08/20 Room / Location:  [facility]    Anesthesia Start: 1437 Anesthesia Stop: 1459    Procedure: ESOPHAGOGASTRODUODENOSCOPY WITH 56FR MALDONADO DILATATION AND COLD BIOPSIES (N/A Esophagus) Diagnosis:       Dysphagia, unspecified type      Esophageal web      Nausea      (Dysphagia, unspecified type [R13.10])      (Esophageal web [Q39.4])      (Nausea [R11.0])    Surgeon: Blanye Cai MD Provider: Sukumar Gilman MD    Anesthesia Type: MAC ASA Status: 3          Anesthesia Type: MAC    Vitals  Vitals Value Taken Time   /73 09/08/20 1516   Temp     Pulse 77 09/08/20 1516   Resp 16 09/08/20 1516   SpO2 97 % 09/08/20 1516           Post Anesthesia Care and Evaluation      Comments: Patient discharged before being evaluated by an Anesthesiologist. No apparent complications per the record.  This case was not medically directed. I am completing this chart for medical records purposes; I personally have no medical involvement with this patient.    /73 (BP Location: Left arm, Patient Position: Lying)   Pulse 77   Temp 36.6 °C (97.9 °F) (Oral)   Resp 16   Ht 160 cm (63\")   Wt 61.7 kg (136 lb)   SpO2 97%   BMI 24.09 kg/m²           "

## 2020-09-25 ENCOUNTER — TELEPHONE (OUTPATIENT)
Dept: GASTROENTEROLOGY | Facility: CLINIC | Age: 80
End: 2020-09-25

## 2020-09-25 NOTE — TELEPHONE ENCOUNTER
----- Message from Blayne Cai MD sent at 9/14/2020 11:46 AM EDT -----  Pathology benign  Office visit 8 weeks

## 2020-09-25 NOTE — TELEPHONE ENCOUNTER
----- Message from TORSTEN Mensah sent at 8/28/2020  4:09 PM EDT -----  Let Carly know that her labs show no evidence of anemia but she does have increase in her kidney function with a creatinine up to 1.89.  She can follow-up with her nephrologist that she has one her PCP for this.  Normal liver function test.

## 2020-10-06 RX ORDER — FAMOTIDINE 40 MG/1
40 TABLET, FILM COATED ORAL 2 TIMES DAILY
Qty: 180 TABLET | Refills: 3 | Status: SHIPPED | OUTPATIENT
Start: 2020-10-06 | End: 2021-10-04

## 2020-10-06 NOTE — TELEPHONE ENCOUNTER
----- Message from Nicko Hunt sent at 10/6/2020  1:51 PM EDT -----  Regarding: famotidine (Pepcid) 40 MG tablet  Contact: [phone]  Pt is needing prescription called in.    [facility]/pharmacy #[id number] - [place], [place] - [address] - [phone]  - [phone]  Phone:  [phone]  Fax:  [phone]  [address]  [address] [place] [id number]

## 2020-10-08 RX ORDER — BUMETANIDE 2 MG/1
2 TABLET ORAL DAILY
Qty: 90 TABLET | Refills: 1 | Status: SHIPPED | OUTPATIENT
Start: 2020-10-08 | End: 2021-03-29

## 2020-11-11 ENCOUNTER — APPOINTMENT (OUTPATIENT)
Dept: CT IMAGING | Facility: HOSPITAL | Age: 80
End: 2020-11-11

## 2020-11-11 ENCOUNTER — HOSPITAL ENCOUNTER (EMERGENCY)
Facility: HOSPITAL | Age: 80
Discharge: HOME | End: 2020-11-11
Attending: EMERGENCY MEDICINE | Admitting: EMERGENCY MEDICINE

## 2020-11-11 ENCOUNTER — APPOINTMENT (OUTPATIENT)
Dept: GENERAL RADIOLOGY | Facility: HOSPITAL | Age: 80
End: 2020-11-11

## 2020-11-11 VITALS
TEMPERATURE: 96.3 F | SYSTOLIC BLOOD PRESSURE: 145 MMHG | RESPIRATION RATE: 16 BRPM | OXYGEN SATURATION: 96 % | HEART RATE: 83 BPM | DIASTOLIC BLOOD PRESSURE: 87 MMHG | HEIGHT: 63 IN | BODY MASS INDEX: 24.09 KG/M2

## 2020-11-11 DIAGNOSIS — S52.501A CLOSED FRACTURE OF DISTAL END OF RIGHT RADIUS, UNSPECIFIED FRACTURE MORPHOLOGY, INITIAL ENCOUNTER: Primary | ICD-10-CM

## 2020-11-11 PROCEDURE — 70450 CT HEAD/BRAIN W/O DYE: CPT

## 2020-11-11 PROCEDURE — 72125 CT NECK SPINE W/O DYE: CPT

## 2020-11-11 PROCEDURE — 73110 X-RAY EXAM OF WRIST: CPT

## 2020-11-11 PROCEDURE — 99283 EMERGENCY DEPT VISIT LOW MDM: CPT

## 2020-11-11 RX ORDER — LIDOCAINE HYDROCHLORIDE AND EPINEPHRINE 10; 10 MG/ML; UG/ML
10 INJECTION, SOLUTION INFILTRATION; PERINEURAL ONCE
Status: COMPLETED | OUTPATIENT
Start: 2020-11-11 | End: 2020-11-11

## 2020-11-11 RX ORDER — BUPIVACAINE HYDROCHLORIDE 5 MG/ML
10 INJECTION, SOLUTION EPIDURAL; INTRACAUDAL ONCE
Status: COMPLETED | OUTPATIENT
Start: 2020-11-11 | End: 2020-11-11

## 2020-11-11 RX ADMIN — BUPIVACAINE HYDROCHLORIDE 10 ML: 5 INJECTION, SOLUTION EPIDURAL; INTRACAUDAL at 21:00

## 2020-11-11 RX ADMIN — LIDOCAINE HYDROCHLORIDE AND EPINEPHRINE 10 ML: 10; 10 INJECTION, SOLUTION INFILTRATION; PERINEURAL at 21:00

## 2020-11-12 NOTE — ED TRIAGE NOTES
Pt to ED from home for c/o fall after being pulled by her dog.  Pt c/o R wrist pain, with swelling noted.  Pt also hit her head, R lower eye bruising and swelling noted, denies LOC, A&ox4, takes ASA 81mg daily.  +R radial pulse.    Pt wearing mask, staff wearing appropriate PPE.

## 2020-11-12 NOTE — ED PROVIDER NOTES
EMERGENCY DEPARTMENT ENCOUNTER    Room Number:  42/42  Date of encounter:  11/12/2020  PCP: Reyes, Miriam Mercado, MD  Historian: Patient      HPI:  Chief Complaint: Right wrist injury, head injury, neck pain  A complete HPI/ROS/PMH/PSH/SH/FH are unobtainable due to: Nothing    Context: Carly Gotti is a 80 y.o. female who presents to the ED c/o sustaining a fall while walking her dog in the neighborhood just prior to arrival.  Patient states the dog got excited to cough and pulled her to the ground.  She states she tried to catch herself with an outstretched right hand and managed to injure her right wrist as well as hit her head and her neck in the process.  She informs me she took one of her pain pills just prior to arrival the pain is still 8 out of 10 on the pain scale, sharp, located to the right wrist, nonradiating.  She informs me she is not on any anticoagulants but she does take aspirin daily for antiplatelet therapy.      PAST MEDICAL HISTORY  Active Ambulatory Problems     Diagnosis Date Noted   • S/P AVR (aortic valve replacement) 04/04/2016   • S/P TVR (tricuspid valve repair) 04/04/2016   • S/P MVR (mitral valve repair) 04/04/2016   • Benign essential hypertension 05/03/2016   • Chest tightness 05/03/2016   • Mitral and aortic valve disease 05/03/2016   • CKD (chronic kidney disease) stage 3, GFR 30-59 ml/min (CMS/[facility]) 11/01/2016   • Small bowel obstruction (CMS/[facility]) 12/21/2016   • Abdominal pain 12/22/2016   • Slow transit constipation 12/27/2016   • Hyperlipidemia 05/02/2017   • Pacemaker 12/06/2017   • Colitis 03/18/2019   • Elevated lipase 03/18/2019   • C. difficile colitis 03/18/2019   • Ileus (CMS/[facility]) 03/18/2019   • Sepsis (CMS/[facility]) 03/19/2019   • Thrombocytopenia (CMS/[facility]) 03/20/2019   • Dysphagia 05/23/2019   • Esophageal web 05/23/2019   • Recurrent Clostridium difficile diarrhea 06/27/2019   • CKD (chronic kidney disease) stage 4, GFR 15-29 ml/min (CMS/HCC) 06/27/2019   • Generalized  abdominal pain 2019   • Intestinal infection due to enteropathogenic E. coli 2019   • Obstructive sleep apnea 2019   • Nausea 2020     Resolved Ambulatory Problems     Diagnosis Date Noted   • No Resolved Ambulatory Problems     Past Medical History:   Diagnosis Date   • Aneurysm (CMS/HCC)    • Aortic valve disease    • Arthritis    • Atrophic kidney    • Bradycardia    • CAD (coronary artery disease)    • Depression    • Essential hypertension    • [place] (hard of hearing)    • Mitral valve disease    • Pulmonary hypertension (CMS/HCC)    • Sick sinus syndrome (CMS/HCC)    • Sleep apnea    • Spinal stenosis          PAST SURGICAL HISTORY  Past Surgical History:   Procedure Laterality Date   • AORTIC VALVE REPAIR/REPLACEMENT  2015    Dr Veliz   • CARDIAC CATHETERIZATION     • CARDIAC DEFIBRILLATOR PLACEMENT     •  SECTION     • COLONOSCOPY N/A 2019    [facility]   • ENDOSCOPY  2012    Hypertonic lower esophageal sphincter, gastritis. Path: Chronic gastritis, moderate.    • ENDOSCOPY N/A 2019    Web in the upper third of the esophagus. Dilated   • ENDOSCOPY N/A 2020    Procedure: ESOPHAGOGASTRODUODENOSCOPY WITH 56FR MALDONADO DILATATION AND COLD BIOPSIES;  Surgeon: Blayne Cai MD;  Location: [facility] END[facility]COPY;  Service: Gastroenterology;  Laterality: N/A;  PRE: DYSPHAGIA  POST: SCHATZKI'S RING   • HYSTERECTOMY     • MITRAL VALVE ANNULOPLASTY  2015    Dr Veliz   • TRICUSPID VALVE SURGERY  2015    Dr Veliz         FAMILY HISTORY  Family History   Problem Relation Age of Onset   • ALS Mother    • Hypertension Father          SOCIAL HISTORY  Social History     Socioeconomic History   • Marital status:      Spouse name: Not on file   • Number of children: Not on file   • Years of education: Not on file   • Highest education level: Not on file   Tobacco Use   • Smoking status: Former Smoker     Quit date:      Years since quittin.8   •  Smokeless tobacco: Never Used   Substance and Sexual Activity   • Alcohol use: No   • Drug use: No   • Sexual activity: Defer         ALLERGIES  Patient has no known allergies.        REVIEW OF SYSTEMS  Review of Systems   Constitutional: Negative for chills and fever.   HENT: Negative.    Eyes: Negative.    Respiratory: Negative.    Cardiovascular: Negative.    Gastrointestinal: Negative.    Genitourinary: Negative.    Musculoskeletal: Positive for joint swelling and neck pain. Negative for back pain.   Skin: Negative.    Neurological: Negative for dizziness, light-headedness and numbness.   Psychiatric/Behavioral: The patient is nervous/anxious.         All systems reviewed and negative except for those discussed in HPI.       PHYSICAL EXAM    I have reviewed the triage vital signs and nursing notes.    ED Triage Vitals [11/11/20 1952]   Temp Heart Rate Resp BP SpO2   96.3 °F (35.7 °C) 80 18 135/86 95 %      Temp src Heart Rate Source Patient Position BP Location FiO2 (%)   Tympanic -- -- -- --       Physical Exam  GENERAL: Well-nourished, nontoxic, appears uncomfortable  HENT: nares patent, mild soft tissue swelling over the right superior aspect of the forehead  EYES: no scleral icterus, PERRL, EOMs intact, no hyphema, no proptosis  CV: regular rhythm, regular rate, heart sounds normal, radial pulse +2 bilaterally  RESPIRATORY: normal effort lungs CTA B   ABDOMEN: soft, nontender, no signs of trauma  MUSCULOSKELETAL: Deformity to the right wrist there does not appear to be any bony tenderness proximal and distal to the right wrist.   NEURO: alert and oriented x3, face symmetric, speech normal, no focal neuro deficits noted, sharp sensation intact distal to the right wrist/injury site  SKIN: warm, dry, no abrasions or lacerations noted        LAB RESULTS  No results found for this or any previous visit (from the past 24 hour(s)).    Ordered the above labs and independently reviewed the  results.        RADIOLOGY  Xr Wrist 3+ View Right    Result Date: 11/11/2020  Right wrist Xrays  History: Fall, wrist pain  Technique: [3] views  Comparison: None  Findings: There is an acute impacted and slightly dorsally angulated fracture of the distal radius that traverses the metaphysis and has intra-articular extension. There is an acute minimally displaced ulnar styloid fracture. Associated diffuse soft tissue swelling. There is widening of the scapholunate interval. There is a possible nondisplaced fracture at the base of the first proximal phalanx. Severe first CMC and triscaphe osteoarthritis.      Impression: 1.  Acute impacted fracture of the distal radius with mild dorsal inflation. 2.  Acute fracture of the ulnar styloid. 3.  Possible scapholunate ligament injury. 4.  Possible nondisplaced fracture at the base of the first phalanx.  This report was finalized on 11/11/2020 9:06 PM by Dr. Broderick Luna M.D.      Ct Head Without Contrast    Result Date: 11/11/2020  CT HEAD WITHOUT CONTRAST:  HISTORY:  Facial trauma.  TECHNIQUE:  Axial images were obtained through the brain without contrast administration. Multiplanar reformatted images were reconstructed from the helical source data. Radiation dose reduction techniques were utilized, including automated exposure control and exposure modulation based on body size.   COMPARISON: CT head 08/03/2007.  FINDINGS: Mild cerebral volume loss with proportionate prominence of ventricular system and sulci. No hydrocephalus or midline shift.  Mild scattered hypodensity throughout the periventricular and subcortical white matter that is most likely secondary to chronic microvascular ischemia. There is no evidence of a large territorial infarction. No hemorrhage or extra-axial fluid collection. Intracranial atherosclerotic arterial calcifications.   The orbits demonstrate evidence of prior bilateral cataract surgery. The visualized paranasal sinuses and mastoid air  cells are clear.    The calvarium is intact. The scalp soft tissues are unremarkable.      1.   No acute intracranial abnormality. 2.  Senescent changes.    This report was finalized on 11/11/2020 9:29 PM by Dr. Broderick Luna M.D.      Ct Cervical Spine Without Contrast    Result Date: 11/11/2020  CERVICAL SPINE CT:  HISTORY:  Fall with head injury and neck pain.  TECHNIQUE:  Helical axial images were performed from the skull base through the upper thoracic spine without contrast. Sagittal and coronal reformatted images were performed. Radiation dose reduction techniques were utilized, including automated exposure control and exposure modulation based on body size.   COMPARISON:  CT cervical spine 07/29/2006.  FINDINGS:   Straightening of the normal cervical lordosis. Degenerative grade 1 anterolisthesis of C3 and C4, C4 on C5 and C7 on T1. Degenerative grade 1 retrolisthesis of C5 on C6. No evidence of acute fracture or subluxation. Severe spondylosis at C5-C6 and C6-C7. No high-grade spinal canal narrowing. Uncovertebral hypertrophy and facet arthropathy contributes to multilevel moderate to severe neural foraminal narrowing. No lytic or blastic osseous lesions. Severe degenerative changes of the atlantodental junction and retrodental calcification indicative of CPPD arthropathy.  The paravertebral soft tissues are normal. No paraspinal mass.  The partially visualized lungs are clear.       1.  No evidence of acute fracture. 2.  Degenerative changes, as above.  This report was finalized on 11/11/2020 9:35 PM by Dr. Broderick Luna M.D.        I ordered the above noted radiological studies. Reviewed by me and discussed with radiologist.  See dictation for official radiology interpretation.      PROCEDURES    Splint - Cast - Strapping    Date/Time: 11/12/2020 6:13 AM  Performed by: Joel Littlejohn III, PA  Authorized by: Mathew Cedeno MD     Consent:     Consent obtained:  Verbal    Consent given by:   Patient    Risks discussed:  Pain    Alternatives discussed:  No treatment  Pre-procedure details:     Sensation:  Normal  Procedure details:     Laterality:  Right    Location:  Wrist    Wrist:  R wrist    Splint type:  Sugar tong    Supplies:  Elastic bandage, Ortho-Glass and cotton padding  Post-procedure details:     Pain:  Improved    Sensation:  Normal    Patient tolerance of procedure:  Tolerated well, no immediate complications  Comments:      At the right distal radius, a hematoma block was achieved with a mixture of 5 cc of bupivacaine/5 cc of lidocaine with epinephrine.  A total of 10 cc were administered.  The site of the hematoma block was prepped with Betadine swabs x3 prior to performing the block.  The patient tolerated the procedure well and received significant pain relief.          MEDICATIONS GIVEN IN ER    Medications   bupivacaine (PF) (MARCAINE) 0.5 % injection 10 mL (10 mL Injection Given by Other 11/11/20 2100)   lidocaine-EPINEPHrine (XYLOCAINE W/EPI) 1 %-1:[id number] injection 10 mL (10 mL Injection Given by Other 11/11/20 2100)         PROGRESS, DATA ANALYSIS, CONSULTS, AND MEDICAL DECISION MAKING    All labs have been independently reviewed by me.  All radiology studies have been reviewed by me and discussed with radiologist dictating the report.   EKG's independently viewed and interpreted by me.  Discussion below represents my analysis of pertinent findings related to patient's condition, differential diagnosis, treatment plan and final disposition.    DDx includes but is not limited to: Closed head injury without hemorrhage, closed head injury with hemorrhage, cervical strain, cervical fracture, right wrist sprain, right wrist fracture. Given the workup, with working diagnosis will be closed head injury without hemorrhage, cervical strain, and right distal radius fracture.  Will tx with rice therapy and have the patient to f/u with Dr Titus/Hand and Arm surgeon for further  evaluation.      Patient was placed in face mask in first look. Patient was wearing facemask when I entered the room and throughout our encounter. I wore full protective equipment throughout this patient encounter including a face mask, and gloves. Hand hygiene was performed before donning protective equipment and after removal when leaving the room.    AS OF 06:16 EST VITALS:    BP - 145/87  HR - 83  TEMP - 96.3 °F (35.7 °C) (Tympanic)  O2 SATS - 96%        DIAGNOSIS  Final diagnoses:   Closed fracture of distal end of right radius, unspecified fracture morphology, initial encounter         DISPOSITION  DISCHARGE    Patient discharged in stable condition.    Reviewed implications of results, diagnosis, meds, responsibility to follow up, warning signs and symptoms of possible worsening, potential complications and reasons to return to ER.  Patient/Family voiced understanding of above instructions.    Discussed plan for discharge, as there is no emergent indication for admission. Patient referred to primary care provider for BP management due to today's BP. Pt/family is agreeable and understands need for follow up and repeat testing.  Pt is aware that discharge does not mean that nothing is wrong but it indicates no emergency is present that requires admission and they must continue care with follow-up as given below or physician of their choice.     FOLLOW-UP  Gato Titus MD  [address] [address]  [phone]    Schedule an appointment as soon as possible for a visit   For further evaluation and treatment         Medication List      No changes were made to your prescriptions during this visit.                Joel Littlejohn III, PA  11/12/20 0600

## 2020-11-12 NOTE — DISCHARGE INSTRUCTIONS
Take 1 to 2 tablets of 500 mg Tylenol every 6 hours as needed for pain.    Call and follow-up with behavioral specialist above.    Return to the ER with any further concerns, should your condition change/worsen, or should you develop a fever.

## 2020-11-12 NOTE — ED PROVIDER NOTES
Pt presents to the ED c/o  right wrist pain at occurred after she fell while walking her dog prior to arrival.     On exam,   General: Awake, alert, no acute distress  HEENT: EOMI  Pulm: Symmetric chest rise, nonlabored breathing  CV: Regular rate and rhythm  GI: Non-distended  MSK: Patient in splint and sling in the right upper extremity, neurovascularly intact distally  Skin: Warm, dry  Neuro: Alert and oriented x 3, moving all extremities, no focal deficits  Psych: Calm, cooperative    Vital signs and nursing notes reviewed.       Surgical mask, protective eye goggles, and gloves used during this encounter. Patient in surgical mask.      Plan:   ED Course as of Nov 11 2138   Wed Nov 11, 2020 2114 I have viewed the patient's wrist x-ray there is an impacted acute fracture of the distal radius and ulna styloid    [RC]   2134 I have viewed the patient CT head without contrast and see no acute findings.  I have confirmed this with the radiologist's official report prior to discharge.    [RC]      ED Course User Index  [RC] Joel Littlejohn III, PA     Fractures as noted, patient has been splinted and will get orthopedic follow-up.  ED return for worsening symptoms as needed.  She is neurovascularly intact.     Attestation:  The ELENA and I have discussed this patient's history, physical exam, and treatment plan.  I have reviewed the documentation and personally had a face to face interaction with the patient. I affirm the documentation and agree with the treatment and plan.  The attached note describes my personal findings.          Mathew Cedeno MD  11/11/20 7278

## 2020-11-13 ENCOUNTER — TELEPHONE (OUTPATIENT)
Dept: CARDIOLOGY | Facility: CLINIC | Age: 80
End: 2020-11-13

## 2020-11-13 NOTE — TELEPHONE ENCOUNTER
Margaret-Daughter  [phone]    Pt's daughter, Margaret L/M stating pt had fallen and broke her wrist. Pt will need Surgical Clearance and Surgery isn't scheduled yet. Pt will have it done at [facility].  Please advise of recommendations.    [facility]  F: [phone]    SANJUANA Chance

## 2020-11-16 ENCOUNTER — HOSPITAL ENCOUNTER (OUTPATIENT)
Dept: CT IMAGING | Facility: HOSPITAL | Age: 80
Discharge: HOME | End: 2020-11-16
Admitting: SURGERY

## 2020-11-16 ENCOUNTER — TRANSCRIBE ORDERS (OUTPATIENT)
Dept: ADMINISTRATIVE | Facility: HOSPITAL | Age: 80
End: 2020-11-16

## 2020-11-16 DIAGNOSIS — S52.301A UNSPECIFIED FRACTURE OF SHAFT OF RIGHT RADIUS, INITIAL ENCOUNTER FOR CLOSED FRACTURE: ICD-10-CM

## 2020-11-16 DIAGNOSIS — S52.301A UNSPECIFIED FRACTURE OF SHAFT OF RIGHT RADIUS, INITIAL ENCOUNTER FOR CLOSED FRACTURE: Primary | ICD-10-CM

## 2020-11-16 PROCEDURE — 73200 CT UPPER EXTREMITY W/O DYE: CPT

## 2020-11-16 PROCEDURE — 76376 3D RENDER W/INTRP POSTPROCES: CPT

## 2020-11-16 NOTE — TELEPHONE ENCOUNTER
Sanna, please call patient to schedule f/u ASAP for surgery clearance.  Ok for APRN.    Thank you!

## 2020-11-19 ENCOUNTER — OFFICE VISIT (OUTPATIENT)
Dept: CARDIOLOGY | Facility: CLINIC | Age: 80
End: 2020-11-19

## 2020-11-19 VITALS
SYSTOLIC BLOOD PRESSURE: 106 MMHG | BODY MASS INDEX: 25.16 KG/M2 | HEART RATE: 82 BPM | WEIGHT: 142 LBS | HEIGHT: 63 IN | DIASTOLIC BLOOD PRESSURE: 62 MMHG

## 2020-11-19 DIAGNOSIS — Z98.890 S/P MVR (MITRAL VALVE REPAIR): ICD-10-CM

## 2020-11-19 DIAGNOSIS — I10 BENIGN ESSENTIAL HYPERTENSION: ICD-10-CM

## 2020-11-19 DIAGNOSIS — Z95.2 S/P AVR (AORTIC VALVE REPLACEMENT): Primary | ICD-10-CM

## 2020-11-19 DIAGNOSIS — G47.33 OBSTRUCTIVE SLEEP APNEA: ICD-10-CM

## 2020-11-19 DIAGNOSIS — Z95.0 PACEMAKER: ICD-10-CM

## 2020-11-19 DIAGNOSIS — E78.2 MIXED HYPERLIPIDEMIA: ICD-10-CM

## 2020-11-19 DIAGNOSIS — Z98.890 S/P TVR (TRICUSPID VALVE REPAIR): ICD-10-CM

## 2020-11-19 PROCEDURE — 99214 OFFICE O/P EST MOD 30 MIN: CPT | Performed by: INTERNAL MEDICINE

## 2020-11-19 PROCEDURE — 93000 ELECTROCARDIOGRAM COMPLETE: CPT | Performed by: INTERNAL MEDICINE

## 2020-11-19 RX ORDER — CARVEDILOL 12.5 MG/1
12.5 TABLET ORAL 2 TIMES DAILY
COMMUNITY
Start: 2020-08-21

## 2020-11-19 NOTE — PROGRESS NOTES
Subjective:     Encounter Date:11/19/2020      Patient ID: Carly Gotti is a 80 y.o. female.    Chief Complaint:  History of Present Illness    This is a 80-year-old female with a history of moderate to severe aortic regurgitation status post replacement with a bioprosthetic aortic valve, moderate to severe mitral regurgitation status post repair, severe tricuspid regurgitation status post repair, severe pulmonary hypertension, chronic kidney disease with an atrophic kidney, hyperlipidemia, hypertension, aberrant right subclavian artery, obstructive sleep apnea on CPAP, status post dual-chamber pacemaker placement for sick sinus syndrome, depression, who presents for follow up.      She presents today for follow-up and preoperative evaluation.  Patient suffered a fall last week while walking her daughter's dog.  She reports that the dog ran a hand pulling her forward and she ended up landing on her right hand breaking her wrist.  She is scheduled to undergo surgery with Dr. Titus on 11/23/2020.  She continues to have dyspnea on exertion which she feels is largely stable and unchanged over the last year.  She denies any chest pain, palpitations, orthopnea, near-syncope or syncope.  She denies any lightheadedness or near syncope or syncope with her fall the other day.    Prior History:  The patient was previously followed by Dr. Lopez who she last saw in 5/2017.  Her prior cardiac history includes mitral and tricuspid valve repair and aortic valve replacement back in 11/2015 by Dr. Veliz.  Postoperatively she developed symptomatic sick sinus syndrome and required dual chamber permanent pacemaker placement.  Since then she's done relatively well although she reports that she never quite recovered back to her baseline following the surgery.       Following her initial visit with me in 12/2017 I set her up for a repeat echocardiogram that showed normal left ventricular systolic function and wall motion  with an estimated ejection fraction 64%, grade 1 diastolic dysfunction, grossly normal-appearing bioprosthetic aortic valve, no mitral valve regurgitation, trace tricuspid valve regurgitation, and a right ventricular systolic pressure of 17 mmHg.     I saw her last in 12/2019 for annual follow-up.  Prior to that office visit she had been hospitalized twice for C. difficile colitis.   She reported fatigue and mild dyspnea with activity since all of these issues started.    I recommended a 1 year follow-up at that time with a repeat echocardiogram.  She called in 2/2020 with complaints of lower extremity swelling especially in the left leg.  I recommended proceeding with a lower extremity venous Doppler ultrasound and asked her to increase her bumetanide to 2 mg twice a day for a few days.  Ultrasound ended up being normal.  She was still having swelling so I recommended that she discuss any further changes to her bumetanide with her nephrologist.      Review of Systems   Constitution: Positive for malaise/fatigue.   HENT: Negative for hearing loss, hoarse voice, nosebleeds and sore throat.    Eyes: Negative for pain.   Cardiovascular: Positive for dyspnea on exertion. Negative for chest pain, claudication, cyanosis, irregular heartbeat, leg swelling, near-syncope, orthopnea, palpitations, paroxysmal nocturnal dyspnea and syncope.   Respiratory: Negative for shortness of breath and snoring.    Endocrine: Negative for cold intolerance, heat intolerance, polydipsia, polyphagia and polyuria.   Skin: Negative for itching and rash.   Musculoskeletal: Negative for arthritis, falls, joint pain, joint swelling, muscle cramps, muscle weakness and myalgias.   Gastrointestinal: Negative for constipation, diarrhea, dysphagia, heartburn, hematemesis, hematochezia, melena, nausea and vomiting.   Genitourinary: Negative for frequency, hematuria and hesitancy.   Neurological: Positive for loss of balance. Negative for excessive  daytime sleepiness, dizziness, headaches, light-headedness, numbness and weakness.   Psychiatric/Behavioral: Negative for depression. The patient is not nervous/anxious.          Current Outpatient Medications:   •  aspirin 325 MG tablet, Take by mouth daily., Disp: , Rfl:   •  atorvastatin (LIPITOR) 20 MG tablet, take 1 tablet by mouth at bedtime, Disp: , Rfl: 0  •  bumetanide (BUMEX) 2 MG tablet, Take 1 tablet by mouth Daily., Disp: 90 tablet, Rfl: 1  •  buPROPion XL (WELLBUTRIN XL) 300 MG 24 hr tablet, Take 300 mg by mouth Daily., Disp: , Rfl: 0  •  carvedilol (COREG) 12.5 MG tablet, Take 12.5 mg by mouth 2 (Two) Times a Day., Disp: , Rfl:   •  Coenzyme Q10 200 MG tablet, Take 1 tablet/day by mouth Daily., Disp: , Rfl:   •  [employer] SENNA PLUS 8.6-50 MG per tablet, TAKE 1 TABLET BY MOUTH EVERY DAY, Disp: 90 tablet, Rfl: 0  •  estradiol (ESTRACE) 0.5 MG tablet, Take by mouth daily., Disp: , Rfl:   •  eszopiclone (LUNESTA) 3 MG tablet, Take 3 mg by mouth every night. Take immediately before bedtime, Disp: , Rfl:   •  famotidine (Pepcid) 40 MG tablet, Take 1 tablet by mouth 2 (Two) Times a Day., Disp: 180 tablet, Rfl: 3  •  fenofibrate (TRICOR) 54 MG tablet, Take 67 mg by mouth Daily., Disp: , Rfl:   •  folic acid (FOLVITE) 400 MCG tablet, Take 1 tablet by mouth Daily., Disp: 30 tablet, Rfl: 0  •  HYDROcodone-acetaminophen (NORCO)  MG per tablet, Take 1 tablet by mouth Every 4 (Four) Hours As Needed for Moderate Pain ., Disp: 20 tablet, Rfl: 0  •  ondansetron (ZOFRAN) 4 MG tablet, Take 1 tablet by mouth Every 6 (Six) Hours As Needed for Nausea or Vomiting., Disp: 10 tablet, Rfl: 0    Past Medical History:   Diagnosis Date   • Aneurysm (CMS/HCC)    • Aortic valve disease    • Arthritis    • Atrophic kidney    • Bradycardia    • CAD (coronary artery disease)    • CKD (chronic kidney disease) stage 4, GFR 15-29 ml/min (CMS/HCC)    • Depression    • Dysphagia    • Essential hypertension    • [place] (hard of hearing)    •  "Hyperlipidemia    • Mitral valve disease    • Pulmonary hypertension (CMS/HCC)    • Sick sinus syndrome (CMS/HCC)    • Sleep apnea    • Spinal stenosis        Past Surgical History:   Procedure Laterality Date   • AORTIC VALVE REPAIR/REPLACEMENT  2015    Dr Veliz   • CARDIAC CATHETERIZATION     • CARDIAC DEFIBRILLATOR PLACEMENT     •  SECTION     • COLONOSCOPY N/A 2019    [facility]   • ENDOSCOPY  2012    Hypertonic lower esophageal sphincter, gastritis. Path: Chronic gastritis, moderate.    • ENDOSCOPY N/A 2019    Web in the upper third of the esophagus. Dilated   • ENDOSCOPY N/A 2020    Procedure: ESOPHAGOGASTRODUODENOSCOPY WITH 56FR MALDONADO DILATATION AND COLD BIOPSIES;  Surgeon: Blayne Cai MD;  Location: [facility] ENDOSCOPY;  Service: Gastroenterology;  Laterality: N/A;  PRE: DYSPHAGIA  POST: SCHATZKI'S RING   • HYSTERECTOMY     • MITRAL VALVE ANNULOPLASTY  2015    Dr Veliz   • TRICUSPID VALVE SURGERY  2015    Dr Veliz       Family History   Problem Relation Age of Onset   • ALS Mother    • Hypertension Father        Social History     Tobacco Use   • Smoking status: Former Smoker     Quit date:      Years since quittin.9   • Smokeless tobacco: Never Used   Substance Use Topics   • Alcohol use: No   • Drug use: No         ECG 12 Lead    Date/Time: 2020 2:10 PM  Performed by: Laura Pak MD  Authorized by: Laura Pak MD   Comparison: compared with previous ECG   Similar to previous ECG  Conduction: left anterior fascicular block  T flattening: all  Comments: Atrial paced rhythm               Objective:     Visit Vitals  /62   Pulse 82   Ht 160 cm (63\")   Wt 64.4 kg (142 lb)   BMI 25.15 kg/m²         Constitutional:       Appearance: Normal appearance. Well-developed.   Eyes:      General: Lids are normal.      Conjunctiva/sclera: Conjunctivae normal.      Pupils: Pupils are equal, round, and reactive to light.   HENT:      Head: " Normocephalic and atraumatic.   Neck:      Musculoskeletal: Full passive range of motion without pain, normal range of motion and neck supple.      Vascular: No carotid bruit or JVD.      Lymphadenopathy: No cervical adenopathy.   Pulmonary:      Effort: Pulmonary effort is normal.      Breath sounds: Normal breath sounds.   Cardiovascular:      Normal rate. Regular rhythm.      No gallop.   Pulses:     Radial: 2+ bilaterally.  Edema:     Peripheral edema absent.   Abdominal:      Palpations: Abdomen is soft.   Skin:     General: Skin is warm and dry.   Neurological:      Mental Status: Alert and oriented to person, place, and time.             Assessment:          Diagnosis Plan   1. S/P AVR (aortic valve replacement)     2. S/P MVR (mitral valve repair)     3. S/P TVR (tricuspid valve repair)     4. Benign essential hypertension     5. Mixed hyperlipidemia     6. Pacemaker     7. Obstructive sleep apnea            Plan:       1.  Dyspnea on exertion.  Largely stable.  She is already scheduled for an echocardiogram in 1/2021.  I asked her to notify me if the symptoms worsen in the meanwhile.  2.  History of bioprosthetic aortic valve replacement.  Normal function on her last echocardiogram in 12/2017.  Plan for repeat echo as above.  3.  History of tricuspid valve repair.    4.  History of mitral valve repair.  5.  Hypertension.  Well-controlled on her current medications.  6.  Dual-chamber permanent pacemaker placement.  Placed for sick sinus syndrome.  She remains 100% paced on her EKG.  She had a device check in 10/2020 that was unremarkable.  She is due for an in office check in 1/2021.  Asked her to keep that appointment.  7.  Hyperlipidemia.  On atorvastatin which is managed by Dr. Reyes.  8.  Chronic kidney disease stage III  9.  Right wrist fracture.  From a cardiac standpoint she is of acceptable risk to proceed with her surgery as planned.  We will send a communication to Dr. Titus's office.    We will  call and discuss results of her echocardiogram after is completed and 1/2021.  I will plan on seeing the patient back again in 6 months.

## 2021-01-19 ENCOUNTER — HOSPITAL ENCOUNTER (OUTPATIENT)
Dept: CARDIOLOGY | Facility: HOSPITAL | Age: 81
Discharge: HOME | End: 2021-01-19
Admitting: INTERNAL MEDICINE

## 2021-01-19 VITALS
WEIGHT: 138 LBS | SYSTOLIC BLOOD PRESSURE: 142 MMHG | HEART RATE: 75 BPM | BODY MASS INDEX: 24.45 KG/M2 | HEIGHT: 63 IN | DIASTOLIC BLOOD PRESSURE: 80 MMHG

## 2021-01-19 DIAGNOSIS — R60.0 BILATERAL LOWER EXTREMITY EDEMA: ICD-10-CM

## 2021-01-19 LAB
BH CV ECHO MEAS - AO MAX PG (FULL): 32.1 MMHG
BH CV ECHO MEAS - AO MAX PG: 38.2 MMHG
BH CV ECHO MEAS - AO MEAN PG (FULL): 16.6 MMHG
BH CV ECHO MEAS - AO MEAN PG: 21 MMHG
BH CV ECHO MEAS - AO ROOT AREA (BSA CORRECTED): 2.4
BH CV ECHO MEAS - AO ROOT AREA: 12.4 CM^2
BH CV ECHO MEAS - AO ROOT DIAM: 4 CM
BH CV ECHO MEAS - AO V2 MAX: 309.1 CM/SEC
BH CV ECHO MEAS - AO V2 MEAN: 216.1 CM/SEC
BH CV ECHO MEAS - AO V2 VTI: 70.2 CM
BH CV ECHO MEAS - ASC AORTA: 3.6 CM
BH CV ECHO MEAS - AVA(I,A): 1.1 CM^2
BH CV ECHO MEAS - AVA(I,D): 1.1 CM^2
BH CV ECHO MEAS - AVA(V,A): 0.86 CM^2
BH CV ECHO MEAS - AVA(V,D): 0.86 CM^2
BH CV ECHO MEAS - BSA(HAYCOCK): 1.7 M^2
BH CV ECHO MEAS - BSA: 1.7 M^2
BH CV ECHO MEAS - BZI_BMI: 24.4 KILOGRAMS/M^2
BH CV ECHO MEAS - BZI_METRIC_HEIGHT: 160 CM
BH CV ECHO MEAS - BZI_METRIC_WEIGHT: 62.6 KG
BH CV ECHO MEAS - EDV(MOD-SP2): 43 ML
BH CV ECHO MEAS - EDV(MOD-SP4): 46 ML
BH CV ECHO MEAS - EDV(TEICH): 49 ML
BH CV ECHO MEAS - EF(CUBED): 69.1 %
BH CV ECHO MEAS - EF(MOD-BP): 57 %
BH CV ECHO MEAS - EF(MOD-SP2): 58.1 %
BH CV ECHO MEAS - EF(MOD-SP4): 56.5 %
BH CV ECHO MEAS - EF(TEICH): 61.9 %
BH CV ECHO MEAS - ESV(MOD-SP2): 18 ML
BH CV ECHO MEAS - ESV(MOD-SP4): 20 ML
BH CV ECHO MEAS - ESV(TEICH): 18.7 ML
BH CV ECHO MEAS - FS: 32.4 %
BH CV ECHO MEAS - IVS/LVPW: 0.97
BH CV ECHO MEAS - IVSD: 1.2 CM
BH CV ECHO MEAS - LAT PEAK E' VEL: 9.1 CM/SEC
BH CV ECHO MEAS - LV DIASTOLIC VOL/BSA (35-75): 27.9 ML/M^2
BH CV ECHO MEAS - LV MASS(C)D: 132.5 GRAMS
BH CV ECHO MEAS - LV MASS(C)DI: 80.2 GRAMS/M^2
BH CV ECHO MEAS - LV MAX PG: 6.1 MMHG
BH CV ECHO MEAS - LV MEAN PG: 4.4 MMHG
BH CV ECHO MEAS - LV SYSTOLIC VOL/BSA (12-30): 12.1 ML/M^2
BH CV ECHO MEAS - LV V1 MAX: 123.2 CM/SEC
BH CV ECHO MEAS - LV V1 MEAN: 101.7 CM/SEC
BH CV ECHO MEAS - LV V1 VTI: 36.2 CM
BH CV ECHO MEAS - LVIDD: 3.4 CM
BH CV ECHO MEAS - LVIDS: 2.3 CM
BH CV ECHO MEAS - LVLD AP2: 6.8 CM
BH CV ECHO MEAS - LVLD AP4: 6.9 CM
BH CV ECHO MEAS - LVLS AP2: 5.3 CM
BH CV ECHO MEAS - LVLS AP4: 5.8 CM
BH CV ECHO MEAS - LVOT AREA (M): 2.3 CM^2
BH CV ECHO MEAS - LVOT AREA: 2.2 CM^2
BH CV ECHO MEAS - LVOT DIAM: 1.7 CM
BH CV ECHO MEAS - LVPWD: 1.2 CM
BH CV ECHO MEAS - MED PEAK E' VEL: 6.6 CM/SEC
BH CV ECHO MEAS - MV A DUR: 0.12 SEC
BH CV ECHO MEAS - MV A MAX VEL: 186.8 CM/SEC
BH CV ECHO MEAS - MV DEC SLOPE: 624.1 CM/SEC^2
BH CV ECHO MEAS - MV DEC TIME: 0.26 SEC
BH CV ECHO MEAS - MV E MAX VEL: 137 CM/SEC
BH CV ECHO MEAS - MV E/A: 0.73
BH CV ECHO MEAS - MV MAX PG: 14.4 MMHG
BH CV ECHO MEAS - MV MEAN PG: 7.8 MMHG
BH CV ECHO MEAS - MV P1/2T MAX VEL: 158.8 CM/SEC
BH CV ECHO MEAS - MV P1/2T: 74.5 MSEC
BH CV ECHO MEAS - MV V2 MAX: 190 CM/SEC
BH CV ECHO MEAS - MV V2 MEAN: 132.7 CM/SEC
BH CV ECHO MEAS - MV V2 VTI: 43.5 CM
BH CV ECHO MEAS - MVA P1/2T LCG: 1.4 CM^2
BH CV ECHO MEAS - MVA(P1/2T): 3 CM^2
BH CV ECHO MEAS - MVA(VTI): 1.8 CM^2
BH CV ECHO MEAS - PA MAX PG (FULL): 3.3 MMHG
BH CV ECHO MEAS - PA MAX PG: 4.5 MMHG
BH CV ECHO MEAS - PA V2 MAX: 105.9 CM/SEC
BH CV ECHO MEAS - PVA(V,A): 2.4 CM^2
BH CV ECHO MEAS - PVA(V,D): 2.4 CM^2
BH CV ECHO MEAS - QP/QS: 0.67
BH CV ECHO MEAS - RAP SYSTOLE: 8 MMHG
BH CV ECHO MEAS - RV MAX PG: 1.1 MMHG
BH CV ECHO MEAS - RV MEAN PG: 0.67 MMHG
BH CV ECHO MEAS - RV V1 MAX: 53.6 CM/SEC
BH CV ECHO MEAS - RV V1 MEAN: 38.7 CM/SEC
BH CV ECHO MEAS - RV V1 VTI: 10.9 CM
BH CV ECHO MEAS - RVOT AREA: 4.8 CM^2
BH CV ECHO MEAS - RVOT DIAM: 2.5 CM
BH CV ECHO MEAS - RVSP: 36 MMHG
BH CV ECHO MEAS - SI(AO): 526.3 ML/M^2
BH CV ECHO MEAS - SI(CUBED): 17.1 ML/M^2
BH CV ECHO MEAS - SI(LVOT): 47.5 ML/M^2
BH CV ECHO MEAS - SI(MOD-SP2): 15.1 ML/M^2
BH CV ECHO MEAS - SI(MOD-SP4): 15.7 ML/M^2
BH CV ECHO MEAS - SI(TEICH): 18.3 ML/M^2
BH CV ECHO MEAS - SV(AO): 869.3 ML
BH CV ECHO MEAS - SV(CUBED): 28.3 ML
BH CV ECHO MEAS - SV(LVOT): 78.4 ML
BH CV ECHO MEAS - SV(MOD-SP2): 25 ML
BH CV ECHO MEAS - SV(MOD-SP4): 26 ML
BH CV ECHO MEAS - SV(RVOT): 52.2 ML
BH CV ECHO MEAS - SV(TEICH): 30.3 ML
BH CV ECHO MEAS - TAPSE (>1.6): 1.2 CM
BH CV ECHO MEAS - TR MAX VEL: 270.1 CM/SEC
BH CV ECHO MEASUREMENTS AVERAGE E/E' RATIO: 17.45
BH CV XLRA - RV BASE: 3.2 CM
BH CV XLRA - RV LENGTH: 6.3 CM
BH CV XLRA - RV MID: 2.3 CM
BH CV XLRA - TDI S': 11.5 CM/SEC
LEFT ATRIUM VOLUME INDEX: 26 ML/M2
LEFT ATRIUM VOLUME: 43 CM3
MAXIMAL PREDICTED HEART RATE: 138 BPM
STRESS TARGET HR: 117 BPM

## 2021-01-19 PROCEDURE — 93306 TTE W/DOPPLER COMPLETE: CPT

## 2021-01-19 PROCEDURE — 93306 TTE W/DOPPLER COMPLETE: CPT | Performed by: INTERNAL MEDICINE

## 2021-01-20 ENCOUNTER — TELEPHONE (OUTPATIENT)
Dept: CARDIOLOGY | Facility: CLINIC | Age: 81
End: 2021-01-20

## 2021-01-20 NOTE — TELEPHONE ENCOUNTER
Called patient back but she was on at home.  Told her  that I will try calling her back either later today or tomorrow.

## 2021-01-21 NOTE — PROGRESS NOTES
Called and discussed the results of her echocardiogram.  No plans to change her management at this time.  We will plan on seeing her back again as scheduled in June.

## 2021-02-02 RX ORDER — INCONTINENCE PAD,LINER,DISP
EACH MISCELLANEOUS
Qty: 30 TABLET | Refills: 2 | Status: SHIPPED | OUTPATIENT
Start: 2021-02-02

## 2021-02-04 ENCOUNTER — OFFICE VISIT (OUTPATIENT)
Dept: GASTROENTEROLOGY | Facility: CLINIC | Age: 81
End: 2021-02-04

## 2021-02-04 VITALS — BODY MASS INDEX: 24.45 KG/M2 | WEIGHT: 138 LBS | TEMPERATURE: 96.5 F | HEIGHT: 63 IN

## 2021-02-04 DIAGNOSIS — K21.9 GASTROESOPHAGEAL REFLUX DISEASE, UNSPECIFIED WHETHER ESOPHAGITIS PRESENT: ICD-10-CM

## 2021-02-04 DIAGNOSIS — K59.03 DRUG-INDUCED CONSTIPATION: Primary | ICD-10-CM

## 2021-02-04 PROCEDURE — 99213 OFFICE O/P EST LOW 20 MIN: CPT | Performed by: NURSE PRACTITIONER

## 2021-02-04 NOTE — PROGRESS NOTES
Chief Complaint   Patient presents with   • Constipation     HPI    Carly Gotti is a  80 y.o. female here for a follow up visit for constipation and GERD.    This patient follows with myself and Dr. Cai.  She has a past medical history of C. difficile colitis and ileus.  She struggles with opioid-induced constipation.  She underwent an EGD in 2020 for esophageal dysphagia found to have a moderate Schatzki's ring which was dilated.  Pathology was benign.    On visit today she reports complete resolution of esophageal dysphagia after dilation in September.  She continues on Pepcid 40 mg p.o. twice daily.  She reports adequate control GERD symptoms.  No nausea, vomiting, or poor appetite.  Her weight is stable.    She goes on to complain of constipation and feelings of incomplete evacuation with excessive gas and bloating.  She is on stool softeners once daily.  Her bowels move on average every 3 days.  No rectal bleeding.    Colonoscopy  with nonbleeding internal hemorrhoids otherwise normal.    Past Medical History:   Diagnosis Date   • Aneurysm (CMS/HCC)    • Aortic valve disease    • Arthritis    • Atrophic kidney    • Bradycardia    • CAD (coronary artery disease)    • CKD (chronic kidney disease) stage 4, GFR 15-29 ml/min (CMS/HCC)    • Depression    • Dysphagia    • Essential hypertension    • [place] (hard of hearing)    • Hyperlipidemia    • Mitral valve disease    • Pulmonary hypertension (CMS/HCC)    • Sick sinus syndrome (CMS/HCC)    • Sleep apnea    • Spinal stenosis        Past Surgical History:   Procedure Laterality Date   • AORTIC VALVE REPAIR/REPLACEMENT  2015    Dr Veliz   • CARDIAC CATHETERIZATION     • CARDIAC DEFIBRILLATOR PLACEMENT     •  SECTION     • COLONOSCOPY N/A 2019    NB   • ENDOSCOPY  2012    Hypertonic lower esophageal sphincter, gastritis. Path: Chronic gastritis, moderate.    • ENDOSCOPY N/A 2019    Web in the upper third of the  esophagus. Dilated   • ENDOSCOPY N/A 9/8/2020    Procedure: ESOPHAGOGASTRODUODENOSCOPY WITH 56FR MALDONADO DILATATION AND COLD BIOPSIES;  Surgeon: Blayne Cai MD;  Location: [facility] ENDOSCOPY;  Service: Gastroenterology;  Laterality: N/A;  PRE: DYSPHAGIA  POST: SCHATZKI'S RING   • HYSTERECTOMY     • MITRAL VALVE ANNULOPLASTY  11/12/2015    Dr Veliz   • TRICUSPID VALVE SURGERY  11/12/2015    Dr Veliz       Scheduled Meds:  Outpatient Encounter Medications as of 2/4/2021   Medication Sig Dispense Refill   • aspirin 325 MG tablet Take by mouth daily.     • atorvastatin (LIPITOR) 20 MG tablet take 1 tablet by mouth at bedtime  0   • bumetanide (BUMEX) 2 MG tablet Take 1 tablet by mouth Daily. 90 tablet 1   • buPROPion XL (WELLBUTRIN XL) 300 MG 24 hr tablet Take 300 mg by mouth Daily.  0   • carvedilol (COREG) 12.5 MG tablet Take 12.5 mg by mouth 2 (Two) Times a Day.     • Coenzyme Q10 200 MG tablet Take 1 tablet/day by mouth Daily.     • CVS Senna Plus 8.6-50 MG per tablet TAKE 1 TABLET BY MOUTH EVERY DAY 30 tablet 2   • estradiol (ESTRACE) 0.5 MG tablet Take by mouth daily.     • eszopiclone (LUNESTA) 3 MG tablet Take 3 mg by mouth every night. Take immediately before bedtime     • famotidine (Pepcid) 40 MG tablet Take 1 tablet by mouth 2 (Two) Times a Day. 180 tablet 3   • fenofibrate (TRICOR) 54 MG tablet Take 67 mg by mouth Daily.     • folic acid (FOLVITE) 400 MCG tablet Take 1 tablet by mouth Daily. 30 tablet 0   • HYDROcodone-acetaminophen (NORCO)  MG per tablet Take 1 tablet by mouth Every 4 (Four) Hours As Needed for Moderate Pain . 20 tablet 0   • ondansetron (ZOFRAN) 4 MG tablet Take 1 tablet by mouth Every 6 (Six) Hours As Needed for Nausea or Vomiting. 10 tablet 0     No facility-administered encounter medications on file as of 2/4/2021.        Continuous Infusions:No current facility-administered medications for this visit.       PRN Meds:.    No Known Allergies    Social History      Socioeconomic History   • Marital status:      Spouse name: Not on file   • Number of children: Not on file   • Years of education: Not on file   • Highest education level: Not on file   Tobacco Use   • Smoking status: Former Smoker     Quit date:      Years since quittin.1   • Smokeless tobacco: Never Used   Substance and Sexual Activity   • Alcohol use: No   • Drug use: No   • Sexual activity: Defer       Family History   Problem Relation Age of Onset   • ALS Mother    • Hypertension Father        Review of Systems   Constitutional: Negative for activity change, appetite change, fatigue, fever and unexpected weight change.   HENT: Negative for trouble swallowing.    Respiratory: Negative for apnea, cough, choking, chest tightness, shortness of breath and wheezing.    Cardiovascular: Negative for chest pain, palpitations and leg swelling.   Gastrointestinal: Positive for constipation. Negative for abdominal distention, abdominal pain, anal bleeding, blood in stool, diarrhea, nausea, rectal pain and vomiting.       Vitals:    21 1511   Temp: 96.5 °F (35.8 °C)       Physical Exam  Constitutional:       Appearance: She is well-developed.   Cardiovascular:      Rate and Rhythm: Normal rate and regular rhythm.      Heart sounds: Normal heart sounds.   Pulmonary:      Effort: Pulmonary effort is normal. No respiratory distress.      Breath sounds: Normal breath sounds. No wheezing.   Abdominal:      General: Bowel sounds are normal. There is no distension.      Palpations: Abdomen is soft. There is no mass.      Tenderness: There is no abdominal tenderness. There is no guarding.      Hernia: No hernia is present.   Skin:     General: Skin is warm and dry.   Neurological:      Mental Status: She is alert and oriented to person, place, and time.   Psychiatric:         Behavior: Behavior normal.     Assessment    Opioid-induced constipation  GERD  History of Schatzki's ring    Plan    Start MiraLAX  1 capful daily titrate based on stool frequency and consistency.  Educational handout provided to the patient regarding MiraLAX as well as samples.    Continue twice daily dosing H2 blocker.  Continue with antireflux dietary precautions.    Patient to call us if dysphagia symptoms return otherwise we will see her back in 3 months.  Patient to call us with an update of constipation on MiraLAX in 2 weeks.  Consider trial of Trulance if needed.         Fussiness in infant

## 2021-02-08 ENCOUNTER — APPOINTMENT (OUTPATIENT)
Dept: GENERAL RADIOLOGY | Facility: HOSPITAL | Age: 81
End: 2021-02-08

## 2021-02-08 ENCOUNTER — HOSPITAL ENCOUNTER (EMERGENCY)
Facility: HOSPITAL | Age: 81
Discharge: HOME | End: 2021-02-08
Attending: EMERGENCY MEDICINE | Admitting: EMERGENCY MEDICINE

## 2021-02-08 ENCOUNTER — APPOINTMENT (OUTPATIENT)
Dept: CT IMAGING | Facility: HOSPITAL | Age: 81
End: 2021-02-08

## 2021-02-08 VITALS
OXYGEN SATURATION: 98 % | HEIGHT: 63 IN | WEIGHT: 138 LBS | SYSTOLIC BLOOD PRESSURE: 184 MMHG | BODY MASS INDEX: 24.45 KG/M2 | RESPIRATION RATE: 19 BRPM | TEMPERATURE: 97.7 F | DIASTOLIC BLOOD PRESSURE: 100 MMHG | HEART RATE: 87 BPM

## 2021-02-08 DIAGNOSIS — S63.501A SPRAIN OF RIGHT WRIST, INITIAL ENCOUNTER: ICD-10-CM

## 2021-02-08 DIAGNOSIS — S01.81XA LACERATION OF FOREHEAD, INITIAL ENCOUNTER: ICD-10-CM

## 2021-02-08 DIAGNOSIS — S00.11XA CONTUSION OF RIGHT PERIOCULAR REGION, INITIAL ENCOUNTER: Primary | ICD-10-CM

## 2021-02-08 PROCEDURE — 70450 CT HEAD/BRAIN W/O DYE: CPT

## 2021-02-08 PROCEDURE — 99283 EMERGENCY DEPT VISIT LOW MDM: CPT

## 2021-02-08 PROCEDURE — 90471 IMMUNIZATION ADMIN: CPT | Performed by: EMERGENCY MEDICINE

## 2021-02-08 PROCEDURE — 90715 TDAP VACCINE 7 YRS/> IM: CPT | Performed by: EMERGENCY MEDICINE

## 2021-02-08 PROCEDURE — 72125 CT NECK SPINE W/O DYE: CPT

## 2021-02-08 PROCEDURE — 73110 X-RAY EXAM OF WRIST: CPT

## 2021-02-08 PROCEDURE — 25010000002 TDAP 5-2.5-18.5 LF-MCG/0.5 SUSPENSION: Performed by: EMERGENCY MEDICINE

## 2021-02-08 RX ORDER — HYDROCODONE BITARTRATE AND ACETAMINOPHEN 10; 325 MG/1; MG/1
1 TABLET ORAL EVERY 4 HOURS PRN
Qty: 12 TABLET | Refills: 0 | Status: SHIPPED | OUTPATIENT
Start: 2021-02-08

## 2021-02-08 RX ORDER — LIDOCAINE HYDROCHLORIDE AND EPINEPHRINE 10; 10 MG/ML; UG/ML
10 INJECTION, SOLUTION INFILTRATION; PERINEURAL ONCE
Status: COMPLETED | OUTPATIENT
Start: 2021-02-08 | End: 2021-02-08

## 2021-02-08 RX ORDER — HYDROCODONE BITARTRATE AND ACETAMINOPHEN 7.5; 325 MG/1; MG/1
1 TABLET ORAL ONCE
Status: COMPLETED | OUTPATIENT
Start: 2021-02-08 | End: 2021-02-08

## 2021-02-08 RX ADMIN — LIDOCAINE HYDROCHLORIDE AND EPINEPHRINE 10 ML: 10; 10 INJECTION, SOLUTION INFILTRATION; PERINEURAL at 19:41

## 2021-02-08 RX ADMIN — TETANUS TOXOID, REDUCED DIPHTHERIA TOXOID AND ACELLULAR PERTUSSIS VACCINE, ADSORBED 0.5 ML: 5; 2.5; 8; 8; 2.5 SUSPENSION INTRAMUSCULAR at 21:20

## 2021-02-08 RX ADMIN — HYDROCODONE BITARTRATE AND ACETAMINOPHEN 1 TABLET: 7.5; 325 TABLET ORAL at 19:40

## 2021-02-08 RX ADMIN — Medication 3 ML: at 19:51

## 2021-02-08 NOTE — ED NOTES
Pt had a mechanical fall today while on a walk and hit her head on the concrete sidewalk. Bleeding laceration on forehead. Pt takes a daily 325 aspirin. Does not take blood thinners, no LOC. Right wrist pain from fall as well.      Emmy Singleton, RN  02/08/21 4393

## 2021-02-09 NOTE — ED NOTES
Pt alert and oriented x 4. Educated on discharge instructions. Pt verbalized understanding. Ambulatory with steady gait. Pt in no distress.       Tisha Yang RN  02/08/21 4420

## 2021-02-09 NOTE — ED PROVIDER NOTES
Laceration Repair    Date/Time: 2/8/2021 8:42 PM  Performed by: Lyndon Lu PA  Authorized by: Angel Scales MD     Consent:     Consent obtained:  Verbal    Consent given by:  Patient    Risks discussed:  Pain, poor cosmetic result and need for additional repair    Alternatives discussed:  No treatment  Anesthesia (see MAR for exact dosages):     Anesthesia method:  Local infiltration and topical application    Topical anesthetic:  LET    Local anesthetic:  Lidocaine 1% WITH epi  Laceration details:     Location:  Face    Face location:  R eyebrow    Length (cm):  3  Repair type:     Repair type:  Intermediate  Pre-procedure details:     Preparation:  Patient was prepped and draped in usual sterile fashion  Exploration:     Hemostasis achieved with:  LET    Wound exploration: wound explored through full range of motion      Wound extent: no fascia violation noted and no foreign bodies/material noted      Contaminated: no    Treatment:     Area cleansed with:  Hibiclens    Amount of cleaning:  Extensive    Irrigation solution:  Sterile saline    Irrigation method:  Syringe  Subcutaneous repair:     Suture size:  5-0    Suture material:  Vicryl    Number of sutures:  4  Skin repair:     Repair method:  Sutures    Suture size:  5-0    Suture material:  Nylon    Suture technique:  Simple interrupted    Number of sutures:  6  Approximation:     Approximation:  Close  Post-procedure details:     Dressing:  Antibiotic ointment and bulky dressing    Patient tolerance of procedure:  Tolerated well, no immediate complications  Comments:      Mild debridement performed.           Lyndon Lu PA  02/08/21 2045

## 2021-02-09 NOTE — ED PROVIDER NOTES
EMERGENCY DEPARTMENT ENCOUNTER    Room Number:  38/38  Date of encounter:  2/8/2021  PCP: Reyes, Miriam Mercado, MD  Historian: Patient      HPI:  Chief Complaint: Fall      Context: Carly Gotti is a 80 y.o. female who presents to the ED c/o being on the sidewalk today and hitting her head and catch herself with her right wrist.  The patient states she is had recent surgery by Dr. Titus on her right wrist.  The patient states she takes an aspirin daily but denies LOC.  She states she was wearing glasses at the time of fall.  Patient denies neck pain, chest pain, shortness of breath, fevers, chills, cough, known COVID-19 exposure, dental pain, vomiting, diarrhea or focal neuro deficit      PAST MEDICAL HISTORY  Active Ambulatory Problems     Diagnosis Date Noted   • S/P AVR (aortic valve replacement) 04/04/2016   • S/P TVR (tricuspid valve repair) 04/04/2016   • S/P MVR (mitral valve repair) 04/04/2016   • Benign essential hypertension 05/03/2016   • Chest tightness 05/03/2016   • Mitral and aortic valve disease 05/03/2016   • CKD (chronic kidney disease) stage 3, GFR 30-59 ml/min (CMS/HCC) 11/01/2016   • Small bowel obstruction (CMS/[facility]) 12/21/2016   • Abdominal pain 12/22/2016   • Slow transit constipation 12/27/2016   • Hyperlipidemia 05/02/2017   • Pacemaker 12/06/2017   • Colitis 03/18/2019   • Elevated lipase 03/18/2019   • C. difficile colitis 03/18/2019   • Ileus (CMS/[facility]) 03/18/2019   • Sepsis (CMS/[facility]) 03/19/2019   • Thrombocytopenia (CMS/[facility]) 03/20/2019   • Dysphagia 05/23/2019   • Esophageal web 05/23/2019   • Recurrent Clostridium difficile diarrhea 06/27/2019   • CKD (chronic kidney disease) stage 4, GFR 15-29 ml/min (CMS/HCC) 06/27/2019   • Generalized abdominal pain 06/27/2019   • Intestinal infection due to enteropathogenic E. coli 06/28/2019   • Obstructive sleep apnea 12/19/2019   • Nausea 08/27/2020     Resolved Ambulatory Problems     Diagnosis Date Noted   • No Resolved Ambulatory Problems      Past Medical History:   Diagnosis Date   • Aneurysm (CMS/HCC)    • Aortic valve disease    • Arthritis    • Atrophic kidney    • Bradycardia    • CAD (coronary artery disease)    • Depression    • Essential hypertension    • [place] (hard of hearing)    • Mitral valve disease    • Pulmonary hypertension (CMS/HCC)    • Sick sinus syndrome (CMS/HCC)    • Sleep apnea    • Spinal stenosis          PAST SURGICAL HISTORY  Past Surgical History:   Procedure Laterality Date   • AORTIC VALVE REPAIR/REPLACEMENT  2015    Dr Veliz   • CARDIAC CATHETERIZATION     • CARDIAC DEFIBRILLATOR PLACEMENT     •  SECTION     • COLONOSCOPY N/A 2019    NB   • ENDOSCOPY  2012    Hypertonic lower esophageal sphincter, gastritis. Path: Chronic gastritis, moderate.    • ENDOSCOPY N/A 2019    Web in the upper third of the esophagus. Dilated   • ENDOSCOPY N/A 2020    Procedure: ESOPHAGOGASTRODUODENOSCOPY WITH 56FR MALDONADO DILATATION AND COLD BIOPSIES;  Surgeon: Blayne Cai MD;  Location: [facility] ENDOSCOPY;  Service: Gastroenterology;  Laterality: N/A;  PRE: DYSPHAGIA  POST: SCHATZKI'S RING   • HYSTERECTOMY     • MITRAL VALVE ANNULOPLASTY  2015    Dr Veliz   • TRICUSPID VALVE SURGERY  2015    Dr Veliz         FAMILY HISTORY  Family History   Problem Relation Age of Onset   • ALS Mother    • Hypertension Father          SOCIAL HISTORY  Social History     Socioeconomic History   • Marital status:      Spouse name: Not on file   • Number of children: Not on file   • Years of education: Not on file   • Highest education level: Not on file   Tobacco Use   • Smoking status: Former Smoker     Quit date:      Years since quittin.1   • Smokeless tobacco: Never Used   Substance and Sexual Activity   • Alcohol use: No   • Drug use: No   • Sexual activity: Defer         ALLERGIES  Patient has no known allergies.        REVIEW OF SYSTEMS  Review of Systems         All systems reviewed  and negative except for those discussed in HPI.     PHYSICAL EXAM    I have reviewed the triage vital signs and nursing notes.    ED Triage Vitals   Temp Heart Rate Resp BP SpO2   02/08/21 1459 02/08/21 1459 02/08/21 1459 02/08/21 1913 02/08/21 1459   97.7 °F (36.5 °C) 87 19 (!) 184/100 98 %      Temp src Heart Rate Source Patient Position BP Location FiO2 (%)   02/08/21 1459 -- -- -- --   Tympanic           GENERAL: 80-year-old well developed, well nourished in mild distress  HENT: Laceration above her right eye with periorbital ecchymosis but no orbital tenderness or step-off and no signs of of blowout fracture, neck supple, trachea midline  EYES: no scleral icterus, PERRL, normal conjunctiva  CV: regular rhythm, regular rate, no murmur  RESPIRATORY: unlabored effort, CTAB  ABDOMEN: soft, non-tender, non-distended, bowel sounds present  MUSCULOSKELETAL: no gross deformity, no pedal edema, no calf tenderness: The patient has an abrasion and mild swelling to her right wrist where she had her recent surgery.  NEURO: alert,  sensory and motor function of extremities grossly intact, speech clear, mental status normal  SKIN: warm, dry, no rash  PSYCH:  Appropriate mood and affect      PPE  Pt does not present with symptoms for COVID19; however, I was wearing a mask and goggles throughout all patient interaction.    Vital signs and nursing notes reviewed.      LAB RESULTS  No results found for this or any previous visit (from the past 24 hour(s)).    Ordered the above labs and independently reviewed the results.        RADIOLOGY  Xr Wrist 3+ View Right    Result Date: 2/8/2021  XR WRIST 3+ VW RIGHT-  INDICATIONS: Trauma  TECHNIQUE: 3 views of the right wrist  COMPARISON: 11/11/2020  FINDINGS:  Intact appearing plate and screw fixative hardware is seen transfixing the previously demonstrated distal right radius fracture. A previous ulnar styloid fracture fragment is also apparent on this exam. At the volar aspect of the  wrist on the lateral view, a 3.6 mm fracture fragment probably relates to prior injury. No definite new fractures are noted. Osteoarthritic degenerative changes are seen, especially at the lateral aspect of the wrist. Follow-up/further evaluation can be obtained as indications persist.       As described.    This report was finalized on 2/8/2021 3:58 PM by Dr. Isak Mirza M.D.      Ct Head Without Contrast    Result Date: 2/8/2021  CT SCAN OF THE HEAD AND CERVICAL SPINE WITHOUT CONTRAST  CLINICAL HISTORY: Mechanical fall today. Patient hit her head on concrete, new laceration on the forehead. Headache and neck pain.  TECHNIQUE: CT scan of the head was obtained with 2 mm axial bone algorithm and 3 mm axial soft tissue algorithm images. No intravenous contrast was administered.  FINDINGS:  There is no evidence for a calvarial fracture. There is no evidence for an acute extra-axial hemorrhage. There are mild changes of chronic small vessel ischemic phenomena. The gray-white matter differentiation is within normal limits. The basal ganglia and thalami are unremarkable. The posterior fossa structures are within normal limits.       No evidence for acute traumatic intracranial pathology.  TECHNIQUE: CT scan of the cervical spine was obtained with 1 mm axial images. Sagittal and coronal reconstructed images were obtained.  FINDINGS:  There is no evidence for acute fracture or bony alignment within the cervical spinal. There are some well-corticated ossific densities adjacent to the right inferior articular facet of C3 and bilateral inferior articular facets of C4 that are felt to be related to chronic abnormalities such as either chronic ligamentous ossification or chronic fracture site.  At C2-3, there is mild foraminal narrowing secondary to predominantly facet hypertrophic change.  At C3-4, there is a disc osteophyte complex results in a mild degree of canal narrowing. There is mild-to-moderate right and mild  left foraminal narrowing secondary to comminution of uncovertebral joint hypertrophy and facet arthrosis on the right predominantly facet hypertrophic change on the left.  At C4-5, there is anterior spondylolisthesis of C4 on C5 by approximately 3 mm. There is mild left and mild-to-moderate right foraminal narrowing secondary to combination of uncovertebral joint hypertrophy and facet arthrosis.  At C5-6, there is marked loss of disc space height. There is degenerative retrolisthesis of C5 on C6 by approximately 3 mm. There is mild-to-moderate bilateral foraminal narrowing secondary to primarily uncovertebral joint hypertrophy. A disc osteophyte complex results in mild canal narrowing.  At C6-7, there is mild-to-moderate foraminal narrowing secondary to uncovertebral joint hypertrophy. A disc osteophyte complex results in mild canal narrowing.  At C7-T1, there is anterior spondylosis of C7 on T1 by approximately 4 mm.  Incidental note is made of an aberrant right subclavian artery.  IMPRESSION:  No evidence for acute fracture or bony malalignment within the cervical spine. Well-corticated ossific densities are appreciated adjacent to the right inferior facet of C3 and bilateral inferior articular facets of C4 and these are felt to most likely be related to chronic ligamentous ossification although they could be conceivably related to chronic fracture sites.  Multilevel degenerative phenomena are incidentally noted within the cervical spine as discussed in detail above.  Incidental aberrant right subclavian artery.  These findings were discussed with Annmarie Lucas on 02/08/2021 at approximately 4:49 PM    Radiation dose reduction techniques were utilized, including automated exposure control and exposure modulation based on body size.       Ct Cervical Spine Without Contrast    Result Date: 2/8/2021  CT SCAN OF THE HEAD AND CERVICAL SPINE WITHOUT CONTRAST  CLINICAL HISTORY: Mechanical fall today. Patient hit her  head on concrete, new laceration on the forehead. Headache and neck pain.  TECHNIQUE: CT scan of the head was obtained with 2 mm axial bone algorithm and 3 mm axial soft tissue algorithm images. No intravenous contrast was administered.  FINDINGS:  There is no evidence for a calvarial fracture. There is no evidence for an acute extra-axial hemorrhage. There are mild changes of chronic small vessel ischemic phenomena. The gray-white matter differentiation is within normal limits. The basal ganglia and thalami are unremarkable. The posterior fossa structures are within normal limits.       No evidence for acute traumatic intracranial pathology.  TECHNIQUE: CT scan of the cervical spine was obtained with 1 mm axial images. Sagittal and coronal reconstructed images were obtained.  FINDINGS:  There is no evidence for acute fracture or bony alignment within the cervical spinal. There are some well-corticated ossific densities adjacent to the right inferior articular facet of C3 and bilateral inferior articular facets of C4 that are felt to be related to chronic abnormalities such as either chronic ligamentous ossification or chronic fracture site.  At C2-3, there is mild foraminal narrowing secondary to predominantly facet hypertrophic change.  At C3-4, there is a disc osteophyte complex results in a mild degree of canal narrowing. There is mild-to-moderate right and mild left foraminal narrowing secondary to comminution of uncovertebral joint hypertrophy and facet arthrosis on the right predominantly facet hypertrophic change on the left.  At C4-5, there is anterior spondylolisthesis of C4 on C5 by approximately 3 mm. There is mild left and mild-to-moderate right foraminal narrowing secondary to combination of uncovertebral joint hypertrophy and facet arthrosis.  At C5-6, there is marked loss of disc space height. There is degenerative retrolisthesis of C5 on C6 by approximately 3 mm. There is mild-to-moderate bilateral  foraminal narrowing secondary to primarily uncovertebral joint hypertrophy. A disc osteophyte complex results in mild canal narrowing.  At C6-7, there is mild-to-moderate foraminal narrowing secondary to uncovertebral joint hypertrophy. A disc osteophyte complex results in mild canal narrowing.  At C7-T1, there is anterior spondylosis of C7 on T1 by approximately 4 mm.  Incidental note is made of an aberrant right subclavian artery.  IMPRESSION:  No evidence for acute fracture or bony malalignment within the cervical spine. Well-corticated ossific densities are appreciated adjacent to the right inferior facet of C3 and bilateral inferior articular facets of C4 and these are felt to most likely be related to chronic ligamentous ossification although they could be conceivably related to chronic fracture sites.  Multilevel degenerative phenomena are incidentally noted within the cervical spine as discussed in detail above.  Incidental aberrant right subclavian artery.  These findings were discussed with Annmarie Lucas on 02/08/2021 at approximately 4:49 PM    Radiation dose reduction techniques were utilized, including automated exposure control and exposure modulation based on body size.         I ordered the above noted radiological studies. Independently reviewed by me and discussed with radiologist.  See dictation above for official radiology interpretation.      PROCEDURES    Procedures        MEDICATIONS GIVEN IN ER    Medications   lidocaine-EPINEPHrine (XYLOCAINE W/EPI) 1 %-1:051993 injection 10 mL (10 mL Injection Given 2/8/21 1941)   Tdap (BOOSTRIX) injection 0.5 mL (0.5 mL Intramuscular Given 2/8/21 2120)   HYDROcodone-acetaminophen (NORCO) 7.5-325 MG per tablet 1 tablet (1 tablet Oral Given 2/8/21 1940)   lidocaine-epinephrine-tetracaine (LET) topical gel 3 mL (3 mL Topical Given 2/8/21 1951)         PROGRESS, DATA ANALYSIS, CONSULTS, AND MEDICAL DECISION MAKING    All labs have been independently reviewed  by me.  All radiology studies have been reviewed by me and discussed with radiologist dictating report.   EKG's independently reviewed by me.  Discussion below represents my analysis of pertinent findings related to patient's condition, differential diagnosis, treatment plan and final disposition.      ED Course as of Feb 08 2357   Mon Feb 08, 2021 1926 I will give the patient a Tdap shot and Lortab for pain.  Her head CT and CT C-spine are negative.  I have reviewed the patient's x-rays and read Dr. Mirza's report and believe that her findings are consistent with old fracture and that her ORIF is intact.  I will have the nurses clean the patient's wounds on her right wrist and place a splint and I will ask our midlevel to repair the laceration.    [GP]      ED Course User Index  [GP] Angel Scales MD John Burkle performed the patient's laceration repair          AS OF 23:57 EST VITALS:    BP - (!) 184/100  HR - 87  TEMP - 97.7 °F (36.5 °C) (Tympanic)  02 SATS - 98%        DIAGNOSIS  Final diagnoses:   Contusion of right periocular region, initial encounter   Laceration of forehead, initial encounter   Sprain of right wrist, initial encounter         DISPOSITION  Discharged        EMR Dragon/Transcription disclaimer:   Much of this encounter note is an electronic transcription/translation of spoken language to printed text.        Angel Scales MD  02/08/21 [id number]

## 2021-02-09 NOTE — ED NOTES
Daughter notified of pt's discharge and is on her way to pick pt's up.      Tisha Yang RN  02/08/21 2120

## 2021-02-09 NOTE — ED NOTES
Cleansed right wrist and hand with normal saline, applied natasha, applied dry dressing and wrapped with ace wrap. Cleansed forehead with normal saline. Pt tolerated well.      Tisha Yang RN  02/08/21 2026

## 2021-02-09 NOTE — ED NOTES
Attempted to call daughter at work home and cell phone number listed per pt. No answer at this time. Will continue to call for pickup of pt.      Tisha Yang, RN  02/08/21 [id number]

## 2021-03-18 ENCOUNTER — IMMUNIZATION (OUTPATIENT)
Dept: VACCINE CLINIC | Facility: HOSPITAL | Age: 81
End: 2021-03-18

## 2021-03-18 PROCEDURE — 91300 HC SARSCOV02 VAC 30MCG/0.3ML IM: CPT | Performed by: INTERNAL MEDICINE

## 2021-03-18 PROCEDURE — 0001A: CPT | Performed by: INTERNAL MEDICINE

## 2021-03-29 RX ORDER — BUMETANIDE 2 MG/1
TABLET ORAL
Qty: 90 TABLET | Refills: 1 | Status: SHIPPED | OUTPATIENT
Start: 2021-03-29 | End: 2021-10-08

## 2021-04-08 ENCOUNTER — IMMUNIZATION (OUTPATIENT)
Dept: VACCINE CLINIC | Facility: HOSPITAL | Age: 81
End: 2021-04-08

## 2021-04-08 PROCEDURE — 91300 HC SARSCOV02 VAC 30MCG/0.3ML IM: CPT | Performed by: INTERNAL MEDICINE

## 2021-04-08 PROCEDURE — 0002A: CPT | Performed by: INTERNAL MEDICINE

## 2021-06-15 ENCOUNTER — OFFICE VISIT (OUTPATIENT)
Dept: CARDIOLOGY | Facility: CLINIC | Age: 81
End: 2021-06-15

## 2021-06-15 VITALS
HEART RATE: 88 BPM | SYSTOLIC BLOOD PRESSURE: 126 MMHG | OXYGEN SATURATION: 97 % | WEIGHT: 137 LBS | HEIGHT: 63 IN | BODY MASS INDEX: 24.27 KG/M2 | DIASTOLIC BLOOD PRESSURE: 84 MMHG

## 2021-06-15 DIAGNOSIS — E78.2 MIXED HYPERLIPIDEMIA: ICD-10-CM

## 2021-06-15 DIAGNOSIS — Z98.890 S/P MVR (MITRAL VALVE REPAIR): ICD-10-CM

## 2021-06-15 DIAGNOSIS — R60.0 BILATERAL LOWER EXTREMITY EDEMA: Primary | ICD-10-CM

## 2021-06-15 DIAGNOSIS — I49.5 SICK SINUS SYNDROME (HCC): ICD-10-CM

## 2021-06-15 DIAGNOSIS — R29.6 FREQUENT FALLS: ICD-10-CM

## 2021-06-15 DIAGNOSIS — R53.83 FATIGUE, UNSPECIFIED TYPE: ICD-10-CM

## 2021-06-15 DIAGNOSIS — Z95.0 PACEMAKER: ICD-10-CM

## 2021-06-15 DIAGNOSIS — G47.33 OBSTRUCTIVE SLEEP APNEA: ICD-10-CM

## 2021-06-15 DIAGNOSIS — Z95.2 S/P AVR (AORTIC VALVE REPLACEMENT): ICD-10-CM

## 2021-06-15 DIAGNOSIS — Z98.890 S/P TVR (TRICUSPID VALVE REPAIR): ICD-10-CM

## 2021-06-15 DIAGNOSIS — I10 BENIGN ESSENTIAL HYPERTENSION: ICD-10-CM

## 2021-06-15 PROBLEM — F32.A DEPRESSION: Status: ACTIVE | Noted: 2021-06-15

## 2021-06-15 PROBLEM — I71.20 ANEURYSM OF THORACIC AORTA (HCC): Status: ACTIVE | Noted: 2021-06-15

## 2021-06-15 PROBLEM — M19.90 ARTHRITIS: Status: ACTIVE | Noted: 2021-06-15

## 2021-06-15 PROBLEM — N19 RENAL FAILURE: Status: ACTIVE | Noted: 2021-06-15

## 2021-06-15 PROBLEM — F41.9 ANXIETY: Status: ACTIVE | Noted: 2021-06-15

## 2021-06-15 PROCEDURE — 99214 OFFICE O/P EST MOD 30 MIN: CPT | Performed by: NURSE PRACTITIONER

## 2021-06-15 PROCEDURE — 93000 ELECTROCARDIOGRAM COMPLETE: CPT | Performed by: NURSE PRACTITIONER

## 2021-06-15 RX ORDER — FENOFIBRATE 67 MG/1
67 CAPSULE ORAL DAILY
COMMUNITY
Start: 2021-06-07

## 2021-06-15 RX ORDER — FLUOXETINE HYDROCHLORIDE 20 MG/1
60 CAPSULE ORAL DAILY
COMMUNITY
Start: 2021-03-12

## 2021-06-15 RX ORDER — DONEPEZIL HYDROCHLORIDE 10 MG/1
10 TABLET, FILM COATED ORAL DAILY
COMMUNITY
Start: 2021-05-03

## 2021-06-15 NOTE — PROGRESS NOTES
Date of Office Visit: 06/15/21  Encounter Provider: TORSTEN Fonseca  Primary Cardiologist: Dr. Pak  Place of Service: [facility] CARDIOLOGY  Patient Name: Carly Gotti  :1940      Subjective:     Chief Complaint:  6-month follow-up visit, valvular disease    History of Present Illness:  Carly Gotti is a pleasant 80 y.o. female who is new to me .  Outside records have been requested and reviewed by me if available.     This is a patient of Dr. Pak with history of moderate to severe aortic regurgitation status post replacement with bioprosthetic aortic valve, moderate to severe mitral regurgitation status post repair as well as severe tricuspid regurgitation status post tricuspid valve repair, severe pulmonary hypertension, chronic kidney disease/atrophic kidney, hyperlipidemia, hypertension, aberrant right subclavian artery, obstructive sleep apnea, sick sinus syndrome with dual-chamber permanent pacemaker, depression.    Patient underwent her valve surgery with Dr. Veliz 2015 and developed symptomatic sick sinus syndrome postoperatively requiring pacemaker placement.  In follow-up she did relatively well but never quite recovered back to baseline after surgery    Patient recently followed with Dr. Lopez who she last saw in 2017 she establish care with Dr. Pak 2017 and she had a repeat echocardiogram that showed normal LV systolic function wall motion with an EF of 64%, grade 1 diastolic dysfunction, normal-appearing bioprosthetic aortic valve no mitral valve regurgitation and trace tricuspid valve regurgitation with normal RVSP 17 mmHg.    2019 patient had annual follow-up and has been hospitalized twice for C. difficile colitis.  She was having fatigue and mild dyspnea with activity since her hospitalizations.  Plan was for 1 year follow-up with echocardiogram.  2020 patient called with reports of lower extremity swelling  especially in the left leg.  She had a lower extremity venous duplex that was normal and her bumetanide was increased but she did not have improvement so it was recommended she follow with her nephrologist for further changes in her diuretics given her kidney disease.    Patient was last in the office 11/19/2020 to see Dr. Pak.  She was overall doing okay but continued to have dyspnea on exertion that she felt was largely stable and unchanged no other significant symptoms.  She had had a chemical fall and fractured her right wrist and was going to require surgery.    1/9/21 echocardiogram showed a calculated EF of 57% with grade 2 diastolic dysfunction elevated left atrial filling pressure her bioprosthetic aortic valve appeared thickened with mild to moderate stenosis with increased gradient compared to December 2017, mild to moderate mitral valve stenosis worse since December 2017, RVSP mildly elevated 36 mmHg and moderate tricuspid regurgitation her aortic root was mildly dilated    4/8/2021 device check showed 60% battery life no alerts/events reported normal device function.    Patient is presenting today for routine 6-month follow-up visit.  Patient reports she has had some increasing fatigue over the last 1 to 2 months but denies any chest pain, shortness of breath, palpitations, dizziness, lightheadedness, syncope, near syncope or significant peripheral edema.  She denies any weight gain, PND or orthopnea.  Blood pressure stable today but she does not check at home.  She reports compliance with her CPAP machine but has not had it checked in some time.  She reports Dr. Schneider is her sleep medicine doctor she follows with nephrology Dr. Peters twice a year.  She had labs a few months ago with her PCP but is unsure what was checked.  Patient's other primary complaint is a few falls and weakness which she feels is related to her arthritis.  She has not had any falls related to dizziness or near syncope.   She reports her legs give out on her.  She has had 3 falls in the last 6 months with last occurring last Wednesday.    Past Medical History:   Diagnosis Date   • Aneurysm (CMS/HCC)    • Aortic valve disease    • Arthritis    • Atrophic kidney    • Bradycardia    • CAD (coronary artery disease)    • CKD (chronic kidney disease) stage 4, GFR 15-29 ml/min (CMS/HCC)    • Depression    • Dysphagia    • Essential hypertension    • [place] (hard of hearing)    • Hyperlipidemia    • Mitral valve disease    • Pulmonary hypertension (CMS/HCC)    • Sick sinus syndrome (CMS/HCC)    • Sleep apnea    • Spinal stenosis      Past Surgical History:   Procedure Laterality Date   • AORTIC VALVE REPAIR/REPLACEMENT  2015    Dr Veliz   • CARDIAC CATHETERIZATION     • CARDIAC DEFIBRILLATOR PLACEMENT     •  SECTION     • COLONOSCOPY N/A 2019    [facility]   • ENDOSCOPY  2012    Hypertonic lower esophageal sphincter, gastritis. Path: Chronic gastritis, moderate.    • ENDOSCOPY N/A 2019    Web in the upper third of the esophagus. Dilated   • ENDOSCOPY N/A 2020    Procedure: ESOPHAGOGASTRODUODENOSCOPY WITH 56FR MALDONADO DILATATION AND COLD BIOPSIES;  Surgeon: Blayne Cai MD;  Location: [facility] ENDOSCOPY;  Service: Gastroenterology;  Laterality: N/A;  PRE: DYSPHAGIA  POST: SCHATZKI'S RING   • HYSTERECTOMY     • MITRAL VALVE ANNULOPLASTY  2015    Dr Veliz   • TRICUSPID VALVE SURGERY  2015    Dr Veliz     Outpatient Medications Prior to Visit   Medication Sig Dispense Refill   • aspirin 325 MG tablet Take by mouth daily.     • atorvastatin (LIPITOR) 20 MG tablet take 1 tablet by mouth at bedtime  0   • bumetanide (BUMEX) 2 MG tablet TAKE 1 TABLET BY MOUTH EVERY DAY 90 tablet 1   • buPROPion XL (WELLBUTRIN XL) 300 MG 24 hr tablet Take 300 mg by mouth Daily.  0   • carvedilol (COREG) 12.5 MG tablet Take 12.5 mg by mouth 2 (Two) Times a Day.     • Coenzyme Q10 200 MG tablet Take 1 tablet/day by mouth  Daily.     • [employer] Senna Plus 8.6-50 MG per tablet TAKE 1 TABLET BY MOUTH EVERY DAY 30 tablet 2   • donepezil (ARICEPT) 10 MG tablet Take 10 mg by mouth Daily.     • estradiol (ESTRACE) 0.5 MG tablet Take by mouth daily.     • eszopiclone (LUNESTA) 3 MG tablet Take 3 mg by mouth every night. Take immediately before bedtime     • famotidine (Pepcid) 40 MG tablet Take 1 tablet by mouth 2 (Two) Times a Day. 180 tablet 3   • fenofibrate micronized (LOFIBRA) 67 MG capsule Take 67 mg by mouth Daily.     • FLUoxetine (PROzac) 20 MG capsule Take 60 mg by mouth Daily.     • folic acid (FOLVITE) 400 MCG tablet Take 1 tablet by mouth Daily. 30 tablet 0   • HYDROcodone-acetaminophen (NORCO)  MG per tablet Take 1 tablet by mouth Every 4 (Four) Hours As Needed for Moderate Pain . 12 tablet 0   • ondansetron (ZOFRAN) 4 MG tablet Take 1 tablet by mouth Every 6 (Six) Hours As Needed for Nausea or Vomiting. 10 tablet 0   • fenofibrate (TRICOR) 54 MG tablet Take 67 mg by mouth Daily.       No facility-administered medications prior to visit.       Allergies as of 06/15/2021   • (No Known Allergies)     Social History     Socioeconomic History   • Marital status:      Spouse name: Not on file   • Number of children: Not on file   • Years of education: Not on file   • Highest education level: Not on file   Tobacco Use   • Smoking status: Former Smoker     Quit date:      Years since quittin.4   • Smokeless tobacco: Never Used   Substance and Sexual Activity   • Alcohol use: No   • Drug use: No   • Sexual activity: Defer     Family History   Problem Relation Age of Onset   • ALS Mother    • Hypertension Father      Review of Systems   Constitutional: Positive for malaise/fatigue. Negative for chills, fever, weight gain and weight loss.   Cardiovascular: Negative for chest pain, dyspnea on exertion, leg swelling, near-syncope, orthopnea, palpitations, paroxysmal nocturnal dyspnea and syncope.   Respiratory: Positive  "for snoring. Negative for cough, shortness of breath and wheezing.    Hematologic/Lymphatic: Negative for bleeding problem. Does not bruise/bleed easily.   Skin: Negative for color change.   Musculoskeletal: Positive for falls (3 falls) and joint pain. Negative for myalgias.   Gastrointestinal: Negative for melena.   Genitourinary: Negative for hematuria.   Neurological: Positive for excessive daytime sleepiness. Negative for dizziness and light-headedness.   Psychiatric/Behavioral: Negative for depression. The patient is nervous/anxious.           Objective:     Vitals:    06/15/21 0400 06/15/21 1529 06/15/21 1600   BP:  126/84    BP Location:  Left arm    Patient Position:  Sitting    Pulse: 88 89 88   SpO2: 97%  97%   Weight:  62.1 kg (137 lb)    Height:  160 cm (63\")        Body mass index is 24.27 kg/m².  Pulse rate of 88 and SPO2 of 97% were not taken at 0400 but were taken at 1600.    PHYSICAL EXAM:  Vitals and nursing note reviewed.   Constitutional:       General: Not in acute distress.     Appearance: Well-developed and not in distress. Not diaphoretic.   Eyes:      Comments: Wearing glasses   HENT:      Head: Normocephalic and atraumatic.     Neck:      Vascular: No carotid bruit or JVD.   Pulmonary:      Effort: Pulmonary effort is normal. No respiratory distress.      Breath sounds: Normal breath sounds. No decreased breath sounds. No wheezing. No rhonchi. No rales.   Chest:      Comments: Left chest permanent pacemaker  Cardiovascular:      Normal rate. Regular rhythm.      Murmurs:  + murmur RUSB and apex      Comments: Nonpitting, mild sockline edema  Pulses:     Radial: 2+ bilaterally.     Dorsalis pedis: 2+ bilaterally.  Edema:     Ankle: bilateral trace edema of the ankle.     Feet: bilateral trace edema of the feet.  Abdominal:      General: Bowel sounds are normal. There is no distension.      Palpations: Abdomen is soft.      Tenderness: There is no abdominal tenderness.   Skin:     General: " Skin is warm and dry.      Findings: No erythema.   Neurological:      Mental Status: Alert and oriented to person, place, and time.   Psychiatric:         Attention and Perception: Attention normal.         Mood and Affect: Mood normal.         Speech: Speech normal.         Behavior: Behavior normal.         Thought Content: Thought content normal.         Cognition and Memory: Cognition normal.         Judgment: Judgment normal.           ECG 12 Lead    Date/Time: 6/15/2021 3:39 PM  Performed by: Olive Nguyen APRN  Authorized by: Olive Nguyen APRN   Comparison: compared with previous ECG from 11/19/2020  Similar to previous ECG  Rhythm: paced  Rate: normal  BPM: 89  Conduction: left anterior fascicular block  QRS axis: left  Other findings: non-specific ST-T wave changes, low voltage and poor R wave progression    Clinical impression: abnormal EKG  Comments: Indication: Valvular heart disease, sick sinus syndrome status post permanent pacemaker            Most recent lab review:  I have requested most recent labs from PCP.  Assessment:       Diagnosis Plan   1. Bilateral lower extremity edema  Basic Metabolic Panel   2. S/P AVR (aortic valve replacement)  Basic Metabolic Panel   3. S/P MVR (mitral valve repair)  Basic Metabolic Panel   4. S/P TVR (tricuspid valve repair)  Basic Metabolic Panel   5. Benign essential hypertension  Basic Metabolic Panel   6. Mixed hyperlipidemia  Basic Metabolic Panel   7. Obstructive sleep apnea  Basic Metabolic Panel   8. Pacemaker  Basic Metabolic Panel   9. Sick sinus syndrome (CMS/HCC)  Basic Metabolic Panel       Plan:     1.  Dyspnea on exertion: Chronic.  History of pulmonary hypertension.  Mildly elevated RVSP  36 mmHg on 1/2021 echo  2.  History of bioprosthetic aortic valve replacement 2015:  Mild to moderate stenosis with increased gradient across the valve on 1/2021 echocardiogram.  And per Dr. Pak was to continue to monitor.  3.  History of tricuspid valve  repair 2015  : Moderate tricuspid regurgitation 1/2021 echo  4.  History of mitral valve repair 2015:Mild to moderate mitral valve stenosis 1/2021 echo  5.  Hypertension: BP is stable today.  Well-controlled on her current medications.  6.  Sick sinus syndrome: Status post Dual-chamber permanent pacemaker placement.  Continue with routine device checks which are reviewed and managed by rhythm management department.  4/8/2021 last remote device check  was stable  7.  Hyperlipidemia:  On atorvastatin which is managed by PCP Dr. Reyes.  8.  Chronic kidney disease: With history of atrophic kidney. Follows with nephrology Dr. Peters.   9.  Diastolic dysfunction: Grade 2 on 1/2021 echo.  She is maintained on bumetanide and overall stable from this standpoint.  10.  Fatigue: Likely multifactorial.  I encouraged her to check her blood pressure twice a day at home and call with readings in a week.  We will see if she needs medicine adjustment possibly decreasing carvedilol.  Also encouraged her to follow-up with sleep medicine as well as her primary care physician for further work-up/possible labs.  11.  Frequent falls: Not related to syncope or dizziness foot joint pain and weakness.  Asked her to discuss possible physical therapy with her PCP    Patient has no concerning heart failure symptoms with her valvular disease.  Her fatigue is unlikely to be caused from her valvular disease and more likely general age decline.  No changes from our standpoint today.  She denied the need for any medication refills.    Follow up with Dr. Pak in 6 months, unless otherwise needed sooner.  I advised the patient to contact our office with any questions or concerns.      I have reviewed this case with Dr. Pak who would like to reevaluate the patient and determine timing of next repeat echo. It has been a pleasure to participate in this patient's care. Please feel free to contact me with any questions or concerns.     Olive BLACKMAN  TORSTEN Nguyen  06/15/21             Your medication list          Accurate as of Christiane 15, 2021  4:17 PM. If you have any questions, ask your nurse or doctor.            CONTINUE taking these medications      Instructions Last Dose Given Next Dose Due   aspirin 325 MG tablet      Take by mouth daily.       atorvastatin 20 MG tablet  Commonly known as: LIPITOR      take 1 tablet by mouth at bedtime       bumetanide 2 MG tablet  Commonly known as: BUMEX      TAKE 1 TABLET BY MOUTH EVERY DAY       buPROPion  MG 24 hr tablet  Commonly known as: WELLBUTRIN XL      Take 300 mg by mouth Daily.       carvedilol 12.5 MG tablet  Commonly known as: COREG      Take 12.5 mg by mouth 2 (Two) Times a Day.       Coenzyme Q10 200 MG tablet      Take 1 tablet/day by mouth Daily.       [employer] Senna Plus 8.6-50 MG per tablet  Generic drug: sennosides-docusate      TAKE 1 TABLET BY MOUTH EVERY DAY       donepezil 10 MG tablet  Commonly known as: ARICEPT      Take 10 mg by mouth Daily.       estradiol 0.5 MG tablet  Commonly known as: ESTRACE      Take by mouth daily.       famotidine 40 MG tablet  Commonly known as: Pepcid      Take 1 tablet by mouth 2 (Two) Times a Day.       fenofibrate micronized 67 MG capsule  Commonly known as: LOFIBRA      Take 67 mg by mouth Daily.       FLUoxetine 20 MG capsule  Commonly known as: PROzac      Take 60 mg by mouth Daily.       folic acid 400 MCG tablet  Commonly known as: FOLVITE      Take 1 tablet by mouth Daily.       HYDROcodone-acetaminophen  MG per tablet  Commonly known as: NORCO      Take 1 tablet by mouth Every 4 (Four) Hours As Needed for Moderate Pain .       Lunesta 3 MG tablet  Generic drug: eszopiclone      Take 3 mg by mouth every night. Take immediately before bedtime       ondansetron 4 MG tablet  Commonly known as: Zofran      Take 1 tablet by mouth Every 6 (Six) Hours As Needed for Nausea or Vomiting.          STOP taking these medications    fenofibrate 54 MG  tablet  Commonly known as: TRICOR  Stopped by: TORSTEN Fonseca               The above medication changes may not have been made by this provider.  Medication list was updated to reflect medications patient is currently taking including medication changes and discontinuations made by other healthcare providers or the patients themselves.     Dictated utilizing Dragon Dictation System.

## 2021-06-16 ENCOUNTER — TELEPHONE (OUTPATIENT)
Dept: CARDIOLOGY | Facility: CLINIC | Age: 81
End: 2021-06-16

## 2021-06-16 NOTE — TELEPHONE ENCOUNTER
Steffi- can you please request her recent labs from PCP/nephrologist? I think she had some done in the last few months.    Thank you!

## 2021-06-18 ENCOUNTER — TELEPHONE (OUTPATIENT)
Dept: CARDIOLOGY | Facility: CLINIC | Age: 81
End: 2021-06-18

## 2021-06-18 NOTE — TELEPHONE ENCOUNTER
Received labs from PCP Dr. Reyes from 2/22/2021 including lipid panel total cholesterol 164, HDL 81, triglycerides 59, LDL 69, CMP within normal limits except for BUN elevated 45, creatinine elevated 1.84 with a GFR 25 and elevated BUN/creatinine ratio 24 (22) electrolytes LFTs within normal limits CBC within normal limits except for minimally low red blood cell count 3.74 (3.80).  They were reviewed and sent to scanning to be put in her chart.

## 2021-07-22 ENCOUNTER — APPOINTMENT (OUTPATIENT)
Dept: SLEEP MEDICINE | Facility: HOSPITAL | Age: 81
End: 2021-07-22

## 2021-07-23 PROCEDURE — 93294 REM INTERROG EVL PM/LDLS PM: CPT | Performed by: INTERNAL MEDICINE

## 2021-07-23 PROCEDURE — 93296 REM INTERROG EVL PM/IDS: CPT | Performed by: INTERNAL MEDICINE

## 2021-10-04 RX ORDER — FAMOTIDINE 40 MG/1
TABLET, FILM COATED ORAL
Qty: 180 TABLET | Refills: 3 | Status: SHIPPED | OUTPATIENT
Start: 2021-10-04 | End: 2022-09-06

## 2021-10-07 NOTE — TELEPHONE ENCOUNTER
Last OV 6/15/21.  Next OV 1/4/22.  Labs 8/27/20. Does not meet protocol.  [facility]

## 2021-10-08 RX ORDER — BUMETANIDE 2 MG/1
TABLET ORAL
Qty: 90 TABLET | Refills: 1 | Status: SHIPPED | OUTPATIENT
Start: 2021-10-08

## 2021-10-12 ENCOUNTER — APPOINTMENT (OUTPATIENT)
Dept: CT IMAGING | Facility: HOSPITAL | Age: 81
End: 2021-10-12

## 2021-10-12 ENCOUNTER — HOSPITAL ENCOUNTER (EMERGENCY)
Facility: HOSPITAL | Age: 81
Discharge: HOME | End: 2021-10-13
Attending: EMERGENCY MEDICINE | Admitting: EMERGENCY MEDICINE

## 2021-10-12 DIAGNOSIS — K56.41 FECAL IMPACTION (HCC): Primary | ICD-10-CM

## 2021-10-12 DIAGNOSIS — R10.84 GENERALIZED ABDOMINAL PAIN: ICD-10-CM

## 2021-10-12 PROCEDURE — 74176 CT ABD & PELVIS W/O CONTRAST: CPT

## 2021-10-12 PROCEDURE — 99284 EMERGENCY DEPT VISIT MOD MDM: CPT

## 2021-10-12 RX ORDER — SODIUM CHLORIDE 0.9 % (FLUSH) 0.9 %
10 SYRINGE (ML) INJECTION AS NEEDED
Status: DISCONTINUED | OUTPATIENT
Start: 2021-10-12 | End: 2021-10-13 | Stop reason: HOSPADM

## 2021-10-12 NOTE — ED TRIAGE NOTES
Pt arrives via EMS from home with c/o constipation, last BM 5 days ago. Hx of bowel obstruction. Pt tried laxatives, enema and stool softeners without relief. pt denies vomiting. Pt is c/o abdominal pain.     Pt given mask in triage, staff in PPE.

## 2021-10-13 VITALS
TEMPERATURE: 97.8 F | OXYGEN SATURATION: 98 % | RESPIRATION RATE: 14 BRPM | HEART RATE: 82 BPM | DIASTOLIC BLOOD PRESSURE: 91 MMHG | SYSTOLIC BLOOD PRESSURE: 170 MMHG | BODY MASS INDEX: 24.8 KG/M2 | HEIGHT: 63 IN | WEIGHT: 140 LBS

## 2021-10-13 LAB
ALBUMIN SERPL-MCNC: 5 G/DL (ref 3.5–5.2)
ALBUMIN/GLOB SERPL: 2.4 G/DL
ALP SERPL-CCNC: 54 U/L (ref 39–117)
ALT SERPL W P-5'-P-CCNC: 25 U/L (ref 1–33)
ANION GAP SERPL CALCULATED.3IONS-SCNC: 13.5 MMOL/L (ref 5–15)
AST SERPL-CCNC: 37 U/L (ref 1–32)
BASOPHILS # BLD AUTO: 0.01 10*3/MM3 (ref 0–0.2)
BASOPHILS NFR BLD AUTO: 0.1 % (ref 0–1.5)
BILIRUB SERPL-MCNC: 0.4 MG/DL (ref 0–1.2)
BILIRUB UR QL STRIP: NEGATIVE
BUN SERPL-MCNC: 40 MG/DL (ref 8–23)
BUN/CREAT SERPL: 22.5 (ref 7–25)
CALCIUM SPEC-SCNC: 10.3 MG/DL (ref 8.6–10.5)
CHLORIDE SERPL-SCNC: 94 MMOL/L (ref 98–107)
CLARITY UR: CLEAR
CO2 SERPL-SCNC: 29.5 MMOL/L (ref 22–29)
COLOR UR: YELLOW
CREAT SERPL-MCNC: 1.78 MG/DL (ref 0.57–1)
DEPRECATED RDW RBC AUTO: 41.6 FL (ref 37–54)
EOSINOPHIL # BLD AUTO: 0.08 10*3/MM3 (ref 0–0.4)
EOSINOPHIL NFR BLD AUTO: 1 % (ref 0.3–6.2)
ERYTHROCYTE [DISTWIDTH] IN BLOOD BY AUTOMATED COUNT: 12.1 % (ref 12.3–15.4)
GFR SERPL CREATININE-BSD FRML MDRD: 27 ML/MIN/1.73
GLOBULIN UR ELPH-MCNC: 2.1 GM/DL
GLUCOSE SERPL-MCNC: 84 MG/DL (ref 65–99)
GLUCOSE UR STRIP-MCNC: NEGATIVE MG/DL
HCT VFR BLD AUTO: 42.4 % (ref 34–46.6)
HGB BLD-MCNC: 14.5 G/DL (ref 12–15.9)
HGB UR QL STRIP.AUTO: NEGATIVE
IMM GRANULOCYTES # BLD AUTO: 0.02 10*3/MM3 (ref 0–0.05)
IMM GRANULOCYTES NFR BLD AUTO: 0.3 % (ref 0–0.5)
KETONES UR QL STRIP: NEGATIVE
LEUKOCYTE ESTERASE UR QL STRIP.AUTO: NEGATIVE
LIPASE SERPL-CCNC: 71 U/L (ref 13–60)
LYMPHOCYTES # BLD AUTO: 1 10*3/MM3 (ref 0.7–3.1)
LYMPHOCYTES NFR BLD AUTO: 12.6 % (ref 19.6–45.3)
MCH RBC QN AUTO: 32 PG (ref 26.6–33)
MCHC RBC AUTO-ENTMCNC: 34.2 G/DL (ref 31.5–35.7)
MCV RBC AUTO: 93.6 FL (ref 79–97)
MONOCYTES # BLD AUTO: 0.75 10*3/MM3 (ref 0.1–0.9)
MONOCYTES NFR BLD AUTO: 9.4 % (ref 5–12)
NEUTROPHILS NFR BLD AUTO: 6.09 10*3/MM3 (ref 1.7–7)
NEUTROPHILS NFR BLD AUTO: 76.6 % (ref 42.7–76)
NITRITE UR QL STRIP: NEGATIVE
NRBC BLD AUTO-RTO: 0 /100 WBC (ref 0–0.2)
PH UR STRIP.AUTO: 6.5 [PH] (ref 5–8)
PLATELET # BLD AUTO: 224 10*3/MM3 (ref 140–450)
PMV BLD AUTO: 11.6 FL (ref 6–12)
POTASSIUM SERPL-SCNC: 3.7 MMOL/L (ref 3.5–5.2)
PROT SERPL-MCNC: 7.1 G/DL (ref 6–8.5)
PROT UR QL STRIP: NEGATIVE
RBC # BLD AUTO: 4.53 10*6/MM3 (ref 3.77–5.28)
SODIUM SERPL-SCNC: 137 MMOL/L (ref 136–145)
SP GR UR STRIP: 1.01 (ref 1–1.03)
UROBILINOGEN UR QL STRIP: NORMAL
WBC # BLD AUTO: 7.95 10*3/MM3 (ref 3.4–10.8)

## 2021-10-13 PROCEDURE — P9612 CATHETERIZE FOR URINE SPEC: HCPCS

## 2021-10-13 PROCEDURE — 85025 COMPLETE CBC W/AUTO DIFF WBC: CPT | Performed by: EMERGENCY MEDICINE

## 2021-10-13 PROCEDURE — 80053 COMPREHEN METABOLIC PANEL: CPT | Performed by: EMERGENCY MEDICINE

## 2021-10-13 PROCEDURE — 81003 URINALYSIS AUTO W/O SCOPE: CPT | Performed by: EMERGENCY MEDICINE

## 2021-10-13 PROCEDURE — 83690 ASSAY OF LIPASE: CPT | Performed by: EMERGENCY MEDICINE

## 2021-10-13 RX ORDER — POLYETHYLENE GLYCOL 3350 17 G/17G
17 POWDER, FOR SOLUTION ORAL DAILY
Qty: 30 EACH | Refills: 0 | Status: SHIPPED | OUTPATIENT
Start: 2021-10-13 | End: 2022-01-04

## 2021-10-13 NOTE — ED NOTES
Pt given cab voucher per request and assisted to waiting area via wheelchair.  Appropriate PPE worn at all times during pt care and interactions.     Radha Rey RN  10/13/21 0148

## 2021-10-13 NOTE — ED PROVIDER NOTES
EMERGENCY DEPARTMENT ENCOUNTER    Room Number:  22/22  Date of encounter:  10/14/2021  PCP: Reyes, Miriam Mercado, MD  Historian: Patient    HPI:  Chief Complaint: Constipation  A complete HPI/ROS/PMH/PSH/SH/FH are unobtainable due to: None    Context: Carly Gotti is a 81 y.o. female who presents to the ED c/o severe constipation with no bowel movement in the last 5 days.  Patient describes that she is bloated, has mild diffuse abdominal pain and swelling, nausea but no vomiting, and despite taking senna every day she has not had a bowel movement in 5 days.  She said no fever, no dysuria, no diarrhea, and she has had this happen before.  She has been able to eat but says she does get nauseated sometimes.  The pain does not radiate, it is generalized, and it is crampy      PAST MEDICAL HISTORY  Active Ambulatory Problems     Diagnosis Date Noted   • S/P AVR (aortic valve replacement) 04/04/2016   • S/P TVR (tricuspid valve repair) 04/04/2016   • S/P MVR (mitral valve repair) 04/04/2016   • Benign essential hypertension 05/03/2016   • Chest tightness 05/03/2016   • Mitral and aortic valve disease 05/03/2016   • CKD (chronic kidney disease) stage 3, GFR 30-59 ml/min (CMS/[facility]) 11/01/2016   • Small bowel obstruction ([facility]) 12/21/2016   • Abdominal pain 12/22/2016   • Slow transit constipation 12/27/2016   • Hyperlipidemia 05/02/2017   • Pacemaker 12/06/2017   • Colitis 03/18/2019   • Elevated lipase 03/18/2019   • C. difficile colitis 03/18/2019   • Ileus ([facility]) 03/18/2019   • Sepsis ([facility]) 03/19/2019   • Thrombocytopenia ([facility]) 03/20/2019   • Dysphagia 05/23/2019   • Esophageal web 05/23/2019   • Recurrent Clostridium difficile diarrhea 06/27/2019   • CKD (chronic kidney disease) stage 4, GFR 15-29 ml/min ([facility]) 06/27/2019   • Generalized abdominal pain 06/27/2019   • Intestinal infection due to enteropathogenic E. coli 06/28/2019   • Obstructive sleep apnea 12/19/2019   • Nausea 08/27/2020   • Renal failure  06/15/2021   • Depression 06/15/2021   • Arthritis 06/15/2021   • Aneurysm of thoracic aorta (HCC) 06/15/2021   • Anxiety 06/15/2021     Resolved Ambulatory Problems     Diagnosis Date Noted   • No Resolved Ambulatory Problems     Past Medical History:   Diagnosis Date   • Aneurysm (CMS/HCC)    • Aortic valve disease    • Atrophic kidney    • Bradycardia    • CAD (coronary artery disease)    • Essential hypertension    • [place] (hard of hearing)    • Mitral valve disease    • Pulmonary hypertension (CMS/HCC)    • Sick sinus syndrome (CMS/HCC)    • Sleep apnea    • Spinal stenosis          PAST SURGICAL HISTORY  Past Surgical History:   Procedure Laterality Date   • AORTIC VALVE REPAIR/REPLACEMENT  2015    Dr Veliz   • CARDIAC CATHETERIZATION     • CARDIAC DEFIBRILLATOR PLACEMENT     •  SECTION     • COLONOSCOPY N/A 2019    [facility]   • ENDOSCOPY  2012    Hypertonic lower esophageal sphincter, gastritis. Path: Chronic gastritis, moderate.    • ENDOSCOPY N/A 2019    Web in the upper third of the esophagus. Dilated   • ENDOSCOPY N/A 2020    Procedure: ESOPHAGOGASTRODUODENOSCOPY WITH 56FR MALDONADO DILATATION AND COLD BIOPSIES;  Surgeon: Blayne Cai MD;  Location: [facility] ENDOSCOPY;  Service: Gastroenterology;  Laterality: N/A;  PRE: DYSPHAGIA  POST: SCHATZKI'S RING   • HYSTERECTOMY     • MITRAL VALVE ANNULOPLASTY  2015    Dr Veliz   • TRICUSPID VALVE SURGERY  2015    Dr Veliz         FAMILY HISTORY  Family History   Problem Relation Age of Onset   • ALS Mother    • Hypertension Father          SOCIAL HISTORY  Social History     Socioeconomic History   • Marital status:    Tobacco Use   • Smoking status: Former Smoker     Quit date:      Years since quittin.8   • Smokeless tobacco: Never Used   Substance and Sexual Activity   • Alcohol use: No   • Drug use: No   • Sexual activity: Defer         ALLERGIES  Patient has no known allergies.        REVIEW OF  SYSTEMS  Review of Systems     All systems reviewed and negative except for those discussed in HPI.       PHYSICAL EXAM    I have reviewed the triage vital signs and nursing notes.    ED Triage Vitals [10/12/21 1934]   Temp Heart Rate Resp BP SpO2   97.8 °F (36.6 °C) 82 14 170/91 98 %      Temp src Heart Rate Source Patient Position BP Location FiO2 (%)   Oral -- -- -- --       Physical Exam  GENERAL: Awake and alert, nontoxic-appearing  HENT: nares patent  EYES: no scleral icterus  CV: regular rhythm, regular rate  RESPIRATORY: normal effort  ABDOMEN: soft, slightly distended and mild diffuse tenderness.  Normal bowel sounds are present  MUSCULOSKELETAL: no deformity  NEURO: alert, moves all extremities, follows commands  SKIN: warm, dry        LAB RESULTS  No results found for this or any previous visit (from the past 24 hour(s)).    Ordered the above labs and independently reviewed the results.        RADIOLOGY  No Radiology Exams Resulted Within Past 24 Hours    I ordered the above noted radiological studies. Reviewed by me and discussed with radiologist.  See dictation for official radiology interpretation.      PROCEDURES    Procedures      MEDICATIONS GIVEN IN ER    Medications - No data to display      PROGRESS, DATA ANALYSIS, CONSULTS, AND MEDICAL DECISION MAKING    All labs have been independently reviewed by me.  All radiology studies have been reviewed by me and discussed with radiologist dictating the report.   EKG's independently viewed and interpreted by me.  Discussion below represents my analysis of pertinent findings related to patient's condition, differential diagnosis, treatment plan and final disposition.        ED Course as of 10/14/21 0959   u Oct 14, 2021   0958 CBC unremarkable [DP]   0958 Chemistry shows stable chronic kidney disease compared to previous labs that I reviewed in epic [DP]   0958 Urinalysis unremarkable [DP]   0958 CT scan of the abdomen pelvis does show evidence for a  fecal impaction with dilated loops of colon up to 7 cm.  There are no inflammatory changes. [DP]   0959 Patient received an enema in the ED and had a very large bowel movement with complete resolution of symptoms.  Afterwards her abdomen was benign and she safe for discharge home [DP]      ED Course User Index  [DP] Gunnar Perales MD           PPE: The patient wore a surgical mask throughout the entire patient encounter. I wore an N95.    AS OF 09:59 EDT VITALS:    BP - 170/91  HR - 82  TEMP - 97.8 °F (36.6 °C) (Oral)  O2 SATS - 98%        DIAGNOSIS  Final diagnoses:   Fecal impaction (HCC)   Generalized abdominal pain         DISPOSITION  Discharge           Gunnar Perales MD  10/14/21 0937

## 2021-10-22 PROCEDURE — 93296 REM INTERROG EVL PM/IDS: CPT | Performed by: INTERNAL MEDICINE

## 2021-10-22 PROCEDURE — 93294 REM INTERROG EVL PM/LDLS PM: CPT | Performed by: INTERNAL MEDICINE

## 2022-01-03 ENCOUNTER — OFFICE VISIT (OUTPATIENT)
Dept: GASTROENTEROLOGY | Facility: CLINIC | Age: 82
End: 2022-01-03

## 2022-01-03 VITALS — BODY MASS INDEX: 23.57 KG/M2 | TEMPERATURE: 96.5 F | HEIGHT: 63 IN | WEIGHT: 133 LBS

## 2022-01-03 DIAGNOSIS — K59.03 DRUG-INDUCED CONSTIPATION: Primary | ICD-10-CM

## 2022-01-03 DIAGNOSIS — R13.10 DYSPHAGIA, UNSPECIFIED TYPE: ICD-10-CM

## 2022-01-03 PROCEDURE — 99214 OFFICE O/P EST MOD 30 MIN: CPT | Performed by: INTERNAL MEDICINE

## 2022-01-04 ENCOUNTER — OFFICE VISIT (OUTPATIENT)
Dept: CARDIOLOGY | Facility: CLINIC | Age: 82
End: 2022-01-04

## 2022-01-04 ENCOUNTER — CLINICAL SUPPORT NO REQUIREMENTS (OUTPATIENT)
Dept: CARDIOLOGY | Facility: CLINIC | Age: 82
End: 2022-01-04

## 2022-01-04 VITALS
BODY MASS INDEX: 23.57 KG/M2 | HEIGHT: 63 IN | WEIGHT: 133 LBS | HEART RATE: 79 BPM | SYSTOLIC BLOOD PRESSURE: 114 MMHG | DIASTOLIC BLOOD PRESSURE: 78 MMHG

## 2022-01-04 DIAGNOSIS — I08.0 MITRAL AND AORTIC VALVE DISEASE: ICD-10-CM

## 2022-01-04 DIAGNOSIS — E78.2 MIXED HYPERLIPIDEMIA: ICD-10-CM

## 2022-01-04 DIAGNOSIS — I71.20 THORACIC AORTIC ANEURYSM WITHOUT RUPTURE: ICD-10-CM

## 2022-01-04 DIAGNOSIS — Z95.0 PACEMAKER: ICD-10-CM

## 2022-01-04 DIAGNOSIS — M79.89 SWELLING OF LEFT LOWER EXTREMITY: ICD-10-CM

## 2022-01-04 DIAGNOSIS — I10 BENIGN ESSENTIAL HYPERTENSION: ICD-10-CM

## 2022-01-04 DIAGNOSIS — Z98.890 S/P TVR (TRICUSPID VALVE REPAIR): ICD-10-CM

## 2022-01-04 DIAGNOSIS — Z98.890 S/P MVR (MITRAL VALVE REPAIR): ICD-10-CM

## 2022-01-04 DIAGNOSIS — I49.5 SICK SINUS SYNDROME: Primary | ICD-10-CM

## 2022-01-04 DIAGNOSIS — Z95.2 S/P AVR (AORTIC VALVE REPLACEMENT): Primary | ICD-10-CM

## 2022-01-04 PROCEDURE — 93280 PM DEVICE PROGR EVAL DUAL: CPT | Performed by: INTERNAL MEDICINE

## 2022-01-04 PROCEDURE — 99214 OFFICE O/P EST MOD 30 MIN: CPT | Performed by: INTERNAL MEDICINE

## 2022-01-04 PROCEDURE — 93000 ELECTROCARDIOGRAM COMPLETE: CPT | Performed by: INTERNAL MEDICINE

## 2022-01-04 NOTE — PROGRESS NOTES
Subjective:     Encounter Date:01/04/2022      Patient ID: Carly Gotti is a 81 y.o. female.    Chief Complaint:  History of Present Illness    This is a 81-year-old female with a history of moderate to severe aortic regurgitation status post replacement with a bioprosthetic aortic valve, moderate to severe mitral regurgitation status post repair, severe tricuspid regurgitation status post repair, pulmonary hypertension, chronic kidney disease with an atrophic kidney, hyperlipidemia, hypertension, aberrant right subclavian artery, obstructive sleep apnea on CPAP, status post dual-chamber pacemaker placement for sick sinus syndrome, depression, who presents for follow up.      She presents today for routine 6-month follow-up.  She reports over the last month her arthritis has really started acting up and has severely limited her mobility.  He is to the point today that she came in a wheelchair because she is unable to ambulate very well.  She denies any chest pain, shortness of breath, palpitation, orthopnea, near-syncope or syncope.  She reports she has been having issues with lower extremity edema primarily in the left lower extremity.  This is despite a recent increase in her bumetanide dosage by her nephrologist.     Prior History:  The patient was previously followed by Dr. Lopez who she last saw in 5/2017.  Her prior cardiac history includes mitral and tricuspid valve repair and aortic valve replacement back in 11/2015 by Dr. Veliz.  Postoperatively she developed symptomatic sick sinus syndrome and required dual chamber permanent pacemaker placement.  Since then she's done relatively well although she reports that she never quite recovered back to her baseline following the surgery.       Following her initial visit with me in 12/2017 I set her up for a repeat echocardiogram that showed normal left ventricular systolic function and wall motion with an estimated ejection fraction 64%, grade 1  diastolic dysfunction, grossly normal-appearing bioprosthetic aortic valve, no mitral valve regurgitation, trace tricuspid valve regurgitation, and a right ventricular systolic pressure of 17 mmHg.     I saw her last in 12/2019 for annual follow-up.  Prior to that office visit she had been hospitalized twice for C. difficile colitis.   She reported fatigue and mild dyspnea with activity since all of these issues started.    I recommended a 1 year follow-up at that time with a repeat echocardiogram.  She called in 2/2020 with complaints of lower extremity swelling especially in the left leg.  I recommended proceeding with a lower extremity venous Doppler ultrasound and asked her to increase her bumetanide to 2 mg twice a day for a few days.  Ultrasound ended up being normal.  She was still having swelling so I recommended that she discuss any further changes to her bumetanide with her nephrologist.    She underwent a follow-up echocardiogram in 1/2021 which showed normal left ventricular systolic function wall motion with an EF of 60 to 65%, grade 2 diastolic dysfunction, bioprosthetic aortic valve with mild to moderately elevated gradients which appear to be slightly worse than prior echocardiogram, mild to moderate mitral valve stenosis across the surgically repaired mitral valve, mildly elevated right ventricular systolic pressure of 36 mmHg, and mildly dilated aortic root, and moderate regurgitation of the repaired tricuspid valve.    Review of Systems   Constitutional: Negative for malaise/fatigue.   HENT: Negative for hearing loss, hoarse voice, nosebleeds and sore throat.    Eyes: Negative for pain.   Cardiovascular: Positive for leg swelling. Negative for chest pain, claudication, cyanosis, dyspnea on exertion, irregular heartbeat, near-syncope, orthopnea, palpitations, paroxysmal nocturnal dyspnea and syncope.   Respiratory: Negative for shortness of breath and snoring.    Endocrine: Negative for cold  intolerance, heat intolerance, polydipsia, polyphagia and polyuria.   Skin: Negative for itching and rash.   Musculoskeletal: Positive for joint pain and joint swelling. Negative for arthritis, falls, muscle cramps, muscle weakness and myalgias.   Gastrointestinal: Negative for constipation, diarrhea, dysphagia, heartburn, hematemesis, hematochezia, melena, nausea and vomiting.   Genitourinary: Negative for frequency, hematuria and hesitancy.   Neurological: Negative for excessive daytime sleepiness, dizziness, headaches, light-headedness, numbness and weakness.   Psychiatric/Behavioral: Positive for depression. The patient is nervous/anxious.          Current Outpatient Medications:   •  aspirin 325 MG tablet, Take by mouth daily., Disp: , Rfl:   •  atorvastatin (LIPITOR) 20 MG tablet, take 1 tablet by mouth at bedtime, Disp: , Rfl: 0  •  bumetanide (BUMEX) 2 MG tablet, TAKE 1 TABLET BY MOUTH EVERY DAY, Disp: 90 tablet, Rfl: 1  •  buPROPion XL (WELLBUTRIN XL) 300 MG 24 hr tablet, Take 300 mg by mouth Daily., Disp: , Rfl: 0  •  carvedilol (COREG) 12.5 MG tablet, Take 12.5 mg by mouth 2 (Two) Times a Day., Disp: , Rfl:   •  Coenzyme Q10 200 MG tablet, Take 1 tablet/day by mouth Daily., Disp: , Rfl:   •  [employer] Senna Plus 8.6-50 MG per tablet, TAKE 1 TABLET BY MOUTH EVERY DAY, Disp: 30 tablet, Rfl: 2  •  donepezil (ARICEPT) 10 MG tablet, Take 10 mg by mouth Daily., Disp: , Rfl:   •  estradiol (ESTRACE) 0.5 MG tablet, Take by mouth daily., Disp: , Rfl:   •  eszopiclone (LUNESTA) 3 MG tablet, Take 3 mg by mouth every night. Take immediately before bedtime, Disp: , Rfl:   •  famotidine (PEPCID) 40 MG tablet, TAKE 1 TABLET BY MOUTH TWICE A DAY, Disp: 180 tablet, Rfl: 3  •  fenofibrate micronized (LOFIBRA) 67 MG capsule, Take 67 mg by mouth Daily., Disp: , Rfl:   •  FLUoxetine (PROzac) 20 MG capsule, Take 60 mg by mouth Daily., Disp: , Rfl:   •  HYDROcodone-acetaminophen (NORCO)  MG per tablet, Take 1 tablet by mouth  "Every 4 (Four) Hours As Needed for Moderate Pain ., Disp: 12 tablet, Rfl: 0    Past Medical History:   Diagnosis Date   • Aneurysm (HCC)    • Aortic valve disease    • Arthritis    • Atrophic kidney    • Bradycardia    • CAD (coronary artery disease)    • CKD (chronic kidney disease) stage 4, GFR 15-29 ml/min (HCC)    • Depression    • Dysphagia    • Essential hypertension    • [place] (hard of hearing)    • Hyperlipidemia    • Mitral valve disease    • Pulmonary hypertension (HCC)    • Sick sinus syndrome (HCC)    • Sleep apnea    • Spinal stenosis        Past Surgical History:   Procedure Laterality Date   • AORTIC VALVE REPAIR/REPLACEMENT  2015    Dr Veliz   • CARDIAC CATHETERIZATION     • CARDIAC DEFIBRILLATOR PLACEMENT     •  SECTION     • COLONOSCOPY N/A 2019    [facility]   • ENDOSCOPY  2012    Hypertonic lower esophageal sphincter, gastritis. Path: Chronic gastritis, moderate.    • ENDOSCOPY N/A 2019    Web in the upper third of the esophagus. Dilated   • ENDOSCOPY N/A 2020    Procedure: ESOPHAGOGASTRODUODENOSCOPY WITH 56FR MALDONADO DILATATION AND COLD BIOPSIES;  Surgeon: Blayne Cai MD;  Location: [facility] ENDOSCOPY;  Service: Gastroenterology;  Laterality: N/A;  PRE: DYSPHAGIA  POST: SCHATZKI'S RING   • HYSTERECTOMY     • MITRAL VALVE ANNULOPLASTY  2015    Dr Veliz   • TRICUSPID VALVE SURGERY  2015    Dr Veliz       Family History   Problem Relation Age of Onset   • ALS Mother    • Hypertension Father        Social History     Tobacco Use   • Smoking status: Former Smoker     Quit date:      Years since quittin.0   • Smokeless tobacco: Never Used   Substance Use Topics   • Alcohol use: No   • Drug use: No         ECG 12 Lead    Date/Time: 2022 5:25 PM  Performed by: Laura Pak MD  Authorized by: Laura Pak MD   Comments: Atrial paced rhythm               Objective:     Visit Vitals  /78   Pulse 79   Ht 160 cm (63\")   Wt 60.3 kg (133 " lb)   BMI 23.56 kg/m²         Constitutional:       Appearance: Normal appearance. Well-developed.   Eyes:      General: Lids are normal.      Conjunctiva/sclera: Conjunctivae normal.      Pupils: Pupils are equal, round, and reactive to light.   HENT:      Head: Normocephalic and atraumatic.   Neck:      Vascular: No carotid bruit or JVD.      Lymphadenopathy: No cervical adenopathy.   Pulmonary:      Effort: Pulmonary effort is normal.      Breath sounds: Normal breath sounds.   Cardiovascular:      Normal rate. Regular rhythm.      No gallop.   Pulses:     Radial: 2+ bilaterally.  Edema:     Peripheral edema present.     Pretibial: bilateral 1+ edema of the pretibial area.     Ankle: bilateral 1+ edema of the ankle.  Abdominal:      Palpations: Abdomen is soft.   Musculoskeletal:      Cervical back: Full passive range of motion without pain, normal range of motion and neck supple. Skin:     General: Skin is warm and dry.   Neurological:      Mental Status: Alert and oriented to person, place, and time.             Assessment:          Diagnosis Plan   1. S/P AVR (aortic valve replacement)     2. S/P TVR (tricuspid valve repair)     3. S/P MVR (mitral valve repair)     4. Pacemaker     5. Benign essential hypertension     6. Thoracic aortic aneurysm without rupture (HCC)     7. Mitral and aortic valve disease     8. Mixed hyperlipidemia            Plan:       1.  Left lower extremity edema.  Since she has been more sedentary and less mobile and her edema is asymmetric I would like to rule out DVT.  I am going to get her set up for a lower extremity venous Doppler ultrasound.  2.  Bioprosthetic aortic valve replacement.  Mild to moderately increased gradients on her last echocardiogram.  We will proceed with a repeat echocardiogram to reevaluate this.  3.  Mitral valve repair.  Mildly elevated gradients across her valve on her last echocardiogram.  Reevaluate with another echo as above.  4.  Tricuspid valve repair.   Noted to have moderate regurgitation on her last echocardiogram.  Plan as above.  5.  Hypertension.  Well-controlled on her current regimen medications.  6.  Mild aortic root dilatation.  Reevaluate as above.  7.  Hyperlipidemia.  On atorvastatin.  8.  Chronic kidney disease.  Managed by nephrology.  9.  Dual-chamber permanent pacemaker placement.  Interrogation in the office today showed normal function and no recent events.  10.  Osteoarthritis.  This is quite limiting for the patient.    I will call and discuss results of the echocardiogram and venous Doppler ultrasound.  We will tentatively plan on seeing the patient back again in 6 months.

## 2022-01-12 ENCOUNTER — HOSPITAL ENCOUNTER (OUTPATIENT)
Dept: CARDIOLOGY | Facility: HOSPITAL | Age: 82
Discharge: HOME | End: 2022-01-12
Admitting: INTERNAL MEDICINE

## 2022-01-12 DIAGNOSIS — M79.89 SWELLING OF LEFT LOWER EXTREMITY: ICD-10-CM

## 2022-01-12 LAB
BH CV LOWER VASCULAR LEFT COMMON FEMORAL AUGMENT: NORMAL
BH CV LOWER VASCULAR LEFT COMMON FEMORAL COMPETENT: NORMAL
BH CV LOWER VASCULAR LEFT COMMON FEMORAL COMPRESS: NORMAL
BH CV LOWER VASCULAR LEFT COMMON FEMORAL PHASIC: NORMAL
BH CV LOWER VASCULAR LEFT COMMON FEMORAL SPONT: NORMAL
BH CV LOWER VASCULAR LEFT DISTAL FEMORAL COMPRESS: NORMAL
BH CV LOWER VASCULAR LEFT GASTRONEMIUS COMPRESS: NORMAL
BH CV LOWER VASCULAR LEFT GREATER SAPH AK COMPRESS: NORMAL
BH CV LOWER VASCULAR LEFT GREATER SAPH BK COMPRESS: NORMAL
BH CV LOWER VASCULAR LEFT LESSER SAPH COMPRESS: NORMAL
BH CV LOWER VASCULAR LEFT MID FEMORAL AUGMENT: NORMAL
BH CV LOWER VASCULAR LEFT MID FEMORAL COMPETENT: NORMAL
BH CV LOWER VASCULAR LEFT MID FEMORAL COMPRESS: NORMAL
BH CV LOWER VASCULAR LEFT MID FEMORAL PHASIC: NORMAL
BH CV LOWER VASCULAR LEFT MID FEMORAL SPONT: NORMAL
BH CV LOWER VASCULAR LEFT PERONEAL COMPRESS: NORMAL
BH CV LOWER VASCULAR LEFT POPLITEAL AUGMENT: NORMAL
BH CV LOWER VASCULAR LEFT POPLITEAL COMPETENT: NORMAL
BH CV LOWER VASCULAR LEFT POPLITEAL COMPRESS: NORMAL
BH CV LOWER VASCULAR LEFT POPLITEAL PHASIC: NORMAL
BH CV LOWER VASCULAR LEFT POPLITEAL SPONT: NORMAL
BH CV LOWER VASCULAR LEFT POSTERIOR TIBIAL COMPRESS: NORMAL
BH CV LOWER VASCULAR LEFT PROFUNDA FEMORAL COMPRESS: NORMAL
BH CV LOWER VASCULAR LEFT PROXIMAL FEMORAL COMPRESS: NORMAL
BH CV LOWER VASCULAR LEFT SAPHENOFEMORAL JUNCTION COMPRESS: NORMAL
BH CV LOWER VASCULAR RIGHT COMMON FEMORAL AUGMENT: NORMAL
BH CV LOWER VASCULAR RIGHT COMMON FEMORAL COMPETENT: NORMAL
BH CV LOWER VASCULAR RIGHT COMMON FEMORAL COMPRESS: NORMAL
BH CV LOWER VASCULAR RIGHT COMMON FEMORAL PHASIC: NORMAL
BH CV LOWER VASCULAR RIGHT COMMON FEMORAL SPONT: NORMAL
MAXIMAL PREDICTED HEART RATE: 137 BPM
STRESS TARGET HR: 116 BPM

## 2022-01-12 PROCEDURE — 93971 EXTREMITY STUDY: CPT

## 2022-01-13 NOTE — PROGRESS NOTES
I called and discussed normal venous Doppler ultrasound results with the patient.  She is scheduled for an echocardiogram next week.  She is also scheduled to see Dr. Peters her nephrologist next week.  I will call her with the results of her echocardiogram once those are available.

## 2022-01-20 ENCOUNTER — HOSPITAL ENCOUNTER (OUTPATIENT)
Dept: CARDIOLOGY | Facility: HOSPITAL | Age: 82
Discharge: HOME | End: 2022-01-20
Admitting: INTERNAL MEDICINE

## 2022-01-20 VITALS
WEIGHT: 133 LBS | HEART RATE: 68 BPM | DIASTOLIC BLOOD PRESSURE: 82 MMHG | SYSTOLIC BLOOD PRESSURE: 138 MMHG | HEIGHT: 63 IN | BODY MASS INDEX: 23.57 KG/M2

## 2022-01-20 DIAGNOSIS — Z98.890 S/P TVR (TRICUSPID VALVE REPAIR): ICD-10-CM

## 2022-01-20 DIAGNOSIS — Z98.890 S/P MVR (MITRAL VALVE REPAIR): ICD-10-CM

## 2022-01-20 DIAGNOSIS — Z95.2 S/P AVR (AORTIC VALVE REPLACEMENT): ICD-10-CM

## 2022-01-20 PROCEDURE — 93306 TTE W/DOPPLER COMPLETE: CPT | Performed by: INTERNAL MEDICINE

## 2022-01-20 PROCEDURE — 93306 TTE W/DOPPLER COMPLETE: CPT

## 2022-01-21 LAB
AORTIC ARCH: 2.1 CM
AORTIC DIMENSIONLESS INDEX: 0.4 (DI)
ASCENDING AORTA: 3.6 CM
BH CV ECHO AV AORTIC VALVE AT ACCEL TIME CALCULATED: NORMAL MSEC
BH CV ECHO MEAS - ACS: 1.2 CM
BH CV ECHO MEAS - AO ACC TIME: 0.07 SEC
BH CV ECHO MEAS - AO MAX PG (FULL): 31.4 MMHG
BH CV ECHO MEAS - AO MAX PG: 37.4 MMHG
BH CV ECHO MEAS - AO MEAN PG (FULL): 9.1 MMHG
BH CV ECHO MEAS - AO MEAN PG: 12.8 MMHG
BH CV ECHO MEAS - AO ROOT AREA (BSA CORRECTED): 2.4
BH CV ECHO MEAS - AO ROOT AREA: 12.2 CM^2
BH CV ECHO MEAS - AO ROOT DIAM: 3.9 CM
BH CV ECHO MEAS - AO V2 MAX: 305.8 CM/SEC
BH CV ECHO MEAS - AO V2 MEAN: 174.6 CM/SEC
BH CV ECHO MEAS - AO V2 VTI: 71.1 CM
BH CV ECHO MEAS - ASC AORTA: 3.6 CM
BH CV ECHO MEAS - AT: 74 SEC
BH CV ECHO MEAS - AVA(I,A): 0.98 CM^2
BH CV ECHO MEAS - AVA(I,D): 0.98 CM^2
BH CV ECHO MEAS - AVA(V,A): 0.99 CM^2
BH CV ECHO MEAS - AVA(V,D): 0.99 CM^2
BH CV ECHO MEAS - BSA(HAYCOCK): 1.6 M^2
BH CV ECHO MEAS - BSA: 1.6 M^2
BH CV ECHO MEAS - BZI_BMI: 23.6 KILOGRAMS/M^2
BH CV ECHO MEAS - BZI_METRIC_HEIGHT: 160 CM
BH CV ECHO MEAS - BZI_METRIC_WEIGHT: 60.3 KG
BH CV ECHO MEAS - EDV(MOD-SP2): 65 ML
BH CV ECHO MEAS - EDV(MOD-SP4): 65 ML
BH CV ECHO MEAS - EDV(TEICH): 49.6 ML
BH CV ECHO MEAS - EF(CUBED): 77.6 %
BH CV ECHO MEAS - EF(MOD-BP): 61.2 %
BH CV ECHO MEAS - EF(MOD-SP2): 61.5 %
BH CV ECHO MEAS - EF(MOD-SP4): 60 %
BH CV ECHO MEAS - EF(TEICH): 70.8 %
BH CV ECHO MEAS - ESV(MOD-SP2): 25 ML
BH CV ECHO MEAS - ESV(MOD-SP4): 26 ML
BH CV ECHO MEAS - ESV(TEICH): 14.5 ML
BH CV ECHO MEAS - FS: 39.2 %
BH CV ECHO MEAS - IVS/LVPW: 1.1
BH CV ECHO MEAS - IVSD: 1.1 CM
BH CV ECHO MEAS - LAT PEAK E' VEL: 5.7 CM/SEC
BH CV ECHO MEAS - LV DIASTOLIC VOL/BSA (35-75): 40 ML/M^2
BH CV ECHO MEAS - LV MASS(C)D: 103.2 GRAMS
BH CV ECHO MEAS - LV MASS(C)DI: 63.5 GRAMS/M^2
BH CV ECHO MEAS - LV MAX PG: 6 MMHG
BH CV ECHO MEAS - LV MEAN PG: 3.7 MMHG
BH CV ECHO MEAS - LV SYSTOLIC VOL/BSA (12-30): 16 ML/M^2
BH CV ECHO MEAS - LV V1 MAX: 122.4 CM/SEC
BH CV ECHO MEAS - LV V1 MEAN: 94.7 CM/SEC
BH CV ECHO MEAS - LV V1 VTI: 28.3 CM
BH CV ECHO MEAS - LVIDD: 3.5 CM
BH CV ECHO MEAS - LVIDS: 2.1 CM
BH CV ECHO MEAS - LVLD AP2: 7.5 CM
BH CV ECHO MEAS - LVLD AP4: 7.3 CM
BH CV ECHO MEAS - LVLS AP2: 6.4 CM
BH CV ECHO MEAS - LVLS AP4: 6.4 CM
BH CV ECHO MEAS - LVOT AREA (M): 2.5 CM^2
BH CV ECHO MEAS - LVOT AREA: 2.5 CM^2
BH CV ECHO MEAS - LVOT DIAM: 1.8 CM
BH CV ECHO MEAS - LVPWD: 0.95 CM
BH CV ECHO MEAS - MED PEAK E' VEL: 7.6 CM/SEC
BH CV ECHO MEAS - MR MAX PG: 130.7 MMHG
BH CV ECHO MEAS - MR MAX VEL: 571.5 CM/SEC
BH CV ECHO MEAS - MV A DUR: 0.13 SEC
BH CV ECHO MEAS - MV A MAX VEL: 183 CM/SEC
BH CV ECHO MEAS - MV DEC SLOPE: 712.9 CM/SEC^2
BH CV ECHO MEAS - MV DEC TIME: 0.15 SEC
BH CV ECHO MEAS - MV E MAX VEL: 141 CM/SEC
BH CV ECHO MEAS - MV E/A: 0.77
BH CV ECHO MEAS - MV MAX PG: 17.2 MMHG
BH CV ECHO MEAS - MV MEAN PG: 7.6 MMHG
BH CV ECHO MEAS - MV P1/2T MAX VEL: 160 CM/SEC
BH CV ECHO MEAS - MV P1/2T: 65.7 MSEC
BH CV ECHO MEAS - MV V2 MAX: 207.7 CM/SEC
BH CV ECHO MEAS - MV V2 MEAN: 128.3 CM/SEC
BH CV ECHO MEAS - MV V2 VTI: 46.4 CM
BH CV ECHO MEAS - MVA P1/2T LCG: 1.4 CM^2
BH CV ECHO MEAS - MVA(P1/2T): 3.3 CM^2
BH CV ECHO MEAS - MVA(VTI): 1.5 CM^2
BH CV ECHO MEAS - PA ACC TIME: 0.08 SEC
BH CV ECHO MEAS - PA MAX PG (FULL): 0.94 MMHG
BH CV ECHO MEAS - PA MAX PG: 3.2 MMHG
BH CV ECHO MEAS - PA PR(ACCEL): 41 MMHG
BH CV ECHO MEAS - PA V2 MAX: 89.6 CM/SEC
BH CV ECHO MEAS - PULM A REVS DUR: 0.11 SEC
BH CV ECHO MEAS - PULM A REVS VEL: 21.9 CM/SEC
BH CV ECHO MEAS - PULM DIAS VEL: 53.6 CM/SEC
BH CV ECHO MEAS - PULM S/D: 1
BH CV ECHO MEAS - PULM SYS VEL: 54.7 CM/SEC
BH CV ECHO MEAS - PVA(V,A): 2.6 CM^2
BH CV ECHO MEAS - PVA(V,D): 2.6 CM^2
BH CV ECHO MEAS - QP/QS: 0.67
BH CV ECHO MEAS - RAP SYSTOLE: 3 MMHG
BH CV ECHO MEAS - RV MAX PG: 2.3 MMHG
BH CV ECHO MEAS - RV MEAN PG: 1.3 MMHG
BH CV ECHO MEAS - RV V1 MAX: 75.4 CM/SEC
BH CV ECHO MEAS - RV V1 MEAN: 54.6 CM/SEC
BH CV ECHO MEAS - RV V1 VTI: 15.1 CM
BH CV ECHO MEAS - RVOT AREA: 3.1 CM^2
BH CV ECHO MEAS - RVOT DIAM: 2 CM
BH CV ECHO MEAS - RVSP: 31 MMHG
BH CV ECHO MEAS - SI(AO): 531.8 ML/M^2
BH CV ECHO MEAS - SI(CUBED): 19.8 ML/M^2
BH CV ECHO MEAS - SI(LVOT): 42.9 ML/M^2
BH CV ECHO MEAS - SI(MOD-SP2): 24.6 ML/M^2
BH CV ECHO MEAS - SI(MOD-SP4): 24 ML/M^2
BH CV ECHO MEAS - SI(TEICH): 21.6 ML/M^2
BH CV ECHO MEAS - SUP REN AO DIAM: 2 CM
BH CV ECHO MEAS - SV(AO): 864.8 ML
BH CV ECHO MEAS - SV(CUBED): 32.2 ML
BH CV ECHO MEAS - SV(LVOT): 69.8 ML
BH CV ECHO MEAS - SV(MOD-SP2): 40 ML
BH CV ECHO MEAS - SV(MOD-SP4): 39 ML
BH CV ECHO MEAS - SV(RVOT): 46.7 ML
BH CV ECHO MEAS - SV(TEICH): 35.1 ML
BH CV ECHO MEAS - TAPSE (>1.6): 1.8 CM
BH CV ECHO MEAS - TR MAX VEL: 264.8 CM/SEC
BH CV ECHO MEASUREMENTS AVERAGE E/E' RATIO: 21.2
BH CV XLRA - RV BASE: 2.4 CM
BH CV XLRA - RV LENGTH: 6.7 CM
BH CV XLRA - RV MID: 2.4 CM
BH CV XLRA - TDI S': 7.7 CM/SEC
LEFT ATRIUM VOLUME INDEX: 35 ML/M2
MAXIMAL PREDICTED HEART RATE: 137 BPM
SINUS: 3.6 CM
STJ: 3.6 CM
STRESS TARGET HR: 116 BPM

## 2022-01-21 PROCEDURE — 93296 REM INTERROG EVL PM/IDS: CPT | Performed by: INTERNAL MEDICINE

## 2022-01-21 PROCEDURE — 93294 REM INTERROG EVL PM/LDLS PM: CPT | Performed by: INTERNAL MEDICINE

## 2022-03-15 ENCOUNTER — TELEPHONE (OUTPATIENT)
Dept: CARDIOLOGY | Facility: CLINIC | Age: 82
End: 2022-03-15

## 2022-03-15 NOTE — TELEPHONE ENCOUNTER
[phone]    Pt called stating she is to be getting a lumbar epidural w conscious sedation w Dr. Ga.  Please advise.    [facility]    [phone] for Dr. Ga

## 2022-05-03 ENCOUNTER — APPOINTMENT (OUTPATIENT)
Dept: CT IMAGING | Facility: HOSPITAL | Age: 82
End: 2022-05-03

## 2022-05-03 ENCOUNTER — HOSPITAL ENCOUNTER (OUTPATIENT)
Facility: HOSPITAL | Age: 82
Setting detail: OBSERVATION
Discharge: HOME | End: 2022-05-04
Attending: EMERGENCY MEDICINE | Admitting: PHYSICIAN ASSISTANT

## 2022-05-03 DIAGNOSIS — G97.1 POST LUMBAR PUNCTURE HEADACHE: Primary | ICD-10-CM

## 2022-05-03 DIAGNOSIS — G93.89 PNEUMOCEPHALUS: ICD-10-CM

## 2022-05-03 LAB
ALBUMIN SERPL-MCNC: 4 G/DL (ref 3.5–5.2)
ALBUMIN/GLOB SERPL: 1.8 G/DL
ALP SERPL-CCNC: 55 U/L (ref 39–117)
ALT SERPL W P-5'-P-CCNC: 15 U/L (ref 1–33)
ANION GAP SERPL CALCULATED.3IONS-SCNC: 13 MMOL/L (ref 5–15)
AST SERPL-CCNC: 22 U/L (ref 1–32)
BASOPHILS # BLD AUTO: 0.01 10*3/MM3 (ref 0–0.2)
BASOPHILS NFR BLD AUTO: 0.1 % (ref 0–1.5)
BILIRUB SERPL-MCNC: 0.4 MG/DL (ref 0–1.2)
BUN SERPL-MCNC: 21 MG/DL (ref 8–23)
BUN/CREAT SERPL: 16.2 (ref 7–25)
CALCIUM SPEC-SCNC: 9.9 MG/DL (ref 8.6–10.5)
CHLORIDE SERPL-SCNC: 103 MMOL/L (ref 98–107)
CO2 SERPL-SCNC: 28 MMOL/L (ref 22–29)
CREAT SERPL-MCNC: 1.3 MG/DL (ref 0.57–1)
DEPRECATED RDW RBC AUTO: 41.9 FL (ref 37–54)
EGFRCR SERPLBLD CKD-EPI 2021: 40.9 ML/MIN/1.73
EOSINOPHIL # BLD AUTO: 0.09 10*3/MM3 (ref 0–0.4)
EOSINOPHIL NFR BLD AUTO: 1.3 % (ref 0.3–6.2)
ERYTHROCYTE [DISTWIDTH] IN BLOOD BY AUTOMATED COUNT: 12.1 % (ref 12.3–15.4)
ERYTHROCYTE [SEDIMENTATION RATE] IN BLOOD: 8 MM/HR (ref 0–30)
GLOBULIN UR ELPH-MCNC: 2.2 GM/DL
GLUCOSE SERPL-MCNC: 94 MG/DL (ref 65–99)
HCT VFR BLD AUTO: 37.2 % (ref 34–46.6)
HGB BLD-MCNC: 12.5 G/DL (ref 12–15.9)
IMM GRANULOCYTES # BLD AUTO: 0.01 10*3/MM3 (ref 0–0.05)
IMM GRANULOCYTES NFR BLD AUTO: 0.1 % (ref 0–0.5)
LYMPHOCYTES # BLD AUTO: 0.75 10*3/MM3 (ref 0.7–3.1)
LYMPHOCYTES NFR BLD AUTO: 10.7 % (ref 19.6–45.3)
MCH RBC QN AUTO: 32.1 PG (ref 26.6–33)
MCHC RBC AUTO-ENTMCNC: 33.6 G/DL (ref 31.5–35.7)
MCV RBC AUTO: 95.4 FL (ref 79–97)
MONOCYTES # BLD AUTO: 0.58 10*3/MM3 (ref 0.1–0.9)
MONOCYTES NFR BLD AUTO: 8.3 % (ref 5–12)
NEUTROPHILS NFR BLD AUTO: 5.56 10*3/MM3 (ref 1.7–7)
NEUTROPHILS NFR BLD AUTO: 79.5 % (ref 42.7–76)
NRBC BLD AUTO-RTO: 0 /100 WBC (ref 0–0.2)
PLATELET # BLD AUTO: 212 10*3/MM3 (ref 140–450)
PMV BLD AUTO: 10.9 FL (ref 6–12)
POTASSIUM SERPL-SCNC: 3.9 MMOL/L (ref 3.5–5.2)
PROT SERPL-MCNC: 6.2 G/DL (ref 6–8.5)
RBC # BLD AUTO: 3.9 10*6/MM3 (ref 3.77–5.28)
SARS-COV-2 RNA RESP QL NAA+PROBE: NOT DETECTED
SODIUM SERPL-SCNC: 144 MMOL/L (ref 136–145)
WBC NRBC COR # BLD: 7 10*3/MM3 (ref 3.4–10.8)

## 2022-05-03 PROCEDURE — 25010000002 MORPHINE PER 10 MG: Performed by: EMERGENCY MEDICINE

## 2022-05-03 PROCEDURE — 70450 CT HEAD/BRAIN W/O DYE: CPT

## 2022-05-03 PROCEDURE — 99284 EMERGENCY DEPT VISIT MOD MDM: CPT

## 2022-05-03 PROCEDURE — U0003 INFECTIOUS AGENT DETECTION BY NUCLEIC ACID (DNA OR RNA); SEVERE ACUTE RESPIRATORY SYNDROME CORONAVIRUS 2 (SARS-COV-2) (CORONAVIRUS DISEASE [COVID-19]), AMPLIFIED PROBE TECHNIQUE, MAKING USE OF HIGH THROUGHPUT TECHNOLOGIES AS DESCRIBED BY CMS-2020-01-R: HCPCS | Performed by: EMERGENCY MEDICINE

## 2022-05-03 PROCEDURE — C9803 HOPD COVID-19 SPEC COLLECT: HCPCS

## 2022-05-03 PROCEDURE — 80053 COMPREHEN METABOLIC PANEL: CPT | Performed by: PHYSICIAN ASSISTANT

## 2022-05-03 PROCEDURE — 85025 COMPLETE CBC W/AUTO DIFF WBC: CPT | Performed by: PHYSICIAN ASSISTANT

## 2022-05-03 PROCEDURE — 25010000002 FENTANYL CITRATE (PF) 50 MCG/ML SOLUTION: Performed by: EMERGENCY MEDICINE

## 2022-05-03 PROCEDURE — 85652 RBC SED RATE AUTOMATED: CPT | Performed by: PHYSICIAN ASSISTANT

## 2022-05-03 PROCEDURE — 96375 TX/PRO/DX INJ NEW DRUG ADDON: CPT

## 2022-05-03 PROCEDURE — 25010000002 ONDANSETRON PER 1 MG: Performed by: EMERGENCY MEDICINE

## 2022-05-03 RX ORDER — ONDANSETRON 2 MG/ML
4 INJECTION INTRAMUSCULAR; INTRAVENOUS ONCE
Status: COMPLETED | OUTPATIENT
Start: 2022-05-03 | End: 2022-05-03

## 2022-05-03 RX ORDER — FENTANYL CITRATE 50 UG/ML
50 INJECTION, SOLUTION INTRAMUSCULAR; INTRAVENOUS ONCE
Status: COMPLETED | OUTPATIENT
Start: 2022-05-03 | End: 2022-05-03

## 2022-05-03 RX ORDER — MORPHINE SULFATE 2 MG/ML
2 INJECTION, SOLUTION INTRAMUSCULAR; INTRAVENOUS ONCE
Status: COMPLETED | OUTPATIENT
Start: 2022-05-03 | End: 2022-05-03

## 2022-05-03 RX ADMIN — MORPHINE SULFATE 2 MG: 2 INJECTION, SOLUTION INTRAMUSCULAR; INTRAVENOUS at 23:14

## 2022-05-03 RX ADMIN — SODIUM CHLORIDE 500 ML: 9 INJECTION, SOLUTION INTRAVENOUS at 23:11

## 2022-05-03 RX ADMIN — ONDANSETRON 4 MG: 2 INJECTION INTRAMUSCULAR; INTRAVENOUS at 23:14

## 2022-05-03 RX ADMIN — FENTANYL CITRATE 50 MCG: 50 INJECTION INTRAMUSCULAR; INTRAVENOUS at 23:55

## 2022-05-04 ENCOUNTER — TELEPHONE (OUTPATIENT)
Dept: NEUROSURGERY | Facility: CLINIC | Age: 82
End: 2022-05-04

## 2022-05-04 ENCOUNTER — APPOINTMENT (OUTPATIENT)
Dept: CT IMAGING | Facility: HOSPITAL | Age: 82
End: 2022-05-04

## 2022-05-04 VITALS
BODY MASS INDEX: 24.45 KG/M2 | RESPIRATION RATE: 16 BRPM | OXYGEN SATURATION: 98 % | HEIGHT: 63 IN | TEMPERATURE: 98.2 F | DIASTOLIC BLOOD PRESSURE: 77 MMHG | SYSTOLIC BLOOD PRESSURE: 129 MMHG | HEART RATE: 80 BPM | WEIGHT: 138 LBS

## 2022-05-04 PROBLEM — G97.1 POST LUMBAR PUNCTURE HEADACHE: Status: ACTIVE | Noted: 2022-05-04

## 2022-05-04 LAB
ANION GAP SERPL CALCULATED.3IONS-SCNC: 13 MMOL/L (ref 5–15)
BUN SERPL-MCNC: 19 MG/DL (ref 8–23)
BUN/CREAT SERPL: 16.1 (ref 7–25)
CALCIUM SPEC-SCNC: 9.3 MG/DL (ref 8.6–10.5)
CHLORIDE SERPL-SCNC: 104 MMOL/L (ref 98–107)
CO2 SERPL-SCNC: 24 MMOL/L (ref 22–29)
CREAT SERPL-MCNC: 1.18 MG/DL (ref 0.57–1)
DEPRECATED RDW RBC AUTO: 44.3 FL (ref 37–54)
EGFRCR SERPLBLD CKD-EPI 2021: 45.9 ML/MIN/1.73
ERYTHROCYTE [DISTWIDTH] IN BLOOD BY AUTOMATED COUNT: 12.3 % (ref 12.3–15.4)
GLUCOSE SERPL-MCNC: 89 MG/DL (ref 65–99)
HCT VFR BLD AUTO: 38 % (ref 34–46.6)
HGB BLD-MCNC: 12.3 G/DL (ref 12–15.9)
MCH RBC QN AUTO: 31.7 PG (ref 26.6–33)
MCHC RBC AUTO-ENTMCNC: 32.4 G/DL (ref 31.5–35.7)
MCV RBC AUTO: 97.9 FL (ref 79–97)
PLATELET # BLD AUTO: 213 10*3/MM3 (ref 140–450)
PMV BLD AUTO: 11.1 FL (ref 6–12)
POTASSIUM SERPL-SCNC: 3.5 MMOL/L (ref 3.5–5.2)
RBC # BLD AUTO: 3.88 10*6/MM3 (ref 3.77–5.28)
SODIUM SERPL-SCNC: 141 MMOL/L (ref 136–145)
WBC NRBC COR # BLD: 7.28 10*3/MM3 (ref 3.4–10.8)

## 2022-05-04 PROCEDURE — G0378 HOSPITAL OBSERVATION PER HR: HCPCS

## 2022-05-04 PROCEDURE — 99213 OFFICE O/P EST LOW 20 MIN: CPT | Performed by: NURSE PRACTITIONER

## 2022-05-04 PROCEDURE — 70450 CT HEAD/BRAIN W/O DYE: CPT

## 2022-05-04 PROCEDURE — 85027 COMPLETE CBC AUTOMATED: CPT | Performed by: EMERGENCY MEDICINE

## 2022-05-04 PROCEDURE — 80048 BASIC METABOLIC PNL TOTAL CA: CPT | Performed by: EMERGENCY MEDICINE

## 2022-05-04 PROCEDURE — 96365 THER/PROPH/DIAG IV INF INIT: CPT

## 2022-05-04 RX ORDER — MORPHINE SULFATE 2 MG/ML
2 INJECTION, SOLUTION INTRAMUSCULAR; INTRAVENOUS EVERY 4 HOURS PRN
Status: DISCONTINUED | OUTPATIENT
Start: 2022-05-04 | End: 2022-05-04 | Stop reason: HOSPADM

## 2022-05-04 RX ORDER — FLUOXETINE HYDROCHLORIDE 20 MG/1
60 CAPSULE ORAL DAILY
Status: DISCONTINUED | OUTPATIENT
Start: 2022-05-04 | End: 2022-05-04 | Stop reason: HOSPADM

## 2022-05-04 RX ORDER — NALOXONE HCL 0.4 MG/ML
0.4 VIAL (ML) INJECTION
Status: DISCONTINUED | OUTPATIENT
Start: 2022-05-04 | End: 2022-05-04 | Stop reason: HOSPADM

## 2022-05-04 RX ORDER — FAMOTIDINE 20 MG/1
40 TABLET, FILM COATED ORAL 2 TIMES DAILY
Status: DISCONTINUED | OUTPATIENT
Start: 2022-05-04 | End: 2022-05-04 | Stop reason: HOSPADM

## 2022-05-04 RX ORDER — ATORVASTATIN CALCIUM 20 MG/1
20 TABLET, FILM COATED ORAL DAILY
Status: DISCONTINUED | OUTPATIENT
Start: 2022-05-04 | End: 2022-05-04 | Stop reason: HOSPADM

## 2022-05-04 RX ORDER — CARVEDILOL 3.12 MG/1
12.5 TABLET ORAL 2 TIMES DAILY
Status: DISCONTINUED | OUTPATIENT
Start: 2022-05-04 | End: 2022-05-04 | Stop reason: HOSPADM

## 2022-05-04 RX ORDER — CHOLECALCIFEROL (VITAMIN D3) 125 MCG
5 CAPSULE ORAL NIGHTLY PRN
Status: DISCONTINUED | OUTPATIENT
Start: 2022-05-04 | End: 2022-05-04 | Stop reason: HOSPADM

## 2022-05-04 RX ORDER — NITROGLYCERIN 0.4 MG/1
0.4 TABLET SUBLINGUAL
Status: DISCONTINUED | OUTPATIENT
Start: 2022-05-04 | End: 2022-05-04 | Stop reason: HOSPADM

## 2022-05-04 RX ORDER — HYDROCODONE BITARTRATE AND ACETAMINOPHEN 10; 325 MG/1; MG/1
1 TABLET ORAL EVERY 4 HOURS PRN
Status: DISCONTINUED | OUTPATIENT
Start: 2022-05-04 | End: 2022-05-04 | Stop reason: HOSPADM

## 2022-05-04 RX ORDER — BUMETANIDE 2 MG/1
2 TABLET ORAL DAILY
Status: DISCONTINUED | OUTPATIENT
Start: 2022-05-04 | End: 2022-05-04 | Stop reason: HOSPADM

## 2022-05-04 RX ORDER — ASPIRIN 325 MG
325 TABLET ORAL DAILY
Status: DISCONTINUED | OUTPATIENT
Start: 2022-05-04 | End: 2022-05-04 | Stop reason: HOSPADM

## 2022-05-04 RX ORDER — ONDANSETRON 2 MG/ML
4 INJECTION INTRAMUSCULAR; INTRAVENOUS EVERY 6 HOURS PRN
Status: DISCONTINUED | OUTPATIENT
Start: 2022-05-04 | End: 2022-05-04 | Stop reason: HOSPADM

## 2022-05-04 RX ORDER — SODIUM CHLORIDE 0.9 % (FLUSH) 0.9 %
10 SYRINGE (ML) INJECTION EVERY 12 HOURS SCHEDULED
Status: DISCONTINUED | OUTPATIENT
Start: 2022-05-04 | End: 2022-05-04 | Stop reason: HOSPADM

## 2022-05-04 RX ORDER — ZOLPIDEM TARTRATE 5 MG/1
5 TABLET ORAL NIGHTLY PRN
Status: DISCONTINUED | OUTPATIENT
Start: 2022-05-04 | End: 2022-05-04 | Stop reason: HOSPADM

## 2022-05-04 RX ORDER — SODIUM CHLORIDE 0.9 % (FLUSH) 0.9 %
10 SYRINGE (ML) INJECTION AS NEEDED
Status: DISCONTINUED | OUTPATIENT
Start: 2022-05-04 | End: 2022-05-04 | Stop reason: HOSPADM

## 2022-05-04 RX ORDER — ONDANSETRON 4 MG/1
4 TABLET, FILM COATED ORAL EVERY 6 HOURS PRN
Status: DISCONTINUED | OUTPATIENT
Start: 2022-05-04 | End: 2022-05-04 | Stop reason: HOSPADM

## 2022-05-04 RX ORDER — DONEPEZIL HYDROCHLORIDE 10 MG/1
10 TABLET, FILM COATED ORAL DAILY
Status: DISCONTINUED | OUTPATIENT
Start: 2022-05-04 | End: 2022-05-04 | Stop reason: HOSPADM

## 2022-05-04 RX ORDER — BUPROPION HYDROCHLORIDE 150 MG/1
300 TABLET ORAL DAILY
Status: DISCONTINUED | OUTPATIENT
Start: 2022-05-04 | End: 2022-05-04 | Stop reason: HOSPADM

## 2022-05-04 RX ORDER — ACETAMINOPHEN 325 MG/1
650 TABLET ORAL EVERY 4 HOURS PRN
Status: DISCONTINUED | OUTPATIENT
Start: 2022-05-04 | End: 2022-05-04 | Stop reason: HOSPADM

## 2022-05-04 RX ADMIN — HYDROCODONE BITARTRATE AND ACETAMINOPHEN 1 TABLET: 10; 325 TABLET ORAL at 02:07

## 2022-05-04 RX ADMIN — HYDROCODONE BITARTRATE AND ACETAMINOPHEN 1 TABLET: 10; 325 TABLET ORAL at 18:26

## 2022-05-04 RX ADMIN — FLUOXETINE HYDROCHLORIDE 60 MG: 20 CAPSULE ORAL at 10:33

## 2022-05-04 RX ADMIN — HYDROCODONE BITARTRATE AND ACETAMINOPHEN 1 TABLET: 10; 325 TABLET ORAL at 14:15

## 2022-05-04 RX ADMIN — BUMETANIDE 2 MG: 2 TABLET ORAL at 10:34

## 2022-05-04 RX ADMIN — Medication 10 ML: at 10:40

## 2022-05-04 RX ADMIN — FAMOTIDINE 40 MG: 20 TABLET ORAL at 10:33

## 2022-05-04 RX ADMIN — ATORVASTATIN CALCIUM 20 MG: 20 TABLET, FILM COATED ORAL at 10:33

## 2022-05-04 RX ADMIN — CAFFEINE AND SODIUM BENZOATE 250 MG: 125 INJECTION, SOLUTION INTRAMUSCULAR; INTRAVENOUS at 00:48

## 2022-05-04 RX ADMIN — BUPROPION HYDROCHLORIDE 300 MG: 150 TABLET, EXTENDED RELEASE ORAL at 10:33

## 2022-05-04 RX ADMIN — CARVEDILOL 12.5 MG: 3.12 TABLET, FILM COATED ORAL at 10:33

## 2022-05-04 RX ADMIN — HYDROCODONE BITARTRATE AND ACETAMINOPHEN 1 TABLET: 10; 325 TABLET ORAL at 10:34

## 2022-05-04 RX ADMIN — Medication 10 ML: at 01:53

## 2022-05-04 RX ADMIN — ZOLPIDEM TARTRATE 5 MG: 5 TABLET ORAL at 03:18

## 2022-05-04 NOTE — H&P
[facility]   HISTORY AND PHYSICAL    Patient Name: Carly Gotti  : 1940  MRN: [id number]  Primary Care Physician:  Reyes, Miriam Mercado, MD  Date of admission: 5/3/2022    Subjective   Subjective     Chief Complaint:   Chief Complaint   Patient presents with   • Headache         HPI:    Carly Gotti is a 81 y.o. female with hypertension, sick sinus syndrome status post pacemaker, valvular heart disease status post aortic valve replacement, TVR, MVR, GERD, hyperlipidemia, anxiety and depression, dementia, and chronic pain, who presented to the emergency department complaining of headache following spinal epidural.  Patient had epidural injection with pain management on 5/3/2022.  Patient states she developed a headache shortly after returning home.  Patient has reportedly had epidural injection in the past but she does not recall having a headache afterwards.  She states headache began in the left temporal area but then became diffuse.  Patient also states she has some associated nausea but no vomiting.  Denies chest pain, shortness of breath, fever, chills, recent sick contacts.    Patient had CT in ED which does show pneumocephalus, ED physician spoke with neurosurgery who recommended observation overnight.  Patient received IV caffeine in ED.  She reports her headache is starting to improve after receiving her home dose of Norco.    Review of Systems   All systems were reviewed and negative except for: What is discussed in the HPI    Personal History     Past Medical History:   Diagnosis Date   • Aneurysm (HCC)    • Aortic valve disease    • Arthritis    • Atrophic kidney    • Bradycardia    • CAD (coronary artery disease)    • CKD (chronic kidney disease) stage 4, GFR 15-29 ml/min ([facility])    • Depression    • Dysphagia    • Essential hypertension    • [place] (hard of hearing)    • Hyperlipidemia    • Mitral valve disease    • Post lumbar puncture headache 2022   • Pulmonary hypertension  (HCC)    • Sick sinus syndrome (HCC)    • Sleep apnea    • Spinal stenosis        Past Surgical History:   Procedure Laterality Date   • AORTIC VALVE REPAIR/REPLACEMENT  2015    Dr Veliz   • CARDIAC CATHETERIZATION     • CARDIAC DEFIBRILLATOR PLACEMENT     •  SECTION     • COLONOSCOPY N/A 2019    [facility]   • ENDOSCOPY  2012    Hypertonic lower esophageal sphincter, gastritis. Path: Chronic gastritis, moderate.    • ENDOSCOPY N/A 2019    Web in the upper third of the esophagus. Dilated   • ENDOSCOPY N/A 2020    Procedure: ESOPHAGOGASTRODUODENOSCOPY WITH 56FR MALDONADO DILATATION AND COLD BIOPSIES;  Surgeon: Blayne Cai MD;  Location: [facility] ENDOSCOPY;  Service: Gastroenterology;  Laterality: N/A;  PRE: DYSPHAGIA  POST: SCHATZKI'S RING   • EPIDURAL  2022   • HYSTERECTOMY     • MITRAL VALVE ANNULOPLASTY  2015    Dr Veliz   • TRICUSPID VALVE SURGERY  2015    Dr Veliz       Family History: family history includes ALS in her mother; Hypertension in her father. Otherwise pertinent FHx was reviewed and not pertinent to current issue.    Social History:  reports that she quit smoking about 34 years ago. She has never used smokeless tobacco. She reports that she does not drink alcohol and does not use drugs.    Home Medications:  Coenzyme Q10, FLUoxetine, HYDROcodone-acetaminophen, aspirin, atorvastatin, buPROPion XL, bumetanide, carvedilol, donepezil, estradiol, eszopiclone, famotidine, fenofibrate micronized, and sennosides-docusate    Allergies:  No Known Allergies    Objective   Objective     Vitals:   Temp:  [97.9 °F (36.6 °C)-98.2 °F (36.8 °C)] 97.9 °F (36.6 °C)  Heart Rate:  [72-90] 79  Resp:  [12-20] 20  BP: (151-180)/() 173/74  Flow (L/min):  [2] 2  Physical Exam     GENERAL: 81 y.o. YO female a/o x 4, NAD  SKIN: Warm pink and dry   HEENT:  PERRL, EOM intact, conjunctiva normal, sclera clear  NECK: supple, no JVD  LUNGS: Clear to auscultation  bilaterally without wheezes, rales or rhonchi.  No accessory muscle use and no nasal flaring.  CARDIAC:  Regular rate and rhythm, S1-S2.  No murmurs, rubs or gallops.  No peripheral edema.  Equal pulses bilaterally.  ABDOMEN: Soft, nontender, nondistended.  No guarding or rebound tenderness.  Normal bowel sounds.  MUSCULOSKELETAL: Moves all extremities well.  No deformity.  NEURO: Cranial nerves II through XII grossly intact.  No gross focal deficits.  Alert.  Normal speech and motor.  PSYCH: Normal mood and affect      Result Review    Result Review:  I have personally reviewed the results from the time of this admission to 5/4/2022 01:51 EDT and agree with these findings:  [x]  Laboratory  []  Microbiology  [x]  Radiology  []  EKG/Telemetry   []  Cardiology/Vascular   []  Pathology  [x]  Old records  []  Other:  Most notable findings include:  Creatinine 1.30, 1.18 after receiving IV fluids, CT head shows pneumocephalus following epidural injection.    CT Head Without Contrast    Result Date: 5/3/2022  Pneumocephalus following epidural injection.  Radiation dose reduction techniques were utilized, including automated exposure control and exposure modulation based on body size.  This report was finalized on 5/3/2022 10:59 PM by Dr. Naty Edwards M.D.              Assessment/Plan   Assessment / Plan     Brief Patient Summary:  Carly Gotti is a 81 y.o. female who is being admitted to observation unit for further evaluation of headache status post epidural injection.    Active Hospital Problems:  Active Hospital Problems    Diagnosis    • Post lumbar puncture headache      Plan:     1.  Headache  -Suspect this is spinal headache due to spinal epidural injection for pain management  -Patient received IV caffeine in ED as well as IV fluids  -Patient to lie flat on bedrest overnight  -CTA reviewed, pneumocephalus following epidural injection  -Reevaluate in morning, will consult anesthesiology if blood patch  needed    2.  Hypertension/hyperlipidemia/GERD  -Chronic conditions, continue home medications as prescribed    3.  Valvular heart disease, sick sinus syndrome  -Status post aortic valve replacement  -Status post tricuspid valve repair  -Status post mitral valve repair  -Status post pacemaker    4.  Anxiety and depression, dementia  -Continue medications as prescribed, no new issues    5.  Chronic pain  -Continue medication as prescribed  -Continue outpatient follow-up with pain management      DVT prophylaxis:  Mechanical DVT prophylaxis orders are present.    CODE STATUS:    Level Of Support Discussed With: Patient  Code Status (Patient has no pulse and is not breathing): CPR (Attempt to Resuscitate)  Medical Interventions (Patient has pulse or is breathing): Full Support    Admission Status:  I believe this patient meets observation status.    I wore an N95 mask, face shield, and gloves during this patient encounter. Patient also wearing a surgical mask throughout encounter. Hand hygeine performed before and after seeing the patient.    Electronically signed by SONJA Ramirez, 05/04/22, 1:51 AM EDT.

## 2022-05-04 NOTE — ED TRIAGE NOTES
Pt to ED from home via [facility] with c/o L temporal headache starting on ride home after lumbar epidermal procedure.  Pt denies vision changes, numbness or tingling, no loss of bladder or bowel control. Pt denies taking any pain medications PTA.    Pt wearing mask, staff wearing appropriate PPE.

## 2022-05-04 NOTE — ED PROVIDER NOTES
EMERGENCY DEPARTMENT ENCOUNTER    Room Number:  116/1  Date of encounter:  5/4/2022  PCP: Reyes, Miriam Mercado, MD  Historian: Patient      HPI:  Chief Complaint: Headache  A complete HPI/ROS/PMH/PSH/SH/FH are unobtainable due to: Nothing    Context: Carly Gotti is a 81 y.o. female who presents to the ED c/o headache that began shortly after receiving an epidural treatment earlier today.  Patient states the headache is severe, located primarily in the left temporal, moved to the occipital region, and is now global.  The headache is made worse when she sits up and moves.  Lying down and remaining still slightly relieves the pain.  Patient denies fever, neck pain, recent head injury, nausea, vomiting, visual loss, photophobia, numbness, weakness, chest pain, palpitations, pain.  She denies recent head injury.  She is here for further evaluation.    PAST MEDICAL HISTORY  Active Ambulatory Problems     Diagnosis Date Noted   • S/P AVR (aortic valve replacement) 04/04/2016   • S/P TVR (tricuspid valve repair) 04/04/2016   • S/P MVR (mitral valve repair) 04/04/2016   • Benign essential hypertension 05/03/2016   • Mitral and aortic valve disease 05/03/2016   • CKD (chronic kidney disease) stage 3, GFR 30-59 ml/min (CMS/Formerly [facility]) 11/01/2016   • Small bowel obstruction (Formerly [facility]) 12/21/2016   • Abdominal pain 12/22/2016   • Slow transit constipation 12/27/2016   • Hyperlipidemia 05/02/2017   • Pacemaker 12/06/2017   • Colitis 03/18/2019   • Elevated lipase 03/18/2019   • C. difficile colitis 03/18/2019   • Ileus (Formerly [facility]) 03/18/2019   • Sepsis (Formerly [facility]) 03/19/2019   • Thrombocytopenia (Formerly [facility]) 03/20/2019   • Dysphagia 05/23/2019   • Esophageal web 05/23/2019   • Recurrent Clostridium difficile diarrhea 06/27/2019   • CKD (chronic kidney disease) stage 4, GFR 15-29 ml/min (Formerly [facility]) 06/27/2019   • Generalized abdominal pain 06/27/2019   • Intestinal infection due to enteropathogenic E. coli 06/28/2019   • Obstructive sleep apnea 12/19/2019   •  Nausea 2020   • Renal failure 06/15/2021   • Depression 06/15/2021   • Arthritis 06/15/2021   • Aneurysm of thoracic aorta (HCC) 06/15/2021   • Anxiety 06/15/2021     Resolved Ambulatory Problems     Diagnosis Date Noted   • Chest tightness 2016     Past Medical History:   Diagnosis Date   • Aneurysm (HCC)    • Aortic valve disease    • Atrophic kidney    • Bradycardia    • CAD (coronary artery disease)    • Essential hypertension    • [place] (hard of hearing)    • Mitral valve disease    • Post lumbar puncture headache 2022   • Pulmonary hypertension (HCC)    • Sick sinus syndrome (HCC)    • Sleep apnea    • Spinal stenosis          PAST SURGICAL HISTORY  Past Surgical History:   Procedure Laterality Date   • AORTIC VALVE REPAIR/REPLACEMENT  2015    Dr Veliz   • CARDIAC CATHETERIZATION     • CARDIAC DEFIBRILLATOR PLACEMENT     •  SECTION     • COLONOSCOPY N/A 2019    [facility]   • ENDOSCOPY  2012    Hypertonic lower esophageal sphincter, gastritis. Path: Chronic gastritis, moderate.    • ENDOSCOPY N/A 2019    Web in the upper third of the esophagus. Dilated   • ENDOSCOPY N/A 2020    Procedure: ESOPHAGOGASTRODUODENOSCOPY WITH 56FR MALDONADO DILATATION AND COLD BIOPSIES;  Surgeon: Blayne Cai MD;  Location: [facility] ENDOSCOPY;  Service: Gastroenterology;  Laterality: N/A;  PRE: DYSPHAGIA  POST: SCHATZKI'S RING   • EPIDURAL  2022   • HYSTERECTOMY     • MITRAL VALVE ANNULOPLASTY  2015    Dr Veliz   • TRICUSPID VALVE SURGERY  2015    Dr Veliz         FAMILY HISTORY  Family History   Problem Relation Age of Onset   • ALS Mother    • Hypertension Father          SOCIAL HISTORY  Social History     Socioeconomic History   • Marital status:    Tobacco Use   • Smoking status: Former Smoker     Quit date:      Years since quittin.3   • Smokeless tobacco: Never Used   Substance and Sexual Activity   • Alcohol use: No   • Drug use: No   •  Sexual activity: Defer         ALLERGIES  Patient has no known allergies.        REVIEW OF SYSTEMS  Review of Systems   Constitutional: Negative for chills and fever.   HENT: Positive for sinus pain.    Eyes: Negative for photophobia and visual disturbance.   Respiratory: Negative for cough and shortness of breath.    Cardiovascular: Negative for chest pain and palpitations.   Gastrointestinal: Negative for nausea and vomiting.   Musculoskeletal: Negative for neck pain.   Neurological: Positive for headaches. Negative for dizziness and light-headedness.        All systems reviewed and negative except for those discussed in HPI.       PHYSICAL EXAM    I have reviewed the triage vital signs and nursing notes.    ED Triage Vitals [05/03/22 2146]   Temp Heart Rate Resp BP SpO2   98.2 °F (36.8 °C) 72 12 180/80 95 %      Temp src Heart Rate Source Patient Position BP Location FiO2 (%)   Tympanic -- -- -- --       Physical Exam  GENERAL: Frail in appearance, nontoxic, no acute distress   HENT: nares patent, NCAT, face symmetric, no obvious TTP over the left temporal artery  Neck: No nuchal rigidity  EYES: no scleral icterus, PERRL, EOMs intact  CV: regular rhythm, regular rate, no obvious rubs murmurs gallops  RESPIRATORY: normal effort, lungs CTA B  ABDOMEN: soft  MUSCULOSKELETAL: no deformity  NEURO: alert oriented x4, moves all extremities, follows commands cranial nerves II through XII grossly intact, no focal neurodeficits noted on exam SKIN: warm, dry        LAB RESULTS  Recent Results (from the past 24 hour(s))   Comprehensive Metabolic Panel    Collection Time: 05/03/22 10:17 PM    Specimen: Blood   Result Value Ref Range    Glucose 94 65 - 99 mg/dL    BUN 21 8 - 23 mg/dL    Creatinine 1.30 (H) 0.57 - 1.00 mg/dL    Sodium 144 136 - 145 mmol/L    Potassium 3.9 3.5 - 5.2 mmol/L    Chloride 103 98 - 107 mmol/L    CO2 28.0 22.0 - 29.0 mmol/L    Calcium 9.9 8.6 - 10.5 mg/dL    Total Protein 6.2 6.0 - 8.5 g/dL    Albumin  4.00 3.50 - 5.20 g/dL    ALT (SGPT) 15 1 - 33 U/L    AST (SGOT) 22 1 - 32 U/L    Alkaline Phosphatase 55 39 - 117 U/L    Total Bilirubin 0.4 0.0 - 1.2 mg/dL    Globulin 2.2 gm/dL    A/G Ratio 1.8 g/dL    BUN/Creatinine Ratio 16.2 7.0 - 25.0    Anion Gap 13.0 5.0 - 15.0 mmol/L    eGFR 40.9 (L) >60.0 mL/min/1.73   Sedimentation Rate    Collection Time: 05/03/22 10:17 PM    Specimen: Blood   Result Value Ref Range    Sed Rate 8 0 - 30 mm/hr   CBC Auto Differential    Collection Time: 05/03/22 10:17 PM    Specimen: Blood   Result Value Ref Range    WBC 7.00 3.40 - 10.80 10*3/mm3    RBC 3.90 3.77 - 5.28 10*6/mm3    Hemoglobin 12.5 12.0 - 15.9 g/dL    Hematocrit 37.2 34.0 - 46.6 %    MCV 95.4 79.0 - 97.0 fL    MCH 32.1 26.6 - 33.0 pg    MCHC 33.6 31.5 - 35.7 g/dL    RDW 12.1 (L) 12.3 - 15.4 %    RDW-SD 41.9 37.0 - 54.0 fl    MPV 10.9 6.0 - 12.0 fL    Platelets 212 140 - 450 10*3/mm3    Neutrophil % 79.5 (H) 42.7 - 76.0 %    Lymphocyte % 10.7 (L) 19.6 - 45.3 %    Monocyte % 8.3 5.0 - 12.0 %    Eosinophil % 1.3 0.3 - 6.2 %    Basophil % 0.1 0.0 - 1.5 %    Immature Grans % 0.1 0.0 - 0.5 %    Neutrophils, Absolute 5.56 1.70 - 7.00 10*3/mm3    Lymphocytes, Absolute 0.75 0.70 - 3.10 10*3/mm3    Monocytes, Absolute 0.58 0.10 - 0.90 10*3/mm3    Eosinophils, Absolute 0.09 0.00 - 0.40 10*3/mm3    Basophils, Absolute 0.01 0.00 - 0.20 10*3/mm3    Immature Grans, Absolute 0.01 0.00 - 0.05 10*3/mm3    nRBC 0.0 0.0 - 0.2 /100 WBC   COVID-19, [facility] IN-HOUSE CEPHEID/GUERRERO NP SWAB IN TRANSPORT MEDIA 8-12 HR TAT - Swab, Nasopharynx    Collection Time: 05/03/22 11:10 PM    Specimen: Nasopharynx; Swab   Result Value Ref Range    COVID19 Not Detected Not Detected - Ref. Range   CBC (No Diff)    Collection Time: 05/04/22  2:03 AM    Specimen: Arm, Left; Blood   Result Value Ref Range    WBC 7.28 3.40 - 10.80 10*3/mm3    RBC 3.88 3.77 - 5.28 10*6/mm3    Hemoglobin 12.3 12.0 - 15.9 g/dL    Hematocrit 38.0 34.0 - 46.6 %    MCV 97.9 (H) 79.0 - 97.0  fL    MCH 31.7 26.6 - 33.0 pg    MCHC 32.4 31.5 - 35.7 g/dL    RDW 12.3 12.3 - 15.4 %    RDW-SD 44.3 37.0 - 54.0 fl    MPV 11.1 6.0 - 12.0 fL    Platelets 213 140 - 450 10*3/mm3   Basic Metabolic Panel    Collection Time: 05/04/22  2:03 AM    Specimen: Arm, Left; Blood   Result Value Ref Range    Glucose 89 65 - 99 mg/dL    BUN 19 8 - 23 mg/dL    Creatinine 1.18 (H) 0.57 - 1.00 mg/dL    Sodium 141 136 - 145 mmol/L    Potassium 3.5 3.5 - 5.2 mmol/L    Chloride 104 98 - 107 mmol/L    CO2 24.0 22.0 - 29.0 mmol/L    Calcium 9.3 8.6 - 10.5 mg/dL    BUN/Creatinine Ratio 16.1 7.0 - 25.0    Anion Gap 13.0 5.0 - 15.0 mmol/L    eGFR 45.9 (L) >60.0 mL/min/1.73       Ordered the above labs and independently reviewed the results.        RADIOLOGY  CT Head Without Contrast    Result Date: 5/3/2022  CT HEAD WITHOUT CONTRAST  HISTORY: New onset headache. Epidural injection today.  COMPARISON: 02/08/2021  TECHNIQUE: Axial CT imaging was obtained through the brain. No IV contrast was administered.  FINDINGS: No acute intracranial hemorrhage is seen, although the patient is noted to have pneumocephalus. There is diffuse atrophy. There is periventricular and deep white matter microangiopathic change. There is no midline shift or mass effect. There is some minimal partial opacification of the right mastoid air cells. Paranasal sinuses appear clear.      Pneumocephalus following epidural injection.  Radiation dose reduction techniques were utilized, including automated exposure control and exposure modulation based on body size.  This report was finalized on 5/3/2022 10:59 PM by Dr. Naty Edwards M.D.        I ordered the above noted radiological studies. Reviewed by me and discussed with radiologist.  See dictation for official radiology interpretation.      PROCEDURES    Procedures      MEDICATIONS GIVEN IN ER    Medications   nitroglycerin (NITROSTAT) SL tablet 0.4 mg (has no administration in time range)   sodium chloride 0.9 %  flush 10 mL (10 mL Intravenous Given 5/4/22 0153)   sodium chloride 0.9 % flush 10 mL (has no administration in time range)   ondansetron (ZOFRAN) tablet 4 mg (has no administration in time range)     Or   ondansetron (ZOFRAN) injection 4 mg (has no administration in time range)   acetaminophen (TYLENOL) tablet 650 mg (has no administration in time range)   morphine injection 2 mg (has no administration in time range)     And   naloxone (NARCAN) injection 0.4 mg (has no administration in time range)   melatonin tablet 5 mg (has no administration in time range)   aspirin tablet 325 mg (has no administration in time range)   atorvastatin (LIPITOR) tablet 20 mg (has no administration in time range)   bumetanide (BUMEX) tablet 2 mg (has no administration in time range)   buPROPion XL (WELLBUTRIN XL) 24 hr tablet 300 mg (has no administration in time range)   carvedilol (COREG) tablet 12.5 mg (has no administration in time range)   donepezil (ARICEPT) tablet 10 mg (has no administration in time range)   famotidine (PEPCID) tablet 40 mg (has no administration in time range)   FLUoxetine (PROzac) capsule 60 mg (has no administration in time range)   HYDROcodone-acetaminophen (NORCO)  MG per tablet 1 tablet (1 tablet Oral Given 5/4/22 0207)   zolpidem (AMBIEN) tablet 5 mg (5 mg Oral Given 5/4/22 0318)   morphine injection 2 mg (2 mg Intravenous Given 5/3/22 2314)   ondansetron (ZOFRAN) injection 4 mg (4 mg Intravenous Given 5/3/22 2314)   sodium chloride 0.9 % bolus 500 mL (0 mL Intravenous Stopped 5/3/22 2355)   fentaNYL citrate (PF) (SUBLIMAZE) injection 50 mcg (50 mcg Intravenous Given 5/3/22 2355)   caffeine-sodium benzoate 250 mg in sodium chloride 0.9 % 500 mL IVPB (0 mg Intravenous Stopped 5/4/22 0201)         PROGRESS, DATA ANALYSIS, CONSULTS, AND MEDICAL DECISION MAKING    All labs have been independently reviewed by me.  All radiology studies have been reviewed by me and discussed with radiologist dictating  the report.   EKG's independently viewed and interpreted by me.  Discussion below represents my analysis of pertinent findings related to patient's condition, differential diagnosis, treatment plan and final disposition.    DDx includes but is not limited to: Spinal headache, tension headache, migraine headache, meningitis, venous cavernous thrombosis, occipital neuralgia, giant cell arteritis, posttraumatic headache, intracranial pathology.  We will obtain CBC, CMP, sed rate, CT head without contrast to further evaluate.    ED Course as of 05/04/22 0413   Tue May 03, 2022   2258 WBC: 7.00 [RC]   2259 RBC: 3.90 [RC]   2259 Hemoglobin: 12.5 [RC]   2259 Hematocrit: 37.2 [RC]   2259 Platelets: 212 [RC]   2259 Glucose: 94 [RC]   2259 BUN: 21 [RC]   2259 Creatinine(!): 1.30 [RC]   2259 Sodium: 144 [RC]   2259 Potassium: 3.9 [RC]   2259 CO2: 28.0 [RC]   2259 Anion Gap: 13.0 [RC]   2259 Sed Rate: 8 [RC]   2259 Sedimentation rate 8.  Doubt giant cell arteritis.  Patient's headache does appear to be somewhat worse when she sits up.  Given the patient's procedure earlier today I suspect this is likely a spinal headache. [RC]   Wed May 04, 2022   0004 CT head without contrast shows pneumocephalus.  Call placed to neurosurgery. [RC]   0006 CONSULT        Provider: Dr. Palacios-neurosurgery    Discussion: Reviewed patient history, ED presentation and evaluation.  He recommends IV fluid bolus, caffeine, and is agreeable with plan for ED observation unit.  He would also be agreeable with anesthesia consult if concern for persistent CSF leak.    Agreeable c treatment and planned disposition.         [RS]   0007 CONSULT        Provider: MAURICIO Grider MA    Discussion: Reviewed patient history, ED presentation evaluation.  Agreeable to accept the patient to the ED observation unit.    Agreeable c treatment and planned disposition.         [RS]   0007 Caffeine dosing discussed with clinical pharmacist.  Plan 250 mg IV in 500 mL.  [RS]      ED Course User Index  [RC] Joel Littlejohn III, PA  [RS] Spike Wei MD           PPE: The patient wore a surgical mask throughout the entire patient encounter. I wore an N95.    AS OF 04:13 EDT VITALS:    BP - 173/74  HR - 79  TEMP - 97.9 °F (36.6 °C) (Oral)  O2 SATS - 97%        DIAGNOSIS  Final diagnoses:   Post lumbar puncture headache   Pneumocephalus         DISPOSITION  -Admit to observation unit         Joel Littlejohn III, PA  05/04/22 0414

## 2022-05-04 NOTE — NURSING NOTE
Patient able to ambulate with gait belt and stand by assist.  She did a lap around nursing station.  She does not report a headache just chronic pain from arthritis.

## 2022-05-04 NOTE — TELEPHONE ENCOUNTER
LILY CALLED TO INQUIRE ABOUT A PEER TO PEER FOR THE PATIENT IN ED, SHE STATED THAT SHE SENT A MESSAGE EARLIER AND WAS REQUESTING TO SEE IF SOMEONE IS COMING TO SEE THE PATIENT.    WARM TRANSFERRED, LYNDA STATED IT SHOULD BE A [facility] MESSAGE AND THAT THEY WOULD BE GETTING THERE ASAP,    ADVISED LILY OF LYNDA'S STATEMENT AND SHE STILL ASKED IF SOMEONE WAS COMING.  ADVISED HER THAT [facility] WOULD BE THE BEST WAY TO COMMUNICATE THIS, SOMEONE IN THE BACKGROUND STATED THEY WOULD PUT THE MESSAGE IN.    UNSURE IF MSG WAS SENT SO [facility]UB IS SENDING HIGH PRIORITY FOR FYI

## 2022-05-04 NOTE — PLAN OF CARE
Goal Outcome Evaluation:           Progress: improving  Outcome Evaluation: neuro sx saw patient and said we can increase her activity to see if her headache comes back.  Patient able to walk about nursing station with just stand-by assist.  No headache or dizziness.  She states she just has her normal pain from chronic arthritis.  Her daughter is going to take her home.  Instructed patient and daughter if the severe headache returns she needs to come back to ER. Patient had a pain pill at 6:30.  I reiterated her next one should not be taken until after 10:30 pm

## 2022-05-04 NOTE — CASE MANAGEMENT/SOCIAL WORK
Discharge Planning Assessment  [facility]     Patient Name: Carly Gotti  MRN: [id number]  Today's Date: 5/4/2022    Admit Date: 5/3/2022     Discharge Needs Assessment     Row Name 05/04/22 1002       Living Environment    People in Home child(silvano), adult    Current Living Arrangements home    Primary Care Provided by self    Provides Primary Care For no one    Family Caregiver if Needed child(silvano), adult    Quality of Family Relationships supportive    Able to Return to Prior Arrangements yes       Resource/Environmental Concerns    Resource/Environmental Concerns none       Transition Planning    Patient/Family Anticipates Transition to home with family    Patient/Family Anticipated Services at Transition none    Transportation Anticipated family or friend will provide       Discharge Needs Assessment    Equipment Currently Used at Home none    Concerns to be Addressed no discharge needs identified    Anticipated Changes Related to Illness none    Equipment Needed After Discharge none    Provided Post Acute Provider List? N/A    Provided Post Acute Provider Quality & Resource List? N/A               Discharge Plan     Row Name 05/04/22 1003       Plan    Plan Introduced self and explained role of CCP. PPE used    Provided Post Acute Provider List? N/A    Provided Post Acute Provider Quality & Resource List? N/A    Plan Comments Lives at home w/ son and plans to return at d/c. Can arrange own transport at d/c. No steps at home. No assistive devices used. Independent w/ ADLs. States she is interested in assist w/ housework and cooking; gave/explained In-home Personal Care Agency list. Denies any financial needs. Denies any d/c needs              Continued Care and Services - Admitted Since 5/3/2022    Coordination has not been started for this encounter.          Demographic Summary    No documentation.                Functional Status    No documentation.                Psychosocial    No  documentation.                Abuse/Neglect    No documentation.                Legal    No documentation.                Substance Abuse    No documentation.                Patient Forms    No documentation.                   Livier Montanez RN

## 2022-05-04 NOTE — DISCHARGE INSTRUCTIONS
Continue continue taking home medications as prescribed.  Continue physical therapy for your osteoarthritis as directed.    Follow-up with your primary care provider in 1 to 2 weeks.    Should your headache recur please return to the ER for reevaluation.  Return to the emergency department with worsening symptoms, uncontrolled pain, inability to tolerate oral liquids, fever greater than 101°F not controlled by Tylenol or as needed with emergent concerns.

## 2022-05-04 NOTE — CONSULTS
[facility] NEUROSURGERY CONSULT NOTE    Patient name: Carly Gotti  Referring Provider: Bonnie Limon PA-C  Reason for Consultation: pneumocephalus s/p epidural injection    Patient Care Team:  Reyes, Miriam Mercado, MD as PCP - General (Family Medicine)  Gato Titus MD as Consulting Physician (Hand Surgery)    Chief complaint: headache    Subjective .     History of present illness:    Patient is a 81 y.o.  female reports having lumbar epidural injection yesterday per Dr. Jason.  While driving home she had onset of pounding headache behind the left eye 7/10 in intensity.  It was aggravated by turning her head to the left.  No associated nausea vomiting or visual changes.  When she got home she used ice and rest and headache improved but still persisted and will return each time she turned her head or sat up.  CT head revealed pneumocephalus.  She has been on flat bedrest overnight.  Reported headache currently 0/10 and is not having any increased pain with head turns now.  Mainly complaining of left arthritic shoulder pain at this point.  She is a non-smoker.    Review of Systems  Review of Systems   Eyes: Negative for visual disturbance.   Gastrointestinal: Negative for nausea and vomiting.   Musculoskeletal: Positive for neck pain.   Neurological: Positive for headaches. Negative for dizziness and weakness.   Psychiatric/Behavioral: Negative for confusion.       History  PAST MEDICAL HISTORY  Past Medical History:   Diagnosis Date   • Aneurysm (HCC)    • Aortic valve disease    • Arthritis    • Atrophic kidney    • Bradycardia    • CAD (coronary artery disease)    • CKD (chronic kidney disease) stage 4, GFR 15-29 ml/min ([facility])    • Depression    • Dysphagia    • Essential hypertension    • [place] (hard of hearing)    • Hyperlipidemia    • Mitral valve disease    • Post lumbar puncture headache 5/4/2022   • Pulmonary hypertension (HCC)    • Sick sinus syndrome (HCC)    • Sleep apnea    • Spinal stenosis         PAST SURGICAL HISTORY  Past Surgical History:   Procedure Laterality Date   • AORTIC VALVE REPAIR/REPLACEMENT  2015    Dr Veliz   • CARDIAC CATHETERIZATION     • CARDIAC DEFIBRILLATOR PLACEMENT     •  SECTION     • COLONOSCOPY N/A 2019    NB   • ENDOSCOPY  2012    Hypertonic lower esophageal sphincter, gastritis. Path: Chronic gastritis, moderate.    • ENDOSCOPY N/A 2019    Web in the upper third of the esophagus. Dilated   • ENDOSCOPY N/A 2020    Procedure: ESOPHAGOGASTRODUODENOSCOPY WITH 56FR MALDONADO DILATATION AND COLD BIOPSIES;  Surgeon: Blayne Cai MD;  Location: [facility] ENDOSCOPY;  Service: Gastroenterology;  Laterality: N/A;  PRE: DYSPHAGIA  POST: SCHATZKI'S RING   • EPIDURAL  2022   • HYSTERECTOMY     • MITRAL VALVE ANNULOPLASTY  2015    Dr Veliz   • TRICUSPID VALVE SURGERY  2015    Dr Veliz       FAMILY HISTORY  Family History   Problem Relation Age of Onset   • ALS Mother    • Hypertension Father        SOCIAL HISTORY  Social History     Tobacco Use   • Smoking status: Former Smoker     Quit date:      Years since quittin.3   • Smokeless tobacco: Never Used   Substance Use Topics   • Alcohol use: No   • Drug use: No       retired  Lives with disabled son.  Daughter assist    Allergies:  Patient has no known allergies.    MEDICATIONS:  Medications Prior to Admission   Medication Sig Dispense Refill Last Dose   • aspirin 325 MG tablet Take by mouth daily.   2022 at Unknown time   • atorvastatin (LIPITOR) 20 MG tablet take 1 tablet by mouth at bedtime  0 2022 at Unknown time   • bumetanide (BUMEX) 2 MG tablet TAKE 1 TABLET BY MOUTH EVERY DAY 90 tablet 1 5/3/2022 at Unknown time   • buPROPion XL (WELLBUTRIN XL) 300 MG 24 hr tablet Take 300 mg by mouth Daily.  0 Past Week at Unknown time   • carvedilol (COREG) 12.5 MG tablet Take 12.5 mg by mouth 2 (Two) Times a Day.   5/3/2022 at Unknown time   • Coenzyme Q10 200  MG tablet Take 1 tablet/day by mouth Daily.   Past Week at Unknown time   • [facility] Senna Plus 8.6-50 MG per tablet TAKE 1 TABLET BY MOUTH EVERY DAY 30 tablet 2 Past Week at Unknown time   • donepezil (ARICEPT) 10 MG tablet Take 10 mg by mouth Daily.   Past Week at Unknown time   • estradiol (ESTRACE) 0.5 MG tablet Take by mouth daily.   Past Week at Unknown time   • eszopiclone (LUNESTA) 3 MG tablet Take 3 mg by mouth Every Night. Take immediately before bedtime   Past Week at Unknown time   • famotidine (PEPCID) 40 MG tablet TAKE 1 TABLET BY MOUTH TWICE A  tablet 3 5/3/2022 at Unknown time   • fenofibrate micronized (LOFIBRA) 67 MG capsule Take 67 mg by mouth Daily.   Past Week at Unknown time   • FLUoxetine (PROzac) 20 MG capsule Take 60 mg by mouth Daily.   5/3/2022 at Unknown time   • HYDROcodone-acetaminophen (NORCO)  MG per tablet Take 1 tablet by mouth Every 4 (Four) Hours As Needed for Moderate Pain . 12 tablet 0 Past Week at Unknown time       Current Facility-Administered Medications:   •  acetaminophen (TYLENOL) tablet 650 mg, 650 mg, Oral, Q4H PRN, Lauryn Lisa MD  •  aspirin tablet 325 mg, 325 mg, Oral, Daily, Lauryn Lisa MD  •  atorvastatin (LIPITOR) tablet 20 mg, 20 mg, Oral, Daily, Lauryn Lisa MD, 20 mg at 05/04/22 1033  •  bumetanide (BUMEX) tablet 2 mg, 2 mg, Oral, Daily, Lauryn Lisa MD, 2 mg at 05/04/22 1034  •  buPROPion XL (WELLBUTRIN XL) 24 hr tablet 300 mg, 300 mg, Oral, Daily, Lauryn Lisa MD, 300 mg at 05/04/22 1033  •  carvedilol (COREG) tablet 12.5 mg, 12.5 mg, Oral, BID, Lauryn Lisa MD, 12.5 mg at 05/04/22 1033  •  donepezil (ARICEPT) tablet 10 mg, 10 mg, Oral, Daily, Luaryn Lisa MD  •  famotidine (PEPCID) tablet 40 mg, 40 mg, Oral, BID, Lauryn Lisa MD, 40 mg at 05/04/22 1033  •  FLUoxetine (PROzac) capsule 60 mg, 60 mg, Oral, Daily, Lauryn Lisa MD, 60 mg at 05/04/22 1033  •  HYDROcodone-acetaminophen (NORCO)  MG per tablet 1 tablet, 1 tablet, Oral, Q4H  PRN, Lauryn Lisa MD, 1 tablet at 05/04/22 1415  •  melatonin tablet 5 mg, 5 mg, Oral, Nightly PRN, Lauryn Lisa MD  •  morphine injection 2 mg, 2 mg, Intravenous, Q4H PRN **AND** naloxone (NARCAN) injection 0.4 mg, 0.4 mg, Intravenous, Q5 Min PRN, Lauryn Lisa MD  •  nitroglycerin (NITROSTAT) SL tablet 0.4 mg, 0.4 mg, Sublingual, Q5 Min PRN, Lauryn Lisa MD  •  ondansetron (ZOFRAN) tablet 4 mg, 4 mg, Oral, Q6H PRN **OR** ondansetron (ZOFRAN) injection 4 mg, 4 mg, Intravenous, Q6H PRN, Lauryn Lisa MD  •  sodium chloride 0.9 % flush 10 mL, 10 mL, Intravenous, Q12H, Lauryn Lisa MD, 10 mL at 05/04/22 1040  •  sodium chloride 0.9 % flush 10 mL, 10 mL, Intravenous, PRN, Lauryn Lisa MD  •  zolpidem (AMBIEN) tablet 5 mg, 5 mg, Oral, Nightly PRN, Lauryn Lisa MD, 5 mg at 05/04/22 0318    Objective     Results Review:  LABS:  Results from last 7 days   Lab Units 05/04/22  0203 05/03/22  2217   WBC 10*3/mm3 7.28 7.00   HEMOGLOBIN g/dL 12.3 12.5   HEMATOCRIT % 38.0 37.2   PLATELETS 10*3/mm3 213 212     Results from last 7 days   Lab Units 05/04/22  0203 05/03/22  2217   SODIUM mmol/L 141 144   POTASSIUM mmol/L 3.5 3.9   CHLORIDE mmol/L 104 103   CO2 mmol/L 24.0 28.0   BUN mg/dL 19 21   CREATININE mg/dL 1.18* 1.30*   CALCIUM mg/dL 9.3 9.9   BILIRUBIN mg/dL  --  0.4   ALK PHOS U/L  --  55   ALT (SGPT) U/L  --  15   AST (SGOT) U/L  --  22   GLUCOSE mg/dL 89 94     Results from last 7 days   Lab Units 05/03/22  2217   SED RATE mm/hr 8     COVID- neg    DIAGNOSTICS:  CTH- pneumocephalus present bifrontal and sellar region. No evidence of hemorrhage. No mass effect or MLS. Repeat CTH shows reduction in pneumocephalus.     Results Review:   I reviewed the patient's new clinical results.  I personally viewed and interpreted the patient's CTH. Also reviewed by and d/w Dr. Fitzpatrick.     Vital Signs   Temp:  [97.9 °F (36.6 °C)-98.4 °F (36.9 °C)] 98.4 °F (36.9 °C)  Heart Rate:  [72-90] 77  Resp:  [12-20] 16  BP:  (131-180)/() 131/67    Physical Exam:  Physical Exam  Vitals reviewed.   Constitutional:       Appearance: Normal appearance.   Pulmonary:      Effort: Pulmonary effort is normal.   Musculoskeletal:      Left shoulder: Tenderness present. Decreased range of motion.      Cervical back: Neck supple. No tenderness or bony tenderness. No pain with movement. Normal range of motion.      Lumbar back: No tenderness or bony tenderness. Negative right straight leg raise test and negative left straight leg raise test.   Skin:     General: Skin is warm and dry.      Comments: Dressing on midline lumbar spine clean dry intact   Neurological:      General: No focal deficit present.      Mental Status: She is alert and oriented to person, place, and time.      Coordination: Finger-Nose-Finger Test normal.   Psychiatric:         Mood and Affect: Mood normal.         Speech: Speech normal.         Thought Content: Thought content normal.       Neurologic Exam     Mental Status   Oriented to person, place, and time.   Attention: normal. Concentration: normal.   Speech: speech is normal   Level of consciousness: alert  Knowledge: good.   Normal comprehension.     Cranial Nerves   Cranial nerves II through XII intact.     Motor Exam   Right arm pronator drift: absent  Left arm pronator drift: absent    Gait, Coordination, and Reflexes     Gait  Gait: (Not tested due to bed rest)    Coordination   Finger to nose coordination: normal    Reflexes   Right Baird: absent  Left Baird: absent      Assessment/Plan       Post lumbar puncture headache    Patient presents for postural headache status post epidural injection yesterday.  CT scan showed variable areas of pneumocephalus which have improved with repeat CT.  Headache is essentially nonexistent at this time and her head of bed is about 15 degrees.  No focal deficits on exam.  Recommend increasing activity by raising head of bed.  If no headache with head of bed up, ambulate.  "if patient's headache returns in upright position, send for blood patch.  If pain does not return in an upright position, okay for discharge.  No neurosurgical follow-up needed.    PLAN:   Check activity tolerance-if headache, then sent for blood patch.  No neurosurgical follow-up needed  Follow-up with primary pain management    I discussed the patient's findings and my recommendations with patient, nursing staff, primary care team and Dr. Nanette Guardado, APRN  05/04/22  15:31 EDT    \"Dictated utilizing Dragon dictation\".      " no weight-bearing restrictions

## 2022-05-04 NOTE — ED PROVIDER NOTES
"The ELENA and I have discussed this patient's history, physical exam, and treatment plan.  I have reviewed the documentation and personally had a face to face interaction with the patient. I affirm the documentation and agree with the treatment and plan.  The attached note describes my personal findings.      I provided a substantive portion of the care of the patient.  I personally performed the physical exam in its entirety, and below are my findings.     Brief history of present illness: 81-year-old female complains of headache onset shortly after lumbar epidural procedure.  Left temporal at onset but now diffuse.  Some associated nausea.  No focal numbness or weakness, vision change, fever, abdominal pain.    Physical exam:    BP (!) 151/106 (BP Location: Left arm, Patient Position: Lying)   Pulse 81   Temp 98.2 °F (36.8 °C) (Tympanic)   Resp 16   Ht 160 cm (63\")   Wt 62.6 kg (138 lb)   SpO2 95%   BMI 24.45 kg/m²       Physical Exam   Constitutional: No distress.  Nontoxic  HENT:  Head: Normocephalic and atraumatic.   Oropharynx: Mucous membranes are moist.   Eyes: . No scleral icterus. No conjunctival pallor.  PERRL, EOMI  Neck: Normal range of motion. Neck supple.   Cardiovascular: Pink warm and well perfused throughout.    Pulmonary/Chest: No respiratory distress.  No tachypnea or increased work of breathing appreciated.    Abdominal: Soft. There is moderate suprapubic tenderness as the patient notes that she needs to urinate.   Musculoskeletal: Moves all extremities equally.    Neurological: Alert and oriented.  No acute focal deficit appreciated.  Skin: Skin is pink, warm, and dry.   Psychiatric: Mood and affect normal.   Nursing note and vitals reviewed.        MDM:    Results for orders placed or performed during the hospital encounter of 05/03/22   Comprehensive Metabolic Panel    Specimen: Blood   Result Value Ref Range    Glucose 94 65 - 99 mg/dL    BUN 21 8 - 23 mg/dL    Creatinine 1.30 (H) 0.57 " - 1.00 mg/dL    Sodium 144 136 - 145 mmol/L    Potassium 3.9 3.5 - 5.2 mmol/L    Chloride 103 98 - 107 mmol/L    CO2 28.0 22.0 - 29.0 mmol/L    Calcium 9.9 8.6 - 10.5 mg/dL    Total Protein 6.2 6.0 - 8.5 g/dL    Albumin 4.00 3.50 - 5.20 g/dL    ALT (SGPT) 15 1 - 33 U/L    AST (SGOT) 22 1 - 32 U/L    Alkaline Phosphatase 55 39 - 117 U/L    Total Bilirubin 0.4 0.0 - 1.2 mg/dL    Globulin 2.2 gm/dL    A/G Ratio 1.8 g/dL    BUN/Creatinine Ratio 16.2 7.0 - 25.0    Anion Gap 13.0 5.0 - 15.0 mmol/L    eGFR 40.9 (L) >60.0 mL/min/1.73   Sedimentation Rate    Specimen: Blood   Result Value Ref Range    Sed Rate 8 0 - 30 mm/hr   CBC Auto Differential    Specimen: Blood   Result Value Ref Range    WBC 7.00 3.40 - 10.80 10*3/mm3    RBC 3.90 3.77 - 5.28 10*6/mm3    Hemoglobin 12.5 12.0 - 15.9 g/dL    Hematocrit 37.2 34.0 - 46.6 %    MCV 95.4 79.0 - 97.0 fL    MCH 32.1 26.6 - 33.0 pg    MCHC 33.6 31.5 - 35.7 g/dL    RDW 12.1 (L) 12.3 - 15.4 %    RDW-SD 41.9 37.0 - 54.0 fl    MPV 10.9 6.0 - 12.0 fL    Platelets 212 140 - 450 10*3/mm3    Neutrophil % 79.5 (H) 42.7 - 76.0 %    Lymphocyte % 10.7 (L) 19.6 - 45.3 %    Monocyte % 8.3 5.0 - 12.0 %    Eosinophil % 1.3 0.3 - 6.2 %    Basophil % 0.1 0.0 - 1.5 %    Immature Grans % 0.1 0.0 - 0.5 %    Neutrophils, Absolute 5.56 1.70 - 7.00 10*3/mm3    Lymphocytes, Absolute 0.75 0.70 - 3.10 10*3/mm3    Monocytes, Absolute 0.58 0.10 - 0.90 10*3/mm3    Eosinophils, Absolute 0.09 0.00 - 0.40 10*3/mm3    Basophils, Absolute 0.01 0.00 - 0.20 10*3/mm3    Immature Grans, Absolute 0.01 0.00 - 0.05 10*3/mm3    nRBC 0.0 0.0 - 0.2 /100 WBC     Agree with plan for radiologic evaluation and potentially even admission for anesthesia consult in the morning for blood patch if headache persist.       Spike Wei MD  05/03/22 3287

## 2022-05-04 NOTE — PROGRESS NOTES
ED OBSERVATION PROGRESS/DISCHARGE SUMMARY    Date of Admission: 5/3/2022   LOS: 0 days   PCP: Reyes, Miriam Mercado, MD    Final Diagnosis Pneumocephalus following epidural injection      Subjective    No acute events overnight.  Patient states that her headache has resolved at this time.  She reports she is having pain in the left arm that is secondary to her arthritis.  Patient states that it is usually managed with her pain medication and has started physical therapy outpatient for it as well.  Patient denies any visual changes.  Denies numbness and tingling.  Denies weakness.  Denies fevers and chills.  Denies abdominal pain, nausea, vomiting, and diarrhea.    Hospital Outcome:   81-year-old female with a history of arthritis and chronic pain, hypertension, sick sinus syndrome status post pacemaker, VHD status post aortic valve replacement, GERD, hyperlipidemia, and dementia who presents to [facility] ER with a headache following a spinal epidural injection.  ER evaluation was significant for CT head revealing pneumocephalus.  ER physician spoke with neurosurgery who recommended observation overnight.  Patient received IV caffeine in the ER with reports of him provement of the headache.  A repeat head CT was performed that showed improvement of the pneumocephalus.  Patient was seen and evaluated by neurosurgery who recommends no further work-up at this time as patient has remained asymptomatic.  Advised patient should her headache recur she should return to the ER and can have a blood patch performed.  I have spoken with the patient regarding the results as well as the plan for discharge who endorses understanding and is in agreement at this time.    ROS:  General: no fevers, chills  Respiratory: no cough, dyspnea  Cardiovascular: no chest pain, palpitations  Abdomen: No abdominal pain, nausea, vomiting, or diarrhea  Neurologic: No focal weakness    Objective   Physical Exam:  I have reviewed the vital  signs.  Temp:  [97.9 °F (36.6 °C)-98.2 °F (36.8 °C)] 98.1 °F (36.7 °C)  Heart Rate:  [72-90] 90  Resp:  [12-20] 16  BP: (139-180)/() 139/82  General Appearance:  81-year-old female, well-nourished, in no acute distress on room air  Head:    Normocephalic, atraumatic  Eyes:    Sclerae anicteric  Neck:   Supple, no mass  Lungs: Clear to auscultation bilaterally, respirations unlabored  Heart: Regular rate and rhythm, S1 and S2 normal, no murmur, rub or gallop  Abdomen:  Soft, non-tender, bowel sounds active, nondistended  Extremities: Some limited range of motion of the left upper extremity secondary to chronic pain, no clubbing, cyanosis, or edema to lower extremities  Pulses:  2+ and symmetric in distal lower extremities  Skin: No rashes   Neurologic: Oriented x3, left upper extremity strength 3 out of 5, otherwise full strength intact in other extremities    Results Review:    I have reviewed the labs, radiology results and diagnostic studies.    Results from last 7 days   Lab Units 05/04/22  0203   WBC 10*3/mm3 7.28   HEMOGLOBIN g/dL 12.3   HEMATOCRIT % 38.0   PLATELETS 10*3/mm3 213     Results from last 7 days   Lab Units 05/04/22  0203 05/03/22  2217   SODIUM mmol/L 141 144   POTASSIUM mmol/L 3.5 3.9   CHLORIDE mmol/L 104 103   CO2 mmol/L 24.0 28.0   BUN mg/dL 19 21   CREATININE mg/dL 1.18* 1.30*   CALCIUM mg/dL 9.3 9.9   BILIRUBIN mg/dL  --  0.4   ALK PHOS U/L  --  55   ALT (SGPT) U/L  --  15   AST (SGOT) U/L  --  22   GLUCOSE mg/dL 89 94     Imaging Results (Last 24 Hours)     Procedure Component Value Units Date/Time    CT Head Without Contrast [[id number]] Collected: 05/03/22 2251     Updated: 05/03/22 2302    Narrative:      CT HEAD WITHOUT CONTRAST     HISTORY: New onset headache. Epidural injection today.     COMPARISON: 02/08/2021     TECHNIQUE: Axial CT imaging was obtained through the brain. No IV  contrast was administered.     FINDINGS:  No acute intracranial hemorrhage is seen, although the  patient is noted  to have pneumocephalus. There is diffuse atrophy. There is  periventricular and deep white matter microangiopathic change. There is  no midline shift or mass effect. There is some minimal partial  opacification of the right mastoid air cells. Paranasal sinuses appear  clear.       Impression:      Pneumocephalus following epidural injection.     Radiation dose reduction techniques were utilized, including automated  exposure control and exposure modulation based on body size.     This report was finalized on 5/3/2022 10:59 PM by Dr. Naty Edwards M.D.             I have reviewed the medications.  ---------------------------------------------------------------------------------------------  Assessment/Plan   Assessment/Problem List    Post lumbar puncture headache      Plan:    Headache following spinal epidural injection  -CT head revealed pneumocephalus  -Patient received IV caffeine and IV fluids in the ER  -Patient placed on bed rest and instructed to lie flat overnight  -Patient reports resolution of headache at this time  -Neurosurgery consulted, recommended to patient should headache recur can return to the ER for possible blood patch  -Repeat head CT revealed interval slight decrease in the pneumocephalus.  -Patient was ambulated with nursing staff with no recurrence of headache.     Chronic pain/arthritis  -Patient reports pain in the left upper extremity that she states she has been dealing with over the past several months.  No acute changes  -Continue home pain medication  -Continue outpatient follow-up and physical therapy    Hypertension/hyperlipidemia/GERD  -Chronic conditions, no acute issues  -Continue home medications as prescribed    Valvular heart disease, sick sinus syndrome  -Status post aortic valve replacement, tricuspid valve repair, mitral valve repair  -Status post pacemaker    Anxiety and depression/dementia  -Chronic conditions, no acute issues  -Continue home  medications as prescribed    Disposition: Home    Follow-up after Discharge: PCP    This note will serve as a discharge summary.    I wore an face mask, eye protection, and gloves during this patient encounter. Patient also wearing a surgical mask. Hand hygeine performed before and after seeing the patient.      Yvette Blanc PA-C 05/04/22 18:14 EDT

## 2022-05-04 NOTE — PAYOR COMM NOTE
"Leena Villegas (81 y.o. Female)     OBSERVATION REQUEST FOR [id number]    CONTACT F# [phone]              Date of Birth   1940    Social Security Number       Address   [address] [place] [id number]    Home Phone   [phone]    MRN   [id number]       [facility]    Marital Status                               Admission Date   5/3/22    Admission Type   Emergency    Admitting Provider   Lauryn Lisa MD    Attending Provider   Lauryn Lisa MD    Department, Room/Bed   [facility] OBSERVATION, 116/1       Discharge Date       Discharge Disposition       Discharge Destination                               Attending Provider: Lauryn Lisa MD    Allergies: No Known Allergies    Isolation: None   Infection: None   Code Status: CPR   Advance Care Planning Activity    Ht: 160 cm (63\")   Wt: 62.6 kg (138 lb)    Admission Cmt: None   Principal Problem: None                Active Insurance as of 5/3/2022     Primary Coverage     Payor Plan Insurance Group Employer/Plan Group    [facility]IC[facility]PLAC[facility]      Payor Plan Address Payor Plan Phone Number Payor Plan Fax Number Effective Dates    [address] [address] [phone]  5/3/2022 - None Entered    [facility] [id number]       Subscriber Name Subscriber Birth Date Member ID       LEENA VILLEGAS 1940 [id number]           Secondary Coverage     Payor Plan Insurance Group Employer/Plan Group    [facility] SUPP [id number]     Payor Plan Address Payor Plan Phone Number Payor Plan Fax Number Effective Dates    [address]   11/1/2016 - None Entered    [facility] [id number]       Subscriber Name Subscriber Birth Date Member ID       LEENA VILLEGAS 1940 [id number]                 Emergency Contacts      (Rel.) Home Phone Work Phone Mobile Phone    Weilage,Chena (Daughter) [phone]    justin mingo (Son) " [phone] -- --               History & Physical      Bonnie Limon PA at 22 0151           [facility]   HISTORY AND PHYSICAL    Patient Name: Carly Gotti  : 1940  MRN: [id number]  Primary Care Physician:  Reyes, Miriam Mercado, MD  Date of admission: 5/3/2022    Subjective   Subjective     Chief Complaint:   Chief Complaint   Patient presents with   • Headache         HPI:    Carly Gotti is a 81 y.o. female with hypertension, sick sinus syndrome status post pacemaker, valvular heart disease status post aortic valve replacement, TVR, MVR, GERD, hyperlipidemia, anxiety and depression, dementia, and chronic pain, who presented to the emergency department complaining of headache following spinal epidural.  Patient had epidural injection with pain management on 5/3/2022.  Patient states she developed a headache shortly after returning home.  Patient has reportedly had epidural injection in the past but she does not recall having a headache afterwards.  She states headache began in the left temporal area but then became diffuse.  Patient also states she has some associated nausea but no vomiting.  Denies chest pain, shortness of breath, fever, chills, recent sick contacts.    Patient had CT in ED which does show pneumocephalus, ED physician spoke with neurosurgery who recommended observation overnight.  Patient received IV caffeine in ED.  She reports her headache is starting to improve after receiving her home dose of Norco.    Review of Systems   All systems were reviewed and negative except for: What is discussed in the HPI    Personal History     Past Medical History:   Diagnosis Date   • Aneurysm (HCC)    • Aortic valve disease    • Arthritis    • Atrophic kidney    • Bradycardia    • CAD (coronary artery disease)    • CKD (chronic kidney disease) stage 4, GFR 15-29 ml/min (HCC)    • Depression    • Dysphagia    • Essential hypertension    • [place] (hard of hearing)    •  Hyperlipidemia    • Mitral valve disease    • Post lumbar puncture headache 2022   • Pulmonary hypertension (HCC)    • Sick sinus syndrome (HCC)    • Sleep apnea    • Spinal stenosis        Past Surgical History:   Procedure Laterality Date   • AORTIC VALVE REPAIR/REPLACEMENT  2015    Dr Veliz   • CARDIAC CATHETERIZATION     • CARDIAC DEFIBRILLATOR PLACEMENT     •  SECTION     • COLONOSCOPY N/A 2019    [facility]   • ENDOSCOPY  2012    Hypertonic lower esophageal sphincter, gastritis. Path: Chronic gastritis, moderate.    • ENDOSCOPY N/A 2019    Web in the upper third of the esophagus. Dilated   • ENDOSCOPY N/A 2020    Procedure: ESOPHAGOGASTRODUODENOSCOPY WITH 56FR MALDONADO DILATATION AND COLD BIOPSIES;  Surgeon: Blayne Cai MD;  Location: [facility] ENDOSCOPY;  Service: Gastroenterology;  Laterality: N/A;  PRE: DYSPHAGIA  POST: SCHATZKI'S RING   • EPIDURAL  2022   • HYSTERECTOMY     • MITRAL VALVE ANNULOPLASTY  2015    Dr Veliz   • TRICUSPID VALVE SURGERY  2015    Dr Veliz       Family History: family history includes ALS in her mother; Hypertension in her father. Otherwise pertinent FHx was reviewed and not pertinent to current issue.    Social History:  reports that she quit smoking about 34 years ago. She has never used smokeless tobacco. She reports that she does not drink alcohol and does not use drugs.    Home Medications:  Coenzyme Q10, FLUoxetine, HYDROcodone-acetaminophen, aspirin, atorvastatin, buPROPion XL, bumetanide, carvedilol, donepezil, estradiol, eszopiclone, famotidine, fenofibrate micronized, and sennosides-docusate    Allergies:  No Known Allergies    Objective   Objective     Vitals:   Temp:  [97.9 °F (36.6 °C)-98.2 °F (36.8 °C)] 97.9 °F (36.6 °C)  Heart Rate:  [72-90] 79  Resp:  [12-20] 20  BP: (151-180)/() 173/74  Flow (L/min):  [2] 2  Physical Exam     GENERAL: 81 y.o. YO female a/o x 4, NAD  SKIN: Warm pink and dry   HEENT:   PERRL, EOM intact, conjunctiva normal, sclera clear  NECK: supple, no JVD  LUNGS: Clear to auscultation bilaterally without wheezes, rales or rhonchi.  No accessory muscle use and no nasal flaring.  CARDIAC:  Regular rate and rhythm, S1-S2.  No murmurs, rubs or gallops.  No peripheral edema.  Equal pulses bilaterally.  ABDOMEN: Soft, nontender, nondistended.  No guarding or rebound tenderness.  Normal bowel sounds.  MUSCULOSKELETAL: Moves all extremities well.  No deformity.  NEURO: Cranial nerves II through XII grossly intact.  No gross focal deficits.  Alert.  Normal speech and motor.  PSYCH: Normal mood and affect      Result Review    Result Review:  I have personally reviewed the results from the time of this admission to 5/4/2022 01:51 EDT and agree with these findings:  [x]  Laboratory  []  Microbiology  [x]  Radiology  []  EKG/Telemetry   []  Cardiology/Vascular   []  Pathology  [x]  Old records  []  Other:  Most notable findings include:  Creatinine 1.30, 1.18 after receiving IV fluids, CT head shows pneumocephalus following epidural injection.    CT Head Without Contrast    Result Date: 5/3/2022  Pneumocephalus following epidural injection.  Radiation dose reduction techniques were utilized, including automated exposure control and exposure modulation based on body size.  This report was finalized on 5/3/2022 10:59 PM by Dr. Naty Edwards M.D.              Assessment/Plan   Assessment / Plan     Brief Patient Summary:  Carly Gotti is a 81 y.o. female who is being admitted to observation unit for further evaluation of headache status post epidural injection.    Active Hospital Problems:  Active Hospital Problems    Diagnosis    • Post lumbar puncture headache      Plan:     1.  Headache  -Suspect this is spinal headache due to spinal epidural injection for pain management  -Patient received IV caffeine in ED as well as IV fluids  -Patient to lie flat on bedrest overnight  -CTA reviewed,  pneumocephalus following epidural injection  -Reevaluate in morning, will consult anesthesiology if blood patch needed    2.  Hypertension/hyperlipidemia/GERD  -Chronic conditions, continue home medications as prescribed    3.  Valvular heart disease, sick sinus syndrome  -Status post aortic valve replacement  -Status post tricuspid valve repair  -Status post mitral valve repair  -Status post pacemaker    4.  Anxiety and depression, dementia  -Continue medications as prescribed, no new issues    5.  Chronic pain  -Continue medication as prescribed  -Continue outpatient follow-up with pain management      DVT prophylaxis:  Mechanical DVT prophylaxis orders are present.    CODE STATUS:    Level Of Support Discussed With: Patient  Code Status (Patient has no pulse and is not breathing): CPR (Attempt to Resuscitate)  Medical Interventions (Patient has pulse or is breathing): Full Support    Admission Status:  I believe this patient meets observation status.    I wore an N95 mask, face shield, and gloves during this patient encounter. Patient also wearing a surgical mask throughout encounter. Hand hygeine performed before and after seeing the patient.    Electronically signed by SONJA Ramirez, 05/04/22, 1:51 AM EDT.             Electronically signed by Bonnie Limon PA at 05/04/22 0300          Emergency Department Notes      Caty Pantoja RN at 05/03/22 2151        Pt to ED from home via [facility] with c/o L temporal headache starting on ride home after lumbar epidermal procedure.  Pt denies vision changes, numbness or tingling, no loss of bladder or bowel control. Pt denies taking any pain medications PTA.    Pt wearing mask, staff wearing appropriate PPE.    Electronically signed by Caty Pantoja RN at 05/03/22 2154     Joel Littlejohn III, PA at 05/03/22 2203     Attestation signed by Spike Wei MD at 05/04/22 4929        SHARED APC FACE TO FACE: This visit was performed by both the  physician and an APC. I personally evaluated and examined the patient. I performed the entirety of the physical exam and I documented this exam in this attestation or in accompanying documentation.    Spike Wei MD 5/4/2022 04:34 EDT                          EMERGENCY DEPARTMENT ENCOUNTER    Room Number:  116/1  Date of encounter:  5/4/2022  PCP: Reyes, Miriam Mercado, MD  Historian: Patient      HPI:  Chief Complaint: Headache  A complete HPI/ROS/PMH/PSH/SH/FH are unobtainable due to: Nothing    Context: Carly Gotti is a 81 y.o. female who presents to the ED c/o headache that began shortly after receiving an epidural treatment earlier today.  Patient states the headache is severe, located primarily in the left temporal, moved to the occipital region, and is now global.  The headache is made worse when she sits up and moves.  Lying down and remaining still slightly relieves the pain.  Patient denies fever, neck pain, recent head injury, nausea, vomiting, visual loss, photophobia, numbness, weakness, chest pain, palpitations, pain.  She denies recent head injury.  She is here for further evaluation.    PAST MEDICAL HISTORY  Active Ambulatory Problems     Diagnosis Date Noted   • S/P AVR (aortic valve replacement) 04/04/2016   • S/P TVR (tricuspid valve repair) 04/04/2016   • S/P MVR (mitral valve repair) 04/04/2016   • Benign essential hypertension 05/03/2016   • Mitral and aortic valve disease 05/03/2016   • CKD (chronic kidney disease) stage 3, GFR 30-59 ml/min (CMS/[facility]) 11/01/2016   • Small bowel obstruction ([facility]) 12/21/2016   • Abdominal pain 12/22/2016   • Slow transit constipation 12/27/2016   • Hyperlipidemia 05/02/2017   • Pacemaker 12/06/2017   • Colitis 03/18/2019   • Elevated lipase 03/18/2019   • C. difficile colitis 03/18/2019   • Ileus ([facility]) 03/18/2019   • Sepsis ([facility]) 03/19/2019   • Thrombocytopenia ([facility]) 03/20/2019   • Dysphagia 05/23/2019   • Esophageal web 05/23/2019   • Recurrent  Clostridium difficile diarrhea 2019   • CKD (chronic kidney disease) stage 4, GFR 15-29 ml/min ([facility]) 2019   • Generalized abdominal pain 2019   • Intestinal infection due to enteropathogenic E. coli 2019   • Obstructive sleep apnea 2019   • Nausea 2020   • Renal failure 06/15/2021   • Depression 06/15/2021   • Arthritis 06/15/2021   • Aneurysm of thoracic aorta ([facility]) 06/15/2021   • Anxiety 06/15/2021     Resolved Ambulatory Problems     Diagnosis Date Noted   • Chest tightness 2016     Past Medical History:   Diagnosis Date   • Aneurysm ([facility])    • Aortic valve disease    • Atrophic kidney    • Bradycardia    • CAD (coronary artery disease)    • Essential hypertension    • [place] (hard of hearing)    • Mitral valve disease    • Post lumbar puncture headache 2022   • Pulmonary hypertension ([facility])    • Sick sinus syndrome ([facility])    • Sleep apnea    • Spinal stenosis          PAST SURGICAL HISTORY  Past Surgical History:   Procedure Laterality Date   • AORTIC VALVE REPAIR/REPLACEMENT  2015    Dr Veliz   • CARDIAC CATHETERIZATION     • CARDIAC DEFIBRILLATOR PLACEMENT     •  SECTION     • COLONOSCOPY N/A 2019    [facility]   • ENDOSCOPY  2012    Hypertonic lower esophageal sphincter, gastritis. Path: Chronic gastritis, moderate.    • ENDOSCOPY N/A 2019    Web in the upper third of the esophagus. Dilated   • ENDOSCOPY N/A 2020    Procedure: ESOPHAGOGASTRODUODENOSCOPY WITH 56FR MALDONADO DILATATION AND COLD BIOPSIES;  Surgeon: Blayne Cai MD;  Location: [facility] ENDOSCOPY;  Service: Gastroenterology;  Laterality: N/A;  PRE: DYSPHAGIA  POST: SCHATZKI'S RING   • EPIDURAL  2022   • HYSTERECTOMY     • MITRAL VALVE ANNULOPLASTY  2015    Dr Veliz   • TRICUSPID VALVE SURGERY  2015    Dr Veliz         FAMILY HISTORY  Family History   Problem Relation Age of Onset   • ALS Mother    • Hypertension Father          SOCIAL HISTORY  Social  History     Socioeconomic History   • Marital status:    Tobacco Use   • Smoking status: Former Smoker     Quit date:      Years since quittin.3   • Smokeless tobacco: Never Used   Substance and Sexual Activity   • Alcohol use: No   • Drug use: No   • Sexual activity: Defer         ALLERGIES  Patient has no known allergies.        REVIEW OF SYSTEMS  Review of Systems   Constitutional: Negative for chills and fever.   HENT: Positive for sinus pain.    Eyes: Negative for photophobia and visual disturbance.   Respiratory: Negative for cough and shortness of breath.    Cardiovascular: Negative for chest pain and palpitations.   Gastrointestinal: Negative for nausea and vomiting.   Musculoskeletal: Negative for neck pain.   Neurological: Positive for headaches. Negative for dizziness and light-headedness.        All systems reviewed and negative except for those discussed in HPI.       PHYSICAL EXAM    I have reviewed the triage vital signs and nursing notes.    ED Triage Vitals [226]   Temp Heart Rate Resp BP SpO2   98.2 °F (36.8 °C) 72 12 180/80 95 %      Temp src Heart Rate Source Patient Position BP Location FiO2 (%)   Tympanic -- -- -- --       Physical Exam  GENERAL: Frail in appearance, nontoxic, no acute distress   HENT: nares patent, NCAT, face symmetric, no obvious TTP over the left temporal artery  Neck: No nuchal rigidity  EYES: no scleral icterus, PERRL, EOMs intact  CV: regular rhythm, regular rate, no obvious rubs murmurs gallops  RESPIRATORY: normal effort, lungs CTA B  ABDOMEN: soft  MUSCULOSKELETAL: no deformity  NEURO: alert oriented x4, moves all extremities, follows commands cranial nerves II through XII grossly intact, no focal neurodeficits noted on exam SKIN: warm, dry        LAB RESULTS  Recent Results (from the past 24 hour(s))   Comprehensive Metabolic Panel    Collection Time: 22 10:17 PM    Specimen: Blood   Result Value Ref Range    Glucose 94 65 - 99 mg/dL     BUN 21 8 - 23 mg/dL    Creatinine 1.30 (H) 0.57 - 1.00 mg/dL    Sodium 144 136 - 145 mmol/L    Potassium 3.9 3.5 - 5.2 mmol/L    Chloride 103 98 - 107 mmol/L    CO2 28.0 22.0 - 29.0 mmol/L    Calcium 9.9 8.6 - 10.5 mg/dL    Total Protein 6.2 6.0 - 8.5 g/dL    Albumin 4.00 3.50 - 5.20 g/dL    ALT (SGPT) 15 1 - 33 U/L    AST (SGOT) 22 1 - 32 U/L    Alkaline Phosphatase 55 39 - 117 U/L    Total Bilirubin 0.4 0.0 - 1.2 mg/dL    Globulin 2.2 gm/dL    A/G Ratio 1.8 g/dL    BUN/Creatinine Ratio 16.2 7.0 - 25.0    Anion Gap 13.0 5.0 - 15.0 mmol/L    eGFR 40.9 (L) >60.0 mL/min/1.73   Sedimentation Rate    Collection Time: 05/03/22 10:17 PM    Specimen: Blood   Result Value Ref Range    Sed Rate 8 0 - 30 mm/hr   CBC Auto Differential    Collection Time: 05/03/22 10:17 PM    Specimen: Blood   Result Value Ref Range    WBC 7.00 3.40 - 10.80 10*3/mm3    RBC 3.90 3.77 - 5.28 10*6/mm3    Hemoglobin 12.5 12.0 - 15.9 g/dL    Hematocrit 37.2 34.0 - 46.6 %    MCV 95.4 79.0 - 97.0 fL    MCH 32.1 26.6 - 33.0 pg    MCHC 33.6 31.5 - 35.7 g/dL    RDW 12.1 (L) 12.3 - 15.4 %    RDW-SD 41.9 37.0 - 54.0 fl    MPV 10.9 6.0 - 12.0 fL    Platelets 212 140 - 450 10*3/mm3    Neutrophil % 79.5 (H) 42.7 - 76.0 %    Lymphocyte % 10.7 (L) 19.6 - 45.3 %    Monocyte % 8.3 5.0 - 12.0 %    Eosinophil % 1.3 0.3 - 6.2 %    Basophil % 0.1 0.0 - 1.5 %    Immature Grans % 0.1 0.0 - 0.5 %    Neutrophils, Absolute 5.56 1.70 - 7.00 10*3/mm3    Lymphocytes, Absolute 0.75 0.70 - 3.10 10*3/mm3    Monocytes, Absolute 0.58 0.10 - 0.90 10*3/mm3    Eosinophils, Absolute 0.09 0.00 - 0.40 10*3/mm3    Basophils, Absolute 0.01 0.00 - 0.20 10*3/mm3    Immature Grans, Absolute 0.01 0.00 - 0.05 10*3/mm3    nRBC 0.0 0.0 - 0.2 /100 WBC   COVID-19,[facility] IN-HOUSE CEPHEID/GUERRERO NP SWAB IN TRANSPORT MEDIA 8-12 HR TAT - Swab, Nasopharynx    Collection Time: 05/03/22 11:10 PM    Specimen: Nasopharynx; Swab   Result Value Ref Range    COVID19 Not Detected Not Detected - Ref. Range   CBC  (No Diff)    Collection Time: 05/04/22  2:03 AM    Specimen: Arm, Left; Blood   Result Value Ref Range    WBC 7.28 3.40 - 10.80 10*3/mm3    RBC 3.88 3.77 - 5.28 10*6/mm3    Hemoglobin 12.3 12.0 - 15.9 g/dL    Hematocrit 38.0 34.0 - 46.6 %    MCV 97.9 (H) 79.0 - 97.0 fL    MCH 31.7 26.6 - 33.0 pg    MCHC 32.4 31.5 - 35.7 g/dL    RDW 12.3 12.3 - 15.4 %    RDW-SD 44.3 37.0 - 54.0 fl    MPV 11.1 6.0 - 12.0 fL    Platelets 213 140 - 450 10*3/mm3   Basic Metabolic Panel    Collection Time: 05/04/22  2:03 AM    Specimen: Arm, Left; Blood   Result Value Ref Range    Glucose 89 65 - 99 mg/dL    BUN 19 8 - 23 mg/dL    Creatinine 1.18 (H) 0.57 - 1.00 mg/dL    Sodium 141 136 - 145 mmol/L    Potassium 3.5 3.5 - 5.2 mmol/L    Chloride 104 98 - 107 mmol/L    CO2 24.0 22.0 - 29.0 mmol/L    Calcium 9.3 8.6 - 10.5 mg/dL    BUN/Creatinine Ratio 16.1 7.0 - 25.0    Anion Gap 13.0 5.0 - 15.0 mmol/L    eGFR 45.9 (L) >60.0 mL/min/1.73       Ordered the above labs and independently reviewed the results.        RADIOLOGY  CT Head Without Contrast    Result Date: 5/3/2022  CT HEAD WITHOUT CONTRAST  HISTORY: New onset headache. Epidural injection today.  COMPARISON: 02/08/2021  TECHNIQUE: Axial CT imaging was obtained through the brain. No IV contrast was administered.  FINDINGS: No acute intracranial hemorrhage is seen, although the patient is noted to have pneumocephalus. There is diffuse atrophy. There is periventricular and deep white matter microangiopathic change. There is no midline shift or mass effect. There is some minimal partial opacification of the right mastoid air cells. Paranasal sinuses appear clear.      Pneumocephalus following epidural injection.  Radiation dose reduction techniques were utilized, including automated exposure control and exposure modulation based on body size.  This report was finalized on 5/3/2022 10:59 PM by Dr. Naty Edwards M.D.        I ordered the above noted radiological studies. Reviewed by me  and discussed with radiologist.  See dictation for official radiology interpretation.      PROCEDURES    Procedures      MEDICATIONS GIVEN IN ER    Medications   nitroglycerin (NITROSTAT) SL tablet 0.4 mg (has no administration in time range)   sodium chloride 0.9 % flush 10 mL (10 mL Intravenous Given 5/4/22 0153)   sodium chloride 0.9 % flush 10 mL (has no administration in time range)   ondansetron (ZOFRAN) tablet 4 mg (has no administration in time range)     Or   ondansetron (ZOFRAN) injection 4 mg (has no administration in time range)   acetaminophen (TYLENOL) tablet 650 mg (has no administration in time range)   morphine injection 2 mg (has no administration in time range)     And   naloxone (NARCAN) injection 0.4 mg (has no administration in time range)   melatonin tablet 5 mg (has no administration in time range)   aspirin tablet 325 mg (has no administration in time range)   atorvastatin (LIPITOR) tablet 20 mg (has no administration in time range)   bumetanide (BUMEX) tablet 2 mg (has no administration in time range)   buPROPion XL (WELLBUTRIN XL) 24 hr tablet 300 mg (has no administration in time range)   carvedilol (COREG) tablet 12.5 mg (has no administration in time range)   donepezil (ARICEPT) tablet 10 mg (has no administration in time range)   famotidine (PEPCID) tablet 40 mg (has no administration in time range)   FLUoxetine (PROzac) capsule 60 mg (has no administration in time range)   HYDROcodone-acetaminophen (NORCO)  MG per tablet 1 tablet (1 tablet Oral Given 5/4/22 0207)   zolpidem (AMBIEN) tablet 5 mg (5 mg Oral Given 5/4/22 0318)   morphine injection 2 mg (2 mg Intravenous Given 5/3/22 2314)   ondansetron (ZOFRAN) injection 4 mg (4 mg Intravenous Given 5/3/22 2314)   sodium chloride 0.9 % bolus 500 mL (0 mL Intravenous Stopped 5/3/22 2355)   fentaNYL citrate (PF) (SUBLIMAZE) injection 50 mcg (50 mcg Intravenous Given 5/3/22 2355)   caffeine-sodium benzoate 250 mg in sodium chloride  0.9 % 500 mL IVPB (0 mg Intravenous Stopped 5/4/22 0201)         PROGRESS, DATA ANALYSIS, CONSULTS, AND MEDICAL DECISION MAKING    All labs have been independently reviewed by me.  All radiology studies have been reviewed by me and discussed with radiologist dictating the report.   EKG's independently viewed and interpreted by me.  Discussion below represents my analysis of pertinent findings related to patient's condition, differential diagnosis, treatment plan and final disposition.    DDx includes but is not limited to: Spinal headache, tension headache, migraine headache, meningitis, venous cavernous thrombosis, occipital neuralgia, giant cell arteritis, posttraumatic headache, intracranial pathology.  We will obtain CBC, CMP, sed rate, CT head without contrast to further evaluate.    ED Course as of 05/04/22 0413   Tue May 03, 2022   2258 WBC: 7.00 [RC]   2259 RBC: 3.90 [RC]   2259 Hemoglobin: 12.5 [RC]   2259 Hematocrit: 37.2 [RC]   2259 Platelets: 212 [RC]   2259 Glucose: 94 [RC]   2259 BUN: 21 [RC]   2259 Creatinine(!): 1.30 [RC]   2259 Sodium: 144 [RC]   2259 Potassium: 3.9 [RC]   2259 CO2: 28.0 [RC]   2259 Anion Gap: 13.0 [RC]   2259 Sed Rate: 8 [RC]   2259 Sedimentation rate 8.  Doubt giant cell arteritis.  Patient's headache does appear to be somewhat worse when she sits up.  Given the patient's procedure earlier today I suspect this is likely a spinal headache. [RC]   Wed May 04, 2022   0004 CT head without contrast shows pneumocephalus.  Call placed to neurosurgery. [RC]   0006 CONSULT        Provider: Dr. Palacios-neurosurgery    Discussion: Reviewed patient history, ED presentation and evaluation.  He recommends IV fluid bolus, caffeine, and is agreeable with plan for ED observation unit.  He would also be agreeable with anesthesia consult if concern for persistent CSF leak.    Agreeable c treatment and planned disposition.         [RS]   0007 CONSULT        Provider: MAURICIO Grider  "MA    Discussion: Reviewed patient history, ED presentation evaluation.  Agreeable to accept the patient to the ED observation unit.    Agreeable c treatment and planned disposition.         [RS]   0007 Caffeine dosing discussed with clinical pharmacist.  Plan 250 mg IV in 500 mL. [RS]      ED Course User Index  [RC] Joel Littlejohn III, PA  [RS] Spike Wei MD           PPE: The patient wore a surgical mask throughout the entire patient encounter. I wore an N95.    AS OF 04:13 EDT VITALS:    BP - 173/74  HR - 79  TEMP - 97.9 °F (36.6 °C) (Oral)  O2 SATS - 97%        DIAGNOSIS  Final diagnoses:   Post lumbar puncture headache   Pneumocephalus         DISPOSITION  -Admit to observation unit         Joel Littlejohn III, PA  05/04/22 0243      Electronically signed by Spike Wei MD at 05/04/22 0833     Spike Wei MD at 05/03/22 2696          The [facility] and I have discussed this patient's history, physical exam, and treatment plan.  I have reviewed the documentation and personally had a face to face interaction with the patient. I affirm the documentation and agree with the treatment and plan.  The attached note describes my personal findings.      I provided a substantive portion of the care of the patient.  I personally performed the physical exam in its entirety, and below are my findings.     Brief history of present illness: 81-year-old female complains of headache onset shortly after lumbar epidural procedure.  Left temporal at onset but now diffuse.  Some associated nausea.  No focal numbness or weakness, vision change, fever, abdominal pain.    Physical exam:    BP (!) 151/106 (BP Location: Left arm, Patient Position: Lying)   Pulse 81   Temp 98.2 °F (36.8 °C) (Tympanic)   Resp 16   Ht 160 cm (63\")   Wt 62.6 kg (138 lb)   SpO2 95%   BMI 24.45 kg/m²       Physical Exam   Constitutional: No distress.  Nontoxic  HENT:  Head: Normocephalic and atraumatic.   Oropharynx: Mucous membranes " are moist.   Eyes: . No scleral icterus. No conjunctival pallor.  PERRL, EOMI  Neck: Normal range of motion. Neck supple.   Cardiovascular: Pink warm and well perfused throughout.    Pulmonary/Chest: No respiratory distress.  No tachypnea or increased work of breathing appreciated.    Abdominal: Soft. There is moderate suprapubic tenderness as the patient notes that she needs to urinate.   Musculoskeletal: Moves all extremities equally.    Neurological: Alert and oriented.  No acute focal deficit appreciated.  Skin: Skin is pink, warm, and dry.   Psychiatric: Mood and affect normal.   Nursing note and vitals reviewed.        MDM:    Results for orders placed or performed during the hospital encounter of 05/03/22   Comprehensive Metabolic Panel    Specimen: Blood   Result Value Ref Range    Glucose 94 65 - 99 mg/dL    BUN 21 8 - 23 mg/dL    Creatinine 1.30 (H) 0.57 - 1.00 mg/dL    Sodium 144 136 - 145 mmol/L    Potassium 3.9 3.5 - 5.2 mmol/L    Chloride 103 98 - 107 mmol/L    CO2 28.0 22.0 - 29.0 mmol/L    Calcium 9.9 8.6 - 10.5 mg/dL    Total Protein 6.2 6.0 - 8.5 g/dL    Albumin 4.00 3.50 - 5.20 g/dL    ALT (SGPT) 15 1 - 33 U/L    AST (SGOT) 22 1 - 32 U/L    Alkaline Phosphatase 55 39 - 117 U/L    Total Bilirubin 0.4 0.0 - 1.2 mg/dL    Globulin 2.2 gm/dL    A/G Ratio 1.8 g/dL    BUN/Creatinine Ratio 16.2 7.0 - 25.0    Anion Gap 13.0 5.0 - 15.0 mmol/L    eGFR 40.9 (L) >60.0 mL/min/1.73   Sedimentation Rate    Specimen: Blood   Result Value Ref Range    Sed Rate 8 0 - 30 mm/hr   CBC Auto Differential    Specimen: Blood   Result Value Ref Range    WBC 7.00 3.40 - 10.80 10*3/mm3    RBC 3.90 3.77 - 5.28 10*6/mm3    Hemoglobin 12.5 12.0 - 15.9 g/dL    Hematocrit 37.2 34.0 - 46.6 %    MCV 95.4 79.0 - 97.0 fL    MCH 32.1 26.6 - 33.0 pg    MCHC 33.6 31.5 - 35.7 g/dL    RDW 12.1 (L) 12.3 - 15.4 %    RDW-SD 41.9 37.0 - 54.0 fl    MPV 10.9 6.0 - 12.0 fL    Platelets 212 140 - 450 10*3/mm3    Neutrophil % 79.5 (H) 42.7 - 76.0 %     Lymphocyte % 10.7 (L) 19.6 - 45.3 %    Monocyte % 8.3 5.0 - 12.0 %    Eosinophil % 1.3 0.3 - 6.2 %    Basophil % 0.1 0.0 - 1.5 %    Immature Grans % 0.1 0.0 - 0.5 %    Neutrophils, Absolute 5.56 1.70 - 7.00 10*3/mm3    Lymphocytes, Absolute 0.75 0.70 - 3.10 10*3/mm3    Monocytes, Absolute 0.58 0.10 - 0.90 10*3/mm3    Eosinophils, Absolute 0.09 0.00 - 0.40 10*3/mm3    Basophils, Absolute 0.01 0.00 - 0.20 10*3/mm3    Immature Grans, Absolute 0.01 0.00 - 0.05 10*3/mm3    nRBC 0.0 0.0 - 0.2 /100 WBC     Agree with plan for radiologic evaluation and potentially even admission for anesthesia consult in the morning for blood patch if headache persist.       Spike Wei MD  05/03/22 2302      Electronically signed by Spike Wei MD at 05/03/22 2302         Physician Progress Notes (last 48 hours)  Notes from 05/02/22 0808 through 05/04/22 0808   No notes of this type exist for this encounter.       Consult Notes (last 48 hours)  Notes from 05/02/22 0808 through 05/04/22 0808   No notes of this type exist for this encounter.

## 2022-07-08 ENCOUNTER — OFFICE VISIT (OUTPATIENT)
Dept: CARDIOLOGY | Facility: CLINIC | Age: 82
End: 2022-07-08

## 2022-07-08 ENCOUNTER — CLINICAL SUPPORT NO REQUIREMENTS (OUTPATIENT)
Dept: CARDIOLOGY | Facility: CLINIC | Age: 82
End: 2022-07-08

## 2022-07-08 VITALS
OXYGEN SATURATION: 96 % | DIASTOLIC BLOOD PRESSURE: 84 MMHG | WEIGHT: 130.8 LBS | SYSTOLIC BLOOD PRESSURE: 124 MMHG | BODY MASS INDEX: 23.18 KG/M2 | HEIGHT: 63 IN | HEART RATE: 84 BPM

## 2022-07-08 DIAGNOSIS — E78.2 MIXED HYPERLIPIDEMIA: ICD-10-CM

## 2022-07-08 DIAGNOSIS — Z95.2 S/P AVR (AORTIC VALVE REPLACEMENT): Primary | ICD-10-CM

## 2022-07-08 DIAGNOSIS — I49.5 SICK SINUS SYNDROME: Primary | ICD-10-CM

## 2022-07-08 DIAGNOSIS — Z98.890 S/P MVR (MITRAL VALVE REPAIR): ICD-10-CM

## 2022-07-08 DIAGNOSIS — Z98.890 S/P TVR (TRICUSPID VALVE REPAIR): ICD-10-CM

## 2022-07-08 DIAGNOSIS — Z95.0 PACEMAKER: ICD-10-CM

## 2022-07-08 DIAGNOSIS — N18.32 STAGE 3B CHRONIC KIDNEY DISEASE: ICD-10-CM

## 2022-07-08 DIAGNOSIS — I71.20 THORACIC AORTIC ANEURYSM WITHOUT RUPTURE: ICD-10-CM

## 2022-07-08 DIAGNOSIS — I10 BENIGN ESSENTIAL HYPERTENSION: ICD-10-CM

## 2022-07-08 PROCEDURE — 93280 PM DEVICE PROGR EVAL DUAL: CPT | Performed by: INTERNAL MEDICINE

## 2022-07-08 PROCEDURE — 93000 ELECTROCARDIOGRAM COMPLETE: CPT | Performed by: INTERNAL MEDICINE

## 2022-07-08 PROCEDURE — 99214 OFFICE O/P EST MOD 30 MIN: CPT | Performed by: INTERNAL MEDICINE

## 2022-07-08 NOTE — PROGRESS NOTES
Subjective:     Encounter Date:07/08/2022      Patient ID: Carly Gotti is a 81 y.o. female.    Chief Complaint:  History of Present Illness    This is a 81-year-old female with a history of moderate to severe aortic regurgitation status post replacement with a bioprosthetic aortic valve, moderate to severe mitral regurgitation status post repair, severe tricuspid regurgitation status post repair, pulmonary hypertension, chronic kidney disease with an atrophic kidney, hyperlipidemia, hypertension, aberrant right subclavian artery, obstructive sleep apnea on CPAP, status post dual-chamber pacemaker placement for sick sinus syndrome, depression, who presents for follow up.      He presents today for routine office follow-up.  Her last office visit in 1/2022 the patient reported that her joint and arthritis issues were really acting up.  In fact she presented to the office at that time in a wheelchair because the issues were severely affecting her mobility.  She continued to complain of lower extremity edema especially of her left lower extremity.  At this was present despite having her increase her bumetanide to 2 mg twice a day by Dr. Peters.  I recommended proceeding with a repeat echocardiogram and a venous Doppler ultrasound.  Ultrasound was performed on 1/12/2022 and showed no evidence of DVT.  Her echocardiogram was performed on 1/20/2022 and showed evidence of normal left ventricular systolic function wall motion with an EF of 61%, mildly dilated left atrium, and normal function and gradients across her mitral, tricuspid, and aortic valves.    Following her last office visit she did follow-up with Dr. Peters who felt that since she had stable renal function that she could continue the Bumex at 2 mg twice a day.  The patient reports that eventually her swelling resolved and she was able to back down to taking 2 mg just once a day.  She has had no recurrent issues with swelling since then.  She  denies any chest pain, palpitations, orthopnea, near-syncope or syncope.  She continues to complain of significant fatigue.  She denies any worsening dyspnea on exertion.  She continues to struggle due to her joint issues.     Prior History:  The patient was previously followed by Dr. Lopez who she last saw in 5/2017.  Her prior cardiac history includes mitral and tricuspid valve repair and aortic valve replacement back in 11/2015 by Dr. Veliz.  Postoperatively she developed symptomatic sick sinus syndrome and required dual chamber permanent pacemaker placement.  Since then she's done relatively well although she reports that she never quite recovered back to her baseline following the surgery.       Following her initial visit with me in 12/2017 I set her up for a repeat echocardiogram that showed normal left ventricular systolic function and wall motion with an estimated ejection fraction 64%, grade 1 diastolic dysfunction, grossly normal-appearing bioprosthetic aortic valve, no mitral valve regurgitation, trace tricuspid valve regurgitation, and a right ventricular systolic pressure of 17 mmHg.     I saw her last in 12/2019 for annual follow-up.  Prior to that office visit she had been hospitalized twice for C. difficile colitis.   She reported fatigue and mild dyspnea with activity since all of these issues started.    I recommended a 1 year follow-up at that time with a repeat echocardiogram.  She called in 2/2020 with complaints of lower extremity swelling especially in the left leg.  I recommended proceeding with a lower extremity venous Doppler ultrasound and asked her to increase her bumetanide to 2 mg twice a day for a few days.  Ultrasound ended up being normal.  She was still having swelling so I recommended that she discuss any further changes to her bumetanide with her nephrologist.     She underwent a follow-up echocardiogram in 1/2021 which showed normal left ventricular systolic function  wall motion with an EF of 60 to 65%, grade 2 diastolic dysfunction, bioprosthetic aortic valve with mild to moderately elevated gradients which appear to be slightly worse than prior echocardiogram, mild to moderate mitral valve stenosis across the surgically repaired mitral valve, mildly elevated right ventricular systolic pressure of 36 mmHg, and mildly dilated aortic root, and moderate regurgitation of the repaired tricuspid valve.    Review of Systems   Constitutional: Positive for malaise/fatigue.   HENT: Negative for hearing loss, hoarse voice, nosebleeds and sore throat.    Eyes: Negative for pain.   Cardiovascular: Negative for chest pain, claudication, cyanosis, dyspnea on exertion, irregular heartbeat, leg swelling, near-syncope, orthopnea, palpitations, paroxysmal nocturnal dyspnea and syncope.   Respiratory: Negative for shortness of breath and snoring.    Endocrine: Negative for cold intolerance, heat intolerance, polydipsia, polyphagia and polyuria.   Skin: Negative for itching and rash.   Musculoskeletal: Positive for joint pain. Negative for arthritis, falls, joint swelling, muscle cramps, muscle weakness and myalgias.   Gastrointestinal: Negative for constipation, diarrhea, dysphagia, heartburn, hematemesis, hematochezia, melena, nausea and vomiting.   Genitourinary: Negative for frequency, hematuria and hesitancy.   Neurological: Negative for excessive daytime sleepiness, dizziness, headaches, light-headedness, numbness and weakness.   Psychiatric/Behavioral: Negative for depression. The patient is not nervous/anxious.          Current Outpatient Medications:   •  aspirin 325 MG tablet, Take by mouth daily., Disp: , Rfl:   •  atorvastatin (LIPITOR) 20 MG tablet, take 1 tablet by mouth at bedtime, Disp: , Rfl: 0  •  bumetanide (BUMEX) 2 MG tablet, TAKE 1 TABLET BY MOUTH EVERY DAY, Disp: 90 tablet, Rfl: 1  •  buPROPion XL (WELLBUTRIN XL) 300 MG 24 hr tablet, Take 300 mg by mouth Daily., Disp: , Rfl:  0  •  carvedilol (COREG) 12.5 MG tablet, Take 12.5 mg by mouth 2 (Two) Times a Day., Disp: , Rfl:   •  Coenzyme Q10 200 MG tablet, Take 1 tablet/day by mouth Daily., Disp: , Rfl:   •  [employer] Senna Plus 8.6-50 MG per tablet, TAKE 1 TABLET BY MOUTH EVERY DAY, Disp: 30 tablet, Rfl: 2  •  donepezil (ARICEPT) 10 MG tablet, Take 10 mg by mouth Daily., Disp: , Rfl:   •  estradiol (ESTRACE) 0.5 MG tablet, Take by mouth daily., Disp: , Rfl:   •  eszopiclone (LUNESTA) 3 MG tablet, Take 3 mg by mouth Every Night. Take immediately before bedtime, Disp: , Rfl:   •  famotidine (PEPCID) 40 MG tablet, TAKE 1 TABLET BY MOUTH TWICE A DAY, Disp: 180 tablet, Rfl: 3  •  fenofibrate micronized (LOFIBRA) 67 MG capsule, Take 67 mg by mouth Daily., Disp: , Rfl:   •  FLUoxetine (PROzac) 20 MG capsule, Take 60 mg by mouth Daily., Disp: , Rfl:   •  HYDROcodone-acetaminophen (NORCO)  MG per tablet, Take 1 tablet by mouth Every 4 (Four) Hours As Needed for Moderate Pain ., Disp: 12 tablet, Rfl: 0    Past Medical History:   Diagnosis Date   • Aneurysm (HCC)    • Aortic valve disease    • Arthritis    • Atrophic kidney    • Bradycardia    • CAD (coronary artery disease)    • CKD (chronic kidney disease) stage 4, GFR 15-29 ml/min (HCC)    • Depression    • Dysphagia    • Essential hypertension    • [place] (hard of hearing)    • Hyperlipidemia    • Mitral valve disease    • Post lumbar puncture headache 2022   • Pulmonary hypertension (HCC)    • Sick sinus syndrome (HCC)    • Sleep apnea    • Spinal stenosis        Past Surgical History:   Procedure Laterality Date   • AORTIC VALVE REPAIR/REPLACEMENT  2015    Dr Veliz   • CARDIAC CATHETERIZATION     • CARDIAC DEFIBRILLATOR PLACEMENT     •  SECTION     • COL[facility]OSCOPY N/A 2019    [facility]   • ENDOSCOPY  2012    Hypertonic lower esophageal sphincter, gastritis. Path: Chronic gastritis, moderate.    • ENDOSCOPY N/A 2019    Web in the upper third of the esophagus. Dilated  "  • ENDOSCOPY N/A 2020    Procedure: ESOPHAGOGASTRODUODENOSCOPY WITH 56FR MALDONADO DILATATION AND COLD BIOPSIES;  Surgeon: Blayne Cai MD;  Location: [facility] ENDOSCOPY;  Service: Gastroenterology;  Laterality: N/A;  PRE: DYSPHAGIA  POST: SCHATZKI'S RING   • EPIDURAL  2022   • HYSTERECTOMY     • MITRAL VALVE ANNULOPLASTY  2015    Dr Veliz   • TRICUSPID VALVE SURGERY  2015    Dr Veliz       Family History   Problem Relation Age of Onset   • ALS Mother    • Hypertension Father        Social History     Tobacco Use   • Smoking status: Former Smoker     Quit date:      Years since quittin.5   • Smokeless tobacco: Never Used   Substance Use Topics   • Alcohol use: No   • Drug use: No         ECG 12 Lead    Date/Time: 2022 3:38 PM  Performed by: Laura Pak MD  Authorized by: Laura Pak MD   Comparison: compared with previous ECG   Similar to previous ECG  Conduction: left anterior fascicular block  Comments: Atrial paced rhythm               Objective:     Visit Vitals  /84 (BP Location: Right arm, Patient Position: Sitting, Cuff Size: Adult)   Pulse 84   Ht 160 cm (63\")   Wt 59.3 kg (130 lb 12.8 oz)   SpO2 96%   BMI 23.17 kg/m²         Constitutional:       Appearance: Normal appearance. Well-developed.   Eyes:      General: Lids are normal.      Conjunctiva/sclera: Conjunctivae normal.      Pupils: Pupils are equal, round, and reactive to light.   HENT:      Head: Normocephalic and atraumatic.   Neck:      Vascular: No carotid bruit or JVD.      Lymphadenopathy: No cervical adenopathy.   Pulmonary:      Effort: Pulmonary effort is normal.      Breath sounds: Normal breath sounds.   Cardiovascular:      Normal rate. Regular rhythm.      No gallop.   Pulses:     Radial: 2+ bilaterally.  Edema:     Peripheral edema absent.   Abdominal:      Palpations: Abdomen is soft.   Musculoskeletal:      Cervical back: Full passive range of motion without pain, normal range " of motion and neck supple. Skin:     General: Skin is warm and dry.   Neurological:      Mental Status: Alert and oriented to person, place, and time.           Assessment:          Diagnosis Plan   1. S/P AVR (aortic valve replacement)     2. S/P TVR (tricuspid valve repair)     3. S/P MVR (mitral valve repair)     4. Benign essential hypertension     5. Thoracic aortic aneurysm without rupture (HCC)     6. Mixed hyperlipidemia     7. Pacemaker     8. Stage 3b chronic kidney disease (HCC)            Plan:       1.  Fatigue.  I suspect this is multifactorial.  I do not have a high suspicion that her symptoms are cardiac.  She had an unremarkable looking echocardiogram earlier this year.  I think is unlikely that her symptoms are an anginal equivalent and are due to ischemic heart disease.  I did inform her that if we wanted to rule out a cardiac issue as a cause of her fatigue further we could consider a stress test.  At this point the patient is okay with monitoring her symptoms before pursuing any further cardiac work-up.  2.  Bioprosthetic aortic valve replacement.  Normal gradients on her most recent echocardiogram.  3.  Mitral valve repair.  Normal gradients across her most recent echocardiogram.  4.  Tricuspid valve repair.  No significant issues on her last echocardiogram.  5.  Hypertension.  Well-controlled on her current regimen of medications.  Continue the same.  6.  Mild aortic root dilatation.  No significant dilatation noted on her most recent echocardiogram.  7.  Hyperlipidemia.  On atorvastatin.  8.  Chronic kidney disease.  Followed closely by Dr. Peters.  9.  Dual-chamber permanent pacemaker placement.  Device interrogation today showed normal function and no recent events.  10.  Osteoarthritis.  This appears to be her main issue at this time.    I will plan on seeing the patient back again in 6 months.

## 2022-07-15 ENCOUNTER — APPOINTMENT (OUTPATIENT)
Dept: CT IMAGING | Facility: HOSPITAL | Age: 82
End: 2022-07-15

## 2022-07-15 ENCOUNTER — HOSPITAL ENCOUNTER (EMERGENCY)
Facility: HOSPITAL | Age: 82
Discharge: HOME | End: 2022-07-15
Attending: EMERGENCY MEDICINE | Admitting: EMERGENCY MEDICINE

## 2022-07-15 VITALS
DIASTOLIC BLOOD PRESSURE: 90 MMHG | TEMPERATURE: 98.2 F | OXYGEN SATURATION: 93 % | SYSTOLIC BLOOD PRESSURE: 133 MMHG | HEART RATE: 90 BPM | RESPIRATION RATE: 18 BRPM

## 2022-07-15 DIAGNOSIS — R25.9 ABNORMAL INVOLUNTARY MOVEMENTS: Primary | ICD-10-CM

## 2022-07-15 DIAGNOSIS — S09.90XA MINOR HEAD INJURY, INITIAL ENCOUNTER: ICD-10-CM

## 2022-07-15 LAB
ALBUMIN SERPL-MCNC: 4.3 G/DL (ref 3.5–5.2)
ALBUMIN/GLOB SERPL: 2.2 G/DL
ALP SERPL-CCNC: 54 U/L (ref 39–117)
ALT SERPL W P-5'-P-CCNC: 20 U/L (ref 1–33)
ANION GAP SERPL CALCULATED.3IONS-SCNC: 10 MMOL/L (ref 5–15)
AST SERPL-CCNC: 24 U/L (ref 1–32)
BASOPHILS # BLD AUTO: 0.02 10*3/MM3 (ref 0–0.2)
BASOPHILS NFR BLD AUTO: 0.3 % (ref 0–1.5)
BILIRUB SERPL-MCNC: 0.5 MG/DL (ref 0–1.2)
BUN SERPL-MCNC: 25 MG/DL (ref 8–23)
BUN/CREAT SERPL: 17.9 (ref 7–25)
CALCIUM SPEC-SCNC: 9.9 MG/DL (ref 8.6–10.5)
CHLORIDE SERPL-SCNC: 101 MMOL/L (ref 98–107)
CO2 SERPL-SCNC: 30 MMOL/L (ref 22–29)
CREAT SERPL-MCNC: 1.4 MG/DL (ref 0.57–1)
DEPRECATED RDW RBC AUTO: 42.8 FL (ref 37–54)
EGFRCR SERPLBLD CKD-EPI 2021: 37.4 ML/MIN/1.73
EOSINOPHIL # BLD AUTO: 0.07 10*3/MM3 (ref 0–0.4)
EOSINOPHIL NFR BLD AUTO: 1 % (ref 0.3–6.2)
ERYTHROCYTE [DISTWIDTH] IN BLOOD BY AUTOMATED COUNT: 12.2 % (ref 12.3–15.4)
GLOBULIN UR ELPH-MCNC: 2 GM/DL
GLUCOSE SERPL-MCNC: 116 MG/DL (ref 65–99)
HCT VFR BLD AUTO: 38.4 % (ref 34–46.6)
HGB BLD-MCNC: 13 G/DL (ref 12–15.9)
HOLD SPECIMEN: NORMAL
HOLD SPECIMEN: NORMAL
IMM GRANULOCYTES # BLD AUTO: 0.02 10*3/MM3 (ref 0–0.05)
IMM GRANULOCYTES NFR BLD AUTO: 0.3 % (ref 0–0.5)
LYMPHOCYTES # BLD AUTO: 0.91 10*3/MM3 (ref 0.7–3.1)
LYMPHOCYTES NFR BLD AUTO: 13.4 % (ref 19.6–45.3)
MCH RBC QN AUTO: 32.6 PG (ref 26.6–33)
MCHC RBC AUTO-ENTMCNC: 33.9 G/DL (ref 31.5–35.7)
MCV RBC AUTO: 96.2 FL (ref 79–97)
MONOCYTES # BLD AUTO: 0.54 10*3/MM3 (ref 0.1–0.9)
MONOCYTES NFR BLD AUTO: 8 % (ref 5–12)
NEUTROPHILS NFR BLD AUTO: 5.23 10*3/MM3 (ref 1.7–7)
NEUTROPHILS NFR BLD AUTO: 77 % (ref 42.7–76)
NRBC BLD AUTO-RTO: 0 /100 WBC (ref 0–0.2)
PLATELET # BLD AUTO: 188 10*3/MM3 (ref 140–450)
PMV BLD AUTO: 11.5 FL (ref 6–12)
POTASSIUM SERPL-SCNC: 3.4 MMOL/L (ref 3.5–5.2)
PROT SERPL-MCNC: 6.3 G/DL (ref 6–8.5)
RBC # BLD AUTO: 3.99 10*6/MM3 (ref 3.77–5.28)
SODIUM SERPL-SCNC: 141 MMOL/L (ref 136–145)
WBC NRBC COR # BLD: 6.79 10*3/MM3 (ref 3.4–10.8)
WHOLE BLOOD HOLD COAG: NORMAL
WHOLE BLOOD HOLD SPECIMEN: NORMAL

## 2022-07-15 PROCEDURE — 99283 EMERGENCY DEPT VISIT LOW MDM: CPT

## 2022-07-15 PROCEDURE — 96374 THER/PROPH/DIAG INJ IV PUSH: CPT

## 2022-07-15 PROCEDURE — 25010000002 DIPHENHYDRAMINE PER 50 MG: Performed by: EMERGENCY MEDICINE

## 2022-07-15 PROCEDURE — 85025 COMPLETE CBC W/AUTO DIFF WBC: CPT | Performed by: EMERGENCY MEDICINE

## 2022-07-15 PROCEDURE — 80053 COMPREHEN METABOLIC PANEL: CPT | Performed by: EMERGENCY MEDICINE

## 2022-07-15 PROCEDURE — 70450 CT HEAD/BRAIN W/O DYE: CPT

## 2022-07-15 RX ORDER — DIPHENHYDRAMINE HYDROCHLORIDE 50 MG/ML
25 INJECTION INTRAMUSCULAR; INTRAVENOUS ONCE
Status: COMPLETED | OUTPATIENT
Start: 2022-07-15 | End: 2022-07-15

## 2022-07-15 RX ORDER — SODIUM CHLORIDE 0.9 % (FLUSH) 0.9 %
10 SYRINGE (ML) INJECTION AS NEEDED
Status: DISCONTINUED | OUTPATIENT
Start: 2022-07-15 | End: 2022-07-15 | Stop reason: HOSPADM

## 2022-07-15 RX ORDER — ALPRAZOLAM 0.25 MG/1
0.25 TABLET ORAL ONCE
Status: COMPLETED | OUTPATIENT
Start: 2022-07-15 | End: 2022-07-15

## 2022-07-15 RX ADMIN — DIPHENHYDRAMINE HYDROCHLORIDE 25 MG: 50 INJECTION, SOLUTION INTRAMUSCULAR; INTRAVENOUS at 14:22

## 2022-07-15 RX ADMIN — ALPRAZOLAM 0.25 MG: 0.25 TABLET ORAL at 15:34

## 2022-07-15 NOTE — DISCHARGE INSTRUCTIONS
If your symptoms recur, call your doctor on Monday for further guidance.  If symptoms worsen over the weekend, return to the ER.

## 2022-07-15 NOTE — ED NOTES
Pt reports shaking since last night. States it has been constant and only in her head, neck and upper extremities. Pt states she had a fall last Saturday, where she hit her head, states she did not have any sx afterwards. Denies any blood thinners. This rn wearing mask and goggles. Pt wearing mask during encounter.

## 2022-07-15 NOTE — ED TRIAGE NOTES
C/O shaking all over onset last PM that is getting worse. Had a fall approx 6 days ago and hit head     Mask placed on patient in triage. Triage staff wore appropriate PPE during interaction with patient.

## 2022-07-16 NOTE — ED PROVIDER NOTES
EMERGENCY DEPARTMENT ENCOUNTER    Room Number:  19/19  Date seen:  7/15/2022  PCP: Reyes, Miriam Mercado, MD  Historian: Patient      HPI:  Chief Complaint: Shaky  A complete HPI/ROS/PMH/PSH/SH/FH are unobtainable due to: Nothing  Context: Carly Gotti is a 81 y.o. female who presents to the ED c/o involuntary shaking movements.  Patient reports that the symptoms started last night.  Is mostly in her head and neck.  She reports that she fell a few days ago and hit her head.  She had no loss conscious.  She denies taking blood thinner medications.  She denies any tingling, numbness, weakness in her extremities.  She denies any new medications.            PAST MEDICAL HISTORY  Active Ambulatory Problems     Diagnosis Date Noted   • S/P AVR (aortic valve replacement) 04/04/2016   • S/P TVR (tricuspid valve repair) 04/04/2016   • S/P MVR (mitral valve repair) 04/04/2016   • Benign essential hypertension 05/03/2016   • Mitral and aortic valve disease 05/03/2016   • CKD (chronic kidney disease) stage 3, GFR 30-59 ml/min (CMS/[facility]) 11/01/2016   • Small bowel obstruction ([facility]) 12/21/2016   • Abdominal pain 12/22/2016   • Slow transit constipation 12/27/2016   • Hyperlipidemia 05/02/2017   • Pacemaker 12/06/2017   • Colitis 03/18/2019   • Elevated lipase 03/18/2019   • C. difficile colitis 03/18/2019   • Ileus ([facility]) 03/18/2019   • Sepsis ([facility]) 03/19/2019   • Thrombocytopenia ([facility]) 03/20/2019   • Dysphagia 05/23/2019   • Esophageal web 05/23/2019   • Recurrent Clostridium difficile diarrhea 06/27/2019   • CKD (chronic kidney disease) stage 4, GFR 15-29 ml/min ([facility]) 06/27/2019   • Generalized abdominal pain 06/27/2019   • Intestinal infection due to enteropathogenic E. coli 06/28/2019   • Obstructive sleep apnea 12/19/2019   • Nausea 08/27/2020   • Renal failure 06/15/2021   • Depression 06/15/2021   • Arthritis 06/15/2021   • Aneurysm of thoracic aorta ([facility]) 06/15/2021   • Anxiety 06/15/2021   • Post lumbar puncture  headache 2022     Resolved Ambulatory Problems     Diagnosis Date Noted   • Chest tightness 2016     Past Medical History:   Diagnosis Date   • Aneurysm (HCC)    • Aortic valve disease    • Atrophic kidney    • Bradycardia    • CAD (coronary artery disease)    • Essential hypertension    • [place]    • Mitral valve disease    • Pulmonary hypertension (HCC)    • Sick sinus syndrome (HCC)    • Sleep apnea    • Spinal stenosis          PAST SURGICAL HISTORY  Past Surgical History:   Procedure Laterality Date   • AORTIC VALVE REPAIR/REPLACEMENT  2015    Dr Veliz   • CARDIAC CATHETERIZATION     • CARDIAC DEFIBRILLATOR PLACEMENT     •  SECTION     • COLONOSCOPY N/A 2019    [facility]   • ENDOSCOPY  2012    Hypertonic lower esophageal sphincter, gastritis. Path: Chronic gastritis, moderate.    • ENDOSCOPY N/A 2019    Web in the upper third of the esophagus. Dilated   • ENDOSCOPY N/A 2020    Procedure: ESOPHAGOGASTRODUODENOSCOPY WITH 56FR MALDONADO DILATATION AND COLD BIOPSIES;  Surgeon: Blayne Cai MD;  Location: [facility] ENDOSCOPY;  Service: Gastroenterology;  Laterality: N/A;  PRE: DYSPHAGIA  POST: SCHATZKI'S RING   • EPIDURAL  2022   • HYSTERECTOMY     • MITRAL VALVE ANNULOPLASTY  2015    Dr Veliz   • TRICUSPID VALVE SURGERY  2015    Dr Veliz         FAMILY HISTORY  Family History   Problem Relation Age of Onset   • ALS Mother    • Hypertension Father          SOCIAL HISTORY  Social History     Socioeconomic History   • Marital status:    Tobacco Use   • Smoking status: Former Smoker     Quit date:      Years since quittin.5   • Smokeless tobacco: Never Used   Substance and Sexual Activity   • Alcohol use: No   • Drug use: No   • Sexual activity: Defer         ALLERGIES  Patient has no known allergies.        REVIEW OF SYSTEMS  Review of Systems   Review of all 14 systems is negative other than stated in the HPI  above.      PHYSICAL EXAM  ED Triage Vitals   Temp Heart Rate Resp BP SpO2   07/15/22 1214 07/15/22 1214 07/15/22 1214 07/15/22 1410 07/15/22 1214   98.2 °F (36.8 °C) 80 18 137/77 96 %      Temp src Heart Rate Source Patient Position BP Location FiO2 (%)   07/15/22 1214 07/15/22 1612 -- -- --   Tympanic Monitor            GENERAL: Awake and alert, no acute distress  HENT: nares patent, left forehead ecchymosis  EYES: no scleral icterus, EOMI  CV: regular rhythm, normal rate  RESPIRATORY: normal effort  ABDOMEN: soft  MUSCULOSKELETAL: no deformity  NEURO: alert, moves all extremities, follows commands, patient has occasional jerking of bilateral upper extremities  PSYCH:  calm, cooperative  SKIN: warm, dry    Vital signs and nursing notes reviewed.          LAB RESULTS  Recent Results (from the past 24 hour(s))   Comprehensive Metabolic Panel    Collection Time: 07/15/22  2:10 PM    Specimen: Blood   Result Value Ref Range    Glucose 116 (H) 65 - 99 mg/dL    BUN 25 (H) 8 - 23 mg/dL    Creatinine 1.40 (H) 0.57 - 1.00 mg/dL    Sodium 141 136 - 145 mmol/L    Potassium 3.4 (L) 3.5 - 5.2 mmol/L    Chloride 101 98 - 107 mmol/L    CO2 30.0 (H) 22.0 - 29.0 mmol/L    Calcium 9.9 8.6 - 10.5 mg/dL    Total Protein 6.3 6.0 - 8.5 g/dL    Albumin 4.30 3.50 - 5.20 g/dL    ALT (SGPT) 20 1 - 33 U/L    AST (SGOT) 24 1 - 32 U/L    Alkaline Phosphatase 54 39 - 117 U/L    Total Bilirubin 0.5 0.0 - 1.2 mg/dL    Globulin 2.0 gm/dL    A/G Ratio 2.2 g/dL    BUN/Creatinine Ratio 17.9 7.0 - 25.0    Anion Gap 10.0 5.0 - 15.0 mmol/L    eGFR 37.4 (L) >60.0 mL/min/1.73   CBC Auto Differential    Collection Time: 07/15/22  2:10 PM    Specimen: Blood   Result Value Ref Range    WBC 6.79 3.40 - 10.80 10*3/mm3    RBC 3.99 3.77 - 5.28 10*6/mm3    Hemoglobin 13.0 12.0 - 15.9 g/dL    Hematocrit 38.4 34.0 - 46.6 %    MCV 96.2 79.0 - 97.0 fL    MCH 32.6 26.6 - 33.0 pg    MCHC 33.9 31.5 - 35.7 g/dL    RDW 12.2 (L) 12.3 - 15.4 %    RDW-SD 42.8 37.0 - 54.0 fl     MPV 11.5 6.0 - 12.0 fL    Platelets 188 140 - 450 10*3/mm3    Neutrophil % 77.0 (H) 42.7 - 76.0 %    Lymphocyte % 13.4 (L) 19.6 - 45.3 %    Monocyte % 8.0 5.0 - 12.0 %    Eosinophil % 1.0 0.3 - 6.2 %    Basophil % 0.3 0.0 - 1.5 %    Immature Grans % 0.3 0.0 - 0.5 %    Neutrophils, Absolute 5.23 1.70 - 7.00 10*3/mm3    Lymphocytes, Absolute 0.91 0.70 - 3.10 10*3/mm3    Monocytes, Absolute 0.54 0.10 - 0.90 10*3/mm3    Eosinophils, Absolute 0.07 0.00 - 0.40 10*3/mm3    Basophils, Absolute 0.02 0.00 - 0.20 10*3/mm3    Immature Grans, Absolute 0.02 0.00 - 0.05 10*3/mm3    nRBC 0.0 0.0 - 0.2 /100 WBC   Green Top (Gel)    Collection Time: 07/15/22  2:10 PM   Result Value Ref Range    Extra Tube Hold for add-ons.    Lavender Top    Collection Time: 07/15/22  2:10 PM   Result Value Ref Range    Extra Tube hold for add-on    Gold Top - SST    Collection Time: 07/15/22  2:10 PM   Result Value Ref Range    Extra Tube Hold for add-ons.    Light Blue Top    Collection Time: 07/15/22  2:10 PM   Result Value Ref Range    Extra Tube Hold for add-ons.        Ordered the above labs and reviewed the results.        RADIOLOGY  CT Head Without Contrast    Result Date: 7/15/2022  CT SCAN OF THE BRAIN WITHOUT CONTRAST  HISTORY: Recent fall with head trauma. Involuntary shaking of extremities.  FINDINGS:  The CT scan was performed as an emergency procedure through the brain without contrast. There is mild diffuse atrophy and chronic small vessel ischemic change similar to the study of 05/04/2022. There is no evidence of acute intracranial hemorrhage or mass effect. The visualized sinuses and mastoid air cells are clear.  Radiation dose reduction techniques were utilized, including automated exposure control and exposure modulation based on body size.  This report was finalized on 7/15/2022 3:44 PM by Dr. Benji Hazel M.D.        Ordered the above noted radiological studies. Reviewed by me in PACS.             PROCEDURES  Procedures              MEDICATIONS GIVEN IN ER  Medications   diphenhydrAMINE (BENADRYL) injection 25 mg (25 mg Intravenous Given 7/15/22 1422)   ALPRAZolam (XANAX) tablet 0.25 mg (0.25 mg Oral Given 7/15/22 1534)                   MEDICAL DECISION MAKING, PROGRESS, and CONSULTS    All labs have been independently reviewed by me.  All radiology studies have been reviewed by me and discussed with radiologist dictating the report.   EKG's independently viewed and interpreted by me.  Discussion below represents my analysis of pertinent findings related to patient's condition, differential diagnosis, treatment plan and final disposition.      Differential diagnosis includes but is not limited to:  Adverse effects of medication  Dystonic reaction  Seizure  Tremor      ED Course as of 07/15/22 2008   Fri Jul 15, 2022   1511 Creatinine(!): 1.40 [JR]   1511 Sodium: 141 [JR]   1511 Potassium(!): 3.4 [JR]   1511 WBC: 6.79 [JR]   1624 Creatinine(!): 1.40  chronic [JR]   1656 Patient reassessed.  Her tremors have resolved.  She seems to get more relief from the low-dose Xanax p.o. and she did from the IV Benadryl.  I explained that her labs and imaging studies appear reassuring and the etiology of her involuntary movements is unclear however she has had symptomatic relief and given her reassuring work-up I think she is safe for discharge home at this time.  If the symptoms recur, advised her to call her doctor on Monday for further guidance.  I explained that I do not want to prescribe her Xanax due to the risk for her to become dependent on benzodiazepines. [JR]      ED Course User Index  [JR] Ravinder Hammond MD              I wore an N95 mask, face shield, and gloves during this patient encounter.  Patient also wearing a surgical mask.  Hand hygeine performed before and after seeing the patient.    DIAGNOSIS  Final diagnoses:   Abnormal involuntary movements   Minor head injury, initial encounter          DISPOSITION  DISCHARGE    Patient discharged in stable condition.    Reviewed implications of results, diagnosis, meds, responsibility to follow up, warning signs and symptoms of possible worsening, potential complications and reasons to return to ER.    Patient/Family voiced understanding of above instructions.    Discussed plan for discharge, as there is no emergent indication for admission. Patient referred to primary care provider for BP management due to today's BP. Pt/family is agreeable and understands need for follow up and repeat testing.  Pt is aware that discharge does not mean that nothing is wrong but it indicates no emergency is present that requires admission and they must continue care with follow-up as given below or physician of their choice.     FOLLOW-UP  Reyes, Miriam Mercado, MD  [address] [address]  [phone]    Schedule an appointment as soon as possible for a visit            Medication List      No changes were made to your prescriptions during this visit.                   Latest Documented Vital Signs:  As of 20:08 EDT  BP- 133/90 HR- 90 Temp- 98.2 °F (36.8 °C) (Tympanic) O2 sat- 93%        --    Please note that portions of this were completed with a voice recognition program.            Ravinder Hammond MD  07/15/22 2010

## 2022-07-25 ENCOUNTER — OFFICE VISIT (OUTPATIENT)
Dept: GASTROENTEROLOGY | Facility: CLINIC | Age: 82
End: 2022-07-25

## 2022-07-25 VITALS
HEART RATE: 78 BPM | OXYGEN SATURATION: 93 % | HEIGHT: 62 IN | WEIGHT: 129.7 LBS | DIASTOLIC BLOOD PRESSURE: 84 MMHG | SYSTOLIC BLOOD PRESSURE: 137 MMHG | BODY MASS INDEX: 23.87 KG/M2 | TEMPERATURE: 97.8 F

## 2022-07-25 DIAGNOSIS — K59.01 SLOW TRANSIT CONSTIPATION: Primary | ICD-10-CM

## 2022-07-25 PROCEDURE — 99214 OFFICE O/P EST MOD 30 MIN: CPT | Performed by: INTERNAL MEDICINE

## 2022-07-25 RX ORDER — AMOXICILLIN AND CLAVULANATE POTASSIUM 500; 125 MG/1; MG/1
1 TABLET, FILM COATED ORAL 2 TIMES DAILY
Qty: 20 TABLET | Refills: 0 | Status: SHIPPED | OUTPATIENT
Start: 2022-07-25 | End: 2022-10-05

## 2022-07-25 NOTE — PROGRESS NOTES
Chief Complaint   Patient presents with   • Drug induced constipation   • Gas     Subjective     HPI  Carly Gotti is a 81 y.o. female who presents today for follow up.  Opioid-induced constipation and gas  Bowels are moving regularly on senna, she continues on Norco 4 times a day  She has excess bloating and gas after meals  No weight loss, rectal bleeding or other alarm symptoms    Objective   Vitals:    07/25/22 1422   BP: 137/84   Pulse: 78   Temp: 97.8 °F (36.6 °C)   SpO2: 93%       Physical Exam  Vitals reviewed.   Constitutional:       Appearance: She is well-developed.   HENT:      Head: Normocephalic and atraumatic.   Abdominal:      General: Bowel sounds are normal. There is no distension.      Palpations: Abdomen is soft. There is no mass.      Tenderness: There is no abdominal tenderness.      Hernia: No hernia is present.   Skin:     General: Skin is warm and dry.   Neurological:      Mental Status: She is alert and oriented to person, place, and time.   Psychiatric:         Behavior: Behavior normal.         Thought Content: Thought content normal.         Judgment: Judgment normal.       The following data was reviewed by: Blayne Cai MD on 07/25/2022:  Common labs    Common Labsle 5/3/22 5/3/22 5/4/22 5/4/22 7/15/22 7/15/22    2217 2217 0203 0203 1410 1410   Glucose  94  89  116 (A)   BUN  21  19  25 (A)   Creatinine  1.30 (A)  1.18 (A)  1.40 (A)   Sodium  144  141  141   Potassium  3.9  3.5  3.4 (A)   Chloride  103  104  101   Calcium  9.9  9.3  9.9   Albumin  4.00    4.30   Total Bilirubin  0.4    0.5   Alkaline Phosphatase  55    54   AST (SGOT)  22    24   ALT (SGPT)  15    20   WBC 7.00  7.28  6.79    Hemoglobin 12.5  12.3  13.0    Hematocrit 37.2  38.0  38.4    Platelets 212  213  188    (A) Abnormal value            CMP    CMP 5/3/22 5/4/22 7/15/22   Glucose 94 89 116 (A)   BUN 21 19 25 (A)   Creatinine 1.30 (A) 1.18 (A) 1.40 (A)   Sodium 144 141 141   Potassium 3.9 3.5 3.4 (A)    Chloride 103 104 101   Calcium 9.9 9.3 9.9   Albumin 4.00  4.30   Total Bilirubin 0.4  0.5   Alkaline Phosphatase 55  54   AST (SGOT) 22  24   ALT (SGPT) 15  20   (A) Abnormal value            CBC    CBC 5/3/22 5/4/22 7/15/22   WBC 7.00 7.28 6.79   RBC 3.90 3.88 3.99   Hemoglobin 12.5 12.3 13.0   Hematocrit 37.2 38.0 38.4   MCV 95.4 97.9 (A) 96.2   MCH 32.1 31.7 32.6   MCHC 33.6 32.4 33.9   RDW 12.1 (A) 12.3 12.2 (A)   Platelets 212 213 188   (A) Abnormal value            CBC w/diff    CBC w/Diff 5/3/22 5/4/22 7/15/22   WBC 7.00 7.28 6.79   RBC 3.90 3.88 3.99   Hemoglobin 12.5 12.3 13.0   Hematocrit 37.2 38.0 38.4   MCV 95.4 97.9 (A) 96.2   MCH 32.1 31.7 32.6   MCHC 33.6 32.4 33.9   RDW 12.1 (A) 12.3 12.2 (A)   Platelets 212 213 188   Neutrophil Rel % 79.5 (A)  77.0 (A)   Immature Granulocyte Rel % 0.1  0.3   Lymphocyte Rel % 10.7 (A)  13.4 (A)   Monocyte Rel % 8.3  8.0   Eosinophil Rel % 1.3  1.0   Basophil Rel % 0.1  0.3   (A) Abnormal value                    Electrolytes    Electrolytes 5/3/22 5/4/22 7/15/22   Sodium 144 141 141   Potassium 3.9 3.5 3.4 (A)   Chloride 103 104 101   Calcium 9.9 9.3 9.9   (A) Abnormal value            Renal Profile    Renal Profile 5/3/22 5/4/22 7/15/22   BUN 21 19 25 (A)   Creatinine 1.30 (A) 1.18 (A) 1.40 (A)   (A) Abnormal value            BMP    BMP 5/3/22 5/4/22 7/15/22   BUN 21 19 25 (A)   Creatinine 1.30 (A) 1.18 (A) 1.40 (A)   Sodium 144 141 141   Potassium 3.9 3.5 3.4 (A)   Chloride 103 104 101   CO2 28.0 24.0 30.0 (A)   Calcium 9.9 9.3 9.9   (A) Abnormal value            CAT scan of the abdomen and pelvis in October 2021 showed stool in the rectal vault otherwise negative    Assessment & Plan   Assessment:     Gas/bloat  Opioid-induced constipation      Plan:   I do not think her opioid-induced constipation requires Movantik or other prescription medication at this time  Instead I would like her to continue senna as needed, minimize Norco use if possible  She should be  on a low bloat diet  We will try Augmentin x10 days for suspected bacterial overgrowth  We could consider Xifaxan in the future  We will see her back in 6 months    I spent 30 minutes caring for Carly on this date of service. This time includes time spent by me in the following activities:preparing for the visit, reviewing tests, obtaining and/or reviewing a separately obtained history, performing a medically appropriate examination and/or evaluation , counseling and educating the patient/family/caregiver, ordering medications, tests, or procedures, referring and communicating with other health care professionals , documenting information in the medical record, independently interpreting results and communicating that information with the patient/family/caregiver and care coordination    Blayne Cai MD  [facility], [place], operated by [facility]  [address] [place] [address]  Office: [phone]

## 2022-08-19 ENCOUNTER — TRANSCRIBE ORDERS (OUTPATIENT)
Dept: ADMINISTRATIVE | Facility: HOSPITAL | Age: 82
End: 2022-08-19

## 2022-08-19 DIAGNOSIS — Z12.31 VISIT FOR SCREENING MAMMOGRAM: Primary | ICD-10-CM

## 2022-08-23 ENCOUNTER — TRANSCRIBE ORDERS (OUTPATIENT)
Dept: ADMINISTRATIVE | Facility: HOSPITAL | Age: 82
End: 2022-08-23

## 2022-08-23 DIAGNOSIS — N63.20 MASS OF LEFT BREAST, UNSPECIFIED QUADRANT: Primary | ICD-10-CM

## 2022-08-24 ENCOUNTER — TRANSCRIBE ORDERS (OUTPATIENT)
Dept: ADMINISTRATIVE | Facility: HOSPITAL | Age: 82
End: 2022-08-24

## 2022-08-24 DIAGNOSIS — N63.20 MASS OF LEFT BREAST, UNSPECIFIED QUADRANT: Primary | ICD-10-CM

## 2022-09-06 ENCOUNTER — APPOINTMENT (OUTPATIENT)
Dept: MAMMOGRAPHY | Facility: HOSPITAL | Age: 82
End: 2022-09-06

## 2022-09-06 ENCOUNTER — HOSPITAL ENCOUNTER (OUTPATIENT)
Dept: MAMMOGRAPHY | Facility: HOSPITAL | Age: 82
Discharge: HOME | End: 2022-09-06

## 2022-09-06 ENCOUNTER — HOSPITAL ENCOUNTER (OUTPATIENT)
Dept: ULTRASOUND IMAGING | Facility: HOSPITAL | Age: 82
Discharge: HOME | End: 2022-09-06

## 2022-09-06 ENCOUNTER — APPOINTMENT (OUTPATIENT)
Dept: ULTRASOUND IMAGING | Facility: HOSPITAL | Age: 82
End: 2022-09-06

## 2022-09-06 DIAGNOSIS — N63.20 MASS OF LEFT BREAST, UNSPECIFIED QUADRANT: ICD-10-CM

## 2022-09-06 PROCEDURE — G0279 TOMOSYNTHESIS, MAMMO: HCPCS

## 2022-09-06 PROCEDURE — 76642 ULTRASOUND BREAST LIMITED: CPT

## 2022-09-06 PROCEDURE — 77066 DX MAMMO INCL CAD BI: CPT

## 2022-09-06 RX ORDER — FAMOTIDINE 40 MG/1
TABLET, FILM COATED ORAL
Qty: 180 TABLET | Refills: 3 | Status: SHIPPED | OUTPATIENT
Start: 2022-09-06

## 2022-09-07 ENCOUNTER — TRANSCRIBE ORDERS (OUTPATIENT)
Dept: GENERAL RADIOLOGY | Facility: HOSPITAL | Age: 82
End: 2022-09-07

## 2022-09-07 DIAGNOSIS — R92.8 ABNORMAL ULTRASOUND OF BREAST: Primary | ICD-10-CM

## 2022-10-03 ENCOUNTER — APPOINTMENT (OUTPATIENT)
Dept: MAMMOGRAPHY | Facility: HOSPITAL | Age: 82
End: 2022-10-03

## 2022-10-05 ENCOUNTER — HOSPITAL ENCOUNTER (OUTPATIENT)
Dept: MAMMOGRAPHY | Facility: HOSPITAL | Age: 82
Discharge: HOME | End: 2022-10-05

## 2022-10-05 ENCOUNTER — HOSPITAL ENCOUNTER (OUTPATIENT)
Dept: ULTRASOUND IMAGING | Facility: HOSPITAL | Age: 82
Discharge: HOME | End: 2022-10-05

## 2022-10-05 VITALS
WEIGHT: 128 LBS | HEIGHT: 62 IN | BODY MASS INDEX: 23.55 KG/M2 | DIASTOLIC BLOOD PRESSURE: 92 MMHG | RESPIRATION RATE: 16 BRPM | OXYGEN SATURATION: 97 % | SYSTOLIC BLOOD PRESSURE: 162 MMHG | TEMPERATURE: 98 F | HEART RATE: 87 BPM

## 2022-10-05 DIAGNOSIS — R92.8 ABNORMAL ULTRASOUND OF BREAST: ICD-10-CM

## 2022-10-05 PROCEDURE — 0 LIDOCAINE 1 % SOLUTION: Performed by: RADIOLOGY

## 2022-10-05 PROCEDURE — 88377 M/PHMTRC ALYS ISHQUANT/SEMIQ: CPT

## 2022-10-05 PROCEDURE — A4648 IMPLANTABLE TISSUE MARKER: HCPCS

## 2022-10-05 PROCEDURE — 88342 IMHCHEM/IMCYTCHM 1ST ANTB: CPT | Performed by: FAMILY MEDICINE

## 2022-10-05 PROCEDURE — 88305 TISSUE EXAM BY PATHOLOGIST: CPT | Performed by: FAMILY MEDICINE

## 2022-10-05 PROCEDURE — 88360 TUMOR IMMUNOHISTOCHEM/MANUAL: CPT | Performed by: FAMILY MEDICINE

## 2022-10-05 PROCEDURE — 77065 DX MAMMO INCL CAD UNI: CPT

## 2022-10-05 RX ORDER — DIAZEPAM 5 MG/1
5 TABLET ORAL ONCE AS NEEDED
Status: DISCONTINUED | OUTPATIENT
Start: 2022-10-05 | End: 2022-10-06 | Stop reason: HOSPADM

## 2022-10-05 RX ORDER — LIDOCAINE HYDROCHLORIDE 10 MG/ML
10 INJECTION, SOLUTION INFILTRATION; PERINEURAL ONCE
Status: COMPLETED | OUTPATIENT
Start: 2022-10-05 | End: 2022-10-05

## 2022-10-05 RX ADMIN — LIDOCAINE HYDROCHLORIDE 3 ML: 10; .005 INJECTION, SOLUTION EPIDURAL; INFILTRATION; INTRACAUDAL; PERINEURAL at 10:57

## 2022-10-05 RX ADMIN — LIDOCAINE HYDROCHLORIDE 9 ML: 10 INJECTION, SOLUTION INFILTRATION; PERINEURAL at 10:57

## 2022-10-05 NOTE — H&P
Name: Carly Gotti ADMIT: 10/5/2022   : 1940  PCP: Reyes, Miriam Mercado, MD    MRN: [id number] LOS: 0 days   AGE/SEX: 82 y.o. female  ROOM: Room/bed info not found       Chief complaint left breast mass    Present Illness or Internal History:  Patient is a 82 y.o. female presents with a left breast mass.     Past Surgical History:  Past Surgical History:   Procedure Laterality Date   • AORTIC VALVE REPAIR/REPLACEMENT  2015    Dr Veliz   • CARDIAC CATHETERIZATION     • CARDIAC DEFIBRILLATOR PLACEMENT     •  SECTION     • COLONOSCOPY N/A 2019    [facility]   • ENDOSCOPY  2012    Hypertonic lower esophageal sphincter, gastritis. Path: Chronic gastritis, moderate.    • ENDOSCOPY N/A 2019    Web in the upper third of the esophagus. Dilated   • ENDOSCOPY N/A 2020    Procedure: ESOPHAGOGASTRODUODENOSCOPY WITH 56FR MALDONADO DILATATION AND COLD BIOPSIES;  Surgeon: Blayne Cai MD;  Location: [facility] ENDOSCOPY;  Service: Gastroenterology;  Laterality: N/A;  PRE: DYSPHAGIA  POST: SCHATZKI'S RING   • EPIDURAL  2022   • HYSTERECTOMY     • MITRAL VALVE ANNULOPLASTY  2015    Dr Veliz   • TRICUSPID VALVE SURGERY  2015    Dr Veliz       Past Medical History:  Past Medical History:   Diagnosis Date   • Aneurysm (HCC)    • Aortic valve disease    • Arthritis    • Atrophic kidney    • Bradycardia    • CAD (coronary artery disease)    • CKD (chronic kidney disease) stage 4, GFR 15-29 ml/min (HCC)    • Depression    • Dysphagia    • Essential hypertension    • [place] (hard of hearing[place]    • Hyperlipidemia    • Mitral valve disease    • Post lumbar puncture headache 2022   • Pulmonary hypertension (HCC)    • Sick sinus syndrome (HCC)    • Sleep apnea    • Spinal stenosis        Home Medications:  (Not in a hospital admission)      Allergies:  Patient has no known allergies.    Family History:  Family History   Problem Relation Age of Onset   • ALS Mother    •  Hypertension Father        Social History:  Social History     Tobacco Use   • Smoking status: Former Smoker     Quit date:      Years since quittin.7   • Smokeless tobacco: Never Used   Substance Use Topics   • Alcohol use: No   • Drug use: No        Objective     Physical Exam:    No exam performed today,    Vital Signs  Temp:  [98 °F (36.7 °C)] 98 °F (36.7 °C)  Heart Rate:  [76] 76  Resp:  [16] 16  BP: (169)/(93) 169/93    Anticipated Surgical Procedure:  Ultrasound guided left breast biopsy with clip placement    The risks, benefits and alternatives of this procedure have been discussed with the patient or responsible party: Yes        Blaine Castellon Jr., MD  10/05/22  10:35 EDT

## 2022-10-05 NOTE — NURSING NOTE
Biopsy site to left outer breast clear with Exofin dry and intact. No new firmness or swelling noted at or around biopsy site. Patient came for biopsy because of a lump she felt in her breast (the mass is approximately 4-5.0 cm x 2.5 cm). Denies pain. Ice pack with protective covering applied to biopsy site. Discharge instructions discussed with understanding voiced by patient. Copies provided to patient. No distress noted. Ambulated with patient to main [place] to wait for her daughter. She walks with a cane but gait was unsteady at times.To home via private vehicle driven by her daughter.

## 2022-10-06 ENCOUNTER — OFFICE VISIT (OUTPATIENT)
Dept: SLEEP MEDICINE | Facility: HOSPITAL | Age: 82
End: 2022-10-06

## 2022-10-06 VITALS — HEART RATE: 79 BPM | WEIGHT: 126 LBS | OXYGEN SATURATION: 97 % | HEIGHT: 62 IN | BODY MASS INDEX: 23.19 KG/M2

## 2022-10-06 DIAGNOSIS — G47.36 HYPOXEMIA ASSOCIATED WITH SLEEP: ICD-10-CM

## 2022-10-06 DIAGNOSIS — G47.33 OBSTRUCTIVE SLEEP APNEA: Primary | ICD-10-CM

## 2022-10-06 PROCEDURE — G0463 HOSPITAL OUTPT CLINIC VISIT: HCPCS

## 2022-10-06 PROCEDURE — 99204 OFFICE O/P NEW MOD 45 MIN: CPT | Performed by: INTERNAL MEDICINE

## 2022-10-06 NOTE — PROGRESS NOTES
[facility] New Patient/Consultation       Reason for Consultation: EMELINA    Patient Care Team:  Reyes, Miriam Mercado, MD as PCP - General (Family Medicine)  Gato Titus MD as Consulting Physician (Hand Surgery)  Chris Schneider MD as Consulting Physician (Sleep Medicine)    Chief complaint: EMELINA    History of present illness:    Thank you for asking me to see your patient.  The patient is a 82 y.o. female who has severe obstructive sleep apnea diagnosed by overnight polysomnogram 2003 that is treated with auto titrating CPAP with a full facemask.  The patient is here for evaluation and to establish ongoing care.  The patient states she cannot sleep without her CPAP.  She goes to bed at 12:30 AM and awakens at 9 AM.  She wakes up painful?  She has no symptoms related to EMELINA as long as she is wearing for CPAP.  She will use the restroom once during the nighttime.    Review of Systems:    A complete review of systems was done and all were negative with the exception of frequent ANAIS    History:  Past Medical History:   Diagnosis Date   • Aneurysm (HCC)    • Aortic valve disease    • Arthritis    • Atrophic kidney    • Bradycardia    • CAD (coronary artery disease)    • CKD (chronic kidney disease) stage 4, GFR 15-29 ml/min ([facility])    • Depression    • Dysphagia    • Essential hypertension    • [place] (hard of hearing)    • Hyperlipidemia    • Mitral valve disease    • EMELINA on auto CPAP 2003    Overnight polysomnogram.  Weight 154 pounds.  Severe EMELINA with AHI 51.5 events per hour.  Sleep-related hypoxia present.   • Post lumbar puncture headache 2022   • Pulmonary hypertension (HCC)    • Sick sinus syndrome (HCC)    • Spinal stenosis    ,   Past Surgical History:   Procedure Laterality Date   • AORTIC VALVE REPAIR/REPLACEMENT  2015    Dr Veliz   • CARDIAC CATHETERIZATION     • CARDIAC DEFIBRILLATOR PLACEMENT     •  SECTION     • COLONOSCOPY N/A 2019    [facility]   • ENDOSCOPY   "2012    Hypertonic lower esophageal sphincter, gastritis. Path: Chronic gastritis, moderate.    • ENDOSCOPY N/A 2019    Web in the upper third of the esophagus. Dilated   • ENDOSCOPY N/A 2020    Procedure: ESOPHAGOGASTRODUODENOSCOPY WITH 56FR MALDONADO DILATATION AND COLD BIOPSIES;  Surgeon: Blayne Cai MD;  Location: [facility] ENDOSCOPY;  Service: Gastroenterology;  Laterality: N/A;  PRE: DYSPHAGIA  POST: SCHATZKI'S RING   • EPIDURAL  2022   • HYSTERECTOMY     • MITRAL VALVE ANNULOPLASTY  2015    Dr Veliz   • TRICUSPID VALVE SURGERY  2015    Dr Veliz   ,   Family History   Problem Relation Age of Onset   • ALS Mother    • Hypertension Father     and   Social History     Socioeconomic History   • Marital status:    • Number of children: 3   Tobacco Use   • Smoking status: Former     Types: Cigarettes     Start date:      Quit date:      Years since quittin.8   • Smokeless tobacco: Never   Vaping Use   • Vaping Use: Never used   Substance and Sexual Activity   • Alcohol use: No   • Drug use: No   • Sexual activity: Defer     E-cigarette/Vaping   • E-cigarette/Vaping Use Never User      E-cigarette/Vaping Substances     E-cigarette/Vaping Devices        Social History: 2 cups of coffee a day    Allergies:  Patient has no known allergies.     Medication Review: Reviewed    Vital Signs:    Vitals:    10/06/22 0900   Pulse: 79   SpO2: 97%   Weight: 57.2 kg (126 lb)   Height: 157.5 cm (62\")      Body mass index is 23.05 kg/m².  Neck Circumference: 13.5 inches      Physical Exam:    Constitutional:  Well developed 82 y.o. female that appears in no apparent distress.  Awake & oriented times 3.  Normal mood with normal recent and remote memory and normal judgement.  Eyes:  Conjunctivae normal.  Oropharynx: Moist mucous membranes without exudate and a large tongue and class III Mallampati airway.  Patient wearing a facemask.  Neck: Trachea midline  Respiratory: " Effort is not labored  Cardiovascular: Radial pulse regular  Musculoskeletal: Gait appears normal, no digital clubbing evident, no pre-tibial edema    Results Review: Downloads between 7/19 and 10/16/2022 complaints 96% and average usage 5 hours and 39 minutes and average AHI is mildly abnormal at 9.3 but she does have an average leak of 1 hour and 40 minutes.  All subsets are normal except for her average hypopnea index at 5.1.  Average auto CPAP pressure 13.8 and the patient's auto CPAP is 12-20.    Impression:   Severe obstructive sleep apnea with sleep-related hypoxia by overnight polysomnogram 9/30/2003, weight 154 pounds, adequately treated with auto titrating CPAP with downloads demonstrating her to be at goal with good compliance and usage.  The patient has no significant complaints of hypersomnolence.    Plan:  Good sleep hygiene measures should be maintained.      After evaluating the patient and assessing results available, the patient is benefiting from the treatment being provided.     Pathophysiology of EMELINA briefly described to the patient.  Cardiovascular complications of untreated EMELINA also briefly reviewed.      After reviewing all with the patient, the patient appears to be adequately treated except for her leak.  The patient is in need of supplies.  A new prescription will be sent to her DME for all needed supplies.  I answered all of the patient's questions.  I will see the patient for follow-up in 1 year.  She may call sooner for any problems.    Thank you for requesting me to assist in this patient's care.    Chris Schneider MD  Sleep Medicine  10/06/22  10:47 EDT

## 2022-10-10 LAB
LAB AP CASE REPORT: NORMAL
LAB AP DIAGNOSIS COMMENT: NORMAL
LAB AP SPECIAL STAINS: NORMAL
LAB AP SYNOPTIC CHECKLIST: NORMAL
PATH REPORT.ADDENDUM SPEC: NORMAL
PATH REPORT.FINAL DX SPEC: NORMAL
PATH REPORT.GROSS SPEC: NORMAL

## 2022-10-11 ENCOUNTER — TELEPHONE (OUTPATIENT)
Dept: SURGERY | Facility: CLINIC | Age: 82
End: 2022-10-11

## 2022-10-11 NOTE — TELEPHONE ENCOUNTER
New Patient appointment with Dr. Ledezma:   11/21/2022 @ 12:00 pm . Patient is aware to arrive 30 minutes prior with paperwork filled out     Patient expressed v/u of appointment date and time.     New patient packet mailed.

## 2022-10-21 ENCOUNTER — OFFICE VISIT (OUTPATIENT)
Dept: SURGERY | Facility: CLINIC | Age: 82
End: 2022-10-21

## 2022-10-21 ENCOUNTER — PREP FOR SURGERY (OUTPATIENT)
Dept: OTHER | Facility: HOSPITAL | Age: 82
End: 2022-10-21

## 2022-10-21 VITALS
HEIGHT: 62 IN | RESPIRATION RATE: 17 BRPM | DIASTOLIC BLOOD PRESSURE: 74 MMHG | OXYGEN SATURATION: 98 % | WEIGHT: 126 LBS | BODY MASS INDEX: 23.19 KG/M2 | SYSTOLIC BLOOD PRESSURE: 128 MMHG | HEART RATE: 72 BPM

## 2022-10-21 DIAGNOSIS — Z17.0 MALIGNANT NEOPLASM OF UPPER-OUTER QUADRANT OF LEFT BREAST IN FEMALE, ESTROGEN RECEPTOR POSITIVE: Primary | ICD-10-CM

## 2022-10-21 DIAGNOSIS — C50.412 MALIGNANT NEOPLASM OF UPPER-OUTER QUADRANT OF LEFT BREAST IN FEMALE, ESTROGEN RECEPTOR POSITIVE: Primary | ICD-10-CM

## 2022-10-21 PROCEDURE — 93296 REM INTERROG EVL PM/IDS: CPT | Performed by: INTERNAL MEDICINE

## 2022-10-21 PROCEDURE — 99205 OFFICE O/P NEW HI 60 MIN: CPT | Performed by: SURGERY

## 2022-10-21 PROCEDURE — 93294 REM INTERROG EVL PM/LDLS PM: CPT | Performed by: INTERNAL MEDICINE

## 2022-10-21 RX ORDER — TEMAZEPAM 15 MG/1
15 CAPSULE ORAL NIGHTLY PRN
COMMUNITY
Start: 2022-09-16

## 2022-10-21 RX ORDER — CEFAZOLIN SODIUM 2 G/100ML
2 INJECTION, SOLUTION INTRAVENOUS ONCE
Status: CANCELLED | OUTPATIENT
Start: 2022-10-21 | End: 2022-10-21

## 2022-10-21 RX ORDER — BUSPIRONE HYDROCHLORIDE 5 MG/1
5 TABLET ORAL 2 TIMES DAILY
COMMUNITY
Start: 2022-08-15

## 2022-10-21 NOTE — PROGRESS NOTES
[facility]     Referring Provider: Miriam Mercado Reyes, MD     Chief complaint: Newly diagnosed breast cancer     HPI: Ms. Carly Gotti is a 83 yo woman, seen at the request of Dr. Miriam Reyes, for a new diagnosis of left breast cancer. The patient first noticed a lump in her outer left breast a few months ago. She believes it has grown since she first noticed it, but denies any associated pain. Diagnostic imaging showed a 2 cm mass and subsequent biopsy showed invasive ductal carcinoma. Her work-up is detailed in the oncologic history below. She denies any prior history of abnormal mammograms or breast biopsies. She has been on estrogen only hormone replacement therapy for about 40 years.    She has a past history of a bioprosthetic aortic valve replacement and mitral and tricuspid valve repairs. She also has a history of pulmonary hypertension and a pacemaker placed for sick sinus syndrome, followed by cardiology. She is not on any anticoagulation, but takes an aspirin 325 mg. Today in the office she is having some involuntary movements and tremors. She says that this happened before in the summer and she was evaluated in the ER. Her PCP is aware and she is currently on medication to help it. They believe it is related to stress/anxiety. She denies any family history of breast or ovarian cancer. She was joined today in clinic by her daughter. She gave consent for her duaghter to be present during her examination and participate in the discussion.        Oncology/Hematology History   Malignant neoplasm of upper-outer quadrant of left breast in female, estrogen receptor positive (HCC)   12/4/2017 Imaging    [facility]   The parenchyma of both breasts remains stable. No suspicious mass, calcification nor adenopathy has developed. Scattered fibroglandular densities.   BI-RADS 2: Benign      9/5/2022 Initial Diagnosis    Malignant neoplasm of upper-outer quadrant of left breast in  female, estrogen receptor positive (HCC)     9/6/2022 Imaging    Bilateral Diagnostic MMG with J Carlos & Left Breast US ( [facility]):  MMG:  Scattered fibroglandular densities. A triangular skin marker overlies the site of palpable concern in the middle third upper outer quadrant of the left breast. At this location there is an underlying partially obscured and partially irregular mass that measures on the order of 1.8 cm in greatest dimension. I see no suspicious calcifications or areas of architectural distortion in either breast. There is no evidence for skin thickening, nipple retraction or axillary adenopathy.   US:   At the 1-o'clock position on the order of 5 cm from the nipple corresponding to the site of palpable concern there is an irregular 2.0 cm x 1.5 cm x 1.5 cm hypoechoic mass with detectable internal vascularity. No evidence for left axillary adenopathy is appreciated.   BI-RADS 5: Highly suggestive of malignancy      10/5/2022 Biopsy    Left Breast, US-Guided Biopsy ( [facility]):    1. Left Breast, 1:00 o'clock, 5 cm from Nipple, Ultrasound-Guided Biopsies for a Mass:   INVASIVE MAMMARY CARCINOMA, NO SPECIAL TYPE (INVASIVE DUCTAL CARCINOMA).  A. Histologic grade:  Wolbach histologic score II  (tubules=3, nuclei=3, mitosis-1).  B. 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                 C. Invasive carcinoma involves multiple tissue cores with the largest contiguous focus measuring up to 11.0 mm.               D. No lymphovascular nor perineural invasion identified.      ER+ (%, strong)   OK+ (%, strong)   Her2 negative (IHC 2+; FISH ratio 1.5, copy #4.8)  Ki-67 28%    -Barbell-shaped metallic clip at the posterior superior margin of the biopsied mass.          Review of Systems   Constitutional: Positive for appetite change, fatigue and unexpected weight change. Negative for chills, diaphoresis and fever.   HENT:    Positive for hearing loss and trouble swallowing. Negative for lump/mass, mouth sores, nosebleeds, sore throat, tinnitus and voice change.    Eyes: Negative for eye problems and icterus.   Respiratory: Negative for chest tightness, cough, hemoptysis, shortness of breath and wheezing.    Cardiovascular: Negative for chest pain, leg swelling and palpitations.   Gastrointestinal: Negative for abdominal distention, abdominal pain, blood in stool, constipation, diarrhea, nausea, rectal pain and vomiting.   Endocrine: Negative for hot flashes.   Genitourinary: Negative for bladder incontinence, difficulty urinating, dyspareunia, dysuria, frequency, hematuria, menstrual problem, nocturia, pelvic pain, vaginal bleeding and vaginal discharge.    Musculoskeletal: Negative for arthralgias, back pain, flank pain, gait problem, myalgias, neck pain and neck stiffness.   Skin: Negative for itching, rash and wound.   Neurological: Negative for dizziness, extremity weakness, gait problem, headaches, light-headedness, numbness, seizures and speech difficulty.   Hematological: Negative for adenopathy. Does not bruise/bleed easily.   Psychiatric/Behavioral: Negative for confusion, decreased concentration, depression, sleep disturbance and suicidal ideas. The patient is not nervous/anxious.    I personally reviewed and updated the ROS.      Medications:    Current Outpatient Medications:   •  aspirin 325 MG tablet, Take by mouth daily., Disp: , Rfl:   •  atorvastatin (LIPITOR) 20 MG tablet, take 1 tablet by mouth at bedtime, Disp: , Rfl: 0  •  bumetanide (BUMEX) 2 MG tablet, TAKE 1 TABLET BY MOUTH EVERY DAY, Disp: 90 tablet, Rfl: 1  •  buPROPion XL (WELLBUTRIN XL) 300 MG 24 hr tablet, Take 300 mg by mouth Daily., Disp: , Rfl: 0  •  busPIRone (BUSPAR) 5 MG tablet, Take 1 tablet by mouth 2 (Two) Times a Day., Disp: , Rfl:   •  carvedilol (COREG) 12.5 MG tablet, Take 12.5 mg by mouth 2 (Two) Times a Day., Disp: , Rfl:   •  Coenzyme Q10 200  MG tablet, Take 1 tablet/day by mouth Daily., Disp: , Rfl:   •  [employer] Senna Plus 8.6-50 MG per tablet, TAKE 1 TABLET BY MOUTH EVERY DAY, Disp: 30 tablet, Rfl: 2  •  donepezil (ARICEPT) 10 MG tablet, Take 10 mg by mouth Daily., Disp: , Rfl:   •  estradiol (ESTRACE) 0.5 MG tablet, Take by mouth daily., Disp: , Rfl:   •  eszopiclone (LUNESTA) 3 MG tablet, Take 3 mg by mouth Every Night. Take immediately before bedtime, Disp: , Rfl:   •  famotidine (PEPCID) 40 MG tablet, TAKE 1 TABLET BY MOUTH TWICE A DAY, Disp: 180 tablet, Rfl: 3  •  fenofibrate micronized (LOFIBRA) 67 MG capsule, Take 67 mg by mouth Daily., Disp: , Rfl:   •  FLUoxetine (PROzac) 20 MG capsule, Take 60 mg by mouth Daily., Disp: , Rfl:   •  HYDROcodone-acetaminophen (NORCO)  MG per tablet, Take 1 tablet by mouth Every 4 (Four) Hours As Needed for Moderate Pain ., Disp: 12 tablet, Rfl: 0  •  temazepam (RESTORIL) 15 MG capsule, Take 1 capsule by mouth Daily., Disp: , Rfl:       Allergies:  No Known Allergies      Past Medical History:   Diagnosis Date   • Aneurysm (HCC)    • Aortic valve disease    • Arthritis    • Atrophic kidney    • Bradycardia    • CAD (coronary artery disease)    • CKD (chronic kidney disease) stage 4, GFR 15-29 ml/min (HCC)    • Depression    • Dysphagia    • Essential hypertension    • [place] (hard of hearing)    • Hyperlipidemia    • Mitral valve disease    • Post lumbar puncture headache 2022   • Pulmonary hypertension (HCC)    • Sick sinus syndrome (HCC)    • Sleep apnea    • Spinal stenosis        Past Surgical History:   Procedure Laterality Date   • AORTIC VALVE REPAIR/REPLACEMENT  2015    Dr Veliz   • CARDIAC CATHETERIZATION     • CARDIAC DEFIBRILLATOR PLACEMENT     •  SECTION     • COLONOSCOPY N/A 2019    [facility]   • ENDOSCOPY  2012    Hypertonic lower esophageal sphincter, gastritis. Path: Chronic gastritis, moderate.    • ENDOSCOPY N/A 2019    Web in the upper third of the esophagus.  Dilated   • ENDOSCOPY N/A 2020    Procedure: ESOPHAGOGASTRODUODENOSCOPY WITH 56FR MALDONADO DILATATION AND COLD BIOPSIES;  Surgeon: Blayne Cai MD;  Location: [facility] ENDOSCOPY;  Service: Gastroenterology;  Laterality: N/A;  PRE: DYSPHAGIA  POST: SCHATZKI'S RING   • EPIDURAL  2022   • HYSTERECTOMY     • MITRAL VALVE ANNULOPLASTY  2015    Dr Veliz   • TRICUSPID VALVE SURGERY  2015    Dr Veliz       Family History   Problem Relation Age of Onset   • ALS Mother    • Hypertension Father        Social History     Socioeconomic History   • Marital status:    • Number of children: 3   Tobacco Use   • Smoking status: Former     Types: Cigarettes     Quit date:      Years since quittin.8   • Smokeless tobacco: Never   Substance and Sexual Activity   • Alcohol use: No   • Drug use: No   • Sexual activity: Defer     Patient drinks 1-2 servings of caffeine per day.       GYNECOLOGIC HISTORY:   . P: 4. AB: 0.  Last menstrual period: , sp SHRUTHI/USO for fibroids  Age at menarche: 9  Age at first childbirth: 16  Lactation/How long: na  Age at menopause: unsure  Total years of oral contraceptive use: 0  Total years of hormone replacement therapy: since , estrogen only      PHYSICAL EXAMINATION:   Vitals:    10/21/22 1157   BP: 128/74   Pulse: 72   Resp: 17   SpO2: 98%     ECOG 0 - Asymptomatic  General: NAD, well appearing  Psych: a&o x 3, normal mood and affect  Eyes: EOMI, no scleral icterus  ENMT: neck supple without masses or thyromegaly, mucus membranes moist  Resp: normal effort, CTAB  CV: RRR, no murmurs, no edema  GI: soft, NT, ND  MSK: normal gait, decreased ROM in bilateral shoulders, sporadic involuntary movement  Lymph nodes: no cervical, supraclavicular or axillary lymphadenopathy  Breast: symmetric, small size, grade 3 ptosis, sternal scar  Right: No visible abnormalities on inspection while seated, with arms raised or hands on hips. No masses, skin changes, or  nipple abnormalities.  Left: On inspection, there is a pacemaker below the clavicle and a bulge in the UOQ at the cancer location with surrounding postbiopsy ecchymoses. At 1:00, 5 cm FN, there is a firm 2 cm mass. No nipple abnormalities.    Left breast, in-office ultrasound: At 1:00, 5 cm FN, there is an irregular hypoechoic mass with biopsy clip visualized. This is located immediately deep to the skin. About 1 cm medial to the primary mass, there is an additional round hypoechoic mass, possible satellite lesion.       I have independently reviewed her imaging and here are my findings:   In the left upper outer breast, there is an irregular 2 cm mass with a possible satellite lesion located medially.      Assessment:  82 y.o. F with a new diagnosis of left breast cancer: Intermediate grade, invasive ductal carcinoma, ER/NV+, Her2 negative; clinical T1cN0, anatomic stage IA, prognostic stage IA.      Discussion:  I had an extensive discussion with the patient and her family about the nature of her breast cancer diagnosis. We reviewed the basics of systemic and local/regional management of breast cancer.      We discussed that in her case, systemic treatment would likely involve endocrine therapy. I explained that the curative treatment of breast cancer involves surgery and in her case I would recommend a lumpectomy. We discussed the risk of positive margins and that she must have negative margins for lumpectomy to be an appropriate oncologic procedure. I will make every effort to obtain negative margins at her initial operation, but there is a 10-15% chance that she will require a second operation for re-excision. We will not know the margin status until after her final pathology has returned.      We discussed axillary staging. I reviewed with her the data (CALGB 9343) that suggests in women over 70 with small ER+ tumors who will receive adjuvant endocrine therapy, the risks of axillary staging may outweigh the  benefits, given that there will be no change in adjuvant therapy or outcome regardless of axillary status. This was explained in detail to the patient and she declined axillary staging.      I described additional risks and potential complications associated with surgery, including, but not limited to, bleeding, infection, deformity/poor cosmetic result, chronic pain, numbness, seroma, hematoma, deep venous thrombosis, skin flap necrosis, disease recurrence and the possibility of requiring additional surgery. We also discussed other treatment options including the option of not undergoing any surgical treatment and the risks associated with this including disease progression. She expressed an understanding of these factors and wished to proceed.    We did also discussed the option of avoiding surgery altogether and instead treating with endocrine therapy alone, however the patient would prefer surgery. She understands that cardiology will have to clear her first.    Plan:  A multidisciplinary plan has been formulated for the patient:      (1) Breast Surgical Oncology:  -Preoperative cardiology evaluation.  -Left ultrasound-guided partial mastectomy.    (2) Medical Oncology:  -She understands that she needs to stop her estrogen therapy.  -Will refer postoperatively.    (3) Radiation Oncology:  -Will refer postoperatively.    Jeannine Ledezma MD    I spent 80 minutes caring for Carly on this date of service. This time includes time spent by me in the following activities: preparing for the visit, performing a medically appropriate examination and/or evaluation , counseling and educating the patient/family/caregiver, referring and communicating with other health care professionals , documenting information in the medical record and independently interpreting results and communicating that information with the patient/family/caregiver.      CC:  Miriam Mercado Reyes, MD

## 2022-10-22 PROBLEM — G47.36 HYPOXEMIA ASSOCIATED WITH SLEEP: Status: ACTIVE | Noted: 2022-10-22

## 2022-10-25 ENCOUNTER — TELEPHONE (OUTPATIENT)
Dept: SLEEP MEDICINE | Facility: HOSPITAL | Age: 82
End: 2022-10-25

## 2022-10-25 ENCOUNTER — TRANSCRIBE ORDERS (OUTPATIENT)
Dept: SURGERY | Facility: CLINIC | Age: 82
End: 2022-10-25

## 2022-10-25 ENCOUNTER — TELEPHONE (OUTPATIENT)
Dept: CARDIOLOGY | Facility: CLINIC | Age: 82
End: 2022-10-25

## 2022-10-25 DIAGNOSIS — Z17.0 MALIGNANT NEOPLASM OF UPPER-OUTER QUADRANT OF LEFT BREAST IN FEMALE, ESTROGEN RECEPTOR POSITIVE: Primary | ICD-10-CM

## 2022-10-25 DIAGNOSIS — C50.412 MALIGNANT NEOPLASM OF UPPER-OUTER QUADRANT OF LEFT BREAST IN FEMALE, ESTROGEN RECEPTOR POSITIVE: Primary | ICD-10-CM

## 2022-10-25 NOTE — TELEPHONE ENCOUNTER
Faxed order for PAP to [facility].  Spoke with patient and instructed her to call if she had not heard from them by the end of the week and also to call once she receives her machine so we could schedule her compliance follow up.

## 2022-10-27 ENCOUNTER — NURSE NAVIGATOR (OUTPATIENT)
Dept: OTHER | Facility: HOSPITAL | Age: 82
End: 2022-10-27

## 2022-10-27 NOTE — PROGRESS NOTES
Referral received from Dr. Ledezma's office. I called Ms. Gotti and introduced myself and navigational services. She stated the consult with Dr. Ledezma went well and she is leaning toward a lumpectomy but will get cardiac clearance from her cardiologist first.  She has a good understanding of her pathology and treatment options. She was able to verbalize teach back on her plan of care.      She stated she has a good support system with her family and friends. She has a daughter who lives a mile away and a special needs son that lives in her home. She stated she feels comfortable talking to them about needs or issues.      She stated she has no financial or transportation concerns at this time. She drives herself to appointments and her daughter can help as needed.      She stated she is doing ok with her diagnosis at this time.We discussed we have support options if the need arises. She was thankful for the information.      We discussed integrative therapies and other services at [facility]. She will received a navigation folder with the following information in the mail:     [facility], Cancer and Restorative Exercise (CARE), [employer] Exercise program, Guide for the Newly Diagnosed, [employer], [facility], Massage Therapy, Reiki Therapy, [employer] [place], Cancer Nutrition, and Survivorship Clinic.     She verbalized appreciation for navigational services and she has my contact information and will call with any questions that arise.     Acuity level 1

## 2022-11-10 ENCOUNTER — OFFICE VISIT (OUTPATIENT)
Dept: CARDIOLOGY | Facility: CLINIC | Age: 82
End: 2022-11-10

## 2022-11-10 ENCOUNTER — TELEPHONE (OUTPATIENT)
Dept: SURGERY | Facility: CLINIC | Age: 82
End: 2022-11-10

## 2022-11-10 VITALS
DIASTOLIC BLOOD PRESSURE: 80 MMHG | RESPIRATION RATE: 16 BRPM | WEIGHT: 127 LBS | HEART RATE: 77 BPM | BODY MASS INDEX: 23.37 KG/M2 | SYSTOLIC BLOOD PRESSURE: 136 MMHG | HEIGHT: 62 IN | OXYGEN SATURATION: 97 %

## 2022-11-10 DIAGNOSIS — Z95.0 PACEMAKER: ICD-10-CM

## 2022-11-10 DIAGNOSIS — E78.2 MIXED HYPERLIPIDEMIA: ICD-10-CM

## 2022-11-10 DIAGNOSIS — Z98.890 S/P MVR (MITRAL VALVE REPAIR): ICD-10-CM

## 2022-11-10 DIAGNOSIS — Z95.2 S/P AVR (AORTIC VALVE REPLACEMENT): Primary | ICD-10-CM

## 2022-11-10 DIAGNOSIS — I10 BENIGN ESSENTIAL HYPERTENSION: ICD-10-CM

## 2022-11-10 DIAGNOSIS — I71.21 ANEURYSM OF ASCENDING AORTA WITHOUT RUPTURE: ICD-10-CM

## 2022-11-10 DIAGNOSIS — Z98.890 S/P TVR (TRICUSPID VALVE REPAIR): ICD-10-CM

## 2022-11-10 PROCEDURE — 99214 OFFICE O/P EST MOD 30 MIN: CPT | Performed by: INTERNAL MEDICINE

## 2022-11-10 PROCEDURE — 93000 ELECTROCARDIOGRAM COMPLETE: CPT | Performed by: INTERNAL MEDICINE

## 2022-11-10 NOTE — PROGRESS NOTES
Subjective:     Encounter Date:11/10/2022      Patient ID: Carly Gotti is a 82 y.o. female.    Chief Complaint:  History of Present Illness    This is a 82-year-old female with a history of moderate to severe aortic regurgitation status post replacement with a bioprosthetic aortic valve, moderate to severe mitral regurgitation status post repair, severe tricuspid regurgitation status post repair, pulmonary hypertension, chronic kidney disease with an atrophic kidney, hyperlipidemia, hypertension, aberrant right subclavian artery, obstructive sleep apnea on CPAP, status post dual-chamber pacemaker placement for sick sinus syndrome, depression, who presents for follow up.      She presents today for her preoperative evaluation.  I saw her last in 7/2022 at which time she was continuing to really struggle with mobility because of arthritis and joint issues.  As a result she was having a lot of issues with fatigue but denied any chest pain or shortness of breath.  I felt that her symptoms were likely multifactorial although we did discuss options for further cardiac work-up.  At that time we decided to monitor her symptoms.    Since that office visit she has been diagnosed with left breast cancer.  She has been evaluated by Dr. Hazel who recommends a partial mastectomy.  The patient continues to struggle with fatigue but denies any significant changes in this.  Her activity is limited by her back and joint issues.  She is able to move around her home but just has to take her time because of her orthopedic issues.  She denies any chest pain, worsening shortness of breath, orthopnea, near-syncope or syncope, or palpitations.     Prior History:  The patient was previously followed by Dr. Lopez who she last saw in 5/2017.  Her prior cardiac history includes mitral and tricuspid valve repair and aortic valve replacement back in 11/2015 by Dr. Veliz.  Postoperatively she developed symptomatic sick sinus  syndrome and required dual chamber permanent pacemaker placement.  Since then she's done relatively well although she reports that she never quite recovered back to her baseline following the surgery.       Following her initial visit with me in 12/2017 I set her up for a repeat echocardiogram that showed normal left ventricular systolic function and wall motion with an estimated ejection fraction 64%, grade 1 diastolic dysfunction, grossly normal-appearing bioprosthetic aortic valve, no mitral valve regurgitation, trace tricuspid valve regurgitation, and a right ventricular systolic pressure of 17 mmHg.     I saw her last in 12/2019 for annual follow-up.  Prior to that office visit she had been hospitalized twice for C. difficile colitis.   She reported fatigue and mild dyspnea with activity since all of these issues started.    I recommended a 1 year follow-up at that time with a repeat echocardiogram.  She called in 2/2020 with complaints of lower extremity swelling especially in the left leg.  I recommended proceeding with a lower extremity venous Doppler ultrasound and asked her to increase her bumetanide to 2 mg twice a day for a few days.  Ultrasound ended up being normal.  She was still having swelling so I recommended that she discuss any further changes to her bumetanide with her nephrologist.     She underwent a follow-up echocardiogram in 1/2021 which showed normal left ventricular systolic function wall motion with an EF of 60 to 65%, grade 2 diastolic dysfunction, bioprosthetic aortic valve with mild to moderately elevated gradients which appear to be slightly worse than prior echocardiogram, mild to moderate mitral valve stenosis across the surgically repaired mitral valve, mildly elevated right ventricular systolic pressure of 36 mmHg, and mildly dilated aortic root, and moderate regurgitation of the repaired tricuspid valve.     In 1/2022 the patient reported that her joint and arthritis issues were  really acting up.  In fact she presented to the office at that time in a wheelchair because the issues were severely affecting her mobility.  She continued to complain of lower extremity edema especially of her left lower extremity.  At this was present despite having her increase her bumetanide to 2 mg twice a day by Dr. Peters.  I recommended proceeding with a repeat echocardiogram and a venous Doppler ultrasound.  Ultrasound was performed on 1/12/2022 and showed no evidence of DVT.  Her echocardiogram was performed on 1/20/2022 and showed evidence of normal left ventricular systolic function wall motion with an EF of 61%, mildly dilated left atrium, and normal function and gradients across her mitral, tricuspid, and aortic valves.  Following that visit the patient's swelling improved and she was able to go back to taking bumetanide 2 mg daily.      Review of Systems   Constitutional: Positive for malaise/fatigue.   HENT: Negative for hearing loss, hoarse voice, nosebleeds and sore throat.    Eyes: Negative for pain.   Cardiovascular: Negative for chest pain, claudication, cyanosis, dyspnea on exertion, irregular heartbeat, leg swelling, near-syncope, orthopnea, palpitations, paroxysmal nocturnal dyspnea and syncope.   Respiratory: Negative for shortness of breath and snoring.    Endocrine: Negative for cold intolerance, heat intolerance, polydipsia, polyphagia and polyuria.   Skin: Negative for itching and rash.   Musculoskeletal: Positive for joint pain and myalgias. Negative for arthritis, falls, joint swelling, muscle cramps and muscle weakness.   Gastrointestinal: Negative for constipation, diarrhea, dysphagia, heartburn, hematemesis, hematochezia, melena, nausea and vomiting.   Genitourinary: Negative for frequency, hematuria and hesitancy.   Neurological: Negative for excessive daytime sleepiness, dizziness, headaches, light-headedness, numbness and weakness.   Psychiatric/Behavioral: Negative for  depression. The patient is not nervous/anxious.          Current Outpatient Medications:   •  aspirin 325 MG tablet, Take by mouth daily., Disp: , Rfl:   •  atorvastatin (LIPITOR) 20 MG tablet, take 1 tablet by mouth at bedtime, Disp: , Rfl: 0  •  bumetanide (BUMEX) 2 MG tablet, TAKE 1 TABLET BY MOUTH EVERY DAY, Disp: 90 tablet, Rfl: 1  •  buPROPion XL (WELLBUTRIN XL) 300 MG 24 hr tablet, Take 300 mg by mouth Daily., Disp: , Rfl: 0  •  busPIRone (BUSPAR) 5 MG tablet, Take 1 tablet by mouth 2 (Two) Times a Day., Disp: , Rfl:   •  carvedilol (COREG) 12.5 MG tablet, Take 12.5 mg by mouth 2 (Two) Times a Day., Disp: , Rfl:   •  Coenzyme Q10 200 MG tablet, Take 1 tablet/day by mouth Daily., Disp: , Rfl:   •  [employer] Senna Plus 8.6-50 MG per tablet, TAKE 1 TABLET BY MOUTH EVERY DAY, Disp: 30 tablet, Rfl: 2  •  Diclofenac Sodium (VOLTAREN) 1 % gel gel, PLEASE SEE ATTACHED FOR DETAILED DIRECTIONS, Disp: , Rfl:   •  donepezil (ARICEPT) 10 MG tablet, Take 10 mg by mouth Daily., Disp: , Rfl:   •  eszopiclone (LUNESTA) 3 MG tablet, Take 3 mg by mouth Every Night. Take immediately before bedtime, Disp: , Rfl:   •  famotidine (PEPCID) 40 MG tablet, TAKE 1 TABLET BY MOUTH TWICE A DAY, Disp: 180 tablet, Rfl: 3  •  fenofibrate micronized (LOFIBRA) 67 MG capsule, Take 67 mg by mouth Daily., Disp: , Rfl:   •  FLUoxetine (PROzac) 20 MG capsule, Take 60 mg by mouth Daily., Disp: , Rfl:   •  HYDROcodone-acetaminophen (NORCO)  MG per tablet, Take 1 tablet by mouth Every 4 (Four) Hours As Needed for Moderate Pain ., Disp: 12 tablet, Rfl: 0  •  temazepam (RESTORIL) 15 MG capsule, Take 1 capsule by mouth Daily., Disp: , Rfl:   •  estradiol (ESTRACE) 0.5 MG tablet, Take by mouth daily., Disp: , Rfl:     Past Medical History:   Diagnosis Date   • Aneurysm (HCC)    • Aortic valve disease    • Arthritis    • Atrophic kidney    • Bradycardia    • CAD (coronary artery disease)    • CKD (chronic kidney disease) stage 4, GFR 15-29 ml/min ([facility])     • Depression    • Dysphagia    • Essential hypertension    • [place] (hard of hearing)    • Hyperlipidemia    • Mitral valve disease    • EMELINA on auto CPAP 2003    Overnight polysomnogram.  Weight 154 pounds.  Severe EMELINA with AHI 51.5 events per hour.  Sleep-related hypoxia present.   • Post lumbar puncture headache 2022   • Pulmonary hypertension (HCC)    • Sick sinus syndrome (HCC)    • Spinal stenosis        Past Surgical History:   Procedure Laterality Date   • AORTIC VALVE REPAIR/REPLACEMENT  2015    Dr Veliz   • CARDIAC CATHETERIZATION     • CARDIAC DEFIBRILLATOR PLACEMENT     •  SECTION     • COLONOSCOPY N/A 2019    [facility]   • ENDOSCOPY  2012    Hypertonic lower esophageal sphincter, gastritis. Path: Chronic gastritis, moderate.    • ENDOSCOPY N/A 2019    Web in the upper third of the esophagus. Dilated   • ENDOSCOPY N/A 2020    Procedure: ESOPHAGOGASTRODUODENOSCOPY WITH 56FR MALDONADO DILATATION AND COLD BIOPSIES;  Surgeon: Blayne Cai MD;  Location: [facility] ENDOSCOPY;  Service: Gastroenterology;  Laterality: N/A;  PRE: DYSPHAGIA  POST: SCHATZKI'S RING   • EPIDURAL  2022   • HYSTERECTOMY     • MITRAL VALVE ANNULOPLASTY  2015    Dr Veliz   • TRICUSPID VALVE SURGERY  2015    Dr Veliz       Family History   Problem Relation Age of Onset   • ALS Mother    • Hypertension Father        Social History     Tobacco Use   • Smoking status: Former     Types: Cigarettes     Start date:      Quit date:      Years since quittin.8   • Smokeless tobacco: Never   Vaping Use   • Vaping Use: Never used   Substance Use Topics   • Alcohol use: No   • Drug use: No         ECG 12 Lead    Date/Time: 11/10/2022 4:27 PM  Performed by: Laura Pak MD  Authorized by: Laura Pak MD   Comparison: compared with previous ECG   Similar to previous ECG  Q waves: V1, V2 and V3    Comments: Atrial paced rhythm               Objective:     Visit  "Vitals  /80 (BP Location: Right arm, Patient Position: Sitting, Cuff Size: Large Adult)   Pulse 77   Resp 16   Ht 157.5 cm (62.01\")   Wt 57.6 kg (127 lb)   SpO2 97%   BMI 23.22 kg/m²         Constitutional:       Appearance: Normal appearance. Well-developed.   Eyes:      General: Lids are normal.      Conjunctiva/sclera: Conjunctivae normal.      Pupils: Pupils are equal, round, and reactive to light.   HENT:      Head: Normocephalic and atraumatic.   Neck:      Vascular: No carotid bruit or JVD.      Lymphadenopathy: No cervical adenopathy.   Pulmonary:      Effort: Pulmonary effort is normal.      Breath sounds: Normal breath sounds.   Cardiovascular:      Normal rate. Regular rhythm.      No gallop.   Pulses:     Radial: 2+ bilaterally.  Edema:     Peripheral edema absent.   Abdominal:      Palpations: Abdomen is soft.   Musculoskeletal:      Cervical back: Full passive range of motion without pain, normal range of motion and neck supple. Skin:     General: Skin is warm and dry.   Neurological:      Mental Status: Alert and oriented to person, place, and time.           Assessment:          Diagnosis Plan   1. S/P AVR (aortic valve replacement)        2. S/P TVR (tricuspid valve repair)        3. S/P MVR (mitral valve repair)        4. Benign essential hypertension        5. Aneurysm of ascending aorta without rupture        6. Mixed hyperlipidemia        7. Pacemaker               Plan:       1.  Bioprosthetic aortic valve replacement.  Normal gradients across her valve on her most recent echocardiogram.  2.  Mitral valve repair.  No significant issues on her last echocardiogram.  3.  Tricuspid valve repair.  No significant issues on last echo.  4.  Hypertension.  Well-controlled on her current regimen of medications.  Continue the same.  5.  Mild aortic root dilatation.  No significant dilatation on her most recent echocardiogram.  6.  Hyperlipidemia.  On atorvastatin.  7.  Chronic kidney disease.  " Followed closely by Dr. Peters.  8.  Dual-chamber permanent pacemaker placement.  No significant issues on routine device checks.  Continue routine checks.  9.  Osteoarthritis.  Severely limiting her mobility.  10.  Left breast cancer.  No new or worsening symptoms.  EKG remained stable.  I think she is of acceptable risk to proceed with surgical treatment of her left breast cancer.    I will plan on seeing the patient back again in 6 months.

## 2022-11-10 NOTE — TELEPHONE ENCOUNTER
Called patient to discuss a surgery date. Offering 12/20/22 if she is released by Dr. Pak today at her appointment.    Unable to reach patient, or daughter - Left both a voicemail.

## 2022-12-06 ENCOUNTER — PATIENT OUTREACH (OUTPATIENT)
Dept: OTHER | Facility: HOSPITAL | Age: 82
End: 2022-12-06

## 2022-12-09 ENCOUNTER — OFFICE VISIT (OUTPATIENT)
Dept: SURGERY | Facility: CLINIC | Age: 82
End: 2022-12-09

## 2022-12-09 VITALS
DIASTOLIC BLOOD PRESSURE: 74 MMHG | SYSTOLIC BLOOD PRESSURE: 130 MMHG | OXYGEN SATURATION: 99 % | HEART RATE: 71 BPM | RESPIRATION RATE: 18 BRPM | BODY MASS INDEX: 23.37 KG/M2 | HEIGHT: 62 IN | WEIGHT: 127 LBS

## 2022-12-09 DIAGNOSIS — Z17.0 MALIGNANT NEOPLASM OF UPPER-OUTER QUADRANT OF LEFT BREAST IN FEMALE, ESTROGEN RECEPTOR POSITIVE: Primary | ICD-10-CM

## 2022-12-09 DIAGNOSIS — C50.412 MALIGNANT NEOPLASM OF UPPER-OUTER QUADRANT OF LEFT BREAST IN FEMALE, ESTROGEN RECEPTOR POSITIVE: Primary | ICD-10-CM

## 2022-12-09 PROCEDURE — 99215 OFFICE O/P EST HI 40 MIN: CPT | Performed by: SURGERY

## 2022-12-09 NOTE — PROGRESS NOTES
[facility]     Referring Provider: Miriam Mercado Reyes, MD     Chief complaint: Preoperative surgical planning      HPI:   10/21/2022:  Ms. Carly Gotti is a 81 yo woman, seen at the request of Dr. Miriam Reyes, for a new diagnosis of left breast cancer. The patient first noticed a lump in her outer left breast a few months ago. She believes it has grown since she first noticed it, but denies any associated pain. Diagnostic imaging showed a 2 cm mass and subsequent biopsy showed invasive ductal carcinoma. Her work-up is detailed in the oncologic history below. She denies any prior history of abnormal mammograms or breast biopsies. She has been on estrogen only hormone replacement therapy for about 40 years.     She has a past history of a bioprosthetic aortic valve replacement and mitral and tricuspid valve repairs. She also has a history of pulmonary hypertension and a pacemaker placed for sick sinus syndrome, followed by cardiology. She is not on any anticoagulation, but takes an aspirin 325 mg. Today in the office she is having some involuntary movements and tremors. She says that this happened before in the summer and she was evaluated in the ER. Her PCP is aware and she is currently on medication to help it. They believe it is related to stress/anxiety. She denies any family history of breast or ovarian cancer.     12/9/2022, Interval History:  At her last visit, we decided on a partial mastectomy. She has seen cardiology and was determined to be of acceptable risk for surgery. She returns today because she is concerned about the risk of needing a second surgery with breast conservation and is wondering about a mastectomy. She was joined today in clinic by her daughter. She gave consent for her duaghter to be present during her examination and participate in the discussion.        Oncology/Hematology History   Malignant neoplasm of upper-outer quadrant of left breast in female, estrogen receptor  positive (HCC)   12/4/2017 Imaging    Screening MMG with J Carlos ( [facility]):   The parenchyma of both breasts remains stable. No suspicious mass, calcification nor adenopathy has developed. Scattered fibroglandular densities.   BI-RADS 2: Benign      9/5/2022 Initial Diagnosis    Malignant neoplasm of upper-outer quadrant of left breast in female, estrogen receptor positive (HCC)     9/6/2022 Imaging    Bilateral Diagnostic MMG with J Carlos & Left Breast US ( [facility]):  MMG:  Scattered fibroglandular densities. A triangular skin marker overlies the site of palpable concern in the middle third upper outer quadrant of the left breast. At this location there is an underlying partially obscured and partially irregular mass that measures on the order of 1.8 cm in greatest dimension. I see no suspicious calcifications or areas of architectural distortion in either breast. There is no evidence for skin thickening, nipple retraction or axillary adenopathy.   US:   At the 1-o'clock position on the order of 5 cm from the nipple corresponding to the site of palpable concern there is an irregular 2.0 cm x 1.5 cm x 1.5 cm hypoechoic mass with detectable internal vascularity. No evidence for left axillary adenopathy is appreciated.   BI-RADS 5: Highly suggestive of malignancy      10/5/2022 Biopsy    Left Breast, US-Guided Biopsy ( [facility]):    1. Left Breast, 1:00 o'clock, 5 cm from Nipple, Ultrasound-Guided Biopsies for a Mass:   INVASIVE MAMMARY CARCINOMA, NO SPECIAL TYPE (INVASIVE DUCTAL CARCINOMA).  A. Histologic grade:  [place] histologic score II  (tubules=3, nuclei=3, mitosis-1).  B. 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                 C. Invasive carcinoma involves multiple tissue cores with the largest contiguous focus measuring up to 11.0 mm.               D. No lymphovascular nor perineural invasion identified.      ER+ (%, strong)   WV+  (%, strong)   Her2 negative (IHC 2+; FISH ratio 1.5, copy #4.8)  Ki-67 28%    -Barbell-shaped metallic clip at the posterior superior margin of the biopsied mass.          Review of Systems:  See interval history.       Medications:    Current Outpatient Medications:   •  aspirin 325 MG tablet, Take by mouth daily., Disp: , Rfl:   •  atorvastatin (LIPITOR) 20 MG tablet, take 1 tablet by mouth at bedtime, Disp: , Rfl: 0  •  bumetanide (BUMEX) 2 MG tablet, TAKE 1 TABLET BY MOUTH EVERY DAY, Disp: 90 tablet, Rfl: 1  •  buPROPion XL (WELLBUTRIN XL) 300 MG 24 hr tablet, Take 300 mg by mouth Daily., Disp: , Rfl: 0  •  busPIRone (BUSPAR) 5 MG tablet, Take 1 tablet by mouth 2 (Two) Times a Day., Disp: , Rfl:   •  carvedilol (COREG) 12.5 MG tablet, Take 12.5 mg by mouth 2 (Two) Times a Day., Disp: , Rfl:   •  Coenzyme Q10 200 MG tablet, Take 1 tablet/day by mouth Daily., Disp: , Rfl:   •  [employer] Senna Plus 8.6-50 MG per tablet, TAKE 1 TABLET BY MOUTH EVERY DAY, Disp: 30 tablet, Rfl: 2  •  Diclofenac Sodium (VOLTAREN) 1 % gel gel, PLEASE SEE ATTACHED FOR DETAILED DIRECTIONS, Disp: , Rfl:   •  donepezil (ARICEPT) 10 MG tablet, Take 10 mg by mouth Daily., Disp: , Rfl:   •  estradiol (ESTRACE) 0.5 MG tablet, Take by mouth daily., Disp: , Rfl:   •  eszopiclone (LUNESTA) 3 MG tablet, Take 3 mg by mouth Every Night. Take immediately before bedtime, Disp: , Rfl:   •  famotidine (PEPCID) 40 MG tablet, TAKE 1 TABLET BY MOUTH TWICE A DAY, Disp: 180 tablet, Rfl: 3  •  fenofibrate micronized (LOFIBRA) 67 MG capsule, Take 67 mg by mouth Daily., Disp: , Rfl:   •  FLUoxetine (PROzac) 20 MG capsule, Take 60 mg by mouth Daily., Disp: , Rfl:   •  HYDROcodone-acetaminophen (NORCO)  MG per tablet, Take 1 tablet by mouth Every 4 (Four) Hours As Needed for Moderate Pain ., Disp: 12 tablet, Rfl: 0  •  temazepam (RESTORIL) 15 MG capsule, Take 1 capsule by mouth Daily., Disp: , Rfl:       Allergies:  No Known Allergies      Family History    Problem Relation Age of Onset   • ALS Mother    • Hypertension Father        PHYSICAL EXAMINATION:   Vitals:    12/09/22 1335   BP: 130/74   Pulse: 71   Resp: 18   SpO2: 99%     ECOG 0 - Asymptomatic  General: NAD, well appearing  Psych: a&o x3, normal mood and affect  Eyes: EOMI, no scleral icterus  ENMT: neck supple without masses or thyromegaly, mucous membranes moist  MSK: normal gait, limited ROM in right shoulders  Lymph nodes: no cervical, supraclavicular or axillary lymphadenopathy  Breast: symmetric, small size, grade 3 ptosis, sternal scar  Right: No visible abnormalities on inspection while seated, with arms raised or hands on hips. No masses, skin changes, or nipple abnormalities.  Left: On inspection, there is a pacemaker below the clavicle and a bulge in the UOQ at the cancer location. At 1:00, 5 cm FN, there is a firm 2 cm mass. No nipple abnormalities.    Left breast, in-office ultrasound: At 1:00, 5 cm FN, there is an irregular hypoechoic mass with biopsy clip visualized. This is located a few millimeters deep to the skin. About 1 cm medial to the primary mass, there is an additional round hypoechoic mass, possible satellite lesion. Biopsy clip is seen at the posterior medial margin of the main mass.       I have independently reviewed her imaging and here are my findings:   In the left upper outer breast, there is an irregular 2 cm mass with a possible satellite lesion located medially.      Assessment:  82 y.o. F with a diagnosis of left breast cancer: Intermediate grade, invasive ductal carcinoma, ER/ME+, Her2 negative; clinical T1cN0, anatomic stage IA, prognostic stage IA.      Discussion:  We again discussed the plan for breast conservation.  I do think that this will be a better option for her since it is a smaller surgery with an easier recovery.  We again discussed the risk of positive margins (less than 10% risk for any patient).  I think that the benefits of a smaller surgery outweigh  this risk.  We again reviewed recovery and activity restrictions.  All of her questions were answered.  At the end of the visit, she was happy with the plan for breast conservation.    Plan:  -Left ultrasound-guided partial mastectomy.  -Will refer to medical oncology and radiation oncology postoperatively.    Jeannine Ledezma MD    I spent 45 minutes caring for Carly on this date of service. This time includes time spent by me in the following activities: preparing for the visit, performing a medically appropriate examination and/or evaluation , counseling and educating the patient/family/caregiver, documenting information in the medical record and independently interpreting results and communicating that information with the patient/family/caregiver.      CC:  Miriam Mercado Reyes, MD

## 2022-12-09 NOTE — H&P (VIEW-ONLY)
[facility]     Referring Provider: Miriam Mercado Reyes, MD     Chief complaint: Preoperative surgical planning      HPI:   10/21/2022:  Ms. Carly Gotti is a 81 yo woman, seen at the request of Dr. Miriam Reyes, for a new diagnosis of left breast cancer. The patient first noticed a lump in her outer left breast a few months ago. She believes it has grown since she first noticed it, but denies any associated pain. Diagnostic imaging showed a 2 cm mass and subsequent biopsy showed invasive ductal carcinoma. Her work-up is detailed in the oncologic history below. She denies any prior history of abnormal mammograms or breast biopsies. She has been on estrogen only hormone replacement therapy for about 40 years.     She has a past history of a bioprosthetic aortic valve replacement and mitral and tricuspid valve repairs. She also has a history of pulmonary hypertension and a pacemaker placed for sick sinus syndrome, followed by cardiology. She is not on any anticoagulation, but takes an aspirin 325 mg. Today in the office she is having some involuntary movements and tremors. She says that this happened before in the summer and she was evaluated in the ER. Her PCP is aware and she is currently on medication to help it. They believe it is related to stress/anxiety. She denies any family history of breast or ovarian cancer.     12/9/2022, Interval History:  At her last visit, we decided on a partial mastectomy. She has seen cardiology and was determined to be of acceptable risk for surgery. She returns today because she is concerned about the risk of needing a second surgery with breast conservation and is wondering about a mastectomy. She was joined today in clinic by her daughter. She gave consent for her duaghter to be present during her examination and participate in the discussion.        Oncology/Hematology History   Malignant neoplasm of upper-outer quadrant of left breast in female, estrogen receptor  positive (HCC)   12/4/2017 Imaging    Screening MMG with [facility] ( [facility]):   The parenchyma of both breasts remains stable. No suspicious mass, calcification nor adenopathy has developed. Scattered fibroglandular densities.   BI-RADS 2: Benign      9/5/2022 Initial Diagnosis    Malignant neoplasm of upper-outer quadrant of left breast in female, estrogen receptor positive (HCC)     9/6/2022 Imaging    Bilateral Diagnostic MMG with J Carlos & Left Breast US ( [facility]):  MMG:  Scattered fibroglandular densities. A triangular skin marker overlies the site of palpable concern in the middle third upper outer quadrant of the left breast. At this location there is an underlying partially obscured and partially irregular mass that measures on the order of 1.8 cm in greatest dimension. I see no suspicious calcifications or areas of architectural distortion in either breast. There is no evidence for skin thickening, nipple retraction or axillary adenopathy.   US:   At the 1-o'clock position on the order of 5 cm from the nipple corresponding to the site of palpable concern there is an irregular 2.0 cm x 1.5 cm x 1.5 cm hypoechoic mass with detectable internal vascularity. No evidence for left axillary adenopathy is appreciated.   BI-RADS 5: Highly suggestive of malignancy      10/5/2022 Biopsy    Left Breast, US-Guided Biopsy ( [facility]):    1. Left Breast, 1:00 o'clock, 5 cm from Nipple, Ultrasound-Guided Biopsies for a Mass:   INVASIVE MAMMARY CARCINOMA, NO SPECIAL TYPE (INVASIVE DUCTAL CARCINOMA).  A. Histologic grade:  [place] histologic score II  (tubules=3, nuclei=3, mitosis-1).  B. 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                 C. Invasive carcinoma involves multiple tissue cores with the largest contiguous focus measuring up to 11.0 mm.               D. No lymphovascular nor perineural invasion identified.      ER+ (%, strong)   NH+  (%, strong)   Her2 negative (IHC 2+; FISH ratio 1.5, copy #4.8)  Ki-67 28%    -Barbell-shaped metallic clip at the posterior superior margin of the biopsied mass.          Review of Systems:  See interval history.       Medications:    Current Outpatient Medications:   •  aspirin 325 MG tablet, Take by mouth daily., Disp: , Rfl:   •  atorvastatin (LIPITOR) 20 MG tablet, take 1 tablet by mouth at bedtime, Disp: , Rfl: 0  •  bumetanide (BUMEX) 2 MG tablet, TAKE 1 TABLET BY MOUTH EVERY DAY, Disp: 90 tablet, Rfl: 1  •  buPROPion XL (WELLBUTRIN XL) 300 MG 24 hr tablet, Take 300 mg by mouth Daily., Disp: , Rfl: 0  •  busPIRone (BUSPAR) 5 MG tablet, Take 1 tablet by mouth 2 (Two) Times a Day., Disp: , Rfl:   •  carvedilol (COREG) 12.5 MG tablet, Take 12.5 mg by mouth 2 (Two) Times a Day., Disp: , Rfl:   •  Coenzyme Q10 200 MG tablet, Take 1 tablet/day by mouth Daily., Disp: , Rfl:   •  [employer] Senna Plus 8.6-50 MG per tablet, TAKE 1 TABLET BY MOUTH EVERY DAY, Disp: 30 tablet, Rfl: 2  •  Diclofenac Sodium (VOLTAREN) 1 % gel gel, PLEASE SEE ATTACHED FOR DETAILED DIRECTIONS, Disp: , Rfl:   •  donepezil (ARICEPT) 10 MG tablet, Take 10 mg by mouth Daily., Disp: , Rfl:   •  estradiol (ESTRACE) 0.5 MG tablet, Take by mouth daily., Disp: , Rfl:   •  eszopiclone (LUNESTA) 3 MG tablet, Take 3 mg by mouth Every Night. Take immediately before bedtime, Disp: , Rfl:   •  famotidine (PEPCID) 40 MG tablet, TAKE 1 TABLET BY MOUTH TWICE A DAY, Disp: 180 tablet, Rfl: 3  •  fenofibrate micronized (LOFIBRA) 67 MG capsule, Take 67 mg by mouth Daily., Disp: , Rfl:   •  FLUoxetine (PROzac) 20 MG capsule, Take 60 mg by mouth Daily., Disp: , Rfl:   •  HYDROcodone-acetaminophen (NORCO)  MG per tablet, Take 1 tablet by mouth Every 4 (Four) Hours As Needed for Moderate Pain ., Disp: 12 tablet, Rfl: 0  •  temazepam (RESTORIL) 15 MG capsule, Take 1 capsule by mouth Daily., Disp: , Rfl:       Allergies:  No Known Allergies      Family History    Problem Relation Age of Onset   • ALS Mother    • Hypertension Father        PHYSICAL EXAMINATION:   Vitals:    12/09/22 1335   BP: 130/74   Pulse: 71   Resp: 18   SpO2: 99%     ECOG 0 - Asymptomatic  General: NAD, well appearing  Psych: a&o x3, normal mood and affect  Eyes: EOMI, no scleral icterus  ENMT: neck supple without masses or thyromegaly, mucous membranes moist  MSK: normal gait, limited ROM in right shoulders  Lymph nodes: no cervical, supraclavicular or axillary lymphadenopathy  Breast: symmetric, small size, grade 3 ptosis, sternal scar  Right: No visible abnormalities on inspection while seated, with arms raised or hands on hips. No masses, skin changes, or nipple abnormalities.  Left: On inspection, there is a pacemaker below the clavicle and a bulge in the UOQ at the cancer location. At 1:00, 5 cm FN, there is a firm 2 cm mass. No nipple abnormalities.    Left breast, in-office ultrasound: At 1:00, 5 cm FN, there is an irregular hypoechoic mass with biopsy clip visualized. This is located a few millimeters deep to the skin. About 1 cm medial to the primary mass, there is an additional round hypoechoic mass, possible satellite lesion. Biopsy clip is seen at the posterior medial margin of the main mass.       I have independently reviewed her imaging and here are my findings:   In the left upper outer breast, there is an irregular 2 cm mass with a possible satellite lesion located medially.      Assessment:  82 y.o. F with a diagnosis of left breast cancer: Intermediate grade, invasive ductal carcinoma, ER/NH+, Her2 negative; clinical T1cN0, anatomic stage IA, prognostic stage IA.      Discussion:  We again discussed the plan for breast conservation.  I do think that this will be a better option for her since it is a smaller surgery with an easier recovery.  We again discussed the risk of positive margins (less than 10% risk for any patient).  I think that the benefits of a smaller surgery outweigh  this risk.  We again reviewed recovery and activity restrictions.  All of her questions were answered.  At the end of the visit, she was happy with the plan for breast conservation.    Plan:  -Left ultrasound-guided partial mastectomy.  -Will refer to medical oncology and radiation oncology postoperatively.    Jeannine Ledezma MD    I spent 45 minutes caring for Carly on this date of service. This time includes time spent by me in the following activities: preparing for the visit, performing a medically appropriate examination and/or evaluation , counseling and educating the patient/family/caregiver, documenting information in the medical record and independently interpreting results and communicating that information with the patient/family/caregiver.      CC:  Miriam Mercado Reyes, MD

## 2022-12-12 ENCOUNTER — PATIENT OUTREACH (OUTPATIENT)
Dept: OTHER | Facility: HOSPITAL | Age: 82
End: 2022-12-12

## 2022-12-15 ENCOUNTER — PRE-ADMISSION TESTING (OUTPATIENT)
Dept: PREADMISSION TESTING | Facility: HOSPITAL | Age: 82
End: 2022-12-15

## 2022-12-15 VITALS
HEART RATE: 90 BPM | DIASTOLIC BLOOD PRESSURE: 81 MMHG | BODY MASS INDEX: 23.79 KG/M2 | OXYGEN SATURATION: 98 % | WEIGHT: 126 LBS | TEMPERATURE: 96.8 F | RESPIRATION RATE: 20 BRPM | SYSTOLIC BLOOD PRESSURE: 117 MMHG | HEIGHT: 61 IN

## 2022-12-15 DIAGNOSIS — Z17.0 MALIGNANT NEOPLASM OF UPPER-OUTER QUADRANT OF LEFT BREAST IN FEMALE, ESTROGEN RECEPTOR POSITIVE: ICD-10-CM

## 2022-12-15 DIAGNOSIS — C50.412 MALIGNANT NEOPLASM OF UPPER-OUTER QUADRANT OF LEFT BREAST IN FEMALE, ESTROGEN RECEPTOR POSITIVE: ICD-10-CM

## 2022-12-15 LAB
ANION GAP SERPL CALCULATED.3IONS-SCNC: 10 MMOL/L (ref 5–15)
BASOPHILS # BLD AUTO: 0.02 10*3/MM3 (ref 0–0.2)
BASOPHILS NFR BLD AUTO: 0.4 % (ref 0–1.5)
BUN SERPL-MCNC: 32 MG/DL (ref 8–23)
BUN/CREAT SERPL: 17.9 (ref 7–25)
CALCIUM SPEC-SCNC: 9.5 MG/DL (ref 8.6–10.5)
CHLORIDE SERPL-SCNC: 99 MMOL/L (ref 98–107)
CO2 SERPL-SCNC: 31 MMOL/L (ref 22–29)
CREAT SERPL-MCNC: 1.79 MG/DL (ref 0.57–1)
DEPRECATED RDW RBC AUTO: 43.3 FL (ref 37–54)
EGFRCR SERPLBLD CKD-EPI 2021: 27.7 ML/MIN/1.73
EOSINOPHIL # BLD AUTO: 0.15 10*3/MM3 (ref 0–0.4)
EOSINOPHIL NFR BLD AUTO: 3 % (ref 0.3–6.2)
ERYTHROCYTE [DISTWIDTH] IN BLOOD BY AUTOMATED COUNT: 11.8 % (ref 12.3–15.4)
GLUCOSE SERPL-MCNC: 95 MG/DL (ref 65–99)
HCT VFR BLD AUTO: 38.8 % (ref 34–46.6)
HGB BLD-MCNC: 12.7 G/DL (ref 12–15.9)
IMM GRANULOCYTES # BLD AUTO: 0.01 10*3/MM3 (ref 0–0.05)
IMM GRANULOCYTES NFR BLD AUTO: 0.2 % (ref 0–0.5)
LYMPHOCYTES # BLD AUTO: 1.28 10*3/MM3 (ref 0.7–3.1)
LYMPHOCYTES NFR BLD AUTO: 25.3 % (ref 19.6–45.3)
MCH RBC QN AUTO: 32.4 PG (ref 26.6–33)
MCHC RBC AUTO-ENTMCNC: 32.7 G/DL (ref 31.5–35.7)
MCV RBC AUTO: 99 FL (ref 79–97)
MONOCYTES # BLD AUTO: 0.66 10*3/MM3 (ref 0.1–0.9)
MONOCYTES NFR BLD AUTO: 13.1 % (ref 5–12)
NEUTROPHILS NFR BLD AUTO: 2.93 10*3/MM3 (ref 1.7–7)
NEUTROPHILS NFR BLD AUTO: 58 % (ref 42.7–76)
NRBC BLD AUTO-RTO: 0 /100 WBC (ref 0–0.2)
PLATELET # BLD AUTO: 173 10*3/MM3 (ref 140–450)
PMV BLD AUTO: 11.6 FL (ref 6–12)
POTASSIUM SERPL-SCNC: 3.7 MMOL/L (ref 3.5–5.2)
RBC # BLD AUTO: 3.92 10*6/MM3 (ref 3.77–5.28)
SODIUM SERPL-SCNC: 140 MMOL/L (ref 136–145)
WBC NRBC COR # BLD: 5.05 10*3/MM3 (ref 3.4–10.8)

## 2022-12-15 PROCEDURE — 36415 COLL VENOUS BLD VENIPUNCTURE: CPT

## 2022-12-15 PROCEDURE — 85025 COMPLETE CBC W/AUTO DIFF WBC: CPT

## 2022-12-15 PROCEDURE — 80048 BASIC METABOLIC PNL TOTAL CA: CPT

## 2022-12-15 NOTE — DISCHARGE INSTRUCTIONS
Take the following medications the morning of surgery:      WELLBUTRIN, BUSPAR, CARVEDILOL, PERCOCET, HYDROCODONE, FLUOXETINE    ARRIVE TO MAIN SURGERY AT 11 AM ON 12/20/2022    If you are on prescription narcotic pain medication to control your pain you may also take that medication the morning of surgery.    General Instructions:  Do not eat solid food after midnight the night before surgery.  You may drink clear liquids day of surgery but must stop at least one hour before your hospital arrival time.  It is beneficial for you to have a clear drink that contains carbohydrates the day of surgery.  We suggest a 12 to 20 ounce bottle of Gatorade or Powerade for non-diabetic patients or a 12 to 20 ounce bottle of G2 or Powerade Zero for diabetic patients. (Pediatric patients, are not advised to drink a 12 to 20 ounce carbohydrate drink)    Clear liquids are liquids you can see through.  Nothing red in color.     Plain water                               Sports drinks  Sodas                                   Gelatin (Jell-O)  Fruit juices without pulp such as white grape juice and apple juice  Popsicles that contain no fruit or yogurt  Tea or coffee (no cream or milk added)  Gatorade / Powerade  G2 / Powerade Zero    Infants may have breast milk up to four hours before surgery.  Infants drinking formula may drink formula up to six hours before surgery.   Patients who avoid smoking, chewing tobacco and alcohol for 4 weeks prior to surgery have a reduced risk of post-operative complications.  Quit smoking as many days before surgery as you can.  Do not smoke, use chewing tobacco or drink alcohol the day of surgery.   If applicable bring your C-PAP/ BI-PAP machine.  Bring any papers given to you in the doctor’s office.  Wear clean comfortable clothes.  Do not wear contact lenses, false eyelashes or make-up.  Bring a case for your glasses.   Bring crutches or walker if applicable.  Remove all piercings.  Leave jewelry and  any other valuables at home.  Hair extensions with metal clips must be removed prior to surgery.  The Pre-Admission Testing nurse will instruct you to bring medications if unable to obtain an accurate list in Pre-Admission Testing.          Preventing a Surgical Site Infection:  For 2 to 3 days before surgery, avoid shaving with a razor because the razor can irritate skin and make it easier to develop an infection.    Any areas of open skin can increase the risk of a post-operative wound infection by allowing bacteria to enter and travel throughout the body.  Notify your surgeon if you have any skin wounds / rashes even if it is not near the expected surgical site.  The area will need assessed to determine if surgery should be delayed until it is healed.  The night prior to surgery shower using a fresh bar of anti-bacterial soap (such as Dial) and clean washcloth.  Sleep in a clean bed with clean clothing.  Do not allow pets to sleep with you.  Shower on the morning of surgery using a fresh bar of anti-bacterial soap (such as Dial) and clean washcloth.  Dry with a clean towel and dress in clean clothing.  Ask your surgeon if you will be receiving antibiotics prior to surgery.  Make sure you, your family, and all healthcare providers clean their hands with soap and water or an alcohol based hand  before caring for you or your wound.    Day of surgery:  Your arrival time is approximately two hours before your scheduled surgery time.  Upon arrival, a Pre-op nurse and Anesthesiologist will review your health history, obtain vital signs, and answer questions you may have.  The only belongings needed at this time will be a list of your home medications and if applicable your C-PAP/BI-PAP machine.  A Pre-op nurse will start an IV and you may receive medication in preparation for surgery, including something to help you relax.     Please be aware that surgery does come with discomfort.  We want to make every effort  to control your discomfort so please discuss any uncontrolled symptoms with your nurse.   Your doctor will most likely have prescribed pain medications.      If you are going home after surgery you will receive individualized written care instructions before being discharged.  A responsible adult must drive you to and from the hospital on the day of your surgery and stay with you for 24 hours.  Discharge prescriptions can be filled by the hospital pharmacy during regular pharmacy hours.  If you are having surgery late in the day/evening your prescription may be e-prescribed to your pharmacy.  Please verify your pharmacy hours or chose a 24 hour pharmacy to avoid not having access to your prescription because your pharmacy has closed for the day.    If you are staying overnight following surgery, you will be transported to your hospital room following the recovery period.  [facility] has all private rooms.    If you have any questions please call Pre-Admission Testing at [phone].  Deductibles and co-payments are collected on the day of service. Please be prepared to pay the required co-pay, deductible or deposit on the day of service as defined by your plan.    Call your surgeon immediately if you experience any of the following symptoms:  Sore Throat  Shortness of Breath or difficulty breathing  Cough  Chills  Body soreness or muscle pain  Headache  Fever  New loss of taste or smell  Do not arrive for your surgery ill.  Your procedure will need to be rescheduled to another time.  You will need to call your physician before the day of surgery to avoid any unnecessary exposure to hospital staff as well as other patients.

## 2022-12-19 ENCOUNTER — PATIENT OUTREACH (OUTPATIENT)
Dept: OTHER | Facility: HOSPITAL | Age: 82
End: 2022-12-19

## 2022-12-19 NOTE — PROGRESS NOTES
Called Ms. Lirat to see how she was doing. She stated she has everything she needs for surgery tomorrow and has no questions or concerns. She was thankful for the call and will reach out if any questions or needs arise.

## 2022-12-20 ENCOUNTER — APPOINTMENT (OUTPATIENT)
Dept: GENERAL RADIOLOGY | Facility: HOSPITAL | Age: 82
End: 2022-12-20

## 2022-12-20 ENCOUNTER — ANESTHESIA EVENT (OUTPATIENT)
Dept: PERIOP | Facility: HOSPITAL | Age: 82
End: 2022-12-20

## 2022-12-20 ENCOUNTER — ANESTHESIA (OUTPATIENT)
Dept: PERIOP | Facility: HOSPITAL | Age: 82
End: 2022-12-20

## 2022-12-20 ENCOUNTER — HOSPITAL ENCOUNTER (OUTPATIENT)
Facility: HOSPITAL | Age: 82
Setting detail: HOSPITAL OUTPATIENT SURGERY
Discharge: HOME | End: 2022-12-20
Attending: SURGERY | Admitting: SURGERY

## 2022-12-20 VITALS
HEIGHT: 61 IN | DIASTOLIC BLOOD PRESSURE: 78 MMHG | BODY MASS INDEX: 23.81 KG/M2 | OXYGEN SATURATION: 93 % | RESPIRATION RATE: 16 BRPM | SYSTOLIC BLOOD PRESSURE: 150 MMHG | WEIGHT: 126.1 LBS | TEMPERATURE: 97.8 F | HEART RATE: 79 BPM

## 2022-12-20 DIAGNOSIS — C50.412 MALIGNANT NEOPLASM OF UPPER-OUTER QUADRANT OF LEFT BREAST IN FEMALE, ESTROGEN RECEPTOR POSITIVE: ICD-10-CM

## 2022-12-20 DIAGNOSIS — Z17.0 MALIGNANT NEOPLASM OF UPPER-OUTER QUADRANT OF LEFT BREAST IN FEMALE, ESTROGEN RECEPTOR POSITIVE: ICD-10-CM

## 2022-12-20 PROCEDURE — 25010000002 ONDANSETRON PER 1 MG: Performed by: NURSE ANESTHETIST, CERTIFIED REGISTERED

## 2022-12-20 PROCEDURE — 76098 X-RAY EXAM SURGICAL SPECIMEN: CPT

## 2022-12-20 PROCEDURE — 25010000002 FENTANYL CITRATE (PF) 50 MCG/ML SOLUTION: Performed by: ANESTHESIOLOGY

## 2022-12-20 PROCEDURE — 25010000002 PROPOFOL 10 MG/ML EMULSION: Performed by: NURSE ANESTHETIST, CERTIFIED REGISTERED

## 2022-12-20 PROCEDURE — 19301 PARTIAL MASTECTOMY: CPT | Performed by: SURGERY

## 2022-12-20 PROCEDURE — 25010000002 DEXAMETHASONE SODIUM PHOSPHATE 20 MG/5ML SOLUTION: Performed by: NURSE ANESTHETIST, CERTIFIED REGISTERED

## 2022-12-20 PROCEDURE — 76998 US GUIDE INTRAOP: CPT | Performed by: SURGERY

## 2022-12-20 PROCEDURE — 88307 TISSUE EXAM BY PATHOLOGIST: CPT | Performed by: SURGERY

## 2022-12-20 PROCEDURE — 25010000002 CEFAZOLIN IN DEXTROSE 2-4 GM/100ML-% SOLUTION: Performed by: SURGERY

## 2022-12-20 DEVICE — LIGACLIP MCA MULTIPLE CLIP APPLIERS, 20 SMALL CLIPS
Type: IMPLANTABLE DEVICE | Site: BREAST | Status: FUNCTIONAL
Brand: LIGACLIP

## 2022-12-20 RX ORDER — MIDAZOLAM HYDROCHLORIDE 1 MG/ML
0.5 INJECTION INTRAMUSCULAR; INTRAVENOUS
Status: DISCONTINUED | OUTPATIENT
Start: 2022-12-20 | End: 2022-12-20 | Stop reason: HOSPADM

## 2022-12-20 RX ORDER — LIDOCAINE HYDROCHLORIDE 10 MG/ML
0.5 INJECTION, SOLUTION EPIDURAL; INFILTRATION; INTRACAUDAL; PERINEURAL ONCE AS NEEDED
Status: DISCONTINUED | OUTPATIENT
Start: 2022-12-20 | End: 2022-12-20 | Stop reason: HOSPADM

## 2022-12-20 RX ORDER — ONDANSETRON 2 MG/ML
INJECTION INTRAMUSCULAR; INTRAVENOUS AS NEEDED
Status: DISCONTINUED | OUTPATIENT
Start: 2022-12-20 | End: 2022-12-20 | Stop reason: SURG

## 2022-12-20 RX ORDER — HYDROMORPHONE HYDROCHLORIDE 1 MG/ML
0.5 INJECTION, SOLUTION INTRAMUSCULAR; INTRAVENOUS; SUBCUTANEOUS
Status: DISCONTINUED | OUTPATIENT
Start: 2022-12-20 | End: 2022-12-20 | Stop reason: HOSPADM

## 2022-12-20 RX ORDER — PROMETHAZINE HYDROCHLORIDE 25 MG/1
25 TABLET ORAL ONCE AS NEEDED
Status: DISCONTINUED | OUTPATIENT
Start: 2022-12-20 | End: 2022-12-20 | Stop reason: HOSPADM

## 2022-12-20 RX ORDER — SODIUM CHLORIDE 0.9 % (FLUSH) 0.9 %
3 SYRINGE (ML) INJECTION EVERY 12 HOURS SCHEDULED
Status: DISCONTINUED | OUTPATIENT
Start: 2022-12-20 | End: 2022-12-20 | Stop reason: HOSPADM

## 2022-12-20 RX ORDER — HYDROCODONE BITARTRATE AND ACETAMINOPHEN 7.5; 325 MG/1; MG/1
1 TABLET ORAL ONCE AS NEEDED
Status: COMPLETED | OUTPATIENT
Start: 2022-12-20 | End: 2022-12-20

## 2022-12-20 RX ORDER — DIPHENHYDRAMINE HYDROCHLORIDE 50 MG/ML
12.5 INJECTION INTRAMUSCULAR; INTRAVENOUS
Status: DISCONTINUED | OUTPATIENT
Start: 2022-12-20 | End: 2022-12-20 | Stop reason: HOSPADM

## 2022-12-20 RX ORDER — SODIUM CHLORIDE 0.9 % (FLUSH) 0.9 %
3-10 SYRINGE (ML) INJECTION AS NEEDED
Status: DISCONTINUED | OUTPATIENT
Start: 2022-12-20 | End: 2022-12-20 | Stop reason: HOSPADM

## 2022-12-20 RX ORDER — PROPOFOL 10 MG/ML
VIAL (ML) INTRAVENOUS AS NEEDED
Status: DISCONTINUED | OUTPATIENT
Start: 2022-12-20 | End: 2022-12-20 | Stop reason: SURG

## 2022-12-20 RX ORDER — MAGNESIUM HYDROXIDE 1200 MG/15ML
LIQUID ORAL AS NEEDED
Status: DISCONTINUED | OUTPATIENT
Start: 2022-12-20 | End: 2022-12-20 | Stop reason: HOSPADM

## 2022-12-20 RX ORDER — ONDANSETRON 2 MG/ML
4 INJECTION INTRAMUSCULAR; INTRAVENOUS ONCE AS NEEDED
Status: DISCONTINUED | OUTPATIENT
Start: 2022-12-20 | End: 2022-12-20 | Stop reason: HOSPADM

## 2022-12-20 RX ORDER — HYDRALAZINE HYDROCHLORIDE 20 MG/ML
5 INJECTION INTRAMUSCULAR; INTRAVENOUS
Status: DISCONTINUED | OUTPATIENT
Start: 2022-12-20 | End: 2022-12-20 | Stop reason: HOSPADM

## 2022-12-20 RX ORDER — NALOXONE HCL 0.4 MG/ML
0.2 VIAL (ML) INJECTION AS NEEDED
Status: DISCONTINUED | OUTPATIENT
Start: 2022-12-20 | End: 2022-12-20 | Stop reason: HOSPADM

## 2022-12-20 RX ORDER — FENTANYL CITRATE 50 UG/ML
50 INJECTION, SOLUTION INTRAMUSCULAR; INTRAVENOUS
Status: DISCONTINUED | OUTPATIENT
Start: 2022-12-20 | End: 2022-12-20 | Stop reason: HOSPADM

## 2022-12-20 RX ORDER — BUPIVACAINE HYDROCHLORIDE AND EPINEPHRINE 2.5; 5 MG/ML; UG/ML
INJECTION, SOLUTION INFILTRATION; PERINEURAL AS NEEDED
Status: DISCONTINUED | OUTPATIENT
Start: 2022-12-20 | End: 2022-12-20 | Stop reason: HOSPADM

## 2022-12-20 RX ORDER — EPHEDRINE SULFATE 50 MG/ML
5 INJECTION, SOLUTION INTRAVENOUS ONCE AS NEEDED
Status: DISCONTINUED | OUTPATIENT
Start: 2022-12-20 | End: 2022-12-20 | Stop reason: HOSPADM

## 2022-12-20 RX ORDER — OXYCODONE AND ACETAMINOPHEN 7.5; 325 MG/1; MG/1
1 TABLET ORAL EVERY 4 HOURS PRN
Status: DISCONTINUED | OUTPATIENT
Start: 2022-12-20 | End: 2022-12-20 | Stop reason: HOSPADM

## 2022-12-20 RX ORDER — DEXAMETHASONE SODIUM PHOSPHATE 4 MG/ML
INJECTION, SOLUTION INTRA-ARTICULAR; INTRALESIONAL; INTRAMUSCULAR; INTRAVENOUS; SOFT TISSUE AS NEEDED
Status: DISCONTINUED | OUTPATIENT
Start: 2022-12-20 | End: 2022-12-20 | Stop reason: SURG

## 2022-12-20 RX ORDER — PROMETHAZINE HYDROCHLORIDE 25 MG/1
25 SUPPOSITORY RECTAL ONCE AS NEEDED
Status: DISCONTINUED | OUTPATIENT
Start: 2022-12-20 | End: 2022-12-20 | Stop reason: HOSPADM

## 2022-12-20 RX ORDER — CEFAZOLIN SODIUM 2 G/100ML
2 INJECTION, SOLUTION INTRAVENOUS ONCE
Status: COMPLETED | OUTPATIENT
Start: 2022-12-20 | End: 2022-12-20

## 2022-12-20 RX ORDER — SODIUM CHLORIDE, SODIUM LACTATE, POTASSIUM CHLORIDE, CALCIUM CHLORIDE 600; 310; 30; 20 MG/100ML; MG/100ML; MG/100ML; MG/100ML
9 INJECTION, SOLUTION INTRAVENOUS CONTINUOUS
Status: DISCONTINUED | OUTPATIENT
Start: 2022-12-20 | End: 2022-12-20 | Stop reason: HOSPADM

## 2022-12-20 RX ORDER — FLUMAZENIL 0.1 MG/ML
0.2 INJECTION INTRAVENOUS AS NEEDED
Status: DISCONTINUED | OUTPATIENT
Start: 2022-12-20 | End: 2022-12-20 | Stop reason: HOSPADM

## 2022-12-20 RX ORDER — FAMOTIDINE 10 MG/ML
20 INJECTION, SOLUTION INTRAVENOUS ONCE
Status: COMPLETED | OUTPATIENT
Start: 2022-12-20 | End: 2022-12-20

## 2022-12-20 RX ORDER — DIPHENHYDRAMINE HCL 25 MG
25 CAPSULE ORAL
Status: DISCONTINUED | OUTPATIENT
Start: 2022-12-20 | End: 2022-12-20 | Stop reason: HOSPADM

## 2022-12-20 RX ORDER — LIDOCAINE HYDROCHLORIDE 20 MG/ML
INJECTION, SOLUTION INFILTRATION; PERINEURAL AS NEEDED
Status: DISCONTINUED | OUTPATIENT
Start: 2022-12-20 | End: 2022-12-20 | Stop reason: SURG

## 2022-12-20 RX ORDER — LABETALOL HYDROCHLORIDE 5 MG/ML
5 INJECTION, SOLUTION INTRAVENOUS
Status: DISCONTINUED | OUTPATIENT
Start: 2022-12-20 | End: 2022-12-20 | Stop reason: HOSPADM

## 2022-12-20 RX ADMIN — SODIUM CHLORIDE, POTASSIUM CHLORIDE, SODIUM LACTATE AND CALCIUM CHLORIDE 9 ML/HR: 600; 310; 30; 20 INJECTION, SOLUTION INTRAVENOUS at 12:19

## 2022-12-20 RX ADMIN — FAMOTIDINE 20 MG: 10 INJECTION INTRAVENOUS at 12:19

## 2022-12-20 RX ADMIN — HYDROCODONE BITARTRATE AND ACETAMINOPHEN 1 TABLET: 7.5; 325 TABLET ORAL at 16:22

## 2022-12-20 RX ADMIN — PROPOFOL 80 MG: 10 INJECTION, EMULSION INTRAVENOUS at 13:23

## 2022-12-20 RX ADMIN — LIDOCAINE HYDROCHLORIDE 80 MG: 20 INJECTION, SOLUTION INFILTRATION; PERINEURAL at 13:23

## 2022-12-20 RX ADMIN — FENTANYL CITRATE 25 MCG: 50 INJECTION, SOLUTION INTRAMUSCULAR; INTRAVENOUS at 13:58

## 2022-12-20 RX ADMIN — FENTANYL CITRATE 25 MCG: 50 INJECTION, SOLUTION INTRAMUSCULAR; INTRAVENOUS at 14:11

## 2022-12-20 RX ADMIN — DEXAMETHASONE SODIUM PHOSPHATE 10 MG: 4 INJECTION, SOLUTION INTRAMUSCULAR; INTRAVENOUS at 13:26

## 2022-12-20 RX ADMIN — ONDANSETRON 4 MG: 2 INJECTION INTRAMUSCULAR; INTRAVENOUS at 13:23

## 2022-12-20 RX ADMIN — CEFAZOLIN SODIUM 2 G: 2 INJECTION, SOLUTION INTRAVENOUS at 13:10

## 2022-12-20 NOTE — ANESTHESIA PROCEDURE NOTES
Airway  Urgency: elective    Date/Time: 12/20/2022 1:25 PM    General Information and Staff    Patient location during procedure: OR  CRNA/CAA: Thony Alfaro CRNA    Indications and Patient Condition  Indications for airway management: airway protection    Preoxygenated: yes  MILS maintained throughout  Mask difficulty assessment: 0 - not attempted    Final Airway Details  Final airway type: supraglottic airway      Successful airway: LMA  Size 3     Number of attempts at approach: 1  Assessment: lips, teeth, and gum same as pre-op

## 2022-12-20 NOTE — ANESTHESIA PREPROCEDURE EVALUATION
Anesthesia Evaluation                  Airway   Mallampati: II  TM distance: >3 FB  Neck ROM: full  No difficulty expected  Dental - normal exam     Pulmonary - normal exam   (+) sleep apnea on CPAP,   Cardiovascular - normal exam    Patient on routine beta blocker    (+) hypertension, CAD, hyperlipidemia,     ROS comment: S/P av, MV,TV repair    Neuro/Psych  (+) headaches, psychiatric history Anxiety and Depression,    GI/Hepatic/Renal/Endo    (+)   renal disease CRI,     Musculoskeletal     Abdominal    Substance History      OB/GYN          Other   arthritis,    history of cancer                    Anesthesia Plan    ASA 4     general     intravenous induction     Anesthetic plan, risks, benefits, and alternatives have been provided, discussed and informed consent has been obtained with: patient.        CODE STATUS:

## 2022-12-20 NOTE — ANESTHESIA POSTPROCEDURE EVALUATION
"Patient: Carly Gotti    Procedure Summary     Date: 12/20/22 Room / Location: [facility] OR [address] [place] [address] MAIN OR    Anesthesia Start: 1317 Anesthesia Stop: 1447    Procedure: Left ultrasound-guided partial mastectomy (Left: Breast) Diagnosis:       Malignant neoplasm of upper-outer quadrant of left breast in female, estrogen receptor positive (HCC)      (Malignant neoplasm of upper-outer quadrant of left breast in female, estrogen receptor positive (HCC) [C50.412, Z17.0])    Surgeons: Jeannine Ledezma MD Provider: Prieto Warren MD    Anesthesia Type: general ASA Status: 4          Anesthesia Type: general    Vitals  Vitals Value Taken Time   /69 12/20/22 1626   Temp 36.6 °C (97.8 °F) 12/20/22 1444   Pulse 78 12/20/22 1631   Resp 16 12/20/22 1625   SpO2 93 % 12/20/22 1631   Vitals shown include unvalidated device data.        Post Anesthesia Care and Evaluation    Patient location during evaluation: bedside  Patient participation: complete - patient participated  Level of consciousness: awake and alert  Pain management: adequate    Airway patency: patent  Anesthetic complications: No anesthetic complications    Cardiovascular status: acceptable  Respiratory status: acceptable  Hydration status: acceptable    Comments: /69   Pulse 84   Temp 36.6 °C (97.8 °F) (Oral)   Resp 16   Ht 154.9 cm (61\")   Wt 57.2 kg (126 lb 1.7 oz)   SpO2 97%   BMI 23.83 kg/m²           "

## 2022-12-20 NOTE — OP NOTE
Operative Report    Patient Name:  Carly Gotti  YOB: 1940    Date of Surgery:  12/20/2022    Pre-op Diagnosis:   Malignant neoplasm of upper-outer quadrant of left breast in female, estrogen receptor positive (HCC) [C50.412, Z17.0]       Post-Op Diagnosis:  Malignant neoplasm of upper-outer quadrant of left breast in female, estrogen receptor positive (HCC) [C50.412, Z17.0]      Procedure:  Left ultrasound-guided partial mastectomy      Staff:  Surgeon(s):  Jeannine Ledezma MD       Anesthesia: General    Estimated Blood Loss: 15 mL    Implants:    Implant Name Type Inv. Item Serial No.  Lot No. LRB No. Used Action   CLIPAPPLR M/ ENDO LIGACLIP 9 3/8IN SM - [id number] Implant CLIPAPPLR M/ ENDO LIGACLIP 9 3/8IN   [employer]  Left 1 Implanted       Specimen:          Specimens     ID Source Type Tests Collected By Collected At Frozen?    A Breast, Left Tissue · TISSUE PATHOLOGY EXAM   Jeannine Ledezma MD 12/20/22 1358 No    Description: LEFT BREAST PARTIAL MASTECTOMY, INK MUNIZ MARGIN, FRESH FOR PERMANENT    B Breast, Left Tissue · TISSUE PATHOLOGY EXAM   Jeannine Ledezma MD 12/20/22 1404 No    Description: LEFT BREAST ADDITIONAL MEDIAL, INFERIOR, AND POSTERIOR MARGIN, INK MUNIZ MARGIN, FRESH FOR PERMANENT    C Breast, Left Tissue · TISSUE PATHOLOGY EXAM   Jeannine Ledezma MD 12/20/22 1409 No    Description: LEFT BREAST ADDITIONAL SUPERIOR AND LATERAL MARGIN, INK MUNIZ MARGIN, FRESH FOR PERMANENT            Findings: Mass with clip seen in specimen radiograph.    Complications: None     Indications: The patient is a 82 y.o. F with a diagnosis of left breast cancer: Intermediate grade, invasive ductal carcinoma, ER/MN+, Her2 negative; clinical T1cN0, anatomic stage IA, prognostic stage IA. She is a good candidate for lumpectomy and she desires breast conservation. Preoperatively, we reviewed the data that suggests in women over 70 with small  ER+ tumors who will receive adjuvant endocrine therapy, the risks of axillary staging may outweigh the benefits, given that there will be no change in adjuvant therapy or outcome regardless of axillary status. This was explained in detail to the patient and she declined axillary staging. The risks and benefits of surgery were discussed preoperatively and she understood and agreed to proceed.     Description of Procedure:  The patient was identified in the preoperative area and brought to the operating room and placed supine on the table. Patient verification was performed and general anesthesia was induced. I used intraoperative ultrasound to examine the lesion of interest. I marked the location of the lesion and intended area for resection and I planned my incision based on the imaging. The patient was prepped and draped in sterile fashion. A time out was performed and all were in agreement. I confirmed that the patient had received preoperative antibiotics.      The skin was infiltrated with local anesthesia. An upper outer curvilinear incision was made with a scalpel and carried down through the dermis with sharp dissection. This included a crescent-shaped piece of skin overlying the lesion. Flaps were raised according to the planned dissection. An anterior flap was raised first. Dissection was then carried out superiorly, inferiorly, medially, and laterally according to the planned area of resection. The posterior dissection was completed and the specimen removed. The specimen was oriented with paint (red - superior, green - anterior, blue - inferior, yellow - medial, orange - lateral, black - posterior). Specimen radiograph showed the mass with clip. An additional medial, inferior, and posterior margin was taken as a single specimen. The posterior margin was taken to the pectoralis muscle. An additional superior and lateral margin was taken as a single specimen. These were inked with the corresponding colors  and ink marked the true margins.    The breast cavity was irrigated and hemostasis achieved. The remaining local anesthesia was infiltrated into the breast cavity. Marking clips were placed in the breast cavity. The breast parenchyma was brought together with several interrupted 3-0 Vicryl stitches. The deep dermis was closed with interrupted 3-0 vicryl stitches. The skin was closed with 4-0 subcuticular running monocryl and covered with dermabond. Counts were correct at the end of the case. The patient was awakened from anesthesia and taken to the PACU in stable condition.    Jeannine Ledezma MD     Date: 12/20/2022  Time: 15:09 EST

## 2022-12-22 ENCOUNTER — TELEPHONE (OUTPATIENT)
Dept: SURGERY | Facility: CLINIC | Age: 82
End: 2022-12-22

## 2022-12-22 DIAGNOSIS — C50.412 MALIGNANT NEOPLASM OF UPPER-OUTER QUADRANT OF LEFT BREAST IN FEMALE, ESTROGEN RECEPTOR POSITIVE: Primary | ICD-10-CM

## 2022-12-22 DIAGNOSIS — Z17.0 MALIGNANT NEOPLASM OF UPPER-OUTER QUADRANT OF LEFT BREAST IN FEMALE, ESTROGEN RECEPTOR POSITIVE: Primary | ICD-10-CM

## 2022-12-22 LAB
LAB AP CASE REPORT: NORMAL
LAB AP DIAGNOSIS COMMENT: NORMAL
LAB AP SYNOPTIC CHECKLIST: NORMAL
PATH REPORT.FINAL DX SPEC: NORMAL
PATH REPORT.GROSS SPEC: NORMAL

## 2022-12-22 NOTE — TELEPHONE ENCOUNTER
I called Ms. Gotti to discuss her pathology report. She is recovering well from surgery. I will place referrals for med/onc and rad/onc.     Jeannine Ledezma MD

## 2023-01-04 ENCOUNTER — TELEPHONE (OUTPATIENT)
Dept: SURGERY | Facility: CLINIC | Age: 83
End: 2023-01-04
Payer: MEDICARE

## 2023-01-17 ENCOUNTER — CLINICAL SUPPORT NO REQUIREMENTS (OUTPATIENT)
Dept: CARDIOLOGY | Facility: CLINIC | Age: 83
End: 2023-01-17
Payer: MEDICARE

## 2023-01-17 DIAGNOSIS — I49.5 SICK SINUS SYNDROME: Primary | ICD-10-CM

## 2023-01-17 PROCEDURE — 93280 PM DEVICE PROGR EVAL DUAL: CPT | Performed by: INTERNAL MEDICINE

## 2023-01-18 ENCOUNTER — CONSULT (OUTPATIENT)
Dept: ONCOLOGY | Facility: CLINIC | Age: 83
End: 2023-01-18
Payer: MEDICARE

## 2023-01-18 ENCOUNTER — LAB (OUTPATIENT)
Dept: LAB | Facility: HOSPITAL | Age: 83
End: 2023-01-18
Payer: MEDICARE

## 2023-01-18 VITALS
WEIGHT: 125 LBS | SYSTOLIC BLOOD PRESSURE: 116 MMHG | HEIGHT: 61 IN | DIASTOLIC BLOOD PRESSURE: 75 MMHG | TEMPERATURE: 97.5 F | BODY MASS INDEX: 23.6 KG/M2 | RESPIRATION RATE: 16 BRPM | HEART RATE: 76 BPM | OXYGEN SATURATION: 97 %

## 2023-01-18 DIAGNOSIS — Z17.0 MALIGNANT NEOPLASM OF UPPER-OUTER QUADRANT OF LEFT BREAST IN FEMALE, ESTROGEN RECEPTOR POSITIVE: Primary | ICD-10-CM

## 2023-01-18 DIAGNOSIS — C50.412 MALIGNANT NEOPLASM OF UPPER-OUTER QUADRANT OF LEFT BREAST IN FEMALE, ESTROGEN RECEPTOR POSITIVE: Primary | ICD-10-CM

## 2023-01-18 DIAGNOSIS — C50.919 MALIGNANT NEOPLASM OF FEMALE BREAST, UNSPECIFIED ESTROGEN RECEPTOR STATUS, UNSPECIFIED LATERALITY, UNSPECIFIED SITE OF BREAST: Primary | ICD-10-CM

## 2023-01-18 LAB
BASOPHILS # BLD AUTO: 0.03 10*3/MM3 (ref 0–0.2)
BASOPHILS NFR BLD AUTO: 0.5 % (ref 0–1.5)
DEPRECATED RDW RBC AUTO: 47.9 FL (ref 37–54)
EOSINOPHIL # BLD AUTO: 0.17 10*3/MM3 (ref 0–0.4)
EOSINOPHIL NFR BLD AUTO: 2.9 % (ref 0.3–6.2)
ERYTHROCYTE [DISTWIDTH] IN BLOOD BY AUTOMATED COUNT: 13.2 % (ref 12.3–15.4)
HCT VFR BLD AUTO: 37.6 % (ref 34–46.6)
HGB BLD-MCNC: 12.6 G/DL (ref 12–15.9)
IMM GRANULOCYTES # BLD AUTO: 0.02 10*3/MM3 (ref 0–0.05)
IMM GRANULOCYTES NFR BLD AUTO: 0.3 % (ref 0–0.5)
LYMPHOCYTES # BLD AUTO: 1.15 10*3/MM3 (ref 0.7–3.1)
LYMPHOCYTES NFR BLD AUTO: 19.9 % (ref 19.6–45.3)
MCH RBC QN AUTO: 32.8 PG (ref 26.6–33)
MCHC RBC AUTO-ENTMCNC: 33.5 G/DL (ref 31.5–35.7)
MCV RBC AUTO: 97.9 FL (ref 79–97)
MONOCYTES # BLD AUTO: 0.66 10*3/MM3 (ref 0.1–0.9)
MONOCYTES NFR BLD AUTO: 11.4 % (ref 5–12)
NEUTROPHILS NFR BLD AUTO: 3.74 10*3/MM3 (ref 1.7–7)
NEUTROPHILS NFR BLD AUTO: 65 % (ref 42.7–76)
NRBC BLD AUTO-RTO: 0 /100 WBC (ref 0–0.2)
PLATELET # BLD AUTO: 178 10*3/MM3 (ref 140–450)
PMV BLD AUTO: 11.4 FL (ref 6–12)
RBC # BLD AUTO: 3.84 10*6/MM3 (ref 3.77–5.28)
WBC NRBC COR # BLD: 5.77 10*3/MM3 (ref 3.4–10.8)

## 2023-01-18 PROCEDURE — 99205 OFFICE O/P NEW HI 60 MIN: CPT | Performed by: INTERNAL MEDICINE

## 2023-01-18 PROCEDURE — 85025 COMPLETE CBC W/AUTO DIFF WBC: CPT

## 2023-01-18 PROCEDURE — 36415 COLL VENOUS BLD VENIPUNCTURE: CPT

## 2023-01-18 RX ORDER — EXEMESTANE 25 MG/1
25 TABLET ORAL DAILY
Qty: 30 TABLET | Refills: 5 | Status: SHIPPED | OUTPATIENT
Start: 2023-01-18

## 2023-01-18 NOTE — PROGRESS NOTES
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    HISTORY OF PRESENT ILLNESS:  The patient is a 82 y.o. year old female who is here for an opinion about the above issue.    History of Present Illness patient is an 82-year-old lady with multiple cardiac issues including aortic valve replacement tricuspid and mitral valve repair hypertension hypercholesterolemia spinal stenosis with severe pain who felt a mass in her left breast in the shower and brought her to the attention of her primary care doctor.  She had stopped having mammograms 5 years ago because of her age and comorbidities and this prompted diagnostic imaging which revealed An 18 mm abnormality in the upper outer quadrant of the left breast and a biopsy of this showed grade 2 invasive ductal carcinoma strongly ER/AZ positive HER2 2+ with negative FISH and Ki-67 of 28%    Patient was referred to Dr. Ledezma And underwent a lumpectomy without sentinel node biopsy on 2022 the findings of a residual 18 x 12 mm grade 2 ductal carcinoma With 24 mm of intermediate grade DCIS which were resected with clear margins and no sentinel node biopsy was done.    Patient is here today to discuss adjuvant treatment.    She is here with a cane and complains of severe back pain and she is going to have an MRI to further evaluate this.  She is being followed with the pain clinic and takes hydrocodone 5 times a day for the pain    She is  4 para 4 first childbirth was at 16 she did not breast-feed  Menarche was at age 9 she had a hysterectomy  at age 37 with 1 ovary removed but started hormone replacement therapy in her mid 40s and was on them at the time of her breast cancer diagnosis couple of months ago and was discontinued.    Family history is completely negative for any malignancies    She is not a current smoker or drinker-smoked for 34 years and stopped in   She has never had an MI or stroke or DVT    She is  and lives with her 61-year-old disabled son    Past Medical History:   Diagnosis Date   • Aneurysm (HCC)    • Angioedema    • Aortic valve disease    • Arthritis    • Atrophic kidney    • Bradycardia    • CAD (coronary artery disease)    • Cancer (HCC)     BREAST   • CKD (chronic kidney disease) stage 4, GFR 15-29 ml/min (HCC)    • Depression    • Dysphagia    • Essential hypertension    • History of anemia    • History of vitamin D deficiency    • [place] (hard of hearing)    • Hyperlipidemia    • Mitral valve disease    • EMELINA on auto CPAP 2003    Overnight polysomnogram.  Weight 154 pounds.  Severe EMELINA with AHI 51.5 events per hour.  Sleep-related hypoxia present.   • Post lumbar puncture headache 2022   • Pulmonary hypertension (HCC)    • Secondary hyperparathyroidism of renal origin (HCC)    • Sick sinus syndrome (HCC)    • Spinal stenosis         Past Surgical History:   Procedure Laterality Date   • AORTIC VALVE REPAIR/REPLACEMENT  2015    Dr Veliz   • BREAST LUMPECTOMY Left 2022    Procedure: Left ultrasound-guided partial mastectomy;  Surgeon: Jeannine Ledezma MD;  Location: [facility];  Service: General;  Laterality: Left;   • CARDIAC CATHETERIZATION     • CARDIAC DEFIBRILLATOR PLACEMENT     •  SECTION     • COLONOSCOPY N/A 2019    [facility]   • ENDOSCOPY  2012    Hypertonic lower esophageal sphincter, gastritis. Path: Chronic gastritis, moderate.    • ENDOSCOPY N/A 2019    Web in the upper third of the esophagus. Dilated   • ENDOSCOPY N/A 2020    Procedure:  ESOPHAGOGASTRODUODENOSCOPY WITH 56FR MALDONADO DILATATION AND COLD BIOPSIES;  Surgeon: Blayne Cai MD;  Location: [facility] ENDOSCOPY;  Service: Gastroenterology;  Laterality: N/A;  PRE: DYSPHAGIA  POST: SCHATZKI'S RING   • EPIDURAL  05/03/2022   • HYSTERECTOMY     • MITRAL VALVE ANNULOPLASTY  11/12/2015    Dr Veliz   • TRICUSPID VALVE SURGERY  11/12/2015    Dr Veliz        Current Outpatient Medications on File Prior to Visit   Medication Sig Dispense Refill   • aspirin 325 MG tablet Take 325 mg by mouth Daily. WILL HOLD AS DIRECTED     • atorvastatin (LIPITOR) 20 MG tablet take 1 tablet by mouth at bedtime  0   • bumetanide (BUMEX) 2 MG tablet TAKE 1 TABLET BY MOUTH EVERY DAY 90 tablet 1   • buPROPion XL (WELLBUTRIN XL) 300 MG 24 hr tablet Take 300 mg by mouth Daily.  0   • busPIRone (BUSPAR) 5 MG tablet Take 1 tablet by mouth 2 (Two) Times a Day.     • carvedilol (COREG) 12.5 MG tablet Take 12.5 mg by mouth 2 (Two) Times a Day.     • Coenzyme Q10 200 MG tablet Take 1 tablet/day by mouth Daily.     • [employer] Senna Plus 8.6-50 MG per tablet TAKE 1 TABLET BY MOUTH EVERY DAY 30 tablet 2   • Diclofenac Sodium (VOLTAREN) 1 % gel gel PLEASE SEE ATTACHED FOR DETAILED DIRECTIONS     • donepezil (ARICEPT) 10 MG tablet Take 10 mg by mouth Daily.     • famotidine (PEPCID) 40 MG tablet TAKE 1 TABLET BY MOUTH TWICE A  tablet 3   • fenofibrate micronized (LOFIBRA) 67 MG capsule Take 67 mg by mouth Daily.     • FLUoxetine (PROzac) 20 MG capsule Take 60 mg by mouth Daily.     • HYDROcodone-acetaminophen (NORCO)  MG per tablet Take 1 tablet by mouth Every 4 (Four) Hours As Needed for Moderate Pain . 12 tablet 0   • temazepam (RESTORIL) 15 MG capsule Take 15 mg by mouth At Night As Needed.       No current facility-administered medications on file prior to visit.        ALLERGIES:  No Known Allergies     Social History     Socioeconomic History   • Marital status:    • Number of children: 3   Tobacco Use   •  "Smoking status: Former     Types: Cigarettes     Start date:      Quit date:      Years since quittin.0   • Smokeless tobacco: Never   Vaping Use   • Vaping Use: Never used   Substance and Sexual Activity   • Alcohol use: No   • Drug use: No   • Sexual activity: Defer        Family History   Problem Relation Age of Onset   • ALS Mother    • Hypertension Father    • Stroke Father    • Malig Hyperthermia Neg Hx         Review of Systems   Constitutional: Positive for fatigue.   Musculoskeletal: Positive for arthralgias, back pain and gait problem.   All other systems reviewed and are negative.       Objective     Vitals:    23 1544   BP: 116/75   Pulse: 76   Resp: 16   Temp: 97.5 °F (36.4 °C)   TempSrc: Temporal   SpO2: 97%   Weight: 56.7 kg (125 lb)   Height: 154.9 cm (60.98\")   PainSc: 0-No pain     Current Status 2023   ECOG score 0       Physical Exam    CONSTITUTIONAL:  Vital signs reviewed.  No distress, looks comfortable.  EYES:  Conjunctivae and lids unremarkable.  PERRLA  EARS,NOSE,MOUTH,THROAT:  Ears and nose appear unremarkable.  Lips, teeth, gums appear unremarkable.  RESPIRATORY:  Normal respiratory effort.  Lungs clear to auscultation bilaterally.  BREASTS: Right breast is benign left breast shows a well-healed lumpectomy in the upper outer quadrant  CARDIOVASCULAR:  Normal S1, S2.  No murmurs rubs or gallops.  No significant lower extremity edema.  GASTROINTESTINAL: Abdomen appears unremarkable.  Nontender.  No hepatomegaly.  No splenomegaly.  LYMPHATIC:  No cervical, supraclavicular, axillary lymphadenopathy.  SKIN:  Warm.  No rashes.  PSYCHIATRIC:  Normal judgment and insight.  Normal mood and affect.      RECENT LABS:  Hematology WBC   Date Value Ref Range Status   2023 5.77 3.40 - 10.80 10*3/mm3 Final   2020 5.16 3.40 - 10.80 10*3/mm3 Final     RBC   Date Value Ref Range Status   2023 3.84 3.77 - 5.28 10*6/mm3 Final   2020 4.01 3.77 - 5.28 10*6/mm3 " Final     Hemoglobin   Date Value Ref Range Status   01/18/2023 12.6 12.0 - 15.9 g/dL Final     Hematocrit   Date Value Ref Range Status   01/18/2023 37.6 34.0 - 46.6 % Final     Platelets   Date Value Ref Range Status   01/18/2023 178 140 - 450 10*3/mm3 Final        Final Diagnosis   1. Left Breast, 1:00 o'clock, 5 cm from Nipple, Ultrasound-Guided Biopsies for a Mass:   INVASIVE MAMMARY       CARCINOMA, NO SPECIAL TYPE (INVASIVE DUCTAL CARCINOMA).  A. Histologic grade:  Tyndall histologic score II  (tubules=3, nuclei=3, mitosis-1).  B. 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                 C. Invasive carcinoma involves multiple tissue cores with the largest contiguous focus measuring up to 11.0 mm.               D. No lymphovascular nor perineural invasion identified.       Swm/kds    Electronically signed by Bridget Fisher MD on 10/6/2022 at 1513   Synoptic Checklist   Breast Biomarker Reporting Template  Protocol posted: 6/22/2022  BREAST: BIOMARKER REPORTING TEMPLATE - All Specimens  Test(s) Performed     Estrogen Receptor (ER) Status  Positive (greater than 10% of cells demonstrate nuclear positivity)    Percentage of Cells with Nuclear Positivity  %    Average Intensity of Staining  Strong    Test Type  Food and Drug Administration (FDA) cleared (test / vendor): ventana    Primary Antibody  SP1    Scoring System  Po    Proportion Score  5    Intensity Score  3    Total Po Score  8    Test(s) Performed     Progesterone Receptor (PgR) Status  Positive    Percentage of Cells with Nuclear Positivity  %    Average Intensity of Staining  Strong    Test Type  Food and Drug Administration (FDA) cleared (test / vendor): ventana    Primary Antibody  1E2    Scoring System  Po    Proportion Score  5    Intensity Score  3    Total Po Score  8    Test(s) Performed     HER2 by Immunohistochemistry   Equivocal (Score 2+)    Percentage of Cells with Uniform Intense Complete Membrane Staining  0 %   Test Type  Food and Drug Administration (FDA) cleared (test / vendor): ventana    Primary Antibody  4B5    Test(s) Performed  Ki-67    Ki-67 Percentage of Positive Nuclei  28 %                Synoptic Checklist   INVASIVE CARCINOMA OF THE BREAST: Resection  8th Edition - Protocol posted: 9/21/2022  INVASIVE CARCINOMA OF THE BREAST: COMPLETE EXCISION - All Specimens  SPECIMEN   Procedure  Excision (less than total mastectomy)    Specimen Laterality  Left    TUMOR   Tumor Site  Upper outer quadrant    Histologic Type  Invasive carcinoma of no special type (ductal)    Histologic Grade (Heather Histologic Score)     Glandular (Acinar) / Tubular Differentiation  Score 3    Nuclear Pleomorphism  Score 2    Mitotic Rate  Score 2    Overall Grade  Grade 2 (scores of 6 or 7)    Tumor Size  Greatest dimension of largest invasive focus (Millimeters): 18 mm   Additional Dimension (Millimeters)  12 mm     10 mm   Tumor Focality  Single focus of invasive carcinoma    Ductal Carcinoma In Situ (DCIS)  Present      Negative for extensive intraductal component (EIC)    Size (Extent) of DCIS  Estimated size (extent) of DCIS is at least (Millimeters): 24 mm   Architectural Patterns  Cribriform    Nuclear Grade  Grade II (intermediate)    Necrosis  Not identified    Lobular Carcinoma In Situ (LCIS)  Not identified    Lymphovascular Invasion  Not identified    Dermal Lymphovascular Invasion  Not identified    Microcalcifications  Not identified    Treatment Effect in the Breast  No known presurgical therapy    MARGINS   Margin Status for Invasive Carcinoma  All margins negative for invasive carcinoma    Distance from Invasive Carcinoma to Closest Margin  12 mm   Closest Margin(s) to Invasive Carcinoma  Posterior    Distance from Invasive Carcinoma to Anterior Margin  15 mm   Distance from Invasive Carcinoma to Posterior Margin  12 mm    Distance from Invasive Carcinoma to Superior Margin  20 mm   Distance from Invasive Carcinoma to Inferior Margin  16 mm   Distance from Invasive Carcinoma to Medial Margin  32 mm   Distance from Invasive Carcinoma to Lateral Margin  23 mm   Margin Status for DCIS  All margins negative for DCIS    Distance from DCIS to Closest Margin  10 mm   Closest Margin(s) to DCIS  Posterior    Distance from DCIS to Anterior Margin  15 mm   Distance from DCIS to Posterior Margin  10 mm   Distance from DCIS to Superior Margin  20 mm   Distance from DCIS to Inferior Margin  27 mm   Distance from DCIS to Medial Margin  26 mm   Distance from DCIS to Lateral Margin  23 mm   REGIONAL LYMPH NODES   Regional Lymph Node Status  Not applicable (no regional lymph nodes submitted or found)    PATHOLOGIC STAGE CLASSIFICATION (pTNM, AJCC 8th Edition)   Reporting of pT, pN, and (when applicable) pM categories is based on information available to the pathologist at the time the report is issued. As per the AJCC (Chapter 1, 8th Ed.) it is the managing physician's responsibility to establish the final pathologic stage based upon all pertinent information, including but potentially not limited to this pathology report.   pT Category  pT1c    pN Category  pN not assigned (no nodes submitted or found)         Estrogen Receptor (ER) Status  Positive (greater than 10% of cells demonstrate nuclear positivity)    Percentage of Cells with Nuclear Positivity  %         Progesterone Receptor (PgR) Status  Positive    Percentage of Cells with Nuclear Positivity  %         HER2 (by immunohistochemistry)  Equivocal (Score 2+)         HER2 (by in situ hybridization)  Negative (not amplified)         Ki-67 Percentage of Positive Nuclei  28 %            Assessment & Plan   1.pT1cNx grade 2 invasive ductal carcinoma left breast strongly ER/NE positive HER2 2+ with negative FISH postlumpectomy    2.  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  CKD 3    4.  Hypertension hypercholesterolemia    5.  Spinal stenosis with severe back pain being treated at the pain clinic    6.  Depression onWellbutrin and Prozac    Plan    1.  Aromasin 25 mg daily if tolerated    2.  Return in 3 months for assessment with NP and see me in 6 months    Overall I think Carly realizes at 82 that her life expectancy is not very long and she has multiple comorbidities making it difficult for her to tolerate  adjuvant treatment.  I think a trial of aromatase inhibitor such as exemestane which may be less likely to cause joint pains is reasonable but if she cannot tolerate this medication I think observation without treatment would be reasonable at her age and with her multiple comorbidities.  Tamoxifen is not possible with Prozac and Wellbutrin and I do not think it is worth switching all these medicines to accommodate that.  She is comfortable with this approach    I spent 60total minutes, face-to-face, caring for Carly today.  Greater than 50% of this time involved counseling and/or coordination of care as documented within this note.60

## 2023-01-20 PROCEDURE — 93296 REM INTERROG EVL PM/IDS: CPT | Performed by: INTERNAL MEDICINE

## 2023-01-20 PROCEDURE — 93294 REM INTERROG EVL PM/LDLS PM: CPT | Performed by: INTERNAL MEDICINE

## 2023-01-26 ENCOUNTER — TELEPHONE (OUTPATIENT)
Dept: RADIATION ONCOLOGY | Facility: HOSPITAL | Age: 83
End: 2023-01-26
Payer: MEDICARE

## 2023-01-26 ENCOUNTER — OFFICE VISIT (OUTPATIENT)
Dept: SURGERY | Facility: CLINIC | Age: 83
End: 2023-01-26
Payer: MEDICARE

## 2023-01-26 VITALS
HEART RATE: 74 BPM | DIASTOLIC BLOOD PRESSURE: 64 MMHG | OXYGEN SATURATION: 96 % | HEIGHT: 61 IN | SYSTOLIC BLOOD PRESSURE: 114 MMHG | RESPIRATION RATE: 18 BRPM | BODY MASS INDEX: 23.6 KG/M2 | WEIGHT: 125 LBS

## 2023-01-26 DIAGNOSIS — Z48.89 POSTOPERATIVE VISIT: Primary | ICD-10-CM

## 2023-01-26 DIAGNOSIS — C50.412 MALIGNANT NEOPLASM OF UPPER-OUTER QUADRANT OF LEFT BREAST IN FEMALE, ESTROGEN RECEPTOR POSITIVE: ICD-10-CM

## 2023-01-26 DIAGNOSIS — Z17.0 MALIGNANT NEOPLASM OF UPPER-OUTER QUADRANT OF LEFT BREAST IN FEMALE, ESTROGEN RECEPTOR POSITIVE: ICD-10-CM

## 2023-01-26 PROCEDURE — 99024 POSTOP FOLLOW-UP VISIT: CPT | Performed by: SURGERY

## 2023-01-26 NOTE — LETTER
January 26, 2023     Miriam Mercado Reyes, MD  [address] [facility] [address]    Patient: Carly Gotti   YOB: 1940   Date of Visit: 1/26/2023       Dear Dr. Reyes, MD:    Thank you for referring Carly Gotti to me for evaluation. Below are the relevant portions of my assessment and plan of care.    If you have questions, please do not hesitate to call me. I look forward to following Carly along with you.         Sincerely,        Jeannine Ledezma MD        CC: No Recipients  Jeannine Ledezma MD  01/26/23 1421  Signed  [facility]     Referring Provider: Miriam Mercado Reyes, MD     Chief complaint: Postoperative visit      Subjective    HPI:   10/21/2022:  Ms. Carly Gotti is a 83 yo woman, seen at the request of Dr. Miriam Reyes, for a new diagnosis of left breast cancer. The patient first noticed a lump in her outer left breast a few months ago. She believes it has grown since she first noticed it, but denies any associated pain. Diagnostic imaging showed a 2 cm mass and subsequent biopsy showed invasive ductal carcinoma. Her work-up is detailed in the oncologic history below. She denies any prior history of abnormal mammograms or breast biopsies. She has been on estrogen only hormone replacement therapy for about 40 years.     She has a past history of a bioprosthetic aortic valve replacement and mitral and tricuspid valve repairs. She also has a history of pulmonary hypertension and a pacemaker placed for sick sinus syndrome, followed by cardiology. She is not on any anticoagulation, but takes an aspirin 325 mg. Today in the office she is having some involuntary movements and tremors. She says that this happened before in the summer and she was evaluated in the ER. Her PCP is aware and she is currently on medication to help it. They believe it is related to stress/anxiety. She denies any family history of breast or ovarian cancer.     12/9/2022:  At her  last visit, we decided on a partial mastectomy. She has seen cardiology and was determined to be of acceptable risk for surgery. She returns today because she is concerned about the risk of needing a second surgery with breast conservation and is wondering about a mastectomy.     1/26/2023, Interval History:  She underwent left partial mastectomy on 12/20/22. See surgery & pathology details below in oncologic history. She has been recovering well and has no complaints. She has already seen Dr. Castellon postoperatively and discussed starting exemestane because of her history of arthritis. They also discussed having a low threshold for stopping it should she have any side effects. She was joined today in clinic by her daughter.       Oncology/Hematology History   Malignant neoplasm of upper-outer quadrant of left breast in female, estrogen receptor positive (HCC)   12/4/2017 Imaging    [facility]   The parenchyma of both breasts remains stable. No suspicious mass, calcification nor adenopathy has developed. Scattered fibroglandular densities.   BI-RADS 2: Benign      9/5/2022 Initial Diagnosis    Malignant neoplasm of upper-outer quadrant of left breast in female, estrogen receptor positive (HCC)     9/6/2022 Imaging    Bilateral Diagnostic MMG with J Carlos & Left Breast US ( [facility]):  MMG:  Scattered fibroglandular densities. A triangular skin marker overlies the site of palpable concern in the middle third upper outer quadrant of the left breast. At this location there is an underlying partially obscured and partially irregular mass that measures on the order of 1.8 cm in greatest dimension. I see no suspicious calcifications or areas of architectural distortion in either breast. There is no evidence for skin thickening, nipple retraction or axillary adenopathy.   US:   At the 1-o'clock position on the order of 5 cm from the nipple corresponding to the site of palpable concern there is an irregular  2.0 cm x 1.5 cm x 1.5 cm hypoechoic mass with detectable internal vascularity. No evidence for left axillary adenopathy is appreciated.   BI-RADS 5: Highly suggestive of malignancy      10/5/2022 Biopsy    Left Breast, US-Guided Biopsy ([facility]):    1. Left Breast, 1:00 o'clock, 5 cm from Nipple, Ultrasound-Guided Biopsies for a Mass:   INVASIVE MAMMARY CARCINOMA, NO SPECIAL TYPE (INVASIVE DUCTAL CARCINOMA).  A. Histologic grade:  Heather histologic score II  (tubules=3, nuclei=3, mitosis-1).  B. 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                 C. Invasive carcinoma involves multiple tissue cores with the largest contiguous focus measuring up to 11.0 mm.               D. No lymphovascular nor perineural invasion identified.      ER+ (%, strong)   WV+ (%, strong)   Her2 negative (IHC 2+; FISH ratio 1.5, copy #4.8)  Ki-67 28%    -Barbell-shaped metallic clip at the posterior superior margin of the biopsied mass.      12/20/2022 Surgery    Left ultrasound-guided partial mastectomy    1. Left Breast, Oriented Partial Mastectomy (36 grams): INVASIVE MAMMARY CARCINOMA, NO SPECIAL TYPE (INVASIVE DUCTAL CARCINOMA).               A. Tumor site: 1 o'clock, upper outer quadrant.               B. Histologic grade: Heather histologic score II (tubules = 3, nuclei = 2, mitosis = 2).               C. Tumor size: 18 x 12 x 10 mm.               D. Ductal carcinoma in situ (DCIS): Present, negative for extensive intraductal component.                             1. Extent of DCIS: DCIS is associated with the invasive carcinoma at the periphery, spanning four breast slices, approximately 24 mm from medial to lateral.  2. Architectural patterns: Cribriform and involving an intraductal papilloma.               3. Nuclear grade: Intermediate.               E. Tumor extent: Overlying skin is uninvolved by in situ and invasive  carcinoma.               F. No lymphovascular nor perineural invasion identified.               H. Margins: Uninvolved by invasive carcinoma and DCIS.                            1. Invasive carcinoma comes to within 4 mm of the closest posterior margin, 8 mm from the inferior margin, 15 mm from the anterior and superior margins, 18 mm from the lateral margin and 24 mm from the medial margin of resection in specimen 1.  2. DCIS comes to within 2 mm of the original posterior margin, 15 mm from the anterior and superior margins, 18 mm from the medial and lateral margins and 19 mm from the inferior of resection in specimen 1.  I. No lymph nodes submitted.  J. Hormone receptor status: ER (%), MN (%), HER2 2+ by IHC, negative by FISH, Ki-67 28% (performed on prior biopsy [id number], slides reviewed).  L. Pathologic stage: pT1c, NX.     2. Left Breast, Additional Medial, Inferior and Posterior Margins, Oriented Excision:               A. Benign breast tissue with small radial scar and fibroadenomatoid change.               B. No atypical hyperplasia, in situ nor invasive carcinoma identified.               C. Final medial, inferior and posterior margins free by and additional 8 mm.     3. Left Breast, Additional Superior and Lateral Margins, Oriented Excision:               A. Benign unremarkable breast tissue.               B. Final superior and lateral margins free by an additional 5 mm.     1/18/2023 Cancer Staged    Staging form: Breast, AJCC 8th Edition  - Pathologic: Stage Unknown (pT1c, pNX, G2, ER+, MN+, HER2-) - Signed by Sarkis Castellon MD on 1/18/2023         Review of Systems:  See interval history.       Medications:    Current Outpatient Medications:   •  aspirin 325 MG tablet, Take 325 mg by mouth Daily. WILL HOLD AS DIRECTED, Disp: , Rfl:   •  atorvastatin (LIPITOR) 20 MG tablet, take 1 tablet by mouth at bedtime, Disp: , Rfl: 0  •  bumetanide (BUMEX) 2 MG tablet, TAKE 1 TABLET BY MOUTH  EVERY DAY, Disp: 90 tablet, Rfl: 1  •  buPROPion XL (WELLBUTRIN XL) 300 MG 24 hr tablet, Take 300 mg by mouth Daily., Disp: , Rfl: 0  •  busPIRone (BUSPAR) 5 MG tablet, Take 1 tablet by mouth 2 (Two) Times a Day., Disp: , Rfl:   •  carvedilol (COREG) 12.5 MG tablet, Take 12.5 mg by mouth 2 (Two) Times a Day., Disp: , Rfl:   •  Coenzyme Q10 200 MG tablet, Take 1 tablet/day by mouth Daily., Disp: , Rfl:   •  [employer] Senna Plus 8.6-50 MG per tablet, TAKE 1 TABLET BY MOUTH EVERY DAY, Disp: 30 tablet, Rfl: 2  •  Diclofenac Sodium (VOLTAREN) 1 % gel gel, PLEASE SEE ATTACHED FOR DETAILED DIRECTIONS, Disp: , Rfl:   •  donepezil (ARICEPT) 10 MG tablet, Take 10 mg by mouth Daily., Disp: , Rfl:   •  exemestane (Aromasin) 25 MG chemo tablet, Take 1 tablet by mouth Daily., Disp: 30 tablet, Rfl: 5  •  famotidine (PEPCID) 40 MG tablet, TAKE 1 TABLET BY MOUTH TWICE A DAY, Disp: 180 tablet, Rfl: 3  •  fenofibrate micronized (LOFIBRA) 67 MG capsule, Take 67 mg by mouth Daily., Disp: , Rfl:   •  FLUoxetine (PROzac) 20 MG capsule, Take 60 mg by mouth Daily., Disp: , Rfl:   •  HYDROcodone-acetaminophen (NORCO)  MG per tablet, Take 1 tablet by mouth Every 4 (Four) Hours As Needed for Moderate Pain ., Disp: 12 tablet, Rfl: 0  •  temazepam (RESTORIL) 15 MG capsule, Take 15 mg by mouth At Night As Needed., Disp: , Rfl:       Allergies:  No Known Allergies      Family History   Problem Relation Age of Onset   • ALS Mother    • Hypertension Father    • Stroke Father    • Malig Hyperthermia Neg Hx      Objective    PHYSICAL EXAMINATION:   Vitals:    01/26/23 1401   BP: 114/64   Pulse: 74   Resp: 18   SpO2: 96%     ECOG 0 - Asymptomatic  General: NAD, well appearing  Psych: a&o x3, normal mood and affect  Eyes: EOMI, no scleral icterus  ENMT: neck supple without masses or thyromegaly, mucous membranes moist  MSK: normal gait, limited ROM in right shoulders  Lymph nodes: no cervical, supraclavicular or axillary lymphadenopathy  Breast:  symmetric, small size, grade 3 ptosis, sternal scar  Right: deferred.  Left: Well-healing superior curvilinear incision.  No swelling.    Assessment & Plan   Assessment:  82 y.o. F with a diagnosis of left breast cancer: Intermediate grade, invasive ductal carcinoma, ER/NV+, Her2 negative. She underwent left partial mastectomy on 12/20/22, pT1cNx.    Plan:  -Continue follow-up with Dr. Castellon.  -Radiation oncology referral.  Appointment scheduled with Dr. Wallace tomorrow.  -Follow-up in 3 months for clinical exam.  -She was instructed to call sooner with any questions, concerns or changes on BSE.    Jeannine Ledezma MD      CC:  Miriam Mercado Reyes, MD

## 2023-01-26 NOTE — TELEPHONE ENCOUNTER
Placed reminder call for appt with Dr. Wallace on 1/27 at 3:00pm - [facility]

## 2023-01-26 NOTE — PROGRESS NOTES
[facility]     Referring Provider: Miriam Mercado Reyes, MD     Chief complaint: Postoperative visit      Subjective   HPI:   10/21/2022:  Ms. Carly Gotti is a 83 yo woman, seen at the request of Dr. Miriam Reyes, for a new diagnosis of left breast cancer. The patient first noticed a lump in her outer left breast a few months ago. She believes it has grown since she first noticed it, but denies any associated pain. Diagnostic imaging showed a 2 cm mass and subsequent biopsy showed invasive ductal carcinoma. Her work-up is detailed in the oncologic history below. She denies any prior history of abnormal mammograms or breast biopsies. She has been on estrogen only hormone replacement therapy for about 40 years.     She has a past history of a bioprosthetic aortic valve replacement and mitral and tricuspid valve repairs. She also has a history of pulmonary hypertension and a pacemaker placed for sick sinus syndrome, followed by cardiology. She is not on any anticoagulation, but takes an aspirin 325 mg. Today in the office she is having some involuntary movements and tremors. She says that this happened before in the summer and she was evaluated in the ER. Her PCP is aware and she is currently on medication to help it. They believe it is related to stress/anxiety. She denies any family history of breast or ovarian cancer.     12/9/2022:  At her last visit, we decided on a partial mastectomy. She has seen cardiology and was determined to be of acceptable risk for surgery. She returns today because she is concerned about the risk of needing a second surgery with breast conservation and is wondering about a mastectomy.     1/26/2023, Interval History:  She underwent left partial mastectomy on 12/20/22. See surgery & pathology details below in oncologic history. She has been recovering well and has no complaints. She has already seen Dr. Castellon postoperatively and discussed starting exemestane because of her  history of arthritis. They also discussed having a low threshold for stopping it should she have any side effects. She was joined today in clinic by her daughter.       Oncology/Hematology History   Malignant neoplasm of upper-outer quadrant of left breast in female, estrogen receptor positive (HCC)   12/4/2017 Imaging    Screening MMG with J Carlos ( [facility]):   The parenchyma of both breasts remains stable. No suspicious mass, calcification nor adenopathy has developed. Scattered fibroglandular densities.   BI-RADS 2: Benign      9/5/2022 Initial Diagnosis    Malignant neoplasm of upper-outer quadrant of left breast in female, estrogen receptor positive (HCC)     9/6/2022 Imaging    Bilateral Diagnostic MMG with J Carlos & Left Breast US ( [facility]):  MMG:  Scattered fibroglandular densities. A triangular skin marker overlies the site of palpable concern in the middle third upper outer quadrant of the left breast. At this location there is an underlying partially obscured and partially irregular mass that measures on the order of 1.8 cm in greatest dimension. I see no suspicious calcifications or areas of architectural distortion in either breast. There is no evidence for skin thickening, nipple retraction or axillary adenopathy.   US:   At the 1-o'clock position on the order of 5 cm from the nipple corresponding to the site of palpable concern there is an irregular 2.0 cm x 1.5 cm x 1.5 cm hypoechoic mass with detectable internal vascularity. No evidence for left axillary adenopathy is appreciated.   BI-RADS 5: Highly suggestive of malignancy      10/5/2022 Biopsy    Left Breast, [facility]    1. Left Breast, 1:00 o'clock, 5 cm from Nipple, Ultrasound-Guided Biopsies for a Mass:   INVASIVE MAMMARY CARCINOMA, NO SPECIAL TYPE (INVASIVE DUCTAL CARCINOMA).  A. Histologic grade:  Heather histologic score II  (tubules=3, nuclei=3, mitosis-1).  B. 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                 C. Invasive carcinoma involves multiple tissue cores with the largest contiguous focus measuring up to 11.0 mm.               D. No lymphovascular nor perineural invasion identified.      ER+ (%, strong)   WV+ (%, strong)   Her2 negative (IHC 2+; FISH ratio 1.5, copy #4.8)  Ki-67 28%    -Barbell-shaped metallic clip at the posterior superior margin of the biopsied mass.      12/20/2022 Surgery    Left ultrasound-guided partial mastectomy    1. Left Breast, Oriented Partial Mastectomy (36 grams): INVASIVE MAMMARY CARCINOMA, NO SPECIAL TYPE (INVASIVE DUCTAL CARCINOMA).               A. Tumor site: 1 o'clock, upper outer quadrant.               B. Histologic grade: [place] histologic score II (tubules = 3, nuclei = 2, mitosis = 2).               C. Tumor size: 18 x 12 x 10 mm.               D. Ductal carcinoma in situ (DCIS): Present, negative for extensive intraductal component.                             1. Extent of DCIS: DCIS is associated with the invasive carcinoma at the periphery, spanning four breast slices, approximately 24 mm from medial to lateral.  2. Architectural patterns: Cribriform and involving an intraductal papilloma.               3. Nuclear grade: Intermediate.               E. Tumor extent: Overlying skin is uninvolved by in situ and invasive carcinoma.               F. No lymphovascular nor perineural invasion identified.               H. Margins: Uninvolved by invasive carcinoma and DCIS.                            1. Invasive carcinoma comes to within 4 mm of the closest posterior margin, 8 mm from the inferior margin, 15 mm from the anterior and superior margins, 18 mm from the lateral margin and 24 mm from the medial margin of resection in specimen 1.  2. DCIS comes to within 2 mm of the original posterior margin, 15 mm from the anterior and superior margins, 18 mm from the medial and lateral margins  and 19 mm from the inferior of resection in specimen 1.  I. No lymph nodes submitted.  J. Hormone receptor status: ER (%), NJ (%), HER2 2+ by IHC, negative by FISH, Ki-67 28% (performed on prior biopsy [id number], slides reviewed).  L. Pathologic stage: pT1c, NX.     2. Left Breast, Additional Medial, Inferior and Posterior Margins, Oriented Excision:               A. Benign breast tissue with small radial scar and fibroadenomatoid change.               B. No atypical hyperplasia, in situ nor invasive carcinoma identified.               C. Final medial, inferior and posterior margins free by and additional 8 mm.     3. Left Breast, Additional Superior and Lateral Margins, Oriented Excision:               A. Benign unremarkable breast tissue.               B. Final superior and lateral margins free by an additional 5 mm.     1/18/2023 Cancer Staged    Staging form: Breast, AJCC 8th Edition  - Pathologic: Stage Unknown (pT1c, pNX, G2, ER+, NJ+, HER2-) - Signed by Sarkis Castellon MD on 1/18/2023         Review of Systems:  See interval history.       Medications:    Current Outpatient Medications:   •  aspirin 325 MG tablet, Take 325 mg by mouth Daily. WILL HOLD AS DIRECTED, Disp: , Rfl:   •  atorvastatin (LIPITOR) 20 MG tablet, take 1 tablet by mouth at bedtime, Disp: , Rfl: 0  •  bumetanide (BUMEX) 2 MG tablet, TAKE 1 TABLET BY MOUTH EVERY DAY, Disp: 90 tablet, Rfl: 1  •  buPROPion XL (WELLBUTRIN XL) 300 MG 24 hr tablet, Take 300 mg by mouth Daily., Disp: , Rfl: 0  •  busPIRone (BUSPAR) 5 MG tablet, Take 1 tablet by mouth 2 (Two) Times a Day., Disp: , Rfl:   •  carvedilol (COREG) 12.5 MG tablet, Take 12.5 mg by mouth 2 (Two) Times a Day., Disp: , Rfl:   •  Coenzyme Q10 200 MG tablet, Take 1 tablet/day by mouth Daily., Disp: , Rfl:   •  [employer] Senna Plus 8.6-50 MG per tablet, TAKE 1 TABLET BY MOUTH EVERY DAY, Disp: 30 tablet, Rfl: 2  •  Diclofenac Sodium (VOLTAREN) 1 % gel gel, PLEASE SEE ATTACHED FOR  DETAILED DIRECTIONS, Disp: , Rfl:   •  donepezil (ARICEPT) 10 MG tablet, Take 10 mg by mouth Daily., Disp: , Rfl:   •  exemestane (Aromasin) 25 MG chemo tablet, Take 1 tablet by mouth Daily., Disp: 30 tablet, Rfl: 5  •  famotidine (PEPCID) 40 MG tablet, TAKE 1 TABLET BY MOUTH TWICE A DAY, Disp: 180 tablet, Rfl: 3  •  fenofibrate micronized (LOFIBRA) 67 MG capsule, Take 67 mg by mouth Daily., Disp: , Rfl:   •  FLUoxetine (PROzac) 20 MG capsule, Take 60 mg by mouth Daily., Disp: , Rfl:   •  HYDROcodone-acetaminophen (NORCO)  MG per tablet, Take 1 tablet by mouth Every 4 (Four) Hours As Needed for Moderate Pain ., Disp: 12 tablet, Rfl: 0  •  temazepam (RESTORIL) 15 MG capsule, Take 15 mg by mouth At Night As Needed., Disp: , Rfl:       Allergies:  No Known Allergies      Family History   Problem Relation Age of Onset   • ALS Mother    • Hypertension Father    • Stroke Father    • Malig Hyperthermia Neg Hx      Objective   PHYSICAL EXAMINATION:   Vitals:    01/26/23 1401   BP: 114/64   Pulse: 74   Resp: 18   SpO2: 96%     ECOG 0 - Asymptomatic  General: NAD, well appearing  Psych: a&o x3, normal mood and affect  Eyes: EOMI, no scleral icterus  ENMT: neck supple without masses or thyromegaly, mucous membranes moist  MSK: normal gait, limited ROM in right shoulders  Lymph nodes: no cervical, supraclavicular or axillary lymphadenopathy  Breast: symmetric, small size, grade 3 ptosis, sternal scar  Right: deferred.  Left: Well-healing superior curvilinear incision.  No swelling.    Assessment & Plan   Assessment:  82 y.o. F with a diagnosis of left breast cancer: Intermediate grade, invasive ductal carcinoma, ER/NJ+, Her2 negative. She underwent left partial mastectomy on 12/20/22, pT1cNx.    Plan:  -Continue follow-up with Dr. Castellon.  -Radiation oncology referral.  Appointment scheduled with Dr. Wallace tomorrow.  -Follow-up in 3 months for clinical exam.  -She was instructed to call sooner with any questions,  concerns or changes on BSE.    Jeannine Ledezma MD      CC:  Miriam Mercado Reyes, MD

## 2023-01-27 ENCOUNTER — APPOINTMENT (OUTPATIENT)
Dept: RADIATION ONCOLOGY | Facility: HOSPITAL | Age: 83
End: 2023-01-27
Payer: MEDICARE

## 2023-01-27 ENCOUNTER — CONSULT (OUTPATIENT)
Dept: RADIATION ONCOLOGY | Facility: HOSPITAL | Age: 83
End: 2023-01-27
Payer: MEDICARE

## 2023-01-27 VITALS
HEART RATE: 80 BPM | OXYGEN SATURATION: 97 % | SYSTOLIC BLOOD PRESSURE: 133 MMHG | BODY MASS INDEX: 23.62 KG/M2 | WEIGHT: 125 LBS | DIASTOLIC BLOOD PRESSURE: 80 MMHG

## 2023-01-27 DIAGNOSIS — C50.412 MALIGNANT NEOPLASM OF UPPER-OUTER QUADRANT OF LEFT BREAST IN FEMALE, ESTROGEN RECEPTOR POSITIVE: Primary | ICD-10-CM

## 2023-01-27 DIAGNOSIS — Z17.0 MALIGNANT NEOPLASM OF UPPER-OUTER QUADRANT OF LEFT BREAST IN FEMALE, ESTROGEN RECEPTOR POSITIVE: Primary | ICD-10-CM

## 2023-01-27 PROCEDURE — G0463 HOSPITAL OUTPT CLINIC VISIT: HCPCS | Performed by: RADIOLOGY

## 2023-01-27 PROCEDURE — 99204 OFFICE O/P NEW MOD 45 MIN: CPT | Performed by: RADIOLOGY

## 2023-01-27 NOTE — PROGRESS NOTES
Subjective     Jeannine Ledezma MD    Cancer Staging   Stage Unknown (pT1c, pNX, G2, ER+, NJ+, HER2-)       Dear Jeannine Ledezma MD    I had the pleasure of seeing Carly Gotti  [facility].   The patient is a 82 y.o. female with recently diagnosed left breast cancer.  She presented with a left breast mass and had a bilateral diagnostic mammogram on in the middle third upper outer quadrant of the left breast irregular mass that measures on the order of 1.8 cm in greatest dimension. I see no suspicious calcifications or areas of architectural distortion in either breast. There is no evidence for skin thickening, nipple retraction or axillary adenopathy.  US: At the 1-o'clock position on the order of 5 cm from the nipple corresponding to the site of palpable concern there is an irregular 2.0 cm x 1.5 cm x 1.5 cm hypoechoic mass with detectable internal vascularity. No evidence for left axillary adenopathy is appreciated.      She had an us guided biopsy of the left breast which revealed invasive ductal carcinoma, ER NJ Pos Her 2 neg.      She underwent a left breast lumpectomy on 22 with pathology revealing a 1.8cm invasive ductal carcinoma, grade II, with DCIS 24mm intermediate grade, no lvsi.  All margins were negative with the invasive component 4mm from the closest posterior margin and dcis 2mm from original posterior margin and final margins greater than 10mm.  pT1cNx Er 990-100% NJ % Her 2 negative ki 67 28%.      She is  menarche age 11 first childbirth age 16    She is recovering well from surgery and referred today for evaluation.  She met with dr. Castellon with medical oncology and she has recommended aromasin 25mg. She had a hysterectomy age 37 and has been on hormone replacement therapy until the time of her diagnosis.         Review of Systems   Constitutional: Positive for activity change and fatigue. Negative for diaphoresis.   HENT: Negative.     Respiratory: Positive for shortness of breath.    Musculoskeletal: Positive for arthralgias.   Skin: Negative.    Psychiatric/Behavioral: Negative.          Past Medical History:   Diagnosis Date   • Aneurysm (HCC)    • Angioedema    • Aortic valve disease    • Arthritis    • Atrophic kidney    • Bradycardia    • CAD (coronary artery disease)    • Cancer (HCC)     BREAST   • CKD (chronic kidney disease) stage 4, GFR 15-29 ml/min (HCC)    • Depression    • Dysphagia    • Essential hypertension    • History of anemia    • History of vitamin D deficiency    • [place] (hard of hearing)    • Hyperlipidemia    • Mitral valve disease    • EMELINA on auto CPAP 2003    Overnight polysomnogram.  Weight 154 pounds.  Severe EMELINA with AHI 51.5 events per hour.  Sleep-related hypoxia present.   • Post lumbar puncture headache 2022   • Pulmonary hypertension (HCC)    • Secondary hyperparathyroidism of renal origin (HCC)    • Sick sinus syndrome (HCC)    • Spinal stenosis          Past Surgical History:   Procedure Laterality Date   • AORTIC VALVE REPAIR/REPLACEMENT  2015    Dr Veliz   • BREAST LUMPECTOMY Left 2022    Procedure: Left ultrasound-guided partial mastectomy;  Surgeon: Jeannine Ledezma MD;  Location: [facility] OR;  Service: General;  Laterality: Left;   • CARDIAC CATHETERIZATION     • CARDIAC DEFIBRILLATOR PLACEMENT     •  SECTION     • COLONOSCOPY N/A 2019    [facility]   • ENDOSCOPY  2012    Hypertonic lower esophageal sphincter, gastritis. Path: Chronic gastritis, moderate.    • ENDOSCOPY N/A 2019    Web in the upper third of the esophagus. Dilated   • ENDOSCOPY N/A 2020    Procedure: ESOPHAGOGASTRODUODENOSCOPY WITH 56FR MALDONADO DILATATION AND COLD BIOPSIES;  Surgeon: Blayne Cai MD;  Location: [facility] ENDOSCOPY;  Service: Gastroenterology;  Laterality: N/A;  PRE: DYSPHAGIA  POST: SCHATZKI'S RING   • EPIDURAL  2022   • HYSTERECTOMY     • MITRAL VALVE  ANNULOPLASTY  2015    Dr Veliz   • TRICUSPID VALVE SURGERY  2015    Dr Veliz         Social History     Socioeconomic History   • Marital status:    • Number of children: 3   Tobacco Use   • Smoking status: Former     Types: Cigarettes     Start date:      Quit date:      Years since quittin.0   • Smokeless tobacco: Never   Vaping Use   • Vaping Use: Never used   Substance and Sexual Activity   • Alcohol use: Not Currently     Comment: Pt partakes occassionally   • Drug use: No   • Sexual activity: Defer         Family History   Problem Relation Age of Onset   • ALS Mother    • Hypertension Father    • Stroke Father    • Malig Hyperthermia Neg Hx           Objective    Physical Exam  Constitutional:       Appearance: Normal appearance.   Pulmonary:      Effort: Pulmonary effort is normal.   Chest:          Comments: Incision healing well, no palpable masses in either breast or axilla  Musculoskeletal:         General: Normal range of motion.   Neurological:      Mental Status: She is alert.   Psychiatric:         Mood and Affect: Mood normal.           Current Outpatient Medications on File Prior to Visit   Medication Sig Dispense Refill   • aspirin 325 MG tablet Take 325 mg by mouth Daily. WILL HOLD AS DIRECTED     • atorvastatin (LIPITOR) 20 MG tablet take 1 tablet by mouth at bedtime  0   • bumetanide (BUMEX) 2 MG tablet TAKE 1 TABLET BY MOUTH EVERY DAY 90 tablet 1   • buPROPion XL (WELLBUTRIN XL) 300 MG 24 hr tablet Take 300 mg by mouth Daily.  0   • busPIRone (BUSPAR) 5 MG tablet Take 1 tablet by mouth 2 (Two) Times a Day.     • carvedilol (COREG) 12.5 MG tablet Take 12.5 mg by mouth 2 (Two) Times a Day.     • Coenzyme Q10 200 MG tablet Take 1 tablet/day by mouth Daily.     • [employer] Senna Plus 8.6-50 MG per tablet TAKE 1 TABLET BY MOUTH EVERY DAY 30 tablet 2   • Diclofenac Sodium (VOLTAREN) 1 % gel gel PLEASE SEE ATTACHED FOR DETAILED DIRECTIONS     • donepezil (ARICEPT) 10 MG  tablet Take 10 mg by mouth Daily.     • exemestane (Aromasin) 25 MG chemo tablet Take 1 tablet by mouth Daily. 30 tablet 5   • famotidine (PEPCID) 40 MG tablet TAKE 1 TABLET BY MOUTH TWICE A  tablet 3   • fenofibrate micronized (LOFIBRA) 67 MG capsule Take 67 mg by mouth Daily.     • FLUoxetine (PROzac) 20 MG capsule Take 60 mg by mouth Daily.     • HYDROcodone-acetaminophen (NORCO)  MG per tablet Take 1 tablet by mouth Every 4 (Four) Hours As Needed for Moderate Pain . 12 tablet 0   • temazepam (RESTORIL) 15 MG capsule Take 15 mg by mouth At Night As Needed.       No current facility-administered medications on file prior to visit.       ALLERGIES:  No Known Allergies    /80   Pulse 80   Wt 56.7 kg (125 lb)   SpO2 97%   BMI 23.62 kg/m²      Current Status 1/18/2023   ECOG score 0         Assessment & Plan     82 year old female with pT1cNx left breast cancer ER GA Pos her 2 neg s/p lumpectomy.  Given her age, widely negative margins and estrogen receptor positivity, I do not recommend adjuvant radiation.  She is going to take aromasin and have regular follow up with Dr. Castellon, her medical oncologist.  She and her daughter voiced understanding and were given an opportunity to ask questions which I believe were answered to her satisfaction. I have not scheduled a follow up with me but I asked her to call with any questions or concerns in the future.     I personally spent greater than 45 minutes today assessing, managing, discussing and documenting my visit with the patient. That time includes review of records, imaging and pathology reports, obtaining my own history, performing a medically appropriate evaluation, counseling and educating the patient, discussing goals, logistics, alternatives and risks of my recommendations, surveillance options and potential outcomes. It also includes the time documenting the clinical information in the EMR and communicating my recommendations to the other  involved physicians.               Thank you very much for allowing me to participate in the care of this very pleasant patient.    Sincerely,      Sabrina Wallace MD     Subjective     Jeannine Ledezma MD

## 2023-02-01 ENCOUNTER — PATIENT OUTREACH (OUTPATIENT)
Dept: OTHER | Facility: HOSPITAL | Age: 83
End: 2023-02-01
Payer: MEDICARE

## 2023-02-01 NOTE — PROGRESS NOTES
Called MsLupis Spreckels to see how she was doing. Left a message with my contact information and asked her to call back at her convenience. Will mail survivorship information as well.

## 2023-03-09 ENCOUNTER — TRANSCRIBE ORDERS (OUTPATIENT)
Dept: ADMINISTRATIVE | Facility: HOSPITAL | Age: 83
End: 2023-03-09
Payer: MEDICARE

## 2023-03-09 DIAGNOSIS — M54.16 LUMBAR RADICULOPATHY: Primary | ICD-10-CM

## 2023-03-16 ENCOUNTER — OFFICE VISIT (OUTPATIENT)
Dept: CARDIOLOGY | Facility: CLINIC | Age: 83
End: 2023-03-16
Payer: MEDICARE

## 2023-03-16 VITALS
HEIGHT: 61 IN | HEART RATE: 84 BPM | OXYGEN SATURATION: 98 % | SYSTOLIC BLOOD PRESSURE: 122 MMHG | DIASTOLIC BLOOD PRESSURE: 70 MMHG | WEIGHT: 123 LBS | BODY MASS INDEX: 23.22 KG/M2

## 2023-03-16 DIAGNOSIS — Z95.2 S/P AVR (AORTIC VALVE REPLACEMENT): Primary | ICD-10-CM

## 2023-03-16 DIAGNOSIS — N18.4 CKD (CHRONIC KIDNEY DISEASE) STAGE 4, GFR 15-29 ML/MIN: ICD-10-CM

## 2023-03-16 DIAGNOSIS — E78.2 MIXED HYPERLIPIDEMIA: ICD-10-CM

## 2023-03-16 DIAGNOSIS — Z98.890 S/P MVR (MITRAL VALVE REPAIR): ICD-10-CM

## 2023-03-16 DIAGNOSIS — I10 BENIGN ESSENTIAL HYPERTENSION: ICD-10-CM

## 2023-03-16 DIAGNOSIS — I71.21 ANEURYSM OF ASCENDING AORTA WITHOUT RUPTURE: ICD-10-CM

## 2023-03-16 DIAGNOSIS — Z98.890 S/P TVR (TRICUSPID VALVE REPAIR): ICD-10-CM

## 2023-03-16 DIAGNOSIS — G47.33 OBSTRUCTIVE SLEEP APNEA: ICD-10-CM

## 2023-03-16 DIAGNOSIS — Z95.0 PACEMAKER: ICD-10-CM

## 2023-03-16 PROCEDURE — 99214 OFFICE O/P EST MOD 30 MIN: CPT | Performed by: INTERNAL MEDICINE

## 2023-03-16 PROCEDURE — 3078F DIAST BP <80 MM HG: CPT | Performed by: INTERNAL MEDICINE

## 2023-03-16 PROCEDURE — 1159F MED LIST DOCD IN RCRD: CPT | Performed by: INTERNAL MEDICINE

## 2023-03-16 PROCEDURE — 93000 ELECTROCARDIOGRAM COMPLETE: CPT | Performed by: INTERNAL MEDICINE

## 2023-03-16 PROCEDURE — 1160F RVW MEDS BY RX/DR IN RCRD: CPT | Performed by: INTERNAL MEDICINE

## 2023-03-16 PROCEDURE — 3074F SYST BP LT 130 MM HG: CPT | Performed by: INTERNAL MEDICINE

## 2023-03-16 NOTE — PROGRESS NOTES
Subjective:     Encounter Date:03/16/2023      Patient ID: Carly Gotti is a 82 y.o. female.    Chief Complaint:  History of Present Illness    This is a 82-year-old female with a history of moderate to severe aortic regurgitation status post replacement with a bioprosthetic aortic valve, moderate to severe mitral regurgitation status post repair, severe tricuspid regurgitation status post repair, pulmonary hypertension, chronic kidney disease with an atrophic kidney, hyperlipidemia, hypertension, aberrant right subclavian artery, obstructive sleep apnea on CPAP, status post dual-chamber pacemaker placement for sick sinus syndrome, depression, who presents for follow up.      She presented in 11/2022 for preoperative evaluation.    On I saw her previously in 7/2022 she was struggling with mobility because of arthritis and joint issues which resulted in issues with fatigue.  Otherwise she denies any symptoms of chest pain and shortness of breath.  At that office visit her symptoms were stable.  She was planning on undergoing partial mastectomy for left breast cancer.  I felt that she was stable from a cardiac standpoint and recommended that she proceed with surgery as planned.    The patient underwent her surgery and 12/2022 without any significant issues.  She has since been evaluated both by medical and radiation oncology.  Aromasin was recommended by Dr. Castellon.  The patient ultimately opted not to start this therapy.  Radiation oncology did not feel that radiation therapy was needed.    Outside of her ongoing issues with the joint and back issues she denies any new significant symptoms of chest pain, shortness of breath, palpitations, orthopnea, near-syncope or syncope, or lower extremity edema.  She noted this morning when she was showering that she has a mass she noted in her left lower quadrant of her abdomen which she did not notice before.     Prior History:  The patient was previously followed by  Dr. Lopez who she last saw in 5/2017.  Her prior cardiac history includes mitral and tricuspid valve repair and aortic valve replacement back in 11/2015 by Dr. Veliz.  Postoperatively she developed symptomatic sick sinus syndrome and required dual chamber permanent pacemaker placement.  Since then she's done relatively well although she reports that she never quite recovered back to her baseline following the surgery.       Following her initial visit with me in 12/2017 I set her up for a repeat echocardiogram that showed normal left ventricular systolic function and wall motion with an estimated ejection fraction 64%, grade 1 diastolic dysfunction, grossly normal-appearing bioprosthetic aortic valve, no mitral valve regurgitation, trace tricuspid valve regurgitation, and a right ventricular systolic pressure of 17 mmHg.     I saw her last in 12/2019 for annual follow-up.  Prior to that office visit she had been hospitalized twice for C. difficile colitis.   She reported fatigue and mild dyspnea with activity since all of these issues started.    I recommended a 1 year follow-up at that time with a repeat echocardiogram.  She called in 2/2020 with complaints of lower extremity swelling especially in the left leg.  I recommended proceeding with a lower extremity venous Doppler ultrasound and asked her to increase her bumetanide to 2 mg twice a day for a few days.  Ultrasound ended up being normal.  She was still having swelling so I recommended that she discuss any further changes to her bumetanide with her nephrologist.     She underwent a follow-up echocardiogram in 1/2021 which showed normal left ventricular systolic function wall motion with an EF of 60 to 65%, grade 2 diastolic dysfunction, bioprosthetic aortic valve with mild to moderately elevated gradients which appear to be slightly worse than prior echocardiogram, mild to moderate mitral valve stenosis across the surgically repaired mitral valve,  mildly elevated right ventricular systolic pressure of 36 mmHg, and mildly dilated aortic root, and moderate regurgitation of the repaired tricuspid valve.     In 1/2022 the patient reported that her joint and arthritis issues were really acting up.  In fact she presented to the office at that time in a wheelchair because the issues were severely affecting her mobility.  She continued to complain of lower extremity edema especially of her left lower extremity.  At this was present despite having her increase her bumetanide to 2 mg twice a day by Dr. Peters.  I recommended proceeding with a repeat echocardiogram and a venous Doppler ultrasound.  Ultrasound was performed on 1/12/2022 and showed no evidence of DVT.  Her echocardiogram was performed on 1/20/2022 and showed evidence of normal left ventricular systolic function wall motion with an EF of 61%, mildly dilated left atrium, and normal function and gradients across her mitral, tricuspid, and aortic valves.  Following that visit the patient's swelling improved and she was able to go back to taking bumetanide 2 mg daily.      Review of Systems   Constitutional: Negative for malaise/fatigue.   HENT: Negative for hearing loss, hoarse voice, nosebleeds and sore throat.    Eyes: Negative for pain.   Cardiovascular: Negative for chest pain, claudication, cyanosis, dyspnea on exertion, irregular heartbeat, leg swelling, near-syncope, orthopnea, palpitations, paroxysmal nocturnal dyspnea and syncope.   Respiratory: Negative for shortness of breath and snoring.    Endocrine: Negative for cold intolerance, heat intolerance, polydipsia, polyphagia and polyuria.   Skin: Negative for itching and rash.   Musculoskeletal: Positive for back pain and joint pain. Negative for arthritis, falls, joint swelling, muscle cramps, muscle weakness and myalgias.   Gastrointestinal: Negative for constipation, diarrhea, dysphagia, heartburn, hematemesis, hematochezia, melena, nausea and  vomiting.   Genitourinary: Negative for frequency, hematuria and hesitancy.   Neurological: Negative for excessive daytime sleepiness, dizziness, headaches, light-headedness, numbness and weakness.   Psychiatric/Behavioral: Negative for depression. The patient is not nervous/anxious.          Current Outpatient Medications:   •  aspirin 325 MG tablet, Take 1 tablet by mouth Daily. WILL HOLD AS DIRECTED  Indications: Resume on Saturday, 12/24/2022., Disp: , Rfl:   •  atorvastatin (LIPITOR) 20 MG tablet, take 1 tablet by mouth at bedtime, Disp: , Rfl: 0  •  bumetanide (BUMEX) 2 MG tablet, TAKE 1 TABLET BY MOUTH EVERY DAY, Disp: 90 tablet, Rfl: 1  •  buPROPion XL (WELLBUTRIN XL) 300 MG 24 hr tablet, Take 1 tablet by mouth Daily., Disp: , Rfl: 0  •  busPIRone (BUSPAR) 5 MG tablet, Take 1 tablet by mouth 2 (Two) Times a Day., Disp: , Rfl:   •  carvedilol (COREG) 12.5 MG tablet, Take 1 tablet by mouth 2 (Two) Times a Day., Disp: , Rfl:   •  Coenzyme Q10 200 MG tablet, Take 1 tablet/day by mouth Daily., Disp: , Rfl:   •  [employer] Senna Plus 8.6-50 MG per tablet, TAKE 1 TABLET BY MOUTH EVERY DAY, Disp: 30 tablet, Rfl: 2  •  Diclofenac Sodium (VOLTAREN) 1 % gel gel, PLEASE SEE ATTACHED FOR DETAILED DIRECTIONS, Disp: , Rfl:   •  donepezil (ARICEPT) 10 MG tablet, Take 1 tablet by mouth Daily., Disp: , Rfl:   •  exemestane (Aromasin) 25 MG chemo tablet, Take 1 tablet by mouth Daily., Disp: 30 tablet, Rfl: 5  •  famotidine (PEPCID) 40 MG tablet, TAKE 1 TABLET BY MOUTH TWICE A DAY, Disp: 180 tablet, Rfl: 3  •  fenofibrate micronized (LOFIBRA) 67 MG capsule, Take 1 capsule by mouth Daily., Disp: , Rfl:   •  FLUoxetine (PROzac) 20 MG capsule, Take 3 capsules by mouth Daily., Disp: , Rfl:   •  HYDROcodone-acetaminophen (NORCO)  MG per tablet, Take 1 tablet by mouth Every 4 (Four) Hours As Needed for Moderate Pain ., Disp: 12 tablet, Rfl: 0  •  temazepam (RESTORIL) 15 MG capsule, Take 1 capsule by mouth At Night As Needed., Disp: ,  Rfl:     Past Medical History:   Diagnosis Date   • Aneurysm (HCC)    • Angioedema    • Aortic valve disease    • Arthritis    • Atrophic kidney    • Bradycardia    • CAD (coronary artery disease)    • Cancer (HCC)     BREAST   • CKD (chronic kidney disease) stage 4, GFR 15-29 ml/min (HCC)    • Depression    • Dysphagia    • Essential hypertension    • History of anemia    • History of vitamin D deficiency    • [place] (hard of hearing)    • Hyperlipidemia    • Mitral valve disease    • EMELINA on auto CPAP 2003    Overnight polysomnogram.  Weight 154 pounds.  Severe EMELINA with AHI 51.5 events per hour.  Sleep-related hypoxia present.   • Post lumbar puncture headache 2022   • Pulmonary hypertension (HCC)    • Secondary hyperparathyroidism of renal origin (HCC)    • Sick sinus syndrome (HCC)    • Spinal stenosis        Past Surgical History:   Procedure Laterality Date   • AORTIC VALVE REPAIR/REPLACEMENT  2015    Dr Veliz   • BREAST LUMPECTOMY Left 2022    Procedure: Left ultrasound-guided partial mastectomy;  Surgeon: Jeannine Ledezma MD;  Location: [facility] OR;  Service: General;  Laterality: Left;   • CARDIAC CATHETERIZATION     • CARDIAC DEFIBRILLATOR PLACEMENT     •  SECTION     • COLONOSCOPY N/A 2019    [facility]   • ENDOSCOPY  2012    Hypertonic lower esophageal sphincter, gastritis. Path: Chronic gastritis, moderate.    • ENDOSCOPY N/A 2019    Web in the upper third of the esophagus. Dilated   • ENDOSCOPY N/A 2020    Procedure: ESOPHAGOGASTRODUODENOSCOPY WITH 56FR MALDONADO DILATATION AND COLD BIOPSIES;  Surgeon: Blayne Cai MD;  Location: [facility] ENDOSCOPY;  Service: Gastroenterology;  Laterality: N/A;  PRE: DYSPHAGIA  POST: SCHATZKI'S RING   • EPIDURAL  2022   • HYSTERECTOMY     • MITRAL VALVE ANNULOPLASTY  2015    Dr Veliz   • TRICUSPID VALVE SURGERY  2015    Dr Veliz       Family History   Problem Relation Age of Onset   • ALS  "Mother    • Hypertension Father    • Stroke Father    • Malig Hyperthermia Neg Hx        Social History     Tobacco Use   • Smoking status: Former     Types: Cigarettes     Start date:      Quit date:      Years since quittin.2   • Smokeless tobacco: Never   Vaping Use   • Vaping Use: Never used   Substance Use Topics   • Alcohol use: Not Currently     Comment: Pt partakes occassionally   • Drug use: No         ECG 12 Lead    Date/Time: 3/16/2023 4:54 PM  Performed by: Laura Pak MD  Authorized by: Laura Pak MD   Conduction: left anterior fascicular block  Comments: Atrial paced rhythm               Objective:     Visit Vitals  /70 (BP Location: Right arm, Patient Position: Sitting, Cuff Size: Adult)   Pulse 84   Ht 154.9 cm (61\")   Wt 55.8 kg (123 lb)   SpO2 98%   BMI 23.24 kg/m²         Constitutional:       Appearance: Normal appearance. Well-developed.   Eyes:      General: Lids are normal.      Conjunctiva/sclera: Conjunctivae normal.      Pupils: Pupils are equal, round, and reactive to light.   HENT:      Head: Normocephalic and atraumatic.   Neck:      Vascular: No carotid bruit or JVD.      Lymphadenopathy: No cervical adenopathy.   Pulmonary:      Effort: Pulmonary effort is normal.      Breath sounds: Normal breath sounds.   Cardiovascular:      Normal rate. Regular rhythm.      No gallop.   Pulses:     Radial: 2+ bilaterally.  Edema:     Peripheral edema absent.   Abdominal:      Palpations: Abdomen is soft.       Musculoskeletal:      Cervical back: Full passive range of motion without pain, normal range of motion and neck supple. Skin:     General: Skin is warm and dry.   Neurological:      Mental Status: Alert and oriented to person, place, and time.             Assessment:          Diagnosis Plan   1. S/P AVR (aortic valve replacement)        2. S/P TVR (tricuspid valve repair)        3. S/P MVR (mitral valve repair)        4. Benign essential hypertension        5. " Aneurysm of ascending aorta without rupture        6. Mixed hyperlipidemia        7. Pacemaker        8. CKD (chronic kidney disease) stage 4, GFR 15-29 ml/min ([facility])        9. Obstructive sleep apnea treated with auto CPAP               Plan:       1.  Bioprosthetic aortic valve replacement.  Normal gradients on last echocardiogram.  2.  Mitral valve repair.  No significant issues on last echocardiogram.  3.  Tricuspid valve repair.  No significant issues on last echo.  4.  Hypertension.  Well-controlled on current regimen medications.  Continue the same.  5.  Mild aortic root dilatation.  No significant dilatation on most recent echocardiogram.  6.  Hyperlipidemia.  On atorvastatin.  7.  Chronic kidney disease.  8.  Dual-chamber cardiac pacemaker placement.  She is atrial paced today.  No significant issues on her routine device checks.  Continue routine checks.  9.  Osteoarthritis.  Severely limiting her mobility.  10.  Left breast cancer status post partial mastectomy.  Not on any adjuvant therapy.  11.  Left lower quadrant mass.  On exam the mass appears superficial.  I suspect it may be a cyst but I recommended that she contact Dr. Ledezma's office for further evaluation and exam.    We will plan on seeing the patient back again in 6 months.

## 2023-04-05 ENCOUNTER — HOSPITAL ENCOUNTER (OUTPATIENT)
Dept: MRI IMAGING | Facility: HOSPITAL | Age: 83
Discharge: HOME | End: 2023-04-05
Admitting: PAIN MEDICINE
Payer: MEDICARE

## 2023-04-05 VITALS
BODY MASS INDEX: 22.63 KG/M2 | HEIGHT: 62 IN | TEMPERATURE: 98.4 F | RESPIRATION RATE: 18 BRPM | DIASTOLIC BLOOD PRESSURE: 62 MMHG | OXYGEN SATURATION: 92 % | SYSTOLIC BLOOD PRESSURE: 132 MMHG | HEART RATE: 90 BPM | WEIGHT: 123 LBS

## 2023-04-05 DIAGNOSIS — M54.16 LUMBAR RADICULOPATHY: ICD-10-CM

## 2023-04-05 PROCEDURE — 72148 MRI LUMBAR SPINE W/O DYE: CPT

## 2023-04-20 ENCOUNTER — OFFICE VISIT (OUTPATIENT)
Dept: SURGERY | Facility: CLINIC | Age: 83
End: 2023-04-20
Payer: MEDICARE

## 2023-04-20 VITALS
HEIGHT: 61 IN | WEIGHT: 123 LBS | HEART RATE: 79 BPM | SYSTOLIC BLOOD PRESSURE: 124 MMHG | RESPIRATION RATE: 20 BRPM | DIASTOLIC BLOOD PRESSURE: 72 MMHG | BODY MASS INDEX: 23.22 KG/M2 | OXYGEN SATURATION: 97 %

## 2023-04-20 DIAGNOSIS — Z12.31 ENCOUNTER FOR SCREENING MAMMOGRAM FOR MALIGNANT NEOPLASM OF BREAST: ICD-10-CM

## 2023-04-20 DIAGNOSIS — Z17.0 MALIGNANT NEOPLASM OF UPPER-OUTER QUADRANT OF LEFT BREAST IN FEMALE, ESTROGEN RECEPTOR POSITIVE: Primary | ICD-10-CM

## 2023-04-20 DIAGNOSIS — C50.412 MALIGNANT NEOPLASM OF UPPER-OUTER QUADRANT OF LEFT BREAST IN FEMALE, ESTROGEN RECEPTOR POSITIVE: Primary | ICD-10-CM

## 2023-04-20 PROCEDURE — 3078F DIAST BP <80 MM HG: CPT | Performed by: SURGERY

## 2023-04-20 PROCEDURE — 1159F MED LIST DOCD IN RCRD: CPT | Performed by: SURGERY

## 2023-04-20 PROCEDURE — 99213 OFFICE O/P EST LOW 20 MIN: CPT | Performed by: SURGERY

## 2023-04-20 PROCEDURE — 1160F RVW MEDS BY RX/DR IN RCRD: CPT | Performed by: SURGERY

## 2023-04-20 PROCEDURE — 3074F SYST BP LT 130 MM HG: CPT | Performed by: SURGERY

## 2023-04-20 NOTE — PROGRESS NOTES
[facility]     Referring Provider: Miriam Mercado Reyes, MD     Chief complaint: Routine follow up breast cancer      Subjective   HPI:   10/21/2022:  Ms. Carly Gotti is a 83 yo woman, seen at the request of Dr. Miriam Reyes, for a new diagnosis of left breast cancer. The patient first noticed a lump in her outer left breast a few months ago. She believes it has grown since she first noticed it, but denies any associated pain. Diagnostic imaging showed a 2 cm mass and subsequent biopsy showed invasive ductal carcinoma. Her work-up is detailed in the oncologic history below. She denies any prior history of abnormal mammograms or breast biopsies. She has been on estrogen only hormone replacement therapy for about 40 years.     She has a past history of a bioprosthetic aortic valve replacement and mitral and tricuspid valve repairs. She also has a history of pulmonary hypertension and a pacemaker placed for sick sinus syndrome, followed by cardiology. She is not on any anticoagulation, but takes an aspirin 325 mg. Today in the office she is having some involuntary movements and tremors. She says that this happened before in the summer and she was evaluated in the ER. Her PCP is aware and she is currently on medication to help it. They believe it is related to stress/anxiety. She denies any family history of breast or ovarian cancer.     12/9/2022:  At her last visit, we decided on a partial mastectomy. She has seen cardiology and was determined to be of acceptable risk for surgery. She returns today because she is concerned about the risk of needing a second surgery with breast conservation and is wondering about a mastectomy.     1/26/2023:  She underwent left partial mastectomy on 12/20/22. See surgery & pathology details below in oncologic history. She has been recovering well and has no complaints. She has already seen Dr. Castellon postoperatively and discussed starting exemestane because of her history  of arthritis. They also discussed having a low threshold for stopping it should she have any side effects.     4/20/2023, Interval History:  She returns today for scheduled follow-up.  After her last visit she saw Dr. Wallace and she did not recommend any adjuvant radiation.  Ms. Gotti ultimately decided not to start the exemestane.  She denies any new breast related complaints.  She was joined today in clinic by her daughter.       Oncology/Hematology History   Malignant neoplasm of upper-outer quadrant of left breast in female, estrogen receptor positive   12/4/2017 Imaging    Screening MMG with [facility] ( [facility]):   The parenchyma of both breasts remains stable. No suspicious mass, calcification nor adenopathy has developed. Scattered fibroglandular densities.   BI-RADS 2: Benign      9/5/2022 Initial Diagnosis    Malignant neoplasm of upper-outer quadrant of left breast in female, estrogen receptor positive (HCC)     9/6/2022 Imaging    Bilateral Diagnostic MMG with [facility] & Left Breast US ( [facility]):  MMG:  Scattered fibroglandular densities. A triangular skin marker overlies the site of palpable concern in the middle third upper outer quadrant of the left breast. At this location there is an underlying partially obscured and partially irregular mass that measures on the order of 1.8 cm in greatest dimension. I see no suspicious calcifications or areas of architectural distortion in either breast. There is no evidence for skin thickening, nipple retraction or axillary adenopathy.   US:   At the 1-o'clock position on the order of 5 cm from the nipple corresponding to the site of palpable concern there is an irregular 2.0 cm x 1.5 cm x 1.5 cm hypoechoic mass with detectable internal vascularity. No evidence for left axillary adenopathy is appreciated.   BI-RADS 5: Highly suggestive of malignancy      10/5/2022 Biopsy    Left Breast, US-Guided Biopsy ( [facility]):    1. Left Breast, 1:00 o'clock, 5 cm from Nipple,  Ultrasound-Guided Biopsies for a Mass:   INVASIVE MAMMARY CARCINOMA, NO SPECIAL TYPE (INVASIVE DUCTAL CARCINOMA).  A. Histologic grade:  Heather histologic score II  (tubules=3, nuclei=3, mitosis-1).  B. 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                 C. Invasive carcinoma involves multiple tissue cores with the largest contiguous focus measuring up to 11.0 mm.               D. No lymphovascular nor perineural invasion identified.      ER+ (%, strong)   AZ+ (%, strong)   Her2 negative (IHC 2+; FISH ratio 1.5, copy #4.8)  Ki-67 28%    -Barbell-shaped metallic clip at the posterior superior margin of the biopsied mass.      12/20/2022 Surgery    Left ultrasound-guided partial mastectomy    1. Left Breast, Oriented Partial Mastectomy (36 grams): INVASIVE MAMMARY CARCINOMA, NO SPECIAL TYPE (INVASIVE DUCTAL CARCINOMA).               A. Tumor site: 1 o'clock, upper outer quadrant.               B. Histologic grade: Heather histologic score II (tubules = 3, nuclei = 2, mitosis = 2).               C. Tumor size: 18 x 12 x 10 mm.               D. Ductal carcinoma in situ (DCIS): Present, negative for extensive intraductal component.                             1. Extent of DCIS: DCIS is associated with the invasive carcinoma at the periphery, spanning four breast slices, approximately 24 mm from medial to lateral.  2. Architectural patterns: Cribriform and involving an intraductal papilloma.               3. Nuclear grade: Intermediate.               E. Tumor extent: Overlying skin is uninvolved by in situ and invasive carcinoma.               F. No lymphovascular nor perineural invasion identified.               H. Margins: Uninvolved by invasive carcinoma and DCIS.                            1. Invasive carcinoma comes to within 4 mm of the closest posterior margin, 8 mm from the inferior margin, 15 mm from the anterior  and superior margins, 18 mm from the lateral margin and 24 mm from the medial margin of resection in specimen 1.  2. DCIS comes to within 2 mm of the original posterior margin, 15 mm from the anterior and superior margins, 18 mm from the medial and lateral margins and 19 mm from the inferior of resection in specimen 1.  I. No lymph nodes submitted.  J. Hormone receptor status: ER (%), AZ (%), HER2 2+ by IHC, negative by FISH, Ki-67 28% (performed on prior biopsy [id number], slides reviewed).  L. Pathologic stage: pT1c, NX.     2. Left Breast, Additional Medial, Inferior and Posterior Margins, Oriented Excision:               A. Benign breast tissue with small radial scar and fibroadenomatoid change.               B. No atypical hyperplasia, in situ nor invasive carcinoma identified.               C. Final medial, inferior and posterior margins free by and additional 8 mm.     3. Left Breast, Additional Superior and Lateral Margins, Oriented Excision:               A. Benign unremarkable breast tissue.               B. Final superior and lateral margins free by an additional 5 mm.     1/18/2023 Cancer Staged    Staging form: Breast, AJCC 8th Edition  - Pathologic: Stage Unknown (pT1c, pNX, G2, ER+, AZ+, HER2-) - Signed by Sarkis Castellon MD on 1/18/2023         Review of Systems:  See interval history.       Medications:    Current Outpatient Medications:   •  aspirin 325 MG tablet, Take 1 tablet by mouth Daily. WILL HOLD AS DIRECTED  Indications: Resume on Saturday, 12/24/2022., Disp: , Rfl:   •  atorvastatin (LIPITOR) 20 MG tablet, take 1 tablet by mouth at bedtime, Disp: , Rfl: 0  •  bumetanide (BUMEX) 2 MG tablet, TAKE 1 TABLET BY MOUTH EVERY DAY, Disp: 90 tablet, Rfl: 1  •  buPROPion XL (WELLBUTRIN XL) 300 MG 24 hr tablet, Take 1 tablet by mouth Daily., Disp: , Rfl: 0  •  busPIRone (BUSPAR) 5 MG tablet, Take 1 tablet by mouth 2 (Two) Times a Day., Disp: , Rfl:   •  carvedilol (COREG) 12.5 MG  tablet, Take 1 tablet by mouth 2 (Two) Times a Day., Disp: , Rfl:   •  Coenzyme Q10 200 MG tablet, Take 1 tablet/day by mouth Daily., Disp: , Rfl:   •  [employer] Senna Plus 8.6-50 MG per tablet, TAKE 1 TABLET BY MOUTH EVERY DAY, Disp: 30 tablet, Rfl: 2  •  Diclofenac Sodium (VOLTAREN) 1 % gel gel, PLEASE SEE ATTACHED FOR DETAILED DIRECTIONS, Disp: , Rfl:   •  donepezil (ARICEPT) 10 MG tablet, Take 1 tablet by mouth Daily., Disp: , Rfl:   •  exemestane (Aromasin) 25 MG chemo tablet, Take 1 tablet by mouth Daily., Disp: 30 tablet, Rfl: 5  •  famotidine (PEPCID) 40 MG tablet, TAKE 1 TABLET BY MOUTH TWICE A DAY, Disp: 180 tablet, Rfl: 3  •  fenofibrate micronized (LOFIBRA) 67 MG capsule, Take 1 capsule by mouth Daily., Disp: , Rfl:   •  FLUoxetine (PROzac) 20 MG capsule, Take 3 capsules by mouth Daily., Disp: , Rfl:   •  HYDROcodone-acetaminophen (NORCO)  MG per tablet, Take 1 tablet by mouth Every 4 (Four) Hours As Needed for Moderate Pain ., Disp: 12 tablet, Rfl: 0  •  temazepam (RESTORIL) 15 MG capsule, Take 1 capsule by mouth At Night As Needed., Disp: , Rfl:       Allergies:  No Known Allergies      Family History   Problem Relation Age of Onset   • ALS Mother    • Hypertension Father    • Stroke Father    • Malig Hyperthermia Neg Hx      Objective   PHYSICAL EXAMINATION:   Vitals:    04/20/23 1410   BP: 124/72   Pulse: 79   Resp: 20   SpO2: 97%     ECOG 0 - Asymptomatic  General: NAD, well appearing  Psych: a&o x3, normal mood and affect  Eyes: EOMI, no scleral icterus  ENMT: neck supple without masses or thyromegaly, mucous membranes moist  MSK: normal gait, limited ROM in right shoulders  Lymph nodes: no cervical, supraclavicular or axillary lymphadenopathy  Breast: symmetric, small size, grade 3 ptosis, sternal scar  Right: No visible abnormalities on inspection while seated, with arms raised or hands on hips. No masses, skin changes, or nipple abnormalities.  Left: Well-healed superior curvilinear scar,  otherwise no visible abnormalities on inspection while seated, with arms raised or hands on hips.  Scar is soft.  No masses or nipple abnormalities.      Assessment & Plan   Assessment:  82 y.o. F with a diagnosis of left breast cancer: Intermediate grade, invasive ductal carcinoma, ER/IL+, Her2 negative. She underwent left partial mastectomy on 12/20/22, pT1cNx.  Adjuvant radiation was not recommended and the patient declined adjuvant endocrine therapy.    Plan:  -Continue follow-up with Dr. Castellon.  -Follow-up in 9/2023 with mammogram.    Jeannine Ledezma MD      CC:  Miriam Mercado Reyes, MD

## 2023-04-20 NOTE — LETTER
April 20, 2023       No Recipients    Patient: Carly Gotti   YOB: 1940   Date of Visit: 4/20/2023       Dear Dr. Gipson Recipients:    Thank you for referring Carly Gotti to me for evaluation. Below are the relevant portions of my assessment and plan of care.    If you have questions, please do not hesitate to call me. I look forward to following Carly along with you.         Sincerely,        Jeannine Ledezma MD        CC:   No Recipients    Jeannine Ledezma MD  04/20/23 1418  Signed  [facility]     Referring Provider: Miriam Mercado Reyes, MD     Chief complaint: Routine follow up breast cancer      Subjective    HPI:   10/21/2022:  Ms. Carly Gotti is a 83 yo woman, seen at the request of Dr. Miriam Reyes, for a new diagnosis of left breast cancer. The patient first noticed a lump in her outer left breast a few months ago. She believes it has grown since she first noticed it, but denies any associated pain. Diagnostic imaging showed a 2 cm mass and subsequent biopsy showed invasive ductal carcinoma. Her work-up is detailed in the oncologic history below. She denies any prior history of abnormal mammograms or breast biopsies. She has been on estrogen only hormone replacement therapy for about 40 years.     She has a past history of a bioprosthetic aortic valve replacement and mitral and tricuspid valve repairs. She also has a history of pulmonary hypertension and a pacemaker placed for sick sinus syndrome, followed by cardiology. She is not on any anticoagulation, but takes an aspirin 325 mg. Today in the office she is having some involuntary movements and tremors. She says that this happened before in the summer and she was evaluated in the ER. Her PCP is aware and she is currently on medication to help it. They believe it is related to stress/anxiety. She denies any family history of breast or ovarian cancer.     12/9/2022:  At her last visit, we decided on a partial  mastectomy. She has seen cardiology and was determined to be of acceptable risk for surgery. She returns today because she is concerned about the risk of needing a second surgery with breast conservation and is wondering about a mastectomy.     1/26/2023:  She underwent left partial mastectomy on 12/20/22. See surgery & pathology details below in oncologic history. She has been recovering well and has no complaints. She has already seen Dr. Castellon postoperatively and discussed starting exemestane because of her history of arthritis. They also discussed having a low threshold for stopping it should she have any side effects.     4/20/2023, Interval History:  She returns today for scheduled follow-up.  After her last visit she saw Dr. Wallace and she did not recommend any adjuvant radiation.  Ms. Gotti ultimately decided not to start the exemestane.  She denies any new breast related complaints.  She was joined today in clinic by her daughter.       Oncology/Hematology History   Malignant neoplasm of upper-outer quadrant of left breast in female, estrogen receptor positive   12/4/2017 Imaging    Screening MMG with [facility] ( [facility]):   The parenchyma of both breasts remains stable. No suspicious mass, calcification nor adenopathy has developed. Scattered fibroglandular densities.   BI-RADS 2: Benign      9/5/2022 Initial Diagnosis    Malignant neoplasm of upper-outer quadrant of left breast in female, estrogen receptor positive (HCC)     9/6/2022 Imaging    Bilateral Diagnostic MMG with [facility] & Left Breast US ( [facility]):  MMG:  Scattered fibroglandular densities. A triangular skin marker overlies the site of palpable concern in the middle third upper outer quadrant of the left breast. At this location there is an underlying partially obscured and partially irregular mass that measures on the order of 1.8 cm in greatest dimension. I see no suspicious calcifications or areas of architectural distortion in either breast. There  is no evidence for skin thickening, nipple retraction or axillary adenopathy.   US:   At the 1-o'clock position on the order of 5 cm from the nipple corresponding to the site of palpable concern there is an irregular 2.0 cm x 1.5 cm x 1.5 cm hypoechoic mass with detectable internal vascularity. No evidence for left axillary adenopathy is appreciated.   BI-RADS 5: Highly suggestive of malignancy      10/5/2022 Biopsy    Left Breast, US-Guided Biopsy ([facility]):    1. Left Breast, 1:00 o'clock, 5 cm from Nipple, Ultrasound-Guided Biopsies for a Mass:   INVASIVE MAMMARY CARCINOMA, NO SPECIAL TYPE (INVASIVE DUCTAL CARCINOMA).  A. Histologic grade:  [place] histologic score II  (tubules=3, nuclei=3, mitosis-1).  B. 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                 C. Invasive carcinoma involves multiple tissue cores with the largest contiguous focus measuring up to 11.0 mm.               D. No lymphovascular nor perineural invasion identified.      ER+ (%, strong)   CT+ (%, strong)   Her2 negative (IHC 2+; FISH ratio 1.5, copy #4.8)  Ki-67 28%    -Barbell-shaped metallic clip at the posterior superior margin of the biopsied mass.      12/20/2022 Surgery    Left ultrasound-guided partial mastectomy    1. Left Breast, Oriented Partial Mastectomy (36 grams): INVASIVE MAMMARY CARCINOMA, NO SPECIAL TYPE (INVASIVE DUCTAL CARCINOMA).               A. Tumor site: 1 o'clock, upper outer quadrant.               B. Histologic grade: Heather histologic score II (tubules = 3, nuclei = 2, mitosis = 2).               C. Tumor size: 18 x 12 x 10 mm.               D. Ductal carcinoma in situ (DCIS): Present, negative for extensive intraductal component.                             1. Extent of DCIS: DCIS is associated with the invasive carcinoma at the periphery, spanning four breast slices, approximately 24 mm from medial to lateral.  2.  Architectural patterns: Cribriform and involving an intraductal papilloma.               3. Nuclear grade: Intermediate.               E. Tumor extent: Overlying skin is uninvolved by in situ and invasive carcinoma.               F. No lymphovascular nor perineural invasion identified.               H. Margins: Uninvolved by invasive carcinoma and DCIS.                            1. Invasive carcinoma comes to within 4 mm of the closest posterior margin, 8 mm from the inferior margin, 15 mm from the anterior and superior margins, 18 mm from the lateral margin and 24 mm from the medial margin of resection in specimen 1.  2. DCIS comes to within 2 mm of the original posterior margin, 15 mm from the anterior and superior margins, 18 mm from the medial and lateral margins and 19 mm from the inferior of resection in specimen 1.  I. No lymph nodes submitted.  J. Hormone receptor status: ER (%), PA (%), HER2 2+ by IHC, negative by FISH, Ki-67 28% (performed on prior biopsy [id number], slides reviewed).  L. Pathologic stage: pT1c, NX.     2. Left Breast, Additional Medial, Inferior and Posterior Margins, Oriented Excision:               A. Benign breast tissue with small radial scar and fibroadenomatoid change.               B. No atypical hyperplasia, in situ nor invasive carcinoma identified.               C. Final medial, inferior and posterior margins free by and additional 8 mm.     3. Left Breast, Additional Superior and Lateral Margins, Oriented Excision:               A. Benign unremarkable breast tissue.               B. Final superior and lateral margins free by an additional 5 mm.     1/18/2023 Cancer Staged    Staging form: Breast, AJCC 8th Edition  - Pathologic: Stage Unknown (pT1c, pNX, G2, ER+, PA+, HER2-) - Signed by Sarkis Castellon MD on 1/18/2023         Review of Systems:  See interval history.       Medications:    Current Outpatient Medications:   •  aspirin 325 MG tablet, Take 1  tablet by mouth Daily. WILL HOLD AS DIRECTED  Indications: Resume on Saturday, 12/24/2022., Disp: , Rfl:   •  atorvastatin (LIPITOR) 20 MG tablet, take 1 tablet by mouth at bedtime, Disp: , Rfl: 0  •  bumetanide (BUMEX) 2 MG tablet, TAKE 1 TABLET BY MOUTH EVERY DAY, Disp: 90 tablet, Rfl: 1  •  buPROPion XL (WELLBUTRIN XL) 300 MG 24 hr tablet, Take 1 tablet by mouth Daily., Disp: , Rfl: 0  •  busPIRone (BUSPAR) 5 MG tablet, Take 1 tablet by mouth 2 (Two) Times a Day., Disp: , Rfl:   •  carvedilol (COREG) 12.5 MG tablet, Take 1 tablet by mouth 2 (Two) Times a Day., Disp: , Rfl:   •  Coenzyme Q10 200 MG tablet, Take 1 tablet/day by mouth Daily., Disp: , Rfl:   •  [employer] Senna Plus 8.6-50 MG per tablet, TAKE 1 TABLET BY MOUTH EVERY DAY, Disp: 30 tablet, Rfl: 2  •  Diclofenac Sodium (VOLTAREN) 1 % gel gel, PLEASE SEE ATTACHED FOR DETAILED DIRECTIONS, Disp: , Rfl:   •  donepezil (ARICEPT) 10 MG tablet, Take 1 tablet by mouth Daily., Disp: , Rfl:   •  exemestane (Aromasin) 25 MG chemo tablet, Take 1 tablet by mouth Daily., Disp: 30 tablet, Rfl: 5  •  famotidine (PEPCID) 40 MG tablet, TAKE 1 TABLET BY MOUTH TWICE A DAY, Disp: 180 tablet, Rfl: 3  •  fenofibrate micronized (LOFIBRA) 67 MG capsule, Take 1 capsule by mouth Daily., Disp: , Rfl:   •  FLUoxetine (PROzac) 20 MG capsule, Take 3 capsules by mouth Daily., Disp: , Rfl:   •  HYDROcodone-acetaminophen (NORCO)  MG per tablet, Take 1 tablet by mouth Every 4 (Four) Hours As Needed for Moderate Pain ., Disp: 12 tablet, Rfl: 0  •  temazepam (RESTORIL) 15 MG capsule, Take 1 capsule by mouth At Night As Needed., Disp: , Rfl:       Allergies:  No Known Allergies      Family History   Problem Relation Age of Onset   • ALS Mother    • Hypertension Father    • Stroke Father    • Malig Hyperthermia Neg Hx      Objective    PHYSICAL EXAMINATION:   Vitals:    04/20/23 1410   BP: 124/72   Pulse: 79   Resp: 20   SpO2: 97%     ECOG 0 - Asymptomatic  General: NAD, well  appearing  Psych: a&o x3, normal mood and affect  Eyes: EOMI, no scleral icterus  ENMT: neck supple without masses or thyromegaly, mucous membranes moist  MSK: normal gait, limited ROM in right shoulders  Lymph nodes: no cervical, supraclavicular or axillary lymphadenopathy  Breast: symmetric, small size, grade 3 ptosis, sternal scar  Right: No visible abnormalities on inspection while seated, with arms raised or hands on hips. No masses, skin changes, or nipple abnormalities.  Left: Well-healed superior curvilinear scar, otherwise no visible abnormalities on inspection while seated, with arms raised or hands on hips.  Scar is soft.  No masses or nipple abnormalities.      Assessment & Plan   Assessment:  82 y.o. F with a diagnosis of left breast cancer: Intermediate grade, invasive ductal carcinoma, ER/MT+, Her2 negative. She underwent left partial mastectomy on 12/20/22, pT1cNx.  Adjuvant radiation was not recommended and the patient declined adjuvant endocrine therapy.    Plan:  -Continue follow-up with Dr. Castellon.  -Follow-up in 9/2023 with mammogram.    Jeannine Ledezma MD      CC:  Miriam Mercado Reyes, MD

## 2023-04-21 PROCEDURE — 93296 REM INTERROG EVL PM/IDS: CPT | Performed by: INTERNAL MEDICINE

## 2023-04-21 PROCEDURE — 93294 REM INTERROG EVL PM/LDLS PM: CPT | Performed by: INTERNAL MEDICINE

## 2023-04-26 ENCOUNTER — LAB (OUTPATIENT)
Dept: LAB | Facility: HOSPITAL | Age: 83
End: 2023-04-26
Payer: MEDICARE

## 2023-04-26 ENCOUNTER — OFFICE VISIT (OUTPATIENT)
Dept: ONCOLOGY | Facility: CLINIC | Age: 83
End: 2023-04-26
Payer: MEDICARE

## 2023-04-26 VITALS
SYSTOLIC BLOOD PRESSURE: 154 MMHG | WEIGHT: 121.3 LBS | DIASTOLIC BLOOD PRESSURE: 80 MMHG | BODY MASS INDEX: 22.9 KG/M2 | TEMPERATURE: 97.1 F | HEIGHT: 61 IN | HEART RATE: 72 BPM | OXYGEN SATURATION: 97 %

## 2023-04-26 DIAGNOSIS — C50.412 MALIGNANT NEOPLASM OF UPPER-OUTER QUADRANT OF LEFT BREAST IN FEMALE, ESTROGEN RECEPTOR POSITIVE: Primary | ICD-10-CM

## 2023-04-26 DIAGNOSIS — C50.412 MALIGNANT NEOPLASM OF UPPER-OUTER QUADRANT OF LEFT BREAST IN FEMALE, ESTROGEN RECEPTOR POSITIVE: ICD-10-CM

## 2023-04-26 DIAGNOSIS — Z17.0 MALIGNANT NEOPLASM OF UPPER-OUTER QUADRANT OF LEFT BREAST IN FEMALE, ESTROGEN RECEPTOR POSITIVE: Primary | ICD-10-CM

## 2023-04-26 DIAGNOSIS — Z17.0 MALIGNANT NEOPLASM OF UPPER-OUTER QUADRANT OF LEFT BREAST IN FEMALE, ESTROGEN RECEPTOR POSITIVE: ICD-10-CM

## 2023-04-26 LAB
ALBUMIN SERPL-MCNC: 4.3 G/DL (ref 3.5–5.2)
ALBUMIN/GLOB SERPL: 2 G/DL (ref 1.1–2.4)
ALP SERPL-CCNC: 53 U/L (ref 38–116)
ALT SERPL W P-5'-P-CCNC: 23 U/L (ref 0–33)
ANION GAP SERPL CALCULATED.3IONS-SCNC: 11.9 MMOL/L (ref 5–15)
AST SERPL-CCNC: 28 U/L (ref 0–32)
BASOPHILS # BLD AUTO: 0.02 10*3/MM3 (ref 0–0.2)
BASOPHILS NFR BLD AUTO: 0.3 % (ref 0–1.5)
BILIRUB SERPL-MCNC: 0.4 MG/DL (ref 0.2–1.2)
BUN SERPL-MCNC: 41 MG/DL (ref 6–20)
BUN/CREAT SERPL: 25.3 (ref 7.3–30)
CALCIUM SPEC-SCNC: 10.1 MG/DL (ref 8.5–10.2)
CHLORIDE SERPL-SCNC: 100 MMOL/L (ref 98–107)
CO2 SERPL-SCNC: 30.1 MMOL/L (ref 22–29)
CREAT SERPL-MCNC: 1.62 MG/DL (ref 0.6–1.1)
DEPRECATED RDW RBC AUTO: 52 FL (ref 37–54)
EGFRCR SERPLBLD CKD-EPI 2021: 31.2 ML/MIN/1.73
EOSINOPHIL # BLD AUTO: 0.11 10*3/MM3 (ref 0–0.4)
EOSINOPHIL NFR BLD AUTO: 1.8 % (ref 0.3–6.2)
ERYTHROCYTE [DISTWIDTH] IN BLOOD BY AUTOMATED COUNT: 13.6 % (ref 12.3–15.4)
GLOBULIN UR ELPH-MCNC: 2.2 GM/DL (ref 1.8–3.5)
GLUCOSE SERPL-MCNC: 96 MG/DL (ref 74–124)
HCT VFR BLD AUTO: 40.9 % (ref 34–46.6)
HGB BLD-MCNC: 12.8 G/DL (ref 12–15.9)
IMM GRANULOCYTES # BLD AUTO: 0.02 10*3/MM3 (ref 0–0.05)
IMM GRANULOCYTES NFR BLD AUTO: 0.3 % (ref 0–0.5)
LYMPHOCYTES # BLD AUTO: 0.87 10*3/MM3 (ref 0.7–3.1)
LYMPHOCYTES NFR BLD AUTO: 13.9 % (ref 19.6–45.3)
MCH RBC QN AUTO: 32.2 PG (ref 26.6–33)
MCHC RBC AUTO-ENTMCNC: 31.3 G/DL (ref 31.5–35.7)
MCV RBC AUTO: 102.8 FL (ref 79–97)
MONOCYTES # BLD AUTO: 0.6 10*3/MM3 (ref 0.1–0.9)
MONOCYTES NFR BLD AUTO: 9.6 % (ref 5–12)
NEUTROPHILS NFR BLD AUTO: 4.65 10*3/MM3 (ref 1.7–7)
NEUTROPHILS NFR BLD AUTO: 74.1 % (ref 42.7–76)
NRBC BLD AUTO-RTO: 0 /100 WBC (ref 0–0.2)
PLATELET # BLD AUTO: 183 10*3/MM3 (ref 140–450)
PMV BLD AUTO: 10.6 FL (ref 6–12)
POTASSIUM SERPL-SCNC: 3.6 MMOL/L (ref 3.5–4.7)
PROT SERPL-MCNC: 6.5 G/DL (ref 6.3–8)
RBC # BLD AUTO: 3.98 10*6/MM3 (ref 3.77–5.28)
SODIUM SERPL-SCNC: 142 MMOL/L (ref 134–145)
WBC NRBC COR # BLD: 6.27 10*3/MM3 (ref 3.4–10.8)

## 2023-04-26 PROCEDURE — 85025 COMPLETE CBC W/AUTO DIFF WBC: CPT

## 2023-04-26 PROCEDURE — 80053 COMPREHEN METABOLIC PANEL: CPT

## 2023-04-26 PROCEDURE — 36415 COLL VENOUS BLD VENIPUNCTURE: CPT

## 2023-04-26 NOTE — PROGRESS NOTES
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    HISTORY OF PRESENT ILLNESS:  The patient is a 82 y.o. year old female who is here for an opinion about the above issue.    History of Present Illness patient seen initially by us in January 2023, 82-year-old lady with multiple cardiac issues including aortic valve replacement tricuspid and mitral valve repair hypertension hypercholesterolemia spinal stenosis with severe pain who felt a mass in her left breast in the shower and brought her to the attention of her primary care doctor.    She had stopped having mammograms 5 years ago because of her age and comorbidities and this prompted diagnostic imaging which revealed An 18 mm abnormality in the upper outer quadrant of the left breast and a biopsy of this showed grade 2 invasive ductal carcinoma strongly ER/NC positive HER2 2+ with negative FISH and Ki-67 of 28%    Patient was referred to Dr. Ledezma And underwent a lumpectomy without sentinel node biopsy on 12/20/2022 the findings of a residual 18 x 12 mm grade 2 ductal carcinoma With 24 mm of intermediate grade DCIS which were resected with clear margins and no sentinel node biopsy was done.    When seen in January 2023 for initial consultation we recommended she take Aromasin considering her comorbidities and otherwise not being a good candidate for any other type of therapy.    As she is now reviewed back today for 4-month follow-up she is accompanied by her daughter.  Patient states that she ultimately elected not to start Aromasin.  She  felt that she would not tolerate it in light of her significant underlying arthritis and multiple medications she takes.  With her multiple comorbidities patient light heartedly states she is not sure what will get her first but she is going to take her chances in terms of breast cancer and not take an AI.  She feels fine with this decision.  She denies other concerns at this time        Past Medical History:   Diagnosis Date   • Aneurysm    • Angioedema    • Aortic valve disease    • Arthritis    • Atrophic kidney    • Bradycardia    • CAD (coronary artery disease)    • Cancer     BREAST   • CKD (chronic kidney disease) stage 4, GFR 15-29 ml/min    • Depression    • Dysphagia    • Essential hypertension    • History of anemia    • History of vitamin D deficiency    • [place] (hard of hearing)    • Hyperlipidemia    • Mitral valve disease    • EMELINA on auto CPAP 2003    Overnight polysomnogram.  Weight 154 pounds.  Severe EMELINA with AHI 51.5 events per hour.  Sleep-related hypoxia present.   • Post lumbar puncture headache 2022   • Pulmonary hypertension    • Secondary hyperparathyroidism of renal origin    • Sick sinus syndrome    • Spinal stenosis       She is  4 para 4 first childbirth was at 16 she did not breast-feed  Menarche was at age 9 she had a hysterectomy at age 37 with 1 ovary removed but started hormone replacement therapy in her mid 40s and was on them at the time of her breast cancer diagnosis couple of months ago and was discontinued.    Family history is completely negative for any malignancies    She is not a current smoker or drinker-smoked for 34 years and stopped in   She has never had an MI or stroke or DVT    She is  and lives with her 61-year-old disabled son    Past Surgical History:   Procedure Laterality Date   • AORTIC VALVE REPAIR/REPLACEMENT  2015    Dr Veliz   • BREAST LUMPECTOMY Left 2022    Procedure: Left ultrasound-guided partial mastectomy;   Surgeon: Jeannine Ledezma MD;  Location: [facility] MAIN OR;  Service: General;  Laterality: Left;   • CARDIAC CATHETERIZATION     • CARDIAC DEFIBRILLATOR PLACEMENT     •  SECTION     • COLONOSCOPY N/A 2019    [facility]   • ENDOSCOPY  2012    Hypertonic lower esophageal sphincter, gastritis. Path: Chronic gastritis, moderate.    • ENDOSCOPY N/A 2019    Web in the upper third of the esophagus. Dilated   • ENDOSCOPY N/A 2020    Procedure: ESOPHAGOGASTRODUODENOSCOPY WITH 56FR MALDONADO DILATATION AND COLD BIOPSIES;  Surgeon: Blayne Cai MD;  Location: [facility] ENDOSCOPY;  Service: Gastroenterology;  Laterality: N/A;  PRE: DYSPHAGIA  POST: SCHATZKI'S RING   • EPIDURAL  2022   • HYSTERECTOMY     • MITRAL VALVE ANNULOPLASTY  2015    Dr Veliz   • TRICUSPID VALVE SURGERY  2015    Dr Veliz        Current Outpatient Medications on File Prior to Visit   Medication Sig Dispense Refill   • aspirin 325 MG tablet Take 1 tablet by mouth Daily. WILL HOLD AS DIRECTED  Indications: Resume on Saturday, 2022.     • atorvastatin (LIPITOR) 20 MG tablet take 1 tablet by mouth at bedtime  0   • bumetanide (BUMEX) 2 MG tablet TAKE 1 TABLET BY MOUTH EVERY DAY 90 tablet 1   • buPROPion XL (WELLBUTRIN XL) 300 MG 24 hr tablet Take 1 tablet by mouth Daily.  0   • busPIRone (BUSPAR) 5 MG tablet Take 1 tablet by mouth 2 (Two) Times a Day.     • carvedilol (COREG) 12.5 MG tablet Take 1 tablet by mouth 2 (Two) Times a Day.     • Coenzyme Q10 200 MG tablet Take 1 tablet/day by mouth Daily.     • [employer] Senna Plus 8.6-50 MG per tablet TAKE 1 TABLET BY MOUTH EVERY DAY 30 tablet 2   • Diclofenac Sodium (VOLTAREN) 1 % gel gel PLEASE SEE ATTACHED FOR DETAILED DIRECTIONS     • donepezil (ARICEPT) 10 MG tablet Take 1 tablet by mouth Daily.     • exemestane (Aromasin) 25 MG chemo tablet Take 1 tablet by mouth Daily. 30 tablet 5   • famotidine (PEPCID) 40 MG tablet TAKE 1 TABLET BY MOUTH TWICE A   "tablet 3   • fenofibrate micronized (LOFIBRA) 67 MG capsule Take 1 capsule by mouth Daily.     • FLUoxetine (PROzac) 20 MG capsule Take 3 capsules by mouth Daily.     • HYDROcodone-acetaminophen (NORCO)  MG per tablet Take 1 tablet by mouth Every 4 (Four) Hours As Needed for Moderate Pain . 12 tablet 0   • temazepam (RESTORIL) 15 MG capsule Take 1 capsule by mouth At Night As Needed.       No current facility-administered medications on file prior to visit.        ALLERGIES:  No Known Allergies     Social History     Socioeconomic History   • Marital status:    • Number of children: 3   Tobacco Use   • Smoking status: Former     Types: Cigarettes     Start date:      Quit date:      Years since quittin.3   • Smokeless tobacco: Never   Vaping Use   • Vaping Use: Never used   Substance and Sexual Activity   • Alcohol use: Not Currently     Comment: Pt partakes occassionally   • Drug use: No   • Sexual activity: Defer        Family History   Problem Relation Age of Onset   • ALS Mother    • Hypertension Father    • Stroke Father    • Malig Hyperthermia Neg Hx         Review of Systems   Constitutional: Positive for fatigue.   Musculoskeletal: Positive for arthralgias, back pain and gait problem.   All other systems reviewed and are negative.     ROS reviewed and updated 23      Objective     Vitals:    23 1450   BP: 154/80   Pulse: 72   Temp: 97.1 °F (36.2 °C)   TempSrc: Temporal   SpO2: 97%   Weight: 55 kg (121 lb 4.8 oz)   Height: 154.9 cm (60.98\")   PainSc: 0-No pain         2023     2:49 PM   Current Status   ECOG score 2       Physical Exam    CONSTITUTIONAL:  Vital signs reviewed.  No distress, looks comfortable.  EYES:  Conjunctivae and lids unremarkable.  PERRLA  EARS,NOSE,MOUTH,THROAT:  Ears and nose appear unremarkable.  Lips, teeth, gums appear unremarkable.  RESPIRATORY:  Normal respiratory effort.  Lungs clear to auscultation bilaterally.  BREASTS: Right breast is " benign left breast shows a well-healed lumpectomy in the upper outer quadrant  CARDIOVASCULAR:  Normal S1, S2.  + murmurs rubs or gallops,  pacemaker in place.  No significant lower extremity edema.  GASTROINTESTINAL: Abdomen appears unremarkable.  Nontender.  No hepatomegaly.  No splenomegaly.  LYMPHATIC:  No cervical, supraclavicular, axillary lymphadenopathy.  SKIN:  Warm.  No rashes.  PSYCHIATRIC:  Normal judgment and insight.  Normal mood and affect.    I have reexamined the patient and the results are consistent with the previously documented exam except as updated. Linnette Kim, TORSTEN       RECENT LABS:  Results from last 7 days   Lab Units 04/26/23  1425   WBC 10*3/mm3 6.27   NEUTROS ABS 10*3/mm3 4.65   HEMOGLOBIN g/dL 12.8   HEMATOCRIT % 40.9   PLATELETS 10*3/mm3 183               Final Diagnosis   1. Left Breast, 1:00 o'clock, 5 cm from Nipple, Ultrasound-Guided Biopsies for a Mass:   INVASIVE MAMMARY       CARCINOMA, NO SPECIAL TYPE (INVASIVE DUCTAL CARCINOMA).  A. Histologic grade:  Linn histologic score II  (tubules=3, nuclei=3, mitosis-1).  B. 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                 C. Invasive carcinoma involves multiple tissue cores with the largest contiguous focus measuring up to 11.0 mm.               D. No lymphovascular nor perineural invasion identified.       Swm/kds    Electronically signed by Bridget Fisher MD on 10/6/2022 at 1513   Synoptic Checklist   Breast Biomarker Reporting Template  Protocol posted: 6/22/2022  BREAST: BIOMARKER REPORTING TEMPLATE - All Specimens  Test(s) Performed     Estrogen Receptor (ER) Status  Positive (greater than 10% of cells demonstrate nuclear positivity)    Percentage of Cells with Nuclear Positivity  %    Average Intensity of Staining  Strong    Test Type  Food and Drug Administration (FDA) cleared (test / vendor): [email or web address]     Primary Antibody  SP1    Scoring System  Po    Proportion Score  5    Intensity Score  3    Total Po Score  8    Test(s) Performed     Progesterone Receptor (PgR) Status  Positive    Percentage of Cells with Nuclear Positivity  %    Average Intensity of Staining  Strong    Test Type  Food and Drug Administration (FDA) cleared (test / vendor): [employer]    Primary Antibody  1E2    Scoring System  Po    Proportion Score  5    Intensity Score  3    Total Po Score  8    Test(s) Performed     HER2 by Immunohistochemistry  Equivocal (Score 2+)    Percentage of Cells with Uniform Intense Complete Membrane Staining  0 %   Test Type  Food and Drug Administration (FDA) cleared (test / vendor): [employer]    Primary Antibody  4B5    Test(s) Performed  Ki-67    Ki-67 Percentage of Positive Nuclei  28 %                Synoptic Checklist   INVASIVE CARCINOMA OF THE BREAST: Resection  8th Edition - Protocol posted: 9/21/2022  INVASIVE CARCINOMA OF THE BREAST: COMPLETE EXCISION - All Specimens  SPECIMEN   Procedure  Excision (less than total mastectomy)    Specimen Laterality  Left    TUMOR   Tumor Site  Upper outer quadrant    Histologic Type  Invasive carcinoma of no special type (ductal)    Histologic Grade (Heather Histologic Score)     Glandular (Acinar) / Tubular Differentiation  Score 3    Nuclear Pleomorphism  Score 2    Mitotic Rate  Score 2    Overall Grade  Grade 2 (scores of 6 or 7)    Tumor Size  Greatest dimension of largest invasive focus (Millimeters): 18 mm   Additional Dimension (Millimeters)  12 mm     10 mm   Tumor Focality  Single focus of invasive carcinoma    Ductal Carcinoma In Situ (DCIS)  Present      Negative for extensive intraductal component (EIC)    Size (Extent) of DCIS  Estimated size (extent) of DCIS is at least (Millimeters): 24 mm   Architectural Patterns  Cribriform    Nuclear Grade  Grade II (intermediate)    Necrosis  Not identified    Lobular Carcinoma In Situ (LCIS)   Not identified    Lymphovascular Invasion  Not identified    Dermal Lymphovascular Invasion  Not identified    Microcalcifications  Not identified    Treatment Effect in the Breast  No known presurgical therapy    MARGINS   Margin Status for Invasive Carcinoma  All margins negative for invasive carcinoma    Distance from Invasive Carcinoma to Closest Margin  12 mm   Closest Margin(s) to Invasive Carcinoma  Posterior    Distance from Invasive Carcinoma to Anterior Margin  15 mm   Distance from Invasive Carcinoma to Posterior Margin  12 mm   Distance from Invasive Carcinoma to Superior Margin  20 mm   Distance from Invasive Carcinoma to Inferior Margin  16 mm   Distance from Invasive Carcinoma to Medial Margin  32 mm   Distance from Invasive Carcinoma to Lateral Margin  23 mm   Margin Status for DCIS  All margins negative for DCIS    Distance from DCIS to Closest Margin  10 mm   Closest Margin(s) to DCIS  Posterior    Distance from DCIS to Anterior Margin  15 mm   Distance from DCIS to Posterior Margin  10 mm   Distance from DCIS to Superior Margin  20 mm   Distance from DCIS to Inferior Margin  27 mm   Distance from DCIS to Medial Margin  26 mm   Distance from DCIS to Lateral Margin  23 mm   REGIONAL LYMPH NODES   Regional Lymph Node Status  Not applicable (no regional lymph nodes submitted or found)    PATHOLOGIC STAGE CLASSIFICATION (pTNM, AJCC 8th Edition)   Reporting of pT, pN, and (when applicable) pM categories is based on information available to the pathologist at the time the report is issued. As per the AJCC (Chapter 1, 8th Ed.) it is the managing physician's responsibility to establish the final pathologic stage based upon all pertinent information, including but potentially not limited to this pathology report.   pT Category  pT1c    pN Category  pN not assigned (no nodes submitted or found)         Estrogen Receptor (ER) Status  Positive (greater than 10% of cells demonstrate nuclear positivity)     Percentage of Cells with Nuclear Positivity  %         Progesterone Receptor (PgR) Status  Positive    Percentage of Cells with Nuclear Positivity  %         HER2 (by immunohistochemistry)  Equivocal (Score 2+)         HER2 (by in situ hybridization)  Negative (not amplified)         Ki-67 Percentage of Positive Nuclei  28 %            Assessment & Plan   1.pT1cNx grade 2 invasive ductal carcinoma left breast strongly ER/OR positive HER2 2+ with negative FISH postlumpectomy  · 1/2023 recommended Aromasin.  · 4/2023 patient states she never started the Aromasin, determining that she would not tolerate it if she tried it and willing to take her chances in terms of breast cancer recurrence in light of her multiple other comorbidities.    2.  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  CKD 3    4.  Hypertension hypercholesterolemia    5.  Spinal stenosis with severe back pain being treated at the pain clinic    6.  Depression on Wellbutrin and Prozac    Plan    1.  Patient declining any type of adjuvant therapy for her breast cancer.  Never started Aromasin.  2.  We will keep her follow-up with Dr. Castellon scheduled in July with repeat CBC, CMP.  Determine at that point appropriate follow-up in light of her not taking any oral therapy.

## 2023-07-21 PROCEDURE — 93296 REM INTERROG EVL PM/IDS: CPT | Performed by: INTERNAL MEDICINE

## 2023-07-21 PROCEDURE — 93294 REM INTERROG EVL PM/LDLS PM: CPT | Performed by: INTERNAL MEDICINE

## 2023-08-01 ENCOUNTER — OFFICE VISIT (OUTPATIENT)
Dept: GASTROENTEROLOGY | Facility: CLINIC | Age: 83
End: 2023-08-01
Payer: MEDICARE

## 2023-08-01 VITALS — HEIGHT: 61 IN | TEMPERATURE: 96.8 F | BODY MASS INDEX: 22.51 KG/M2 | WEIGHT: 119.2 LBS

## 2023-08-01 DIAGNOSIS — K59.03 DRUG-INDUCED CONSTIPATION: Primary | ICD-10-CM

## 2023-08-01 PROCEDURE — 99213 OFFICE O/P EST LOW 20 MIN: CPT | Performed by: NURSE PRACTITIONER

## 2023-08-03 ENCOUNTER — OFFICE VISIT (OUTPATIENT)
Dept: SURGERY | Facility: CLINIC | Age: 83
End: 2023-08-03
Payer: MEDICARE

## 2023-08-03 VITALS
SYSTOLIC BLOOD PRESSURE: 124 MMHG | OXYGEN SATURATION: 97 % | HEART RATE: 78 BPM | DIASTOLIC BLOOD PRESSURE: 78 MMHG | BODY MASS INDEX: 22.47 KG/M2 | HEIGHT: 61 IN | WEIGHT: 119 LBS

## 2023-08-03 DIAGNOSIS — K43.9 HERNIA OF ABDOMINAL WALL: Primary | ICD-10-CM

## 2023-08-03 PROCEDURE — 99214 OFFICE O/P EST MOD 30 MIN: CPT | Performed by: NURSE PRACTITIONER

## 2023-08-03 PROCEDURE — 3078F DIAST BP <80 MM HG: CPT | Performed by: NURSE PRACTITIONER

## 2023-08-03 PROCEDURE — 1160F RVW MEDS BY RX/DR IN RCRD: CPT | Performed by: NURSE PRACTITIONER

## 2023-08-03 PROCEDURE — 1159F MED LIST DOCD IN RCRD: CPT | Performed by: NURSE PRACTITIONER

## 2023-08-03 PROCEDURE — 3074F SYST BP LT 130 MM HG: CPT | Performed by: NURSE PRACTITIONER

## 2023-08-25 NOTE — PAYOR COMM NOTE
"Leena Gotti (81 y.o. Female)     DC SUMMARY FOR [id number]  DC TO HOME 5/4    CONTACT F# [phone]              Date of Birth   1940    Social Security Number       Address   [address] [facility] [address]    Home Phone   [phone]    MRN   [id number]       [facility]    Marital Status                               Admission Date   5/3/22    Admission Type   Emergency    Admitting Provider   Lauryn Lisa MD    Attending Provider       Department, Room/Bed   [facility] OBSERVATION, 116/1       Discharge Date   5/4/2022    Discharge Disposition   Home or Self Care    Discharge Destination                               Attending Provider: (none)   Allergies: No Known Allergies    Isolation: None   Infection: None   Code Status: Prior   Advance Care Planning Activity    Ht: 160 cm (63\")   Wt: 62.6 kg (138 lb)    Admission Cmt: None   Principal Problem: None                Active Insurance as of 5/3/2022     Primary Coverage     Payor Plan Insurance Group Employer/Plan Group    [facility]      Payor Plan Address Payor Plan Phone Number Payor Plan Fax Number Effective Dates    [address] [address] [phone]  5/3/2022 - None Entered    [facility] [id number]       Subscriber Name Subscriber Birth Date Member ID       NELLYLEENA STOVER 1940 [id number]           Secondary Coverage     Payor Plan Insurance Group Employer/Plan Group    ANTH BLUE CROSS Counts include 234 beds at the [facility] SUPP [id number]     Payor Plan Address Payor Plan Phone Number Payor Plan Fax Number Effective Dates    [address]   11/1/2016 - None Entered    [facility] [id number]       Subscriber Name Subscriber Birth Date Member ID       NELLYLEENA RAMOS JIMENA 1940 [id number]                 Emergency Contacts      (Rel.) Home Phone Work Phone Mobile Phone    Weilage,Chena (Daughter) [phone]    nelly mingo (Son) " [facility]   Electrophysiology  Jan Lila, APRN-CNP  Attending EP: Dr. Pearl Colon    Date: 9/7/2023  I had the privilege of visiting Sydnie Cervantes in the office. Chief Complaint:   Chief Complaint   Patient presents with    Follow-Up from Hospital     No Cardiac Symptoms     History of Present Illness: History obtained from patient and medical record. Sydnie Cervantes is 78 y.o. female with a past medical history of pAF, HTN, and aortic stenosis s/p TAVR    In August of 2023, pt presented with fall and weakness. She was found to have hemoglobin of 6.7 and large hematoma of the gluteal muscles.     -Interval history: Today, Sydnie Cervantes is being seen for routine follow up. His grand daughter is present for the visit (works for Dr. Felicitas Cotto' office). She is doing well. She remains in sinus rhythm. No known recurrence of atrial fibrillation. We discussed using amiodarone to prevent recurrence AF. No further falls. Denies having chest pain, palpitations, shortness of breath, orthopnea/PND, cough, or dizziness at the time of this visit. With regard to medication therapy the patient has been compliant with prescribed regimen. They have tolerated therapy to date. Allergies: Allergies   Allergen Reactions    Bactrim [Sulfamethoxazole-Trimethoprim] Hallucinations    Macrobid [Nitrofurantoin] Hallucinations     Home Medications:  Prior to Visit Medications    Medication Sig Taking?  Authorizing Provider   ciprofloxacin (CIPRO) 500 MG tablet Take 1 tablet by mouth daily for 7 days Yes CAROLYN Ash CNP   amiodarone (CORDARONE) 200 MG tablet Take 1 tablet by mouth daily Yes Historical Provider, MD   potassium chloride (KLOR-CON M) 10 MEQ extended release tablet Take 1 tablet by mouth daily Yes Eduardo Taylor MD   pantoprazole (PROTONIX) 20 MG tablet Take 1 tablet by mouth every morning (before breakfast) Yes Eduardo Taylor MD   furosemide (LASIX) 20 MG tablet Take 1 tablet by mouth [phone] -- --            Discharge Summary    No notes of this type exist for this encounter.       OBSERVATION PROGRESS/DISCHARGE SUMMARY     Date of Admission: 5/3/2022   LOS: 0 days   PCP: Reyes, Miriam Mercado, MD     Final Diagnosis Pneumocephalus following epidural injection           Subjective       No acute events overnight.  Patient states that her headache has resolved at this time.  She reports she is having pain in the left arm that is secondary to her arthritis.  Patient states that it is usually managed with her pain medication and has started physical therapy outpatient for it as well.  Patient denies any visual changes.  Denies numbness and tingling.  Denies weakness.  Denies fevers and chills.  Denies abdominal pain, nausea, vomiting, and diarrhea.     Hospital Outcome:   81-year-old female with a history of arthritis and chronic pain, hypertension, sick sinus syndrome status post pacemaker, VHD status post aortic valve replacement, GERD, hyperlipidemia, and dementia who presents to [facility] ER with a headache following a spinal epidural injection.  ER evaluation was significant for CT head revealing pneumocephalus.  ER physician spoke with neurosurgery who recommended observation overnight.  Patient received IV caffeine in the ER with reports of him provement of the headache.  A repeat head CT was performed that showed improvement of the pneumocephalus.  Patient was seen and evaluated by neurosurgery who recommends no further work-up at this time as patient has remained asymptomatic.  Advised patient should her headache recur she should return to the ER and can have a blood patch performed.  I have spoken with the patient regarding the results as well as the plan for discharge who endorses understanding and is in agreement at this time.     ROS:  General: no fevers, chills  Respiratory: no cough, dyspnea  Cardiovascular: no chest pain, palpitations  Abdomen: No abdominal pain,  nausea, vomiting, or diarrhea  Neurologic: No focal weakness           Objective      Physical Exam:  I have reviewed the vital signs.  Temp:  [97.9 °F (36.6 °C)-98.2 °F (36.8 °C)] 98.1 °F (36.7 °C)  Heart Rate:  [72-90] 90  Resp:  [12-20] 16  BP: (139-180)/() 139/82  General Appearance:  81-year-old female, well-nourished, in no acute distress on room air  Head:     Normocephalic, atraumatic  Eyes:     Sclerae anicteric  Neck:     Supple, no mass  Lungs: Clear to auscultation bilaterally, respirations unlabored  Heart: Regular rate and rhythm, S1 and S2 normal, no murmur, rub or gallop  Abdomen:  Soft, non-tender, bowel sounds active, nondistended  Extremities: Some limited range of motion of the left upper extremity secondary to chronic pain, no clubbing, cyanosis, or edema to lower extremities  Pulses:  2+ and symmetric in distal lower extremities  Skin: No rashes   Neurologic: Oriented x3, left upper extremity strength 3 out of 5, otherwise full strength intact in other extremities     Results Review:    I have reviewed the labs, radiology results and diagnostic studies.          Results from last 7 days   Lab Units 05/04/22  0203   WBC 10*3/mm3 7.28   HEMOGLOBIN g/dL 12.3   HEMATOCRIT % 38.0   PLATELETS 10*3/mm3 213            Results from last 7 days   Lab Units 05/04/22  0203 05/03/22  2217   SODIUM mmol/L 141 144   POTASSIUM mmol/L 3.5 3.9   CHLORIDE mmol/L 104 103   CO2 mmol/L 24.0 28.0   BUN mg/dL 19 21   CREATININE mg/dL 1.18* 1.30*   CALCIUM mg/dL 9.3 9.9   BILIRUBIN mg/dL  --  0.4   ALK PHOS U/L  --  55   ALT (SGPT) U/L  --  15   AST (SGOT) U/L  --  22   GLUCOSE mg/dL 89 94               Imaging Results (Last 24 Hours)      Procedure Component Value Units Date/Time     CT Head Without Contrast [[id number]] Collected: 05/03/22 2251       Updated: 05/03/22 2302     Narrative:       CT HEAD WITHOUT CONTRAST     HISTORY: New onset headache. Epidural injection today.     COMPARISON: 02/08/2021      TECHNIQUE: Axial CT imaging was obtained through the brain. No IV  contrast was administered.     FINDINGS:  No acute intracranial hemorrhage is seen, although the patient is noted  to have pneumocephalus. There is diffuse atrophy. There is  periventricular and deep white matter microangiopathic change. There is  no midline shift or mass effect. There is some minimal partial  opacification of the right mastoid air cells. Paranasal sinuses appear  clear.        Impression:       Pneumocephalus following epidural injection.     Radiation dose reduction techniques were utilized, including automated  exposure control and exposure modulation based on body size.     This report was finalized on 5/3/2022 10:59 PM by Dr. Naty Edwards M.D.                I have reviewed the medications.  ---------------------------------------------------------------------------------------------        Assessment/Plan      Assessment/Problem List    Post lumbar puncture headache        Plan:     Headache following spinal epidural injection  -CT head revealed pneumocephalus  -Patient received IV caffeine and IV fluids in the ER  -Patient placed on bed rest and instructed to lie flat overnight  -Patient reports resolution of headache at this time  -Neurosurgery consulted, recommended to patient should headache recur can return to the ER for possible blood patch  -Repeat head CT revealed interval slight decrease in the pneumocephalus.  -Patient was ambulated with nursing staff with no recurrence of headache.      Chronic pain/arthritis  -Patient reports pain in the left upper extremity that she states she has been dealing with over the past several months.  No acute changes  -Continue home pain medication  -Continue outpatient follow-up and physical therapy     Hypertension/hyperlipidemia/GERD  -Chronic conditions, no acute issues  -Continue home medications as prescribed     Valvular heart disease, sick sinus syndrome  -Status post  aortic valve replacement, tricuspid valve repair, mitral valve repair  -Status post pacemaker     Anxiety and depression/dementia  -Chronic conditions, no acute issues  -Continue home medications as prescribed     Disposition: Home     Follow-up after Discharge: PCP     This note will serve as a discharge summary.     I wore an face mask, eye protection, and gloves during this patient encounter. Patient also wearing a surgical mask. Hand hygeine performed before and after seeing the patient.        Yvette Blanc PA-C 05/04/22 18:14 EDT                                 Cosigned by: Lauryn Lisa MD at 05/05/22 0029

## 2023-09-08 ENCOUNTER — HOSPITAL ENCOUNTER (OUTPATIENT)
Dept: MAMMOGRAPHY | Facility: HOSPITAL | Age: 83
Discharge: HOME | End: 2023-09-08
Admitting: SURGERY
Payer: MEDICARE

## 2023-09-08 DIAGNOSIS — Z12.31 ENCOUNTER FOR SCREENING MAMMOGRAM FOR MALIGNANT NEOPLASM OF BREAST: ICD-10-CM

## 2023-09-08 PROCEDURE — 77063 BREAST TOMOSYNTHESIS BI: CPT

## 2023-09-08 PROCEDURE — 77067 SCR MAMMO BI INCL CAD: CPT

## 2023-09-20 ENCOUNTER — OFFICE VISIT (OUTPATIENT)
Dept: SURGERY | Facility: CLINIC | Age: 83
End: 2023-09-20
Payer: MEDICARE

## 2023-09-20 VITALS
HEIGHT: 61 IN | HEART RATE: 76 BPM | SYSTOLIC BLOOD PRESSURE: 122 MMHG | WEIGHT: 121 LBS | OXYGEN SATURATION: 98 % | BODY MASS INDEX: 22.84 KG/M2 | DIASTOLIC BLOOD PRESSURE: 80 MMHG | RESPIRATION RATE: 16 BRPM

## 2023-09-20 DIAGNOSIS — C50.412 MALIGNANT NEOPLASM OF UPPER-OUTER QUADRANT OF LEFT BREAST IN FEMALE, ESTROGEN RECEPTOR POSITIVE: Primary | ICD-10-CM

## 2023-09-20 DIAGNOSIS — Z17.0 MALIGNANT NEOPLASM OF UPPER-OUTER QUADRANT OF LEFT BREAST IN FEMALE, ESTROGEN RECEPTOR POSITIVE: Primary | ICD-10-CM

## 2023-09-20 PROCEDURE — 1160F RVW MEDS BY RX/DR IN RCRD: CPT | Performed by: SURGERY

## 2023-09-20 PROCEDURE — 3074F SYST BP LT 130 MM HG: CPT | Performed by: SURGERY

## 2023-09-20 PROCEDURE — 99213 OFFICE O/P EST LOW 20 MIN: CPT | Performed by: SURGERY

## 2023-09-20 PROCEDURE — 1159F MED LIST DOCD IN RCRD: CPT | Performed by: SURGERY

## 2023-09-20 PROCEDURE — 3079F DIAST BP 80-89 MM HG: CPT | Performed by: SURGERY

## 2023-09-20 NOTE — LETTER
September 20, 2023       No Recipients    Patient: Carly Gotti   YOB: 1940   Date of Visit: 9/20/2023     Dear Miriam Mercado Reyes, MD:       Thank you for referring Carly Gotti to me for evaluation. Below are the relevant portions of my assessment and plan of care.    If you have questions, please do not hesitate to call me. I look forward to following Carly along with you.         Sincerely,        Jeannine Ledezma MD        CC:   No Recipients    Jeannine Ledezma MD  09/20/23 1148  Sign when Signing Visit  [facility]     Referring Provider: Miriam Mercado Reyes, MD     Chief complaint: Routine follow up breast cancer      Subjective   HPI:   10/21/2022:  Ms. Carly Gotti is a 83 yo woman, seen at the request of Dr. Miriam Reyes, for a new diagnosis of left breast cancer. The patient first noticed a lump in her outer left breast a few months ago. She believes it has grown since she first noticed it, but denies any associated pain. Diagnostic imaging showed a 2 cm mass and subsequent biopsy showed invasive ductal carcinoma. Her work-up is detailed in the oncologic history below. She denies any prior history of abnormal mammograms or breast biopsies. She has been on estrogen only hormone replacement therapy for about 40 years.     She has a past history of a bioprosthetic aortic valve replacement and mitral and tricuspid valve repairs. She also has a history of pulmonary hypertension and a pacemaker placed for sick sinus syndrome, followed by cardiology. She is not on any anticoagulation, but takes an aspirin 325 mg. Today in the office she is having some involuntary movements and tremors. She says that this happened before in the summer and she was evaluated in the ER. Her PCP is aware and she is currently on medication to help it. They believe it is related to stress/anxiety. She denies any family history of breast or ovarian cancer.     12/9/2022:  At her last  visit, we decided on a partial mastectomy. She has seen cardiology and was determined to be of acceptable risk for surgery. She returns today because she is concerned about the risk of needing a second surgery with breast conservation and is wondering about a mastectomy.     1/26/2023:  She underwent left partial mastectomy on 12/20/22. See surgery & pathology details below in oncologic history. She has been recovering well and has no complaints. She has already seen Dr. Castellon postoperatively and discussed starting exemestane because of her history of arthritis. They also discussed having a low threshold for stopping it should she have any side effects.     4/20/2023:  She returns today for scheduled follow-up.  After her last visit she saw Dr. Wallace and she did not recommend any adjuvant radiation.  Ms. Gotti ultimately decided not to start the exemestane.  She denies any new breast related complaints.  She was joined today in clinic by her daughter.     8/3/2023, Seen by TORSTEN Desir:   Patient presenting to the office today with concerns of a bump in left groin that comes and goes for the past 6 months. It does not hurt when it is there.  She first felt it when in the shower.  Sometimes its little and sometimes it is as large as a golf ball.  Can come and go in the same day. No change in BMs/  She suffers from chronic constipation and urinary incontinence and wears a pad.  No nausea, vomiting, fever or diarrhea or abdominal pain.    9/20/2023, Interval History:  She saw Almas last month complaining of a left groin lump.  Almas thought that it may be a hernia and ordered a CT scan, however crystal Gotti was unable to get this completed.  The lump still comes and goes.  It is not painful.  She denies any new breast related complaints.  She had a benign screening mammogram prior to this appointment.      Oncology/Hematology History   Malignant neoplasm of upper-outer quadrant of left breast in female,  estrogen receptor positive   12/4/2017 Imaging    Screening MMG with J Carlos ( [facility]):   The parenchyma of both breasts remains stable. No suspicious mass, calcification nor adenopathy has developed. Scattered fibroglandular densities.   BI-RADS 2: Benign      9/5/2022 Initial Diagnosis    Malignant neoplasm of upper-outer quadrant of left breast in female, estrogen receptor positive (HCC)     9/6/2022 Imaging    Bilateral Diagnostic MMG with J Carlos & Left Breast US ( [facility]):  MMG:  Scattered fibroglandular densities. A triangular skin marker overlies the site of palpable concern in the middle third upper outer quadrant of the left breast. At this location there is an underlying partially obscured and partially irregular mass that measures on the order of 1.8 cm in greatest dimension. I see no suspicious calcifications or areas of architectural distortion in either breast. There is no evidence for skin thickening, nipple retraction or axillary adenopathy.   US:   At the 1-o'clock position on the order of 5 cm from the nipple corresponding to the site of palpable concern there is an irregular 2.0 cm x 1.5 cm x 1.5 cm hypoechoic mass with detectable internal vascularity. No evidence for left axillary adenopathy is appreciated.   BI-RADS 5: Highly suggestive of malignancy      10/5/2022 Biopsy    Left Breast, US-Guided Biopsy ( [facility]):    1. Left Breast, 1:00 o'clock, 5 cm from Nipple, Ultrasound-Guided Biopsies for a Mass:   INVASIVE MAMMARY CARCINOMA, NO SPECIAL TYPE (INVASIVE DUCTAL CARCINOMA).  A. Histologic grade:  Heather histologic score II  (tubules=3, nuclei=3, mitosis-1).  B. 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                 C. Invasive carcinoma involves multiple tissue cores with the largest contiguous focus measuring up to 11.0 mm.               D. No lymphovascular nor perineural invasion identified.      ER+ (%,  strong)   MI+ (%, strong)   Her2 negative (IHC 2+; FISH ratio 1.5, copy #4.8)  Ki-67 28%    -Barbell-shaped metallic clip at the posterior superior margin of the biopsied mass.      12/20/2022 Surgery    Left ultrasound-guided partial mastectomy    1. Left Breast, Oriented Partial Mastectomy (36 grams): INVASIVE MAMMARY CARCINOMA, NO SPECIAL TYPE (INVASIVE DUCTAL CARCINOMA).               A. Tumor site: 1 o'clock, upper outer quadrant.               B. Histologic grade: Heather histologic score II (tubules = 3, nuclei = 2, mitosis = 2).               C. Tumor size: 18 x 12 x 10 mm.               D. Ductal carcinoma in situ (DCIS): Present, negative for extensive intraductal component.                             1. Extent of DCIS: DCIS is associated with the invasive carcinoma at the periphery, spanning four breast slices, approximately 24 mm from medial to lateral.  2. Architectural patterns: Cribriform and involving an intraductal papilloma.               3. Nuclear grade: Intermediate.               E. Tumor extent: Overlying skin is uninvolved by in situ and invasive carcinoma.               F. No lymphovascular nor perineural invasion identified.               H. Margins: Uninvolved by invasive carcinoma and DCIS.                            1. Invasive carcinoma comes to within 4 mm of the closest posterior margin, 8 mm from the inferior margin, 15 mm from the anterior and superior margins, 18 mm from the lateral margin and 24 mm from the medial margin of resection in specimen 1.  2. DCIS comes to within 2 mm of the original posterior margin, 15 mm from the anterior and superior margins, 18 mm from the medial and lateral margins and 19 mm from the inferior of resection in specimen 1.  I. No lymph nodes submitted.  J. Hormone receptor status: ER (%), MI (%), HER2 2+ by IHC, negative by FISH, Ki-67 28% (performed on prior biopsy [id number], slides reviewed).  L. Pathologic stage: pT1c, NX.      2. Left Breast, Additional Medial, Inferior and Posterior Margins, Oriented Excision:               A. Benign breast tissue with small radial scar and fibroadenomatoid change.               B. No atypical hyperplasia, in situ nor invasive carcinoma identified.               C. Final medial, inferior and posterior margins free by and additional 8 mm.     3. Left Breast, Additional Superior and Lateral Margins, Oriented Excision:               A. Benign unremarkable breast tissue.               B. Final superior and lateral margins free by an additional 5 mm.     1/18/2023 Cancer Staged    Staging form: Breast, AJCC 8th Edition  - Pathologic: Stage Unknown (pT1c, pNX, G2, ER+, SC+, HER2-) - Signed by Sarkis Castellon MD on 1/18/2023 9/8/2023 Imaging    Screening MMG with [facility] ([facility]):  Scattered fibroglandular densities are seen throughout both breasts in a pattern which is unchanged. Postsurgical change in the left breast associated with prior breast conservation therapy is noted. I see no new or dominant masses, areas of architectural distortion or skin thickening. There is no evidence for axillary lymphadenopathy or nipple retraction.  BI-RADS 2: Benign.         Review of Systems:  See interval history.       Medications:    Current Outpatient Medications:   •  aspirin 325 MG tablet, Take 1 tablet by mouth Daily. WILL HOLD AS DIRECTED  Indications: Resume on Saturday, 12/24/2022., Disp: , Rfl:   •  atorvastatin (LIPITOR) 20 MG tablet, take 1 tablet by mouth at bedtime, Disp: , Rfl: 0  •  bumetanide (BUMEX) 2 MG tablet, TAKE 1 TABLET BY MOUTH EVERY DAY, Disp: 90 tablet, Rfl: 1  •  buPROPion XL (WELLBUTRIN XL) 300 MG 24 hr tablet, Take 1 tablet by mouth Daily., Disp: , Rfl: 0  •  busPIRone (BUSPAR) 5 MG tablet, Take 1 tablet by mouth 2 (Two) Times a Day., Disp: , Rfl:   •  carvedilol (COREG) 12.5 MG tablet, Take 1 tablet by mouth 2 (Two) Times a Day., Disp: , Rfl:   •  COENZYME Q-10 PO, Take  by mouth.,  Disp: , Rfl:   •  Coenzyme Q10 200 MG tablet, Take 1 tablet/day by mouth Daily., Disp: , Rfl:   •  [employer] Senna Plus 8.6-50 MG per tablet, TAKE 1 TABLET BY MOUTH EVERY DAY, Disp: 30 tablet, Rfl: 2  •  Diclofenac Sodium (VOLTAREN) 1 % gel gel, PLEASE SEE ATTACHED FOR DETAILED DIRECTIONS, Disp: , Rfl:   •  donepezil (ARICEPT) 10 MG tablet, Take 1 tablet by mouth Daily., Disp: , Rfl:   •  famotidine (PEPCID) 40 MG tablet, TAKE 1 TABLET BY MOUTH TWICE A DAY, Disp: 180 tablet, Rfl: 3  •  fenofibrate micronized (LOFIBRA) 67 MG capsule, Take 1 capsule by mouth Daily., Disp: , Rfl:   •  FLUoxetine (PROzac) 20 MG capsule, Take 3 capsules by mouth Daily., Disp: , Rfl:   •  HYDROcodone-acetaminophen (NORCO)  MG per tablet, Take 1 tablet by mouth Every 4 (Four) Hours As Needed for Moderate Pain ., Disp: 12 tablet, Rfl: 0  •  ondansetron ODT (ZOFRAN-ODT) 8 MG disintegrating tablet, Place 1 tablet on the tongue Every 6 (Six) Hours As Needed for Nausea or Vomiting., Disp: 120 tablet, Rfl: 3  •  temazepam (RESTORIL) 15 MG capsule, Take 1 capsule by mouth At Night As Needed., Disp: , Rfl:       Allergies:  No Known Allergies      Family History   Problem Relation Age of Onset   • ALS Mother    • Hypertension Father    • Stroke Father    • Malig Hyperthermia Neg Hx        Objective   PHYSICAL EXAMINATION:   Vitals:    09/20/23 1136   BP: 122/80   Pulse: 76   Resp: 16   SpO2: 98%     ECOG 0 - Asymptomatic  General: NAD, well appearing  Psych: a&o x3, normal mood and affect  Eyes: EOMI, no scleral icterus  ENMT: neck supple without masses or thyromegaly, mucous membranes moist  MSK: normal gait, limited ROM in right shoulders  Lymph nodes: no cervical, supraclavicular or axillary lymphadenopathy  Breast: symmetric, small size, grade 3 ptosis, sternal scar  Right: No visible abnormalities on inspection while seated, with arms raised or hands on hips. No masses, skin changes, or nipple abnormalities.  Left: Well-healed superior  curvilinear scar, otherwise no visible abnormalities on inspection while seated, with arms raised or hands on hips.  Scar is soft.  No masses or nipple abnormalities.    I have independently reviewed her imaging and here are my findings:   Expected left breast postsurgical changes.  No suspicious findings in either breast on mammogram.      Assessment & Plan   Assessment:  83 y.o. F with a diagnosis of left breast cancer: Intermediate grade, invasive ductal carcinoma, ER/VT+, Her2 negative. She underwent left partial mastectomy on 12/20/22, pT1cNx.  Adjuvant radiation was not recommended and the patient declined adjuvant endocrine therapy.    Plan:  -Continue follow-up with Dr. Castellon.  -Follow-up in 6 months with NP for clinical exam.  -She still would like to have the left groin lump evaluated, so we will help her reschedule the CT scan.    Jeannine Ledezma MD      CC:  Miriam Mercado Reyes, MD

## 2023-09-20 NOTE — PROGRESS NOTES
[facility]     Referring Provider: Miriam Mercado Reyes, MD     Chief complaint: Routine follow up breast cancer      Subjective   HPI:   10/21/2022:  Ms. Carly Gotti is a 81 yo woman, seen at the request of Dr. Miriam Reyes, for a new diagnosis of left breast cancer. The patient first noticed a lump in her outer left breast a few months ago. She believes it has grown since she first noticed it, but denies any associated pain. Diagnostic imaging showed a 2 cm mass and subsequent biopsy showed invasive ductal carcinoma. Her work-up is detailed in the oncologic history below. She denies any prior history of abnormal mammograms or breast biopsies. She has been on estrogen only hormone replacement therapy for about 40 years.     She has a past history of a bioprosthetic aortic valve replacement and mitral and tricuspid valve repairs. She also has a history of pulmonary hypertension and a pacemaker placed for sick sinus syndrome, followed by cardiology. She is not on any anticoagulation, but takes an aspirin 325 mg. Today in the office she is having some involuntary movements and tremors. She says that this happened before in the summer and she was evaluated in the ER. Her PCP is aware and she is currently on medication to help it. They believe it is related to stress/anxiety. She denies any family history of breast or ovarian cancer.     12/9/2022:  At her last visit, we decided on a partial mastectomy. She has seen cardiology and was determined to be of acceptable risk for surgery. She returns today because she is concerned about the risk of needing a second surgery with breast conservation and is wondering about a mastectomy.     1/26/2023:  She underwent left partial mastectomy on 12/20/22. See surgery & pathology details below in oncologic history. She has been recovering well and has no complaints. She has already seen Dr. Castellon postoperatively and discussed starting exemestane because of her history  of arthritis. They also discussed having a low threshold for stopping it should she have any side effects.     4/20/2023:  She returns today for scheduled follow-up.  After her last visit she saw Dr. Wallace and she did not recommend any adjuvant radiation.  Ms. Gotti ultimately decided not to start the exemestane.  She denies any new breast related complaints.  She was joined today in clinic by her daughter.     8/3/2023, Seen by TORSTEN Desir:   Patient presenting to the office today with concerns of a bump in left groin that comes and goes for the past 6 months. It does not hurt when it is there.  She first felt it when in the shower.  Sometimes its little and sometimes it is as large as a golf ball.  Can come and go in the same day. No change in BMs/  She suffers from chronic constipation and urinary incontinence and wears a pad.  No nausea, vomiting, fever or diarrhea or abdominal pain.    9/20/2023, Interval History:  She saw Al[facility] last month complaining of a left groin lump.  Almas thought that it may be a hernia and ordered a CT scan, however crystal Gotti was unable to get this completed.  The lump still comes and goes.  It is not painful.  She denies any new breast related complaints.  She had a benign screening mammogram prior to this appointment.      Oncology/Hematology History   Malignant neoplasm of upper-outer quadrant of left breast in female, estrogen receptor positive   12/4/2017 Imaging    Screening MMG with J [facility] ( [facility]):   The parenchyma of both breasts remains stable. No suspicious mass, calcification nor adenopathy has developed. Scattered fibroglandular densities.   BI-RADS 2: Benign      9/5/2022 Initial Diagnosis    Malignant neoplasm of upper-outer quadrant of left breast in female, estrogen receptor positive (HCC)     9/6/2022 Imaging    Bilateral Diagnostic MMG with J Carlos & Left Breast US ( [facility]):  MMG:  Scattered fibroglandular densities. A triangular skin marker overlies the  site of palpable concern in the middle third upper outer quadrant of the left breast. At this location there is an underlying partially obscured and partially irregular mass that measures on the order of 1.8 cm in greatest dimension. I see no suspicious calcifications or areas of architectural distortion in either breast. There is no evidence for skin thickening, nipple retraction or axillary adenopathy.   US:   At the 1-o'clock position on the order of 5 cm from the nipple corresponding to the site of palpable concern there is an irregular 2.0 cm x 1.5 cm x 1.5 cm hypoechoic mass with detectable internal vascularity. No evidence for left axillary adenopathy is appreciated.   BI-RADS 5: Highly suggestive of malignancy      10/5/2022 Biopsy    Left Breast, US-Guided Biopsy ([facility]):    1. Left Breast, 1:00 o'clock, 5 cm from Nipple, Ultrasound-Guided Biopsies for a Mass:   INVASIVE MAMMARY CARCINOMA, NO SPECIAL TYPE (INVASIVE DUCTAL CARCINOMA).  A. Histologic grade:  Heather histologic score II  (tubules=3, nuclei=3, mitosis-1).  B. 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                 C. Invasive carcinoma involves multiple tissue cores with the largest contiguous focus measuring up to 11.0 mm.               D. No lymphovascular nor perineural invasion identified.      ER+ (%, strong)   AK+ (%, strong)   Her2 negative (IHC 2+; FISH ratio 1.5, copy #4.8)  Ki-67 28%    -Barbell-shaped metallic clip at the posterior superior margin of the biopsied mass.      12/20/2022 Surgery    Left ultrasound-guided partial mastectomy    1. Left Breast, Oriented Partial Mastectomy (36 grams): INVASIVE MAMMARY CARCINOMA, NO SPECIAL TYPE (INVASIVE DUCTAL CARCINOMA).               A. Tumor site: 1 o'clock, upper outer quadrant.               B. Histologic grade: Heather histologic score II (tubules = 3, nuclei = 2, mitosis = 2).                C. Tumor size: 18 x 12 x 10 mm.               D. Ductal carcinoma in situ (DCIS): Present, negative for extensive intraductal component.                             1. Extent of DCIS: DCIS is associated with the invasive carcinoma at the periphery, spanning four breast slices, approximately 24 mm from medial to lateral.  2. Architectural patterns: Cribriform and involving an intraductal papilloma.               3. Nuclear grade: Intermediate.               E. Tumor extent: Overlying skin is uninvolved by in situ and invasive carcinoma.               F. No lymphovascular nor perineural invasion identified.               H. Margins: Uninvolved by invasive carcinoma and DCIS.                            1. Invasive carcinoma comes to within 4 mm of the closest posterior margin, 8 mm from the inferior margin, 15 mm from the anterior and superior margins, 18 mm from the lateral margin and 24 mm from the medial margin of resection in specimen 1.  2. DCIS comes to within 2 mm of the original posterior margin, 15 mm from the anterior and superior margins, 18 mm from the medial and lateral margins and 19 mm from the inferior of resection in specimen 1.  I. No lymph nodes submitted.  J. Hormone receptor status: ER (%), NC (%), HER2 2+ by IHC, negative by FISH, Ki-67 28% (performed on prior biopsy [id number], slides reviewed).  L. Pathologic stage: pT1c, NX.     2. Left Breast, Additional Medial, Inferior and Posterior Margins, Oriented Excision:               A. Benign breast tissue with small radial scar and fibroadenomatoid change.               B. No atypical hyperplasia, in situ nor invasive carcinoma identified.               C. Final medial, inferior and posterior margins free by and additional 8 mm.     3. Left Breast, Additional Superior and Lateral Margins, Oriented Excision:               A. Benign unremarkable breast tissue.               B. Final superior and lateral margins free by an additional 5  mm.     1/18/2023 Cancer Staged    Staging form: Breast, AJCC 8th Edition  - Pathologic: Stage Unknown (pT1c, pNX, G2, ER+, CA+, HER2-) - Signed by Sarkis Castellon MD on 1/18/2023 9/8/2023 Imaging    Screening MMG with J Carlos ([facility]):  Scattered fibroglandular densities are seen throughout both breasts in a pattern which is unchanged. Postsurgical change in the left breast associated with prior breast conservation therapy is noted. I see no new or dominant masses, areas of architectural distortion or skin thickening. There is no evidence for axillary lymphadenopathy or nipple retraction.  BI-RADS 2: Benign.         Review of Systems:  See interval history.       Medications:    Current Outpatient Medications:     aspirin 325 MG tablet, Take 1 tablet by mouth Daily. WILL HOLD AS DIRECTED  Indications: Resume on Saturday, 12/24/2022., Disp: , Rfl:     atorvastatin (LIPITOR) 20 MG tablet, take 1 tablet by mouth at bedtime, Disp: , Rfl: 0    bumetanide (BUMEX) 2 MG tablet, TAKE 1 TABLET BY MOUTH EVERY DAY, Disp: 90 tablet, Rfl: 1    buPROPion XL (WELLBUTRIN XL) 300 MG 24 hr tablet, Take 1 tablet by mouth Daily., Disp: , Rfl: 0    busPIRone (BUSPAR) 5 MG tablet, Take 1 tablet by mouth 2 (Two) Times a Day., Disp: , Rfl:     carvedilol (COREG) 12.5 MG tablet, Take 1 tablet by mouth 2 (Two) Times a Day., Disp: , Rfl:     COENZYME Q-10 PO, Take  by mouth., Disp: , Rfl:     Coenzyme Q10 200 MG tablet, Take 1 tablet/day by mouth Daily., Disp: , Rfl:     [employer] Senna Plus 8.6-50 MG per tablet, TAKE 1 TABLET BY MOUTH EVERY DAY, Disp: 30 tablet, Rfl: 2    Diclofenac Sodium (VOLTAREN) 1 % gel gel, PLEASE SEE ATTACHED FOR DETAILED DIRECTIONS, Disp: , Rfl:     donepezil (ARICEPT) 10 MG tablet, Take 1 tablet by mouth Daily., Disp: , Rfl:     famotidine (PEPCID) 40 MG tablet, TAKE 1 TABLET BY MOUTH TWICE A DAY, Disp: 180 tablet, Rfl: 3    fenofibrate micronized (LOFIBRA) 67 MG capsule, Take 1 capsule by mouth Daily., Disp: ,  Rfl:     FLUoxetine (PROzac) 20 MG capsule, Take 3 capsules by mouth Daily., Disp: , Rfl:     HYDROcodone-acetaminophen (NORCO)  MG per tablet, Take 1 tablet by mouth Every 4 (Four) Hours As Needed for Moderate Pain ., Disp: 12 tablet, Rfl: 0    ondansetron ODT (ZOFRAN-ODT) 8 MG disintegrating tablet, Place 1 tablet on the tongue Every 6 (Six) Hours As Needed for Nausea or Vomiting., Disp: 120 tablet, Rfl: 3    temazepam (RESTORIL) 15 MG capsule, Take 1 capsule by mouth At Night As Needed., Disp: , Rfl:       Allergies:  No Known Allergies      Family History   Problem Relation Age of Onset    ALS Mother     Hypertension Father     Stroke Father     Malig Hyperthermia Neg Hx        Objective   PHYSICAL EXAMINATION:   Vitals:    09/20/23 1136   BP: 122/80   Pulse: 76   Resp: 16   SpO2: 98%     ECOG 0 - Asymptomatic  General: NAD, well appearing  Psych: a&o x3, normal mood and affect  Eyes: EOMI, no scleral icterus  ENMT: neck supple without masses or thyromegaly, mucous membranes moist  MSK: normal gait, limited ROM in right shoulders  Lymph nodes: no cervical, supraclavicular or axillary lymphadenopathy  Breast: symmetric, small size, grade 3 ptosis, sternal scar  Right: No visible abnormalities on inspection while seated, with arms raised or hands on hips. No masses, skin changes, or nipple abnormalities.  Left: Well-healed superior curvilinear scar, otherwise no visible abnormalities on inspection while seated, with arms raised or hands on hips.  Scar is soft.  No masses or nipple abnormalities.    I have independently reviewed her imaging and here are my findings:   Expected left breast postsurgical changes.  No suspicious findings in either breast on mammogram.      Assessment & Plan   Assessment:  83 y.o. F with a diagnosis of left breast cancer: Intermediate grade, invasive ductal carcinoma, ER/MN+, Her2 negative. She underwent left partial mastectomy on 12/20/22, pT1cNx.  Adjuvant radiation was not  recommended and the patient declined adjuvant endocrine therapy.    Plan:  -Continue follow-up with Dr. Castellon.  -Follow-up in 6 months with NP for clinical exam.  -She still would like to have the left groin lump evaluated, so we will help her reschedule the CT scan.    Jeannine Ledezma MD      CC:  Miriam Mercado Reyes, MD

## 2023-09-21 ENCOUNTER — TELEPHONE (OUTPATIENT)
Dept: SURGERY | Facility: CLINIC | Age: 83
End: 2023-09-21
Payer: MEDICARE

## 2023-09-21 DIAGNOSIS — K43.9 HERNIA OF ABDOMINAL WALL: Primary | ICD-10-CM

## 2023-09-21 NOTE — TELEPHONE ENCOUNTER
Patient is scheduled for Abdomen/ Pelvis CT on 9/28 at 2:00 pm    [facility]  [id number]    Arrival time is 1:00 pm  Only liquids 4 hours  prior     Voicemail left for patient to call and confirm date/ time/ location    SD

## 2023-09-22 ENCOUNTER — OFFICE VISIT (OUTPATIENT)
Dept: CARDIOLOGY | Facility: CLINIC | Age: 83
End: 2023-09-22
Payer: MEDICARE

## 2023-09-22 VITALS
OXYGEN SATURATION: 97 % | DIASTOLIC BLOOD PRESSURE: 70 MMHG | HEIGHT: 61 IN | SYSTOLIC BLOOD PRESSURE: 130 MMHG | HEART RATE: 78 BPM | WEIGHT: 121 LBS | BODY MASS INDEX: 22.84 KG/M2

## 2023-09-22 DIAGNOSIS — Z98.890 S/P MVR (MITRAL VALVE REPAIR): ICD-10-CM

## 2023-09-22 DIAGNOSIS — Z95.2 S/P AVR (AORTIC VALVE REPLACEMENT): Primary | ICD-10-CM

## 2023-09-22 DIAGNOSIS — Z98.890 S/P TVR (TRICUSPID VALVE REPAIR): ICD-10-CM

## 2023-09-22 DIAGNOSIS — N18.32 STAGE 3B CHRONIC KIDNEY DISEASE: ICD-10-CM

## 2023-09-22 DIAGNOSIS — Z95.0 PACEMAKER: ICD-10-CM

## 2023-09-22 DIAGNOSIS — G47.33 OBSTRUCTIVE SLEEP APNEA: ICD-10-CM

## 2023-09-22 DIAGNOSIS — E78.2 MIXED HYPERLIPIDEMIA: ICD-10-CM

## 2023-09-22 DIAGNOSIS — I10 BENIGN ESSENTIAL HYPERTENSION: ICD-10-CM

## 2023-09-22 NOTE — PROGRESS NOTES
Subjective:     Encounter Date:09/22/2023      Patient ID: Carly Gotti is a 83 y.o. female.    Chief Complaint:  History of Present Illness    This is a 83-year-old female with aortic regurgitation status post replacement with a bioprosthetic aortic valve, moderate to severe mitral regurgitation status post repair, severe tricuspid regurgitation status post repair, pulmonary hypertension, chronic kidney disease with an atrophic kidney, hyperlipidemia, hypertension, aberrant right subclavian artery, obstructive sleep apnea on CPAP, status post dual-chamber pacemaker placement for sick sinus syndrome, depression, who presents for follow up.       At her follow-up in 3/2023 she was doing well from a cardiac standpoint and no changes were made to her management.      She presents today for routine 6-month follow-up.  From a cardiac standpoint she continues to do well.  She denies any chest pain, shortness of breath, palpitations, orthopnea, near-syncope or syncope or lower extremity edema.     Prior History:  The patient was previously followed by Dr. Lopez who she last saw in 5/2017.  Her prior cardiac history includes mitral and tricuspid valve repair and aortic valve replacement back in 11/2015 by Dr. Veliz.  Postoperatively she developed symptomatic sick sinus syndrome and required dual chamber permanent pacemaker placement.  Since then she's done relatively well although she reports that she never quite recovered back to her baseline following the surgery.       Following her initial visit with me in 12/2017 I set her up for a repeat echocardiogram that showed normal left ventricular systolic function and wall motion with an estimated ejection fraction 64%, grade 1 diastolic dysfunction, grossly normal-appearing bioprosthetic aortic valve, no mitral valve regurgitation, trace tricuspid valve regurgitation, and a right ventricular systolic pressure of 17 mmHg.     I saw her last in 12/2019 for  annual follow-up.  Prior to that office visit she had been hospitalized twice for C. difficile colitis.   She reported fatigue and mild dyspnea with activity since all of these issues started.    I recommended a 1 year follow-up at that time with a repeat echocardiogram.  She called in 2/2020 with complaints of lower extremity swelling especially in the left leg.  I recommended proceeding with a lower extremity venous Doppler ultrasound and asked her to increase her bumetanide to 2 mg twice a day for a few days.  Ultrasound ended up being normal.  She was still having swelling so I recommended that she discuss any further changes to her bumetanide with her nephrologist.     She underwent a follow-up echocardiogram in 1/2021 which showed normal left ventricular systolic function wall motion with an EF of 60 to 65%, grade 2 diastolic dysfunction, bioprosthetic aortic valve with mild to moderately elevated gradients which appear to be slightly worse than prior echocardiogram, mild to moderate mitral valve stenosis across the surgically repaired mitral valve, mildly elevated right ventricular systolic pressure of 36 mmHg, and mildly dilated aortic root, and moderate regurgitation of the repaired tricuspid valve.     In 1/2022 the patient reported that her joint and arthritis issues were really acting up.  In fact she presented to the office at that time in a wheelchair because the issues were severely affecting her mobility.  She continued to complain of lower extremity edema especially of her left lower extremity.  At this was present despite having her increase her bumetanide to 2 mg twice a day by Dr. Peters.  I recommended proceeding with a repeat echocardiogram and a venous Doppler ultrasound.  Ultrasound was performed on 1/12/2022 and showed no evidence of DVT.  Her echocardiogram was performed on 1/20/2022 and showed evidence of normal left ventricular systolic function wall motion with an EF of 61%, mildly  dilated left atrium, and normal function and gradients across her mitral, tricuspid, and aortic valves.  Following that visit the patient's swelling improved and she was able to go back to taking bumetanide 2 mg daily.      She presented in 11/2022 for preoperative evaluation.    On I saw her previously in 7/2022 she was struggling with mobility because of arthritis and joint issues which resulted in issues with fatigue.  Otherwise she denies any symptoms of chest pain and shortness of breath.  At that office visit her symptoms were stable.  She was planning on undergoing partial mastectomy for left breast cancer.  I felt that she was stable from a cardiac standpoint and recommended that she proceed with surgery as planned.     The patient underwent her surgery and 12/2022 without any significant issues.  She has since been evaluated both by medical and radiation oncology.  Aromasin was recommended by Dr. Castellon.  The patient ultimately opted not to start this therapy.  Radiation oncology did not feel that radiation therapy was needed.       Review of Systems   Constitutional: Positive for malaise/fatigue.   HENT:  Negative for hearing loss, hoarse voice, nosebleeds and sore throat.    Eyes:  Negative for pain.   Cardiovascular:  Negative for chest pain, claudication, cyanosis, dyspnea on exertion, irregular heartbeat, leg swelling, near-syncope, orthopnea, palpitations, paroxysmal nocturnal dyspnea and syncope.   Respiratory:  Negative for shortness of breath and snoring.    Endocrine: Negative for cold intolerance, heat intolerance, polydipsia, polyphagia and polyuria.   Skin:  Negative for itching and rash.   Musculoskeletal:  Positive for joint pain. Negative for arthritis, falls, joint swelling, muscle cramps, muscle weakness and myalgias.   Gastrointestinal:  Negative for constipation, diarrhea, dysphagia, heartburn, hematemesis, hematochezia, melena, nausea and vomiting.   Genitourinary:  Negative for  frequency, hematuria and hesitancy.   Neurological:  Negative for excessive daytime sleepiness, dizziness, headaches, light-headedness, numbness and weakness.   Psychiatric/Behavioral:  Negative for depression. The patient is not nervous/anxious.        Current Outpatient Medications:     aspirin 325 MG tablet, Take 1 tablet by mouth Daily. WILL HOLD AS DIRECTED  Indications: Resume on Saturday, 12/24/2022., Disp: , Rfl:     atorvastatin (LIPITOR) 20 MG tablet, take 1 tablet by mouth at bedtime, Disp: , Rfl: 0    bumetanide (BUMEX) 2 MG tablet, TAKE 1 TABLET BY MOUTH EVERY DAY, Disp: 90 tablet, Rfl: 1    buPROPion XL (WELLBUTRIN XL) 300 MG 24 hr tablet, Take 1 tablet by mouth Daily., Disp: , Rfl: 0    busPIRone (BUSPAR) 5 MG tablet, Take 1 tablet by mouth 2 (Two) Times a Day., Disp: , Rfl:     carvedilol (COREG) 12.5 MG tablet, Take 1 tablet by mouth 2 (Two) Times a Day., Disp: , Rfl:     COENZYME Q-10 PO, Take  by mouth., Disp: , Rfl:     Coenzyme Q10 200 MG tablet, Take 1 tablet/day by mouth Daily., Disp: , Rfl:     [employer] Senna Plus 8.6-50 MG per tablet, TAKE 1 TABLET BY MOUTH EVERY DAY, Disp: 30 tablet, Rfl: 2    Diclofenac Sodium (VOLTAREN) 1 % gel gel, PLEASE SEE ATTACHED FOR DETAILED DIRECTIONS, Disp: , Rfl:     donepezil (ARICEPT) 10 MG tablet, Take 1 tablet by mouth Daily., Disp: , Rfl:     famotidine (PEPCID) 40 MG tablet, TAKE 1 TABLET BY MOUTH TWICE A DAY, Disp: 180 tablet, Rfl: 3    fenofibrate micronized (LOFIBRA) 67 MG capsule, Take 1 capsule by mouth Daily., Disp: , Rfl:     FLUoxetine (PROzac) 20 MG capsule, Take 3 capsules by mouth Daily., Disp: , Rfl:     HYDROcodone-acetaminophen (NORCO)  MG per tablet, Take 1 tablet by mouth Every 4 (Four) Hours As Needed for Moderate Pain ., Disp: 12 tablet, Rfl: 0    ondansetron ODT (ZOFRAN-ODT) 8 MG disintegrating tablet, Place 1 tablet on the tongue Every 6 (Six) Hours As Needed for Nausea or Vomiting., Disp: 120 tablet, Rfl: 3    temazepam (RESTORIL) 15  MG capsule, Take 1 capsule by mouth At Night As Needed., Disp: , Rfl:     Past Medical History:   Diagnosis Date    Aneurysm     Angioedema     Aortic valve disease     Arthritis     Atrophic kidney     Bradycardia     CAD (coronary artery disease)     Cancer     BREAST    CKD (chronic kidney disease) stage 4, GFR 15-29 ml/min     Depression     Dysphagia     Essential hypertension     History of anemia     History of vitamin D deficiency     [place] (hard of hearing)     Hyperlipidemia     Mitral valve disease     EMELINA on auto CPAP 2003    Overnight polysomnogram.  Weight 154 pounds.  Severe EMELINA with AHI 51.5 events per hour.  Sleep-related hypoxia present.    Post lumbar puncture headache 2022    Pulmonary hypertension     Secondary hyperparathyroidism of renal origin     Sick sinus syndrome     Spinal stenosis        Past Surgical History:   Procedure Laterality Date    AORTIC VALVE REPAIR/REPLACEMENT  2015    Dr Veliz    BREAST LUMPECTOMY Left 2022    Procedure: Left ultrasound-guided partial mastectomy;  Surgeon: Jeannine Ledezma MD;  Location: [facility] MAIN OR;  Service: General;  Laterality: Left;    CARDIAC CATHETERIZATION      CARDIAC DEFIBRILLATOR PLACEMENT       SECTION      COLONOSCOPY N/A 2019    [facility]    ENDOSCOPY  2012    Hypertonic lower esophageal sphincter, gastritis. Path: Chronic gastritis, moderate.     ENDOSCOPY N/A 2019    Web in the upper third of the esophagus. Dilated    ENDOSCOPY N/A 2020    Procedure: ESOPHAGOGASTRODUODENOSCOPY WITH 56FR MALDONADO DILATATION AND COLD BIOPSIES;  Surgeon: Blayne Cai MD;  Location: [facility] ENDOSCOPY;  Service: Gastroenterology;  Laterality: N/A;  PRE: DYSPHAGIA  POST: SCHATZKI'S RING    EPIDURAL  2022    HYSTERECTOMY      MITRAL VALVE ANNULOPLASTY  2015    Dr Veliz    TRICUSPID VALVE SURGERY  2015    Dr Veliz       Family History   Problem Relation Age of Onset    ALS Mother      "Hypertension Father     Stroke Father     Malig Hyperthermia Neg Hx        Social History     Tobacco Use    Smoking status: Former     Types: Cigarettes     Start date:      Quit date:      Years since quittin.7    Smokeless tobacco: Never   Vaping Use    Vaping Use: Never used   Substance Use Topics    Alcohol use: Not Currently     Comment: Pt partakes occassionally    Drug use: No         ECG 12 Lead    Date/Time: 2023 4:43 PM  Performed by: Laura Pak MD  Authorized by: Laura Pak MD   Comparison: compared with previous ECG   Similar to previous ECG  Comments: Atrial paced rhythm           Objective:     Visit Vitals  /70 (BP Location: Right arm, Patient Position: Sitting, Cuff Size: Adult)   Pulse 78   Ht 154.9 cm (61\")   Wt 54.9 kg (121 lb)   SpO2 97%   BMI 22.86 kg/m²         Constitutional:       Appearance: Normal appearance. Well-developed.   Eyes:      General: Lids are normal.      Conjunctiva/sclera: Conjunctivae normal.      Pupils: Pupils are equal, round, and reactive to light.   HENT:      Head: Normocephalic and atraumatic.   Neck:      Vascular: No carotid bruit or JVD.      Lymphadenopathy: No cervical adenopathy.   Pulmonary:      Effort: Pulmonary effort is normal.      Breath sounds: Normal breath sounds.   Cardiovascular:      Normal rate. Regular rhythm.      No gallop.    Pulses:     Radial: 2+ bilaterally.  Edema:     Peripheral edema absent.   Abdominal:      Palpations: Abdomen is soft.   Musculoskeletal:      Cervical back: Full passive range of motion without pain, normal range of motion and neck supple. Skin:     General: Skin is warm and dry.   Neurological:      Mental Status: Alert and oriented to person, place, and time.           Assessment:          Diagnosis Plan   1. S/P AVR (aortic valve replacement)        2. S/P TVR (tricuspid valve repair)        3. S/P MVR (mitral valve repair)        4. Benign essential hypertension        5. Mixed " hyperlipidemia        6. Pacemaker        7. Stage 3b chronic kidney disease        8. Obstructive sleep apnea treated with auto CPAP               Plan:       1.  Bioprosthetic aortic valve replacement.  Normal gradients on last echocardiogram.  2.  Mitral valve repair.  Normal function on last echocardiogram.  3.  Tricuspid valve repair.  Normal function on last echocardiogram.  4.  Hypertension.  Well-controlled on current regimen.  Continue same.  5.  Mild aortic root dilatation.  No significant change on most recent echocardiogram.  Continue to monitor periodically.  6.  Hyperlipidemia.  On atorvastatin.  Managed by Dr. Reyes.  7.  Dual-chamber permanent pacemaker placement.  She remains atrial paced.  Continue routine device checks.  8.  Chronic kidney disease    We will plan to see the patient back again in 6 months with a pacemaker check.

## 2023-11-22 ENCOUNTER — APPOINTMENT (OUTPATIENT)
Dept: GENERAL RADIOLOGY | Facility: HOSPITAL | Age: 83
End: 2023-11-22
Payer: MEDICARE

## 2023-11-22 ENCOUNTER — HOSPITAL ENCOUNTER (OUTPATIENT)
Facility: HOSPITAL | Age: 83
Setting detail: OBSERVATION
Discharge: NURSING, REHAB OR LONG-TERM CARE | End: 2023-11-27
Attending: EMERGENCY MEDICINE | Admitting: STUDENT IN AN ORGANIZED HEALTH CARE EDUCATION/TRAINING PROGRAM
Payer: MEDICARE

## 2023-11-22 ENCOUNTER — APPOINTMENT (OUTPATIENT)
Dept: CT IMAGING | Facility: HOSPITAL | Age: 83
End: 2023-11-22
Payer: MEDICARE

## 2023-11-22 DIAGNOSIS — S40.021A CONTUSION OF RIGHT UPPER EXTREMITY, INITIAL ENCOUNTER: ICD-10-CM

## 2023-11-22 DIAGNOSIS — F41.9 ANXIETY: ICD-10-CM

## 2023-11-22 DIAGNOSIS — G89.11 ACUTE PAIN DUE TO TRAUMA: ICD-10-CM

## 2023-11-22 DIAGNOSIS — S70.02XA CONTUSION OF LEFT HIP, INITIAL ENCOUNTER: ICD-10-CM
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
## 2023-11-22 DIAGNOSIS — S09.90XA INJURY OF HEAD, INITIAL ENCOUNTER: ICD-10-CM

## 2023-11-22 LAB
ALBUMIN SERPL-MCNC: 3.7 G/DL (ref 3.5–5.2)
ALBUMIN/GLOB SERPL: 1.5 G/DL
ALP SERPL-CCNC: 54 U/L (ref 39–117)
ALT SERPL W P-5'-P-CCNC: 42 U/L (ref 1–33)
ANION GAP SERPL CALCULATED.3IONS-SCNC: 13.1 MMOL/L (ref 5–15)
AST SERPL-CCNC: 62 U/L (ref 1–32)
BASOPHILS # BLD AUTO: 0.01 10*3/MM3 (ref 0–0.2)
BASOPHILS NFR BLD AUTO: 0.2 % (ref 0–1.5)
BILIRUB SERPL-MCNC: 0.3 MG/DL (ref 0–1.2)
BUN SERPL-MCNC: 29 MG/DL (ref 8–23)
BUN/CREAT SERPL: 23.4 (ref 7–25)
CALCIUM SPEC-SCNC: 9.9 MG/DL (ref 8.6–10.5)
CHLORIDE SERPL-SCNC: 101 MMOL/L (ref 98–107)
CO2 SERPL-SCNC: 25.9 MMOL/L (ref 22–29)
CREAT SERPL-MCNC: 1.24 MG/DL (ref 0.57–1)
DEPRECATED RDW RBC AUTO: 42.1 FL (ref 37–54)
EGFRCR SERPLBLD CKD-EPI 2021: 42.7 ML/MIN/1.73
EOSINOPHIL # BLD AUTO: 0.03 10*3/MM3 (ref 0–0.4)
EOSINOPHIL NFR BLD AUTO: 0.5 % (ref 0.3–6.2)
ERYTHROCYTE [DISTWIDTH] IN BLOOD BY AUTOMATED COUNT: 12.3 % (ref 12.3–15.4)
GLOBULIN UR ELPH-MCNC: 2.4 GM/DL
GLUCOSE SERPL-MCNC: 74 MG/DL (ref 65–99)
HCT VFR BLD AUTO: 35.2 % (ref 34–46.6)
HGB BLD-MCNC: 12.1 G/DL (ref 12–15.9)
IMM GRANULOCYTES # BLD AUTO: 0.01 10*3/MM3 (ref 0–0.05)
IMM GRANULOCYTES NFR BLD AUTO: 0.2 % (ref 0–0.5)
LYMPHOCYTES # BLD AUTO: 0.57 10*3/MM3 (ref 0.7–3.1)
LYMPHOCYTES NFR BLD AUTO: 10.4 % (ref 19.6–45.3)
MCH RBC QN AUTO: 32.4 PG (ref 26.6–33)
MCHC RBC AUTO-ENTMCNC: 34.4 G/DL (ref 31.5–35.7)
MCV RBC AUTO: 94.1 FL (ref 79–97)
MONOCYTES # BLD AUTO: 1.06 10*3/MM3 (ref 0.1–0.9)
MONOCYTES NFR BLD AUTO: 19.3 % (ref 5–12)
NEUTROPHILS NFR BLD AUTO: 3.81 10*3/MM3 (ref 1.7–7)
NEUTROPHILS NFR BLD AUTO: 69.4 % (ref 42.7–76)
NRBC BLD AUTO-RTO: 0 /100 WBC (ref 0–0.2)
PLATELET # BLD AUTO: 154 10*3/MM3 (ref 140–450)
PMV BLD AUTO: 11.3 FL (ref 6–12)
POTASSIUM SERPL-SCNC: 4 MMOL/L (ref 3.5–5.2)
PROT SERPL-MCNC: 6.1 G/DL (ref 6–8.5)
RBC # BLD AUTO: 3.74 10*6/MM3 (ref 3.77–5.28)
SODIUM SERPL-SCNC: 140 MMOL/L (ref 136–145)
WBC NRBC COR # BLD AUTO: 5.49 10*3/MM3 (ref 3.4–10.8)

## 2023-11-22 PROCEDURE — 70450 CT HEAD/BRAIN W/O DYE: CPT

## 2023-11-22 PROCEDURE — 36415 COLL VENOUS BLD VENIPUNCTURE: CPT

## 2023-11-22 PROCEDURE — 80053 COMPREHEN METABOLIC PANEL: CPT | Performed by: PHYSICIAN ASSISTANT

## 2023-11-22 PROCEDURE — 72125 CT NECK SPINE W/O DYE: CPT

## 2023-11-22 PROCEDURE — 99284 EMERGENCY DEPT VISIT MOD MDM: CPT

## 2023-11-22 PROCEDURE — 83880 ASSAY OF NATRIURETIC PEPTIDE: CPT | Performed by: PHYSICIAN ASSISTANT

## 2023-11-22 PROCEDURE — 85025 COMPLETE CBC W/AUTO DIFF WBC: CPT | Performed by: PHYSICIAN ASSISTANT

## 2023-11-22 PROCEDURE — 70486 CT MAXILLOFACIAL W/O DYE: CPT

## 2023-11-22 PROCEDURE — 73070 X-RAY EXAM OF ELBOW: CPT

## 2023-11-22 PROCEDURE — 72192 CT PELVIS W/O DYE: CPT

## 2023-11-22 PROCEDURE — 73060 X-RAY EXAM OF HUMERUS: CPT

## 2023-11-22 RX ORDER — ACETAMINOPHEN 500 MG
1000 TABLET ORAL ONCE
Status: COMPLETED | OUTPATIENT
Start: 2023-11-22 | End: 2023-11-22

## 2023-11-22 RX ORDER — ACETAMINOPHEN 500 MG
1000 TABLET ORAL EVERY 6 HOURS PRN
Qty: 30 TABLET | Refills: 0 | Status: SHIPPED | OUTPATIENT
Start: 2023-11-22

## 2023-11-22 RX ADMIN — ACETAMINOPHEN 1000 MG: 500 TABLET ORAL at 22:42

## 2023-11-23 ENCOUNTER — APPOINTMENT (OUTPATIENT)
Dept: GENERAL RADIOLOGY | Facility: HOSPITAL | Age: 83
End: 2023-11-23
Payer: MEDICARE

## 2023-11-23 PROBLEM — R26.2 UNABLE TO AMBULATE: Status: ACTIVE | Noted: 2023-11-23

## 2023-11-23 PROBLEM — R74.01 ELEVATED LIVER TRANSAMINASE LEVEL: Status: ACTIVE | Noted: 2023-11-23
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
## 2023-11-23 LAB
ANION GAP SERPL CALCULATED.3IONS-SCNC: 12 MMOL/L (ref 5–15)
B PARAPERT DNA SPEC QL NAA+PROBE: NOT DETECTED
B PERT DNA SPEC QL NAA+PROBE: NOT DETECTED
BUN SERPL-MCNC: 28 MG/DL (ref 8–23)
BUN/CREAT SERPL: 22.2 (ref 7–25)
C PNEUM DNA NPH QL NAA+NON-PROBE: NOT DETECTED
CALCIUM SPEC-SCNC: 9.7 MG/DL (ref 8.6–10.5)
CHLORIDE SERPL-SCNC: 102 MMOL/L (ref 98–107)
CK SERPL-CCNC: 118 U/L (ref 20–180)
CO2 SERPL-SCNC: 25 MMOL/L (ref 22–29)
CREAT SERPL-MCNC: 1.26 MG/DL (ref 0.57–1)
DEPRECATED RDW RBC AUTO: 41 FL (ref 37–54)
EGFRCR SERPLBLD CKD-EPI 2021: 41.9 ML/MIN/1.73
ERYTHROCYTE [DISTWIDTH] IN BLOOD BY AUTOMATED COUNT: 12 % (ref 12.3–15.4)
FLUAV SUBTYP SPEC NAA+PROBE: NOT DETECTED
FLUBV RNA ISLT QL NAA+PROBE: NOT DETECTED
GLUCOSE SERPL-MCNC: 61 MG/DL (ref 65–99)
HADV DNA SPEC NAA+PROBE: NOT DETECTED
HCOV 229E RNA SPEC QL NAA+PROBE: NOT DETECTED
HCOV HKU1 RNA SPEC QL NAA+PROBE: NOT DETECTED
HCOV NL63 RNA SPEC QL NAA+PROBE: NOT DETECTED
HCOV OC43 RNA SPEC QL NAA+PROBE: NOT DETECTED
HCT VFR BLD AUTO: 35.4 % (ref 34–46.6)
HGB BLD-MCNC: 11.7 G/DL (ref 12–15.9)
HMPV RNA NPH QL NAA+NON-PROBE: NOT DETECTED
HPIV1 RNA ISLT QL NAA+PROBE: NOT DETECTED
HPIV2 RNA SPEC QL NAA+PROBE: NOT DETECTED
HPIV3 RNA NPH QL NAA+PROBE: NOT DETECTED
HPIV4 P GENE NPH QL NAA+PROBE: NOT DETECTED
M PNEUMO IGG SER IA-ACNC: NOT DETECTED
MCH RBC QN AUTO: 31 PG (ref 26.6–33)
MCHC RBC AUTO-ENTMCNC: 33.1 G/DL (ref 31.5–35.7)
MCV RBC AUTO: 93.9 FL (ref 79–97)
NT-PROBNP SERPL-MCNC: 1058 PG/ML (ref 0–1800)
PLATELET # BLD AUTO: 147 10*3/MM3 (ref 140–450)
PMV BLD AUTO: 11.4 FL (ref 6–12)
POTASSIUM SERPL-SCNC: 3.8 MMOL/L (ref 3.5–5.2)
RBC # BLD AUTO: 3.77 10*6/MM3 (ref 3.77–5.28)
RHINOVIRUS RNA SPEC NAA+PROBE: NOT DETECTED
RSV RNA NPH QL NAA+NON-PROBE: NOT DETECTED
SARS-COV-2 RNA NPH QL NAA+NON-PROBE: DETECTED
SODIUM SERPL-SCNC: 139 MMOL/L (ref 136–145)
WBC NRBC COR # BLD AUTO: 4.34 10*3/MM3 (ref 3.4–10.8)

## 2023-11-23 PROCEDURE — G0378 HOSPITAL OBSERVATION PER HR: HCPCS

## 2023-11-23 PROCEDURE — 80048 BASIC METABOLIC PNL TOTAL CA: CPT | Performed by: NURSE PRACTITIONER

## 2023-11-23 PROCEDURE — 71045 X-RAY EXAM CHEST 1 VIEW: CPT

## 2023-11-23 PROCEDURE — 85027 COMPLETE CBC AUTOMATED: CPT | Performed by: NURSE PRACTITIONER

## 2023-11-23 PROCEDURE — 97530 THERAPEUTIC ACTIVITIES: CPT

## 2023-11-23 PROCEDURE — 97162 PT EVAL MOD COMPLEX 30 MIN: CPT

## 2023-11-23 PROCEDURE — 97166 OT EVAL MOD COMPLEX 45 MIN: CPT

## 2023-11-23 PROCEDURE — 0202U NFCT DS 22 TRGT SARS-COV-2: CPT | Performed by: HOSPITALIST

## 2023-11-23 PROCEDURE — 82550 ASSAY OF CK (CPK): CPT | Performed by: HOSPITALIST

## 2023-11-23 PROCEDURE — 25810000003 SODIUM CHLORIDE 0.9 % SOLUTION: Performed by: NURSE PRACTITIONER

## 2023-11-23 RX ORDER — FAMOTIDINE 20 MG/1
40 TABLET, FILM COATED ORAL DAILY
Status: DISCONTINUED | OUTPATIENT
Start: 2023-11-23 | End: 2023-11-27 | Stop reason: HOSPADM

## 2023-11-23 RX ORDER — CALCIUM CARBONATE 500 MG/1
2 TABLET, CHEWABLE ORAL 2 TIMES DAILY PRN
Status: DISCONTINUED | OUTPATIENT
Start: 2023-11-23 | End: 2023-11-27 | Stop reason: HOSPADM

## 2023-11-23 RX ORDER — ACETAMINOPHEN 650 MG/1
650 SUPPOSITORY RECTAL EVERY 4 HOURS PRN
Status: DISCONTINUED | OUTPATIENT
Start: 2023-11-23 | End: 2023-11-27 | Stop reason: HOSPADM

## 2023-11-23 RX ORDER — ACETAMINOPHEN 160 MG/5ML
650 SOLUTION ORAL EVERY 4 HOURS PRN
Status: DISCONTINUED | OUTPATIENT
Start: 2023-11-23 | End: 2023-11-27 | Stop reason: HOSPADM

## 2023-11-23 RX ORDER — BUSPIRONE HYDROCHLORIDE 5 MG/1
5 TABLET ORAL 2 TIMES DAILY
Status: DISCONTINUED | OUTPATIENT
Start: 2023-11-23 | End: 2023-11-27 | Stop reason: HOSPADM

## 2023-11-23 RX ORDER — ASPIRIN 325 MG
325 TABLET ORAL DAILY
Status: DISCONTINUED | OUTPATIENT
Start: 2023-11-23 | End: 2023-11-27 | Stop reason: HOSPADM

## 2023-11-23 RX ORDER — ACETAMINOPHEN 325 MG/1
650 TABLET ORAL EVERY 4 HOURS PRN
Status: DISCONTINUED | OUTPATIENT
Start: 2023-11-23 | End: 2023-11-27 | Stop reason: HOSPADM

## 2023-11-23 RX ORDER — HYDROCODONE BITARTRATE AND ACETAMINOPHEN 10; 325 MG/1; MG/1
1 TABLET ORAL EVERY 4 HOURS PRN
Status: DISCONTINUED | OUTPATIENT
Start: 2023-11-23 | End: 2023-11-27 | Stop reason: HOSPADM

## 2023-11-23 RX ORDER — POLYETHYLENE GLYCOL 3350 17 G/17G
17 POWDER, FOR SOLUTION ORAL DAILY PRN
Status: DISCONTINUED | OUTPATIENT
Start: 2023-11-23 | End: 2023-11-26

## 2023-11-23 RX ORDER — DIPHENHYDRAMINE HYDROCHLORIDE AND LIDOCAINE HYDROCHLORIDE AND ALUMINUM HYDROXIDE AND MAGNESIUM HYDRO
5 KIT 4 TIMES DAILY
Qty: 140 ML | Refills: 0 | Status: DISCONTINUED | OUTPATIENT
Start: 2023-11-23 | End: 2023-11-27 | Stop reason: HOSPADM

## 2023-11-23 RX ORDER — SODIUM CHLORIDE 0.9 % (FLUSH) 0.9 %
10 SYRINGE (ML) INJECTION AS NEEDED
Status: DISCONTINUED | OUTPATIENT
Start: 2023-11-23 | End: 2023-11-27 | Stop reason: HOSPADM

## 2023-11-23 RX ORDER — DONEPEZIL HYDROCHLORIDE 10 MG/1
10 TABLET, FILM COATED ORAL NIGHTLY
Status: DISCONTINUED | OUTPATIENT
Start: 2023-11-23 | End: 2023-11-27 | Stop reason: HOSPADM

## 2023-11-23 RX ORDER — AMOXICILLIN 250 MG
2 CAPSULE ORAL 2 TIMES DAILY
Status: DISCONTINUED | OUTPATIENT
Start: 2023-11-23 | End: 2023-11-26

## 2023-11-23 RX ORDER — FLUOXETINE HYDROCHLORIDE 20 MG/1
60 CAPSULE ORAL DAILY
Status: DISCONTINUED | OUTPATIENT
Start: 2023-11-23 | End: 2023-11-27 | Stop reason: HOSPADM

## 2023-11-23 RX ORDER — BUPROPION HYDROCHLORIDE 150 MG/1
150 TABLET ORAL DAILY
Status: DISCONTINUED | OUTPATIENT
Start: 2023-11-23 | End: 2023-11-27 | Stop reason: HOSPADM

## 2023-11-23 RX ORDER — HYDROCODONE BITARTRATE AND ACETAMINOPHEN 5; 325 MG/1; MG/1
1 TABLET ORAL EVERY 6 HOURS PRN
Status: DISCONTINUED | OUTPATIENT
Start: 2023-11-23 | End: 2023-11-27 | Stop reason: HOSPADM

## 2023-11-23 RX ORDER — SODIUM CHLORIDE 0.9 % (FLUSH) 0.9 %
10 SYRINGE (ML) INJECTION EVERY 12 HOURS SCHEDULED
Status: DISCONTINUED | OUTPATIENT
Start: 2023-11-23 | End: 2023-11-27 | Stop reason: HOSPADM

## 2023-11-23 RX ORDER — CARVEDILOL 12.5 MG/1
12.5 TABLET ORAL 2 TIMES DAILY
Status: DISCONTINUED | OUTPATIENT
Start: 2023-11-23 | End: 2023-11-27 | Stop reason: HOSPADM

## 2023-11-23 RX ORDER — SODIUM CHLORIDE 9 MG/ML
40 INJECTION, SOLUTION INTRAVENOUS AS NEEDED
Status: DISCONTINUED | OUTPATIENT
Start: 2023-11-23 | End: 2023-11-27 | Stop reason: HOSPADM

## 2023-11-23 RX ORDER — BISACODYL 5 MG/1
5 TABLET, DELAYED RELEASE ORAL DAILY PRN
Status: DISCONTINUED | OUTPATIENT
Start: 2023-11-23 | End: 2023-11-26

## 2023-11-23 RX ORDER — ONDANSETRON 2 MG/ML
4 INJECTION INTRAMUSCULAR; INTRAVENOUS EVERY 6 HOURS PRN
Status: DISCONTINUED | OUTPATIENT
Start: 2023-11-23 | End: 2023-11-27 | Stop reason: HOSPADM

## 2023-11-23 RX ORDER — ONDANSETRON 4 MG/1
4 TABLET, FILM COATED ORAL EVERY 6 HOURS PRN
Status: DISCONTINUED | OUTPATIENT
Start: 2023-11-23 | End: 2023-11-27 | Stop reason: HOSPADM

## 2023-11-23 RX ORDER — SODIUM CHLORIDE 9 MG/ML
75 INJECTION, SOLUTION INTRAVENOUS CONTINUOUS
Status: ACTIVE | OUTPATIENT
Start: 2023-11-23 | End: 2023-11-23

## 2023-11-23 RX ORDER — BISACODYL 10 MG
10 SUPPOSITORY, RECTAL RECTAL DAILY PRN
Status: DISCONTINUED | OUTPATIENT
Start: 2023-11-23 | End: 2023-11-26

## 2023-11-23 RX ORDER — TEMAZEPAM 7.5 MG/1
15 CAPSULE ORAL NIGHTLY PRN
Status: DISCONTINUED | OUTPATIENT
Start: 2023-11-23 | End: 2023-11-27 | Stop reason: HOSPADM

## 2023-11-23 RX ADMIN — NIRMATRELVIR AND RITONAVIR 2 TABLET: KIT at 21:28

## 2023-11-23 RX ADMIN — CARVEDILOL 12.5 MG: 12.5 TABLET, FILM COATED ORAL at 12:31

## 2023-11-23 RX ADMIN — SODIUM CHLORIDE 75 ML/HR: 9 INJECTION, SOLUTION INTRAVENOUS at 05:36

## 2023-11-23 RX ADMIN — CARVEDILOL 12.5 MG: 12.5 TABLET, FILM COATED ORAL at 21:28

## 2023-11-23 RX ADMIN — ASPIRIN 325 MG: 325 TABLET ORAL at 12:31

## 2023-11-23 RX ADMIN — DIPHENHYDRAMINE HYDROCHLORIDE AND LIDOCAINE HYDROCHLORIDE AND ALUMINUM HYDROXIDE AND MAGNESIUM HYDRO 5 ML: KIT at 17:53

## 2023-11-23 RX ADMIN — BUPROPION HYDROCHLORIDE 150 MG: 150 TABLET, EXTENDED RELEASE ORAL at 12:32

## 2023-11-23 RX ADMIN — BUSPIRONE HYDROCHLORIDE 5 MG: 5 TABLET ORAL at 12:32

## 2023-11-23 RX ADMIN — BUSPIRONE HYDROCHLORIDE 5 MG: 5 TABLET ORAL at 21:28

## 2023-11-23 RX ADMIN — HYDROCODONE BITARTRATE AND ACETAMINOPHEN 1 TABLET: 10; 325 TABLET ORAL at 21:33

## 2023-11-23 RX ADMIN — DICLOFENAC SODIUM 4 G: 10 GEL TOPICAL at 21:28

## 2023-11-23 RX ADMIN — FAMOTIDINE 40 MG: 20 TABLET ORAL at 12:31

## 2023-11-23 RX ADMIN — HYDROCODONE BITARTRATE AND ACETAMINOPHEN 1 TABLET: 10; 325 TABLET ORAL at 09:43

## 2023-11-23 RX ADMIN — DONEPEZIL HYDROCHLORIDE 10 MG: 10 TABLET, FILM COATED ORAL at 21:28

## 2023-11-23 RX ADMIN — FLUOXETINE HYDROCHLORIDE 60 MG: 20 CAPSULE ORAL at 12:32

## 2023-11-23 RX ADMIN — DOCUSATE SODIUM 50MG AND SENNOSIDES 8.6MG 2 TABLET: 8.6; 5 TABLET, FILM COATED ORAL at 21:28

## 2023-11-23 RX ADMIN — DICLOFENAC SODIUM 4 G: 10 GEL TOPICAL at 12:31

## 2023-11-23 RX ADMIN — DOCUSATE SODIUM 50MG AND SENNOSIDES 8.6MG 2 TABLET: 8.6; 5 TABLET, FILM COATED ORAL at 12:31

## 2023-11-23 RX ADMIN — DIPHENHYDRAMINE HYDROCHLORIDE AND LIDOCAINE HYDROCHLORIDE AND ALUMINUM HYDROXIDE AND MAGNESIUM HYDRO 5 ML: KIT at 21:30

## 2023-11-23 RX ADMIN — Medication 10 ML: at 21:28

## 2023-11-23 NOTE — ED PROVIDER NOTES
EMERGENCY DEPARTMENT ENCOUNTER    Room Number:  P679/1  PCP: Reyes, Miriam Mercado, MD      HPI:  Chief Complaint: Fall  A complete HPI/ROS/PMH/PSH/SH/FH are unobtainable due to: None  Context: Carly Gotti is a 83 y.o. female with a history of aortic valve replacement, tricuspid valve replacement, mitral valve repair, chronic kidney disease, thrombocytopenia who presents to the ED from home c/o mechanical fall that occurred J PTA.  Patient states she was making her way back to her bed from the bathroom.  She went to reach for bed lost balance falling forward striking her face and injuring her right upper extremity as well as her left hip.  She denies lightheadedness, dizziness, chest pain, palpitations, near syncope leading up to her surrounding the fall.  She denies LOC on impact.  She is not on anticoagulants but states she does take 325 mg of ASA daily.        PAST MEDICAL HISTORY  Active Ambulatory Problems     Diagnosis Date Noted    S/P AVR (aortic valve replacement) 04/04/2016    S/P TVR (tricuspid valve repair) 04/04/2016    S/P MVR (mitral valve repair) 04/04/2016    Benign essential hypertension 05/03/2016    Mitral and aortic valve disease 05/03/2016    CKD (chronic kidney disease) stage 3, GFR 30-59 ml/min 11/01/2016    Small bowel obstruction 12/21/2016    Abdominal pain 12/22/2016    Slow transit constipation 12/27/2016    Hyperlipidemia 05/02/2017    Pacemaker 12/06/2017    Colitis 03/18/2019    Elevated lipase 03/18/2019    C. difficile colitis 03/18/2019    Ileus 03/18/2019    Sepsis 03/19/2019    Thrombocytopenia 03/20/2019    Dysphagia 05/23/2019    Esophageal web 05/23/2019    Recurrent Clostridium difficile diarrhea 06/27/2019    CKD (chronic kidney disease) stage 4, GFR 15-29 ml/min 06/27/2019    Generalized abdominal pain 06/27/2019    Intestinal infection due to enteropathogenic E. coli 06/28/2019    Obstructive sleep apnea treated with auto CPAP 12/19/2019    Nausea 08/27/2020     Renal failure 06/15/2021    Depression 06/15/2021    Arthritis 06/15/2021    Aneurysm of thoracic aorta 06/15/2021    Anxiety 06/15/2021    Post lumbar puncture headache 2022    Malignant neoplasm of upper-outer quadrant of left breast in female, estrogen receptor positive 10/21/2022    Hypoxemia associated with sleep 10/22/2022     Resolved Ambulatory Problems     Diagnosis Date Noted    Chest tightness 2016     Past Medical History:   Diagnosis Date    Aneurysm     Angioedema     Aortic valve disease     Atrophic kidney     Bradycardia     CAD (coronary artery disease)     Cancer     Essential hypertension     History of anemia     History of vitamin D deficiency     [place]     Mitral valve disease     EMELINA on auto CPAP 2003    Pulmonary hypertension     Secondary hyperparathyroidism of renal origin     Sick sinus syndrome     Spinal stenosis          PAST SURGICAL HISTORY  Past Surgical History:   Procedure Laterality Date    AORTIC VALVE REPAIR/REPLACEMENT  2015    Dr Veliz    BREAST LUMPECTOMY Left 2022    Procedure: Left ultrasound-guided partial mastectomy;  Surgeon: Jeannine Ledezma MD;  Location: [facility] OR;  Service: General;  Laterality: Left;    CARDIAC CATHETERIZATION      CARDIAC DEFIBRILLATOR PLACEMENT       SECTION      COLONOSCOPY N/A 2019    [facility]    ENDOSCOPY  2012    Hypertonic lower esophageal sphincter, gastritis. Path: Chronic gastritis, moderate.     ENDOSCOPY N/A 2019    Web in the upper third of the esophagus. Dilated    ENDOSCOPY N/A 2020    Procedure: ESOPHAGOGASTRODUODENOSCOPY WITH 56FR MALDONADO DILATATION AND COLD BIOPSIES;  Surgeon: Blayne Cai MD;  Location: [facility] ENDOSCOPY;  Service: Gastroenterology;  Laterality: N/A;  PRE: DYSPHAGIA  POST: SCHATZKI'S RING    EPIDURAL  2022    HYSTERECTOMY      MITRAL VALVE ANNULOPLASTY  2015    Dr Veliz    TRICUSPID VALVE SURGERY  2015     Dr Veliz         FAMILY HISTORY  Family History   Problem Relation Age of Onset    ALS Mother     Hypertension Father     Stroke Father     Malig Hyperthermia Neg Hx          SOCIAL HISTORY  Social History     Socioeconomic History    Marital status:     Number of children: 3   Tobacco Use    Smoking status: Former     Types: Cigarettes     Start date:      Quit date:      Years since quittin.9    Smokeless tobacco: Never   Vaping Use    Vaping Use: Never used   Substance and Sexual Activity    Alcohol use: Not Currently     Comment: Pt partakes occassionally    Drug use: No    Sexual activity: Defer         ALLERGIES  Patient has no known allergies.        REVIEW OF SYSTEMS  Review of Systems     All systems reviewed and negative except for those discussed in HPI.       PHYSICAL EXAM  ED Triage Vitals [23]   Temp Heart Rate Resp BP SpO2   98.8 °F (37.1 °C) 80 14 178/94 96 %      Temp src Heart Rate Source Patient Position BP Location FiO2 (%)   -- -- -- -- --       Physical Exam      GENERAL: Frail, nontoxic, appears uncomfortable  HENT: nares patent hematoma and soft tissue swelling overlying the bridge of the nose, mild bilateral periorbital TTP, no septal hematoma, no hemotympanums, no raccoon eyes  EYES: no scleral icterus, EOMs intact and without deficits  CV: regular rhythm, normal rate  RESPIRATORY: normal effort  ABDOMEN: soft  MUSCULOSKELETAL: Right arm: TTP at the right elbow and the proximal right humerus.  Left hip: Not shortened and rotated.  TTP with internal and external rotation.  C-spine: There is diffuse tenderness around the C-spine/no point tenderness or deformity.   NEURO: alert, moves all extremities, follows commands  PSYCH:  calm, cooperative  SKIN: warm, dry    Vital signs and nursing notes reviewed.          LAB RESULTS  Recent Results (from the past 24 hour(s))   CBC Auto Differential    Collection Time: 23 10:40 PM    Specimen: Blood   Result Value  Ref Range    WBC 5.49 3.40 - 10.80 10*3/mm3    RBC 3.74 (L) 3.77 - 5.28 10*6/mm3    Hemoglobin 12.1 12.0 - 15.9 g/dL    Hematocrit 35.2 34.0 - 46.6 %    MCV 94.1 79.0 - 97.0 fL    MCH 32.4 26.6 - 33.0 pg    MCHC 34.4 31.5 - 35.7 g/dL    RDW 12.3 12.3 - 15.4 %    RDW-SD 42.1 37.0 - 54.0 fl    MPV 11.3 6.0 - 12.0 fL    Platelets 154 140 - 450 10*3/mm3    Neutrophil % 69.4 42.7 - 76.0 %    Lymphocyte % 10.4 (L) 19.6 - 45.3 %    Monocyte % 19.3 (H) 5.0 - 12.0 %    Eosinophil % 0.5 0.3 - 6.2 %    Basophil % 0.2 0.0 - 1.5 %    Immature Grans % 0.2 0.0 - 0.5 %    Neutrophils, Absolute 3.81 1.70 - 7.00 10*3/mm3    Lymphocytes, Absolute 0.57 (L) 0.70 - 3.10 10*3/mm3    Monocytes, Absolute 1.06 (H) 0.10 - 0.90 10*3/mm3    Eosinophils, Absolute 0.03 0.00 - 0.40 10*3/mm3    Basophils, Absolute 0.01 0.00 - 0.20 10*3/mm3    Immature Grans, Absolute 0.01 0.00 - 0.05 10*3/mm3    nRBC 0.0 0.0 - 0.2 /100 WBC   Comprehensive Metabolic Panel    Collection Time: 11/22/23 10:40 PM    Specimen: Blood   Result Value Ref Range    Glucose 74 65 - 99 mg/dL    BUN 29 (H) 8 - 23 mg/dL    Creatinine 1.24 (H) 0.57 - 1.00 mg/dL    Sodium 140 136 - 145 mmol/L    Potassium 4.0 3.5 - 5.2 mmol/L    Chloride 101 98 - 107 mmol/L    CO2 25.9 22.0 - 29.0 mmol/L    Calcium 9.9 8.6 - 10.5 mg/dL    Total Protein 6.1 6.0 - 8.5 g/dL    Albumin 3.7 3.5 - 5.2 g/dL    ALT (SGPT) 42 (H) 1 - 33 U/L    AST (SGOT) 62 (H) 1 - 32 U/L    Alkaline Phosphatase 54 39 - 117 U/L    Total Bilirubin 0.3 0.0 - 1.2 mg/dL    Globulin 2.4 gm/dL    A/G Ratio 1.5 g/dL    BUN/Creatinine Ratio 23.4 7.0 - 25.0    Anion Gap 13.1 5.0 - 15.0 mmol/L    eGFR 42.7 (L) >60.0 mL/min/1.73   BNP    Collection Time: 11/22/23 10:40 PM    Specimen: Blood   Result Value Ref Range    proBNP 1,058.0 0.0 - 1,800.0 pg/mL   Basic Metabolic Panel    Collection Time: 11/23/23  4:36 AM    Specimen: Blood   Result Value Ref Range    Glucose 61 (L) 65 - 99 mg/dL    BUN 28 (H) 8 - 23 mg/dL    Creatinine 1.26  (H) 0.57 - 1.00 mg/dL    Sodium 139 136 - 145 mmol/L    Potassium 3.8 3.5 - 5.2 mmol/L    Chloride 102 98 - 107 mmol/L    CO2 25.0 22.0 - 29.0 mmol/L    Calcium 9.7 8.6 - 10.5 mg/dL    BUN/Creatinine Ratio 22.2 7.0 - 25.0    Anion Gap 12.0 5.0 - 15.0 mmol/L    eGFR 41.9 (L) >60.0 mL/min/1.73   CBC (No Diff)    Collection Time: 11/23/23  4:36 AM    Specimen: Blood   Result Value Ref Range    WBC 4.34 3.40 - 10.80 10*3/mm3    RBC 3.77 3.77 - 5.28 10*6/mm3    Hemoglobin 11.7 (L) 12.0 - 15.9 g/dL    Hematocrit 35.4 34.0 - 46.6 %    MCV 93.9 79.0 - 97.0 fL    MCH 31.0 26.6 - 33.0 pg    MCHC 33.1 31.5 - 35.7 g/dL    RDW 12.0 (L) 12.3 - 15.4 %    RDW-SD 41.0 37.0 - 54.0 fl    MPV 11.4 6.0 - 12.0 fL    Platelets 147 140 - 450 10*3/mm3       Ordered the above labs and reviewed the results.        RADIOLOGY  XR Chest 1 View    Result Date: 11/23/2023  Patient: LEENA VILLEGAS  Time Out: 02:06 Exam(s): XR CXR 1 VIEW EXAM:   XR Chest, 1 View CLINICAL HISTORY:    Reason for exam: Reported sudden onset of hypoxia. TECHNIQUE:   Frontal view of the chest. COMPARISON:   No relevant prior studies available. FINDINGS:   Lungs:  Unremarkable.  No consolidation.   Pleural space:  Unremarkable.  No pneumothorax.   Heart:  Cardiomegaly.  Prosthetic aortic valve.   Mediastinum:  Unremarkable.  Normal mediastinal contour.   Bones joints:  Unremarkable.  No acute fracture.   Vasculature:  Calcified aorta.   Tubes, lines and devices:  Dual lead pacemaker. IMPRESSION:       No acute findings in the chest.     Electronically signed by Julio C Lock MD on 11-23-23 at 0206    XR Elbow 2 View Right, XR Humerus Right    Result Date: 11/23/2023  TWO RADIOGRAPHIC VIEWS OF THE RIGHT HUMERUS AND 2 RADIOGRAPHIC VIEWS OF THE RIGHT ELBOW  CLINICAL HISTORY: Trauma. Evaluate for fracture.  FINDINGS:  Right humerus: Two radiographic views of the right humerus demonstrate prominent arthritic changes within the right glenohumeral joint. Otherwise, there is no  evidence for acute fracture or bony malalignment.  Right elbow: Two radiographic views of the right elbow demonstrate no evidence for acute fracture or bony malalignment.  This report was finalized on 11/23/2023 12:28 AM by Dr. David Michael M.D on Workstation: [id number]      CT Head Without Contrast, CT Cervical Spine Without Contrast, CT Facial Bones Without Contrast    Result Date: 11/23/2023   CT HEAD WITHOUT CONTRAST, MAXILLOFACIAL CT WITHOUT CONTRAST, AND CERVICAL SPINE CT WITHOUT CONTRAST  CLINICAL HISTORY: Tripped and fell on carpet with bruising around bridge of nose and headache and neck pain.  TECHNIQUE: CT scan of the head was obtained with 3 mm axial soft tissue algorithm and 2 mm bone algorithm images. No intravenous contrast was administered. Sagittal and coronal reconstructions were obtained.  COMPARISON: CT head dated 7/15/2022.  FINDINGS:   There is no evidence for a calvarial fracture. There is no evidence for an acute extra-axial hemorrhage.  The ventricles, sulci, and cisterns are age-appropriate. There are mild changes of chronic small vessel ischemic phenomenon. The basal ganglia and thalami are unremarkable in appearance. The gray-white matter differentiation is within normal limits. The posterior fossa structures are unremarkable.       No evidence for acute traumatic intracranial pathology.   TECHNIQUE: CT scan of the maxillofacial region was obtained with 1 mm axial, sagittal, and coronal bone algorithm images. Additionally, there are 2 mm axial, coronal, and sagittal soft tissue algorithm images.  FINDINGS:  There is no evidence for acute fracture or bony malalignment within the maxillofacial region. The orbits are unremarkable in appearance. No significant soft tissue abnormality is seen within the maxillofacial region.  IMPRESSION:  No evidence for acute fracture or bony malalignment involving the maxillofacial region.   TECHNIQUE: CT scan of the cervical spine was obtained with 1 mm  axial bone algorithm and 2 mm axial soft tissue algorithm images. Sagittal and coronal reconstructed images were obtained.   FINDINGS:  There is no evidence for acute fracture or bony malalignment involving the cervical spine.  Advanced degenerative disc changes are seen at C5-6 and C6-7. There is degenerative anterior spondylolisthesis of C4 on C5 and C7 on T1 by approximately 3 mm. Disc osteophyte complexes at C5-6 and C6-7 result in mild degrees of canal narrowing. Multilevel foraminal stenotic changes are appreciated from C3-4 down to C6-7.  Incidental note is made of atherosclerotic changes within the aortic arch and an aberrant origin of the right subclavian artery.  There is asymmetric increased density within the region of the right tongue base measuring up to 2.1 cm in greatest diameter and this may simply reflect angled positioning. However, I cannot entirely exclude the possibility of a mass at this site. I recommend further evaluation with direct visualization.  IMPRESSION:  No evidence for acute fracture or bony malalignment involving the cervical spine.  Incidental degenerative phenomena as discussed in detail above.  There is asymmetric increased density within the region of the right tongue base measuring up to 2.1 cm in greatest diameter and this may simply reflect angled positioning. However, I cannot entirely exclude the possibility of a mass at this site. I recommend further evaluation with direct visualization.  These findings and recommendations were discussed with Raza Littlejohn on 11/20/2023 at approximately 12:25 a.m.  This report was finalized on 11/23/2023 12:26 AM by Dr. David Michael M.D on Workstation: [id number]      CT Pelvis Without Contrast    Result Date: 11/22/2023  Patient: LEENA VILLEGAS  Time Out: 23:38 Exam(s): CT PELVIS Without Contrast EXAM:   CT Pelvis Without Intravenous Contrast CLINICAL HISTORY:    Reason for exam: Trauma. R o left hip pelvic fx. TECHNIQUE:   Axial computed  tomography images of the pelvis without intravenous contrast.  CTDI is 9.27 mGy and DLP is 312 mGy-cm.  This CT exam was performed according to the principle of ALARA (As Low As Reasonably Achievable) by using one or more of the following dose reduction techniques: automated exposure control, adjustment of the mA and or kV according to patient size, and or use of iterative reconstruction technique. COMPARISON:   No relevant prior studies available. FINDINGS:   Kidneys and ureters:  Severe left renal atrophy.   Bowel:  Sigmoid diverticulosis without associated inflammation.  No obstruction.  No mucosal thickening.   Appendix:  No findings to suggest acute appendicitis.   Intraperitoneal space:  Unremarkable.  No free air.  No significant fluid collection.   Bladder:  Unremarkable.  No stones.   Reproductive:  Hysterectomy.   Bones joints:  No acute fracture.  No dislocation.   Soft tissues:  Fat-containing left inguinal hernia.   Vasculature:  Moderate aortoiliac atherosclerosis.  No lower abdominal aortic aneurysm.   Lymph nodes:  Unremarkable.  No enlarged lymph nodes. IMPRESSION:       No acute findings in the pelvis.     Electronically signed by Rodney Mancia MD on 11-22-23 at 2338     Ordered the above noted radiological studies. Reviewed by me in PACS.          PROCEDURES  Procedures          MEDICATIONS GIVEN IN ER  Medications   Influenza Vac High-Dose Quad (FLUZONE HIGH DOSE) injection 0.7 mL (has no administration in time range)   sodium chloride 0.9 % flush 10 mL (has no administration in time range)   sodium chloride 0.9 % flush 10 mL (has no administration in time range)   sodium chloride 0.9 % infusion 40 mL (has no administration in time range)   acetaminophen (TYLENOL) tablet 650 mg (has no administration in time range)     Or   acetaminophen (TYLENOL) 160 MG/5ML oral solution 650 mg (has no administration in time range)     Or   acetaminophen (TYLENOL) suppository 650 mg (has no administration in  time range)   ondansetron (ZOFRAN) tablet 4 mg (has no administration in time range)     Or   ondansetron (ZOFRAN) injection 4 mg (has no administration in time range)   calcium carbonate (TUMS) chewable tablet 500 mg (200 mg elemental) (has no administration in time range)   sodium chloride 0.9 % infusion (75 mL/hr Intravenous New Bag 11/23/23 4287)   sennosides-docusate (PERICOLACE) 8.6-50 MG per tablet 2 tablet (has no administration in time range)     And   polyethylene glycol (MIRALAX) packet 17 g (has no administration in time range)     And   bisacodyl (DULCOLAX) EC tablet 5 mg (has no administration in time range)     And   bisacodyl (DULCOLAX) suppository 10 mg (has no administration in time range)   HYDROcodone-acetaminophen (NORCO) 5-325 MG per tablet 1 tablet (has no administration in time range)   acetaminophen (TYLENOL) tablet 1,000 mg (1,000 mg Oral Given 11/22/23 5492)         MEDICAL DECISION MAKING, PROGRESS, and CONSULTS    Discussion below represents my analysis of pertinent findings related to patient's condition, differential diagnosis, treatment plan and final disposition.      Orders placed during this visit:  Orders Placed This Encounter   Procedures    CT Head Without Contrast    CT Cervical Spine Without Contrast    CT Facial Bones Without Contrast    CT Pelvis Without Contrast    XR Elbow 2 View Right    XR Humerus Right    XR Chest 1 View    Comprehensive Metabolic Panel    CBC Auto Differential    BNP    Basic Metabolic Panel    CBC (No Diff)    Diet: Regular/House Diet; Texture: Regular Texture (IDDSI 7); Fluid Consistency: Thin (IDDSI 0)    Vital Signs    Intake & Output    Weigh Patient    Oral Care    Saline Lock & Maintain IV Access    Place Sequential Compression Device    Maintain Sequential Compression Device    Code Status and Medical Interventions:    [facility] (on-call MD unless specified) Details    Inpatient Case Management  Consult    PT Consult: Eval & Treat  Functional Mobility Below Baseline    Insert Peripheral IV    Initiate Observation Status    CBC & Differential         Additional sources:  - Discussed/obtained information from independent historians: None avail  Additional information was obtained to confirm the patient's history.    - External (non-ED) record review: Well 22,022 patient had a malignant neoplasm in the upper outer quadrant of the left breast.  She had a left ultrasound-guided partial mastectomy performed by Jeannine Ledezma at this hospital.          - Chronic or social conditions impacting care: None        Differential diagnosis:    Head injury with intercranial hemorrhage, head without a cranial hemorrhage, cervical strain, cervical fracture, left hip contusion, left hip fracture, facial contusion, facial fracture, right elbow fracture, right humerus fracture.  Will obtain CT head, CT C-spine, CT facial bone, CT pelvis to further evaluate.  Will also obtain x-ray of the right elbow and right humerus      Independent interpretation of labs, radiology studies, and discussions with consultants:  ED Course as of 11/23/23 0642   Wed Nov 22, 2023 2153 BP: 178/94 [RC]   2153 Temp: 98.8 °F (37.1 °C) [RC]   2153 Heart Rate: 80 [RC]   2153 Resp: 14 [RC]   2153 SpO2: 96 %  RA [RC]   u Nov 23, 2023   0054 My independent interpretation of the x-rays of the right elbow and right humerus is that of no acute fracture.  This is confirmed with the radiologist official read. [RC]   0055 CT pelvis, CT head, CT C-spine showed no acute process.  Will attempt to ambulate the patient to see if she will be safe for discharge [RC]   0120 Patient unable to ambulate at all in the ED at this time due to pain from the fall.  Although nothing broken patient lives at home with a 63-year-old disabled son.  It is felt she would be unsafe going home at this time.  We admitted to the hospitalist for further evaluation and management. [RC]      ED Course User Index  [RC]  Joel Littlejohn III, PA               DIAGNOSIS  Final diagnoses:   Fall, initial encounter   Injury of head, initial encounter   Contusion of right upper extremity, initial encounter   Contusion of left hip, initial encounter         DISPOSITION  ADMISSION    Discussed treatment plan and reason for admission with pt/family and admitting physician.  Pt/family voiced understanding of the plan for admission for further testing/treatment as needed.        Latest Documented Vital Signs:  As of 06:42 EST  BP- 131/67 HR- 75 Temp- 97.3 °F (36.3 °C) (Oral) O2 sat- 95%      --    Please note that portions of this were completed with a voice recognition program.       Note Disclaimer: At [facility], we believe that sharing information builds trust and better relationships. You are receiving this note because you are receiving care at [facility] or recently visited. It is possible you will see health information before a provider has talked with you about it. This kind of information can be easy to misunderstand. To help you fully understand what it means for your health, we urge you to discuss this note with your provider.         Joel Littlejohn III, PA  11/23/23 0648

## 2023-11-23 NOTE — PLAN OF CARE
Goal Outcome Evaluation:  Plan of Care Reviewed With: patient        Progress: no change  Outcome Evaluation: Pt is an 84 yo female admitted after a fall and inability to ambulate. She is seen this date for OT RAINA steward&Candido, reports required assist at home from her daughter with dressing/bathing. Pt reports she care for her mentally disabled son. Today, pt presents with pain, poor act tolerance, strength and balance impacting overall safety and (I) with ADLs. She required min-mod A for bed mob, dep for LBD, STS with min A and min A with HHA for short distance to chair, she remains a high falls risk. She will continue to benefit from skilled OT to address goals, rec SNF at this time due to falls hx and decreased ability to care for self. Bilat shoulders are very limited as well.      Anticipated Discharge Disposition (OT): skilled nursing facility

## 2023-11-23 NOTE — ED NOTES
Nursing report ED to floor  Carly Gotti  83 y.o.  female    HPI :   Chief Complaint   Patient presents with    Fall    Head Injury       Admitting doctor:   Walker Palacios MD    Admitting diagnosis:   The primary encounter diagnosis was Fall, initial encounter. Diagnoses of Injury of head, initial encounter, Contusion of right upper extremity, initial encounter, and Contusion of left hip, initial encounter were also pertinent to this visit.    Code status:   Current Code Status       Date Active Code Status Order ID Comments User Context       Prior            Allergies:   Patient has no known allergies.    Isolation:   No active isolations    Intake and Output  No intake or output data in the 24 hours ending 11/23/23 0127    Weight:       11/22/23  2147   Weight: 54.9 kg (121 lb)       Most recent vitals:   Vitals:    11/22/23 2230 11/22/23 2300 11/23/23 0000 11/23/23 0101   BP: 157/85 143/86 111/66 124/78   Pulse: 76 89 75 75   Resp:    16   Temp:       SpO2: 93% 90% 94% 95%   Weight:       Height:           Active LDAs/IV Access:   Lines, Drains & Airways       Active LDAs       Name Placement date Placement time Site Days    Peripheral IV 11/22/23 Left Antecubital 11/22/23  --  Antecubital  1                    Labs (abnormal labs have a star):   Labs Reviewed   CBC WITH AUTO DIFFERENTIAL - Abnormal; Notable for the following components:       Result Value    RBC 3.74 (*)     Lymphocyte % 10.4 (*)     Monocyte % 19.3 (*)     Lymphocytes, Absolute 0.57 (*)     Monocytes, Absolute 1.06 (*)     All other components within normal limits   COMPREHENSIVE METABOLIC PANEL - Abnormal; Notable for the following components:    BUN 29 (*)     Creatinine 1.24 (*)     ALT (SGPT) 42 (*)     AST (SGOT) 62 (*)     eGFR 42.7 (*)     All other components within normal limits    Narrative:     GFR Normal >60  Chronic Kidney Disease <60  Kidney Failure <15    The GFR formula is only valid for adults with stable renal function  between ages 18 and 70.   BNP (IN-HOUSE) - Normal    Narrative:     This assay is used as an aid in the diagnosis of individuals suspected of having heart failure. It can be used as an aid in the diagnosis of acute decompensated heart failure (ADHF) in patients presenting with signs and symptoms of ADHF to the emergency department (ED). In addition, NT-proBNP of <300 pg/mL indicates ADHF is not likely.    Age Range Result Interpretation  NT-proBNP Concentration (pg/mL:      <50             Positive            >450                   Gray                 300-450                    Negative             <300    50-75           Positive            >900                  Gray                300-900                  Negative            <300      >75             Positive            >1800                  Gray                300-1800                  Negative            <300   CBC AND DIFFERENTIAL    Narrative:     The following orders were created for panel order CBC & Differential.  Procedure                               Abnormality         Status                     ---------                               -----------         ------                     CBC Auto Differential[[id number]]        Abnormal            Final result                 Please view results for these tests on the individual orders.       EKG:   No orders to display       Meds given in ED:   Medications   acetaminophen (TYLENOL) tablet 1,000 mg (1,000 mg Oral Given 11/22/23 2242)       Imaging results:  CT Head Without Contrast    Result Date: 11/23/2023   No evidence for acute traumatic intracranial pathology.   TECHNIQUE: CT scan of the maxillofacial region was obtained with 1 mm axial, sagittal, and coronal bone algorithm images. Additionally, there are 2 mm axial, coronal, and sagittal soft tissue algorithm images.  FINDINGS:  There is no evidence for acute fracture or bony malalignment within the maxillofacial region. The orbits are unremarkable in  appearance. No significant soft tissue abnormality is seen within the maxillofacial region.  IMPRESSION:  No evidence for acute fracture or bony malalignment involving the maxillofacial region.   TECHNIQUE: CT scan of the cervical spine was obtained with 1 mm axial bone algorithm and 2 mm axial soft tissue algorithm images. Sagittal and coronal reconstructed images were obtained.   FINDINGS:  There is no evidence for acute fracture or bony malalignment involving the cervical spine.  Advanced degenerative disc changes are seen at C5-6 and C6-7. There is degenerative anterior spondylolisthesis of C4 on C5 and C7 on T1 by approximately 3 mm. Disc osteophyte complexes at C5-6 and C6-7 result in mild degrees of canal narrowing. Multilevel foraminal stenotic changes are appreciated from C3-4 down to C6-7.  Incidental note is made of atherosclerotic changes within the aortic arch and an aberrant origin of the right subclavian artery.  There is asymmetric increased density within the region of the right tongue base measuring up to 2.1 cm in greatest diameter and this may simply reflect angled positioning. However, I cannot entirely exclude the possibility of a mass at this site. I recommend further evaluation with direct visualization.  IMPRESSION:  No evidence for acute fracture or bony malalignment involving the cervical spine.  Incidental degenerative phenomena as discussed in detail above.  There is asymmetric increased density within the region of the right tongue base measuring up to 2.1 cm in greatest diameter and this may simply reflect angled positioning. However, I cannot entirely exclude the possibility of a mass at this site. I recommend further evaluation with direct visualization.  These findings and recommendations were discussed with Raza Littlejohn on 11/20/2023 at approximately 12:25 a.m.  This report was finalized on 11/23/2023 12:26 AM by Dr. David Michael M.D on Workstation: [id number]      CT Cervical Spine  Without Contrast    Result Date: 11/23/2023   No evidence for acute traumatic intracranial pathology.   TECHNIQUE: CT scan of the maxillofacial region was obtained with 1 mm axial, sagittal, and coronal bone algorithm images. Additionally, there are 2 mm axial, coronal, and sagittal soft tissue algorithm images.  FINDINGS:  There is no evidence for acute fracture or bony malalignment within the maxillofacial region. The orbits are unremarkable in appearance. No significant soft tissue abnormality is seen within the maxillofacial region.  IMPRESSION:  No evidence for acute fracture or bony malalignment involving the maxillofacial region.   TECHNIQUE: CT scan of the cervical spine was obtained with 1 mm axial bone algorithm and 2 mm axial soft tissue algorithm images. Sagittal and coronal reconstructed images were obtained.   FINDINGS:  There is no evidence for acute fracture or bony malalignment involving the cervical spine.  Advanced degenerative disc changes are seen at C5-6 and C6-7. There is degenerative anterior spondylolisthesis of C4 on C5 and C7 on T1 by approximately 3 mm. Disc osteophyte complexes at C5-6 and C6-7 result in mild degrees of canal narrowing. Multilevel foraminal stenotic changes are appreciated from C3-4 down to C6-7.  Incidental note is made of atherosclerotic changes within the aortic arch and an aberrant origin of the right subclavian artery.  There is asymmetric increased density within the region of the right tongue base measuring up to 2.1 cm in greatest diameter and this may simply reflect angled positioning. However, I cannot entirely exclude the possibility of a mass at this site. I recommend further evaluation with direct visualization.  IMPRESSION:  No evidence for acute fracture or bony malalignment involving the cervical spine.  Incidental degenerative phenomena as discussed in detail above.  There is asymmetric increased density within the region of the right tongue base  measuring up to 2.1 cm in greatest diameter and this may simply reflect angled positioning. However, I cannot entirely exclude the possibility of a mass at this site. I recommend further evaluation with direct visualization.  These findings and recommendations were discussed with Raza Littlejohn on 11/20/2023 at approximately 12:25 a.m.  This report was finalized on 11/23/2023 12:26 AM by Dr. David Michael M.D on Workstation: [id number]      CT Facial Bones Without Contrast    Result Date: 11/23/2023   No evidence for acute traumatic intracranial pathology.   TECHNIQUE: CT scan of the maxillofacial region was obtained with 1 mm axial, sagittal, and coronal bone algorithm images. Additionally, there are 2 mm axial, coronal, and sagittal soft tissue algorithm images.  FINDINGS:  There is no evidence for acute fracture or bony malalignment within the maxillofacial region. The orbits are unremarkable in appearance. No significant soft tissue abnormality is seen within the maxillofacial region.  IMPRESSION:  No evidence for acute fracture or bony malalignment involving the maxillofacial region.   TECHNIQUE: CT scan of the cervical spine was obtained with 1 mm axial bone algorithm and 2 mm axial soft tissue algorithm images. Sagittal and coronal reconstructed images were obtained.   FINDINGS:  There is no evidence for acute fracture or bony malalignment involving the cervical spine.  Advanced degenerative disc changes are seen at C5-6 and C6-7. There is degenerative anterior spondylolisthesis of C4 on C5 and C7 on T1 by approximately 3 mm. Disc osteophyte complexes at C5-6 and C6-7 result in mild degrees of canal narrowing. Multilevel foraminal stenotic changes are appreciated from C3-4 down to C6-7.  Incidental note is made of atherosclerotic changes within the aortic arch and an aberrant origin of the right subclavian artery.  There is asymmetric increased density within the region of the right tongue base measuring up to 2.1  cm in greatest diameter and this may simply reflect angled positioning. However, I cannot entirely exclude the possibility of a mass at this site. I recommend further evaluation with direct visualization.  IMPRESSION:  No evidence for acute fracture or bony malalignment involving the cervical spine.  Incidental degenerative phenomena as discussed in detail above.  There is asymmetric increased density within the region of the right tongue base measuring up to 2.1 cm in greatest diameter and this may simply reflect angled positioning. However, I cannot entirely exclude the possibility of a mass at this site. I recommend further evaluation with direct visualization.  These findings and recommendations were discussed with Raza Littlejohn on 2023 at approximately 12:25 a.m.  This report was finalized on 2023 12:26 AM by Dr. David Michael M.D on Workstation: [id number]      CT Pelvis Without Contrast    Result Date: 2023  Electronically signed by Rodney Mancia MD on 23 at 2338     Ambulatory status:   - x2 assist    Social issues:   Social History     Socioeconomic History    Marital status:     Number of children: 3   Tobacco Use    Smoking status: Former     Types: Cigarettes     Start date:      Quit date:      Years since quittin.9    Smokeless tobacco: Never   Vaping Use    Vaping Use: Never used   Substance and Sexual Activity    Alcohol use: Not Currently     Comment: Pt partakes occassionally    Drug use: No    Sexual activity: Defer       NIH Stroke Scale:       Deepthi Wiggins RN  23 01:27 EST

## 2023-11-23 NOTE — THERAPY EVALUATION
Patient Name: Carly Gotti  : 1940    MRN: [id number]                              Today's Date: 2023       Admit Date: 2023    Visit Dx:     ICD-10-CM ICD-9-CM   1. Fall, initial encounter  W19.XXXA E888.9   2. Injury of head, initial encounter  S09.90XA 959.01   3. Contusion of right upper extremity, initial encounter  S40.021A 923.9   4. Contusion of left hip, initial encounter  S70.02XA 924.01     Patient Active Problem List   Diagnosis    S/P AVR (aortic valve replacement)    S/P TVR (tricuspid valve repair)    S/P MVR (mitral valve repair)    Benign essential hypertension    Mitral and aortic valve disease    CKD (chronic kidney disease) stage 3, GFR 30-59 ml/min    Small bowel obstruction    Abdominal pain    Slow transit constipation    Hyperlipidemia    Pacemaker    Colitis    Elevated lipase    C. difficile colitis    Ileus    Sepsis    Thrombocytopenia    Dysphagia    Esophageal web    Recurrent Clostridium difficile diarrhea    CKD (chronic kidney disease) stage 4, GFR 15-29 ml/min    Generalized abdominal pain    Intestinal infection due to enteropathogenic E. coli    Obstructive sleep apnea treated with auto CPAP    Nausea    Renal failure    Depression    Arthritis    Aneurysm of thoracic aorta    Anxiety    Post lumbar puncture headache    Malignant neoplasm of upper-outer quadrant of left breast in female, estrogen receptor positive    Hypoxemia associated with sleep    Unable to ambulate    Fall    Elevated liver transaminase level     Past Medical History:   Diagnosis Date    Aneurysm     Angioedema     Aortic valve disease     Arthritis     Atrophic kidney     Bradycardia     CAD (coronary artery disease)     Cancer     BREAST    CKD (chronic kidney disease) stage 4, GFR 15-29 ml/min     Depression     Dysphagia     Essential hypertension     History of anemia     History of vitamin D deficiency     [place] (hard of hearing)     Hyperlipidemia     Mitral valve disease     EMELINA  on auto CPAP 2003    Overnight polysomnogram.  Weight 154 pounds.  Severe EMELINA with AHI 51.5 events per hour.  Sleep-related hypoxia present.    Post lumbar puncture headache 2022    Pulmonary hypertension     Secondary hyperparathyroidism of renal origin     Sick sinus syndrome     Spinal stenosis      Past Surgical History:   Procedure Laterality Date    AORTIC VALVE REPAIR/REPLACEMENT  2015    Dr Veliz    BREAST LUMPECTOMY Left 2022    Procedure: Left ultrasound-guided partial mastectomy;  Surgeon: Jeannine Ledezma MD;  Location: [facility] MAIN OR;  Service: General;  Laterality: Left;    CARDIAC CATHETERIZATION      CARDIAC DEFIBRILLATOR PLACEMENT       SECTION      COLONOSCOPY N/A 2019    NBIH    ENDOSCOPY  2012    Hypertonic lower esophageal sphincter, gastritis. Path: Chronic gastritis, moderate.     ENDOSCOPY N/A 2019    Web in the upper third of the esophagus. Dilated    ENDOSCOPY N/A 2020    Procedure: ESOPHAGOGASTRODUODENOSCOPY WITH 56FR MALDONADO DILATATION AND COLD BIOPSIES;  Surgeon: Blayne Cai MD;  Location: [facility] ENDOSCOPY;  Service: Gastroenterology;  Laterality: N/A;  PRE: DYSPHAGIA  POST: SCHATZKI'S RING    EPIDURAL  2022    HYSTERECTOMY      MITRAL VALVE ANNULOPLASTY  2015    Dr Veliz    TRICUSPID VALVE SURGERY  2015    Dr Veliz      General Information       Row Name 23 1142          OT Time and Intention    Document Type evaluation  -MW     Mode of Treatment occupational therapy;individual therapy  -MW       Row Name 23 1142          General Information    Patient Profile Reviewed yes  -MW     Prior Level of Function min assist:;ADL's  she reports her daughter assist with dressing, pt reports she cares for her mentally disabled son at home  -MW     Existing Precautions/Restrictions fall  -MW     Barriers to Rehab none identified  -MW       Row Name 23 1142          Living Environment    People  in Home child(silvano), adult  -       Row Name 11/23/23 1142          Cognition    Orientation Status (Cognition) oriented x 3  -MW       Row Name 11/23/23 1142          Safety Issues, Functional Mobility    Impairments Affecting Function (Mobility) balance;strength;pain;endurance/activity tolerance;postural/trunk control;range of motion (ROM)  -               User Key  (r) = Recorded By, (t) = Taken By, (c) = Cosigned By      Initials Name Provider Type    MW Ileana Diaz OT Occupational Therapist                     Mobility/ADL's       Row Name 11/23/23 1143          Bed Mobility    Bed Mobility supine-sit;sit-supine  -MW     Supine-Sit [place] (Bed Mobility) minimum assist (75% patient effort);moderate assist (50% patient effort);verbal cues  -     Sit-Supine [place] (Bed Mobility) not tested  -     Assistive Device (Bed Mobility) bed rails  -     Comment, (Bed Mobility) increased time, [facility] end of session  -       Row Name 11/23/23 1143          Transfers    Transfers sit-stand transfer  -       Row Name 11/23/23 1143          Sit-Stand Transfer    Sit-Stand [place] (Transfers) minimum assist (75% patient effort)  -     Assistive Device (Sit-Stand Transfers) other (see comments)  -     Comment, (Sit-Stand Transfer) HHA  -       Row Name 11/23/23 1143          Functional Mobility    Functional Mobility- Comment short distance to chair with HHA  -MW       Row Name 11/23/23 1143          Activities of Daily Living    BADL Assessment/Intervention toileting;lower body dressing  -       Row Name 11/23/23 1143          Toileting Assessment/Training    Comment, (Toileting) educated on use of BSC  -       Row Name 11/23/23 1143          Lower Body Dressing Assessment/Training    [place] Level (Lower Body Dressing) lower body dressing skills;don;socks;dependent (less than 25% patient effort)  -     Position (Lower Body Dressing) sitting up in bed  -               User Key   (r) = Recorded By, (t) = Taken By, (c) = Cosigned By      Initials Name Provider Type    MW Ileana Diaz OT Occupational Therapist                   Obj/Interventions       Row Name 11/23/23 1144          Sensory Assessment (Somatosensory)    Sensory Assessment (Somatosensory) UE sensation intact  -[facility] Name 11/23/23 1144          Vision Assessment/Intervention    Visual Impairment/Limitations WFL  -       Row Name 11/23/23 1144          Range of Motion Comprehensive    General Range of Motion upper extremity range of motion deficits identified  -     Comment, General Range of Motion 25% end range in bilat shoulders, very limited due to arthritis and pain  -[facility] Name 11/23/23 1144          Strength Comprehensive (MMT)    General Manual Muscle Testing (MMT) Assessment upper extremity strength deficits identified  -     Comment, General Manual Muscle Testing (MMT) Assessment  3/5 and elbow 3/5, bilat shoulders 2/5  -       Row Name 11/23/23 1144          Motor Skills    Motor Skills coordination;functional endurance  -     Coordination WFL  -     Functional Endurance poor  -[facility] Name 11/23/23 1144          Balance    Balance Assessment sitting static balance;sitting dynamic balance;sit to stand dynamic balance;standing static balance;standing dynamic balance  -     Static Sitting Balance standby assist  -MW     Dynamic Sitting Balance contact guard  -     Position, Sitting Balance sitting edge of bed;unsupported  -     Sit to Stand Dynamic Balance minimal assist  -MW     Static Standing Balance minimal assist  -MW     Dynamic Standing Balance minimal assist;moderate assist  -     Position/Device Used, Standing Balance supported;other (see comments)  HHA  -     Balance Interventions occupation based/functional task;sitting;standing;sit to stand;supported;static;dynamic;minimal challenge  -     Comment, Balance unsteady and high falls risk  -                User Key  (r) = Recorded By, (t) = Taken By, (c) = Cosigned By      Initials Name Provider Type    Ileana Nava OT Occupational Therapist                   Goals/Plan       Row Name 11/23/23 1148          Transfer Goal 1 (OT)    Activity/Assistive Device (Transfer Goal 1, OT) transfers, all  -MW     [place] Level/Cues Needed (Transfer Goal 1, OT) contact guard required  -MW     Time Frame (Transfer Goal 1, OT) short term goal (STG);2 weeks  -MW     Progress/Outcome (Transfer Goal 1, OT) goal ongoing  -       Row Name 11/23/23 1148          Dressing Goal 1 (OT)    Activity/Device (Dressing Goal 1, OT) lower body dressing  -MW     [place]/Cues Needed (Dressing Goal 1, OT) minimum assist (75% or more patient effort)  -MW     Time Frame (Dressing Goal 1, OT) short term goal (STG);2 weeks  -MW     Progress/Outcome (Dressing Goal 1, OT) goal ongoing  -MW       Row Name 11/23/23 1148          Toileting Goal 1 (OT)    Activity/Device (Toileting Goal 1, OT) toileting skills, all  -MW     [place] Level/Cues Needed (Toileting Goal 1, OT) minimum assist (75% or more patient effort)  -MW     Time Frame (Toileting Goal 1, OT) short term goal (STG);2 weeks  -MW     Progress/Outcome (Toileting Goal 1, OT) goal ongoing  -MW       Row Name 11/23/23 1148          Grooming Goal 1 (OT)    Activity/Device (Grooming Goal 1, OT) grooming skills, all  -MW     [place] (Grooming Goal 1, OT) standby assist  -MW     Time Frame (Grooming Goal 1, OT) short term goal (STG);2 weeks  -MW     Progress/Outcome (Grooming Goal 1, OT) goal ongoing  -       Row Name 11/23/23 1148          Therapy Assessment/Plan (OT)    Planned Therapy Interventions (OT) activity tolerance training;BADL retraining;neuromuscular control/coordination retraining;patient/caregiver education/training;transfer/mobility retraining;strengthening exercise;ROM/therapeutic exercise;occupation/activity based interventions;functional balance retraining   -               User Key  (r) = Recorded By, (t) = Taken By, (c) = Cosigned By      Initials Name Provider Type    MW Ileana Diaz, LEIGHANN Occupational Therapist                   Clinical Impression       Row Name 11/23/23 1145          Pain Assessment    Pre/Posttreatment Pain Comment reports pain in bilat shoulders however did not rate  -       Row Name 11/23/23 1145          Plan of Care Review    Plan of Care Reviewed With patient  -     Progress no change  -     Outcome Evaluation Pt is an 84 yo female admitted after a fall and inability to ambulate. She is seen this date for OT RAINA steward&Mook3, reports required assist at home from her daughter with dressing/bathing. Pt reports she care for her mentally disabled son. Today, pt presents with pain, poor act tolerance, strength and balance impacting overall safety and (I) with ADLs. She required min-mod A for bed mob, dep for LBD, STS with min A and min A with HHA for short distance to chair, she remains a high falls risk. She will continue to benefit from skilled OT to address goals, rec SNF at this time due to falls hx and decreased ability to care for self. Bilat shoulders are very limited as well.  -       Row Name 11/23/23 1145          Therapy Assessment/Plan (OT)    Rehab Potential (OT) good, to achieve stated therapy goals  -     Criteria for Skilled Therapeutic Interventions Met (OT) yes;meets criteria;skilled treatment is necessary  -     Therapy Frequency (OT) 5 times/wk  -       Row Name 11/23/23 1145          Therapy Plan Review/Discharge Plan (OT)    Anticipated Discharge Disposition (OT) skilled nursing facility  -       Row Name 11/23/23 1145          Vital Signs    O2 Delivery Pre Treatment room air  -       Row Name 11/23/23 1145          Positioning and Restraints    Pre-Treatment Position in bed  -MW     Post Treatment Position chair  -MW     In Chair with nsg;notified nsg;sitting  -               User Key  (r) = Recorded  By, (t) = Taken By, (c) = Cosigned By      Initials Name Provider Type    Ileana Nava OT Occupational Therapist                   Outcome Measures       Row Name 11/23/23 1149          How much help from another is currently needed...    Putting on and taking off regular lower body clothing? 2  -MW     Bathing (including washing, rinsing, and drying) 2  -MW     Toileting (which includes using toilet bed pan or urinal) 2  -MW     Putting on and taking off regular upper body clothing 2  -MW     Taking care of personal grooming (such as brushing teeth) 3  -MW     Eating meals 3  -MW     AM-PAC 6 Clicks Score (OT) 14  -MW       Row Name 11/23/23 0830 11/23/23 0404       How much help from another person do you currently need...    Turning from your back to your side while in flat bed without using bedrails? 2  -CD 2  -BT    Moving from lying on back to sitting on the side of a flat bed without bedrails? 2  -CD 2  -BT    Moving to and from a bed to a chair (including a wheelchair)? 2  -CD 2  -BT    Standing up from a chair using your arms (e.g., wheelchair, bedside chair)? 2  -CD 2  -BT    Climbing 3-5 steps with a railing? 1  -CD 1  -BT    To walk in hospital room? 2  -CD 2  -BT    AM-PAC 6 Clicks Score (PT) 11  -CD 11  -BT    Highest Level of Mobility Goal 4 --> Transfer to chair/commode  -CD 4 --> Transfer to chair/commode  -BT      Row Name 11/23/23 1149          Functional Assessment    Outcome Measure Options AM-PAC 6 Clicks Daily Activity (OT)  -               User Key  (r) = Recorded By, (t) = Taken By, (c) = Cosigned By      Initials Name Provider Type    BT Alanis Lemus RN Registered Nurse    Dee Antonio RN Registered Nurse    Ileana Nava OT Occupational Therapist                    Occupational Therapy Education       Title: PT OT SLP Therapies (In Progress)       Topic: Occupational Therapy (In Progress)       Point: ADL training (Done)       Description:   Instruct  learner(s) on proper safety adaptation and remediation techniques during self care or transfers.   Instruct in proper use of assistive devices.                  Learning Progress Summary             Patient Acceptance, E, VU by  at 11/23/2023 1150    Comment: role of OT, d/c rec, goals, safety                         Point: Home exercise program (Not Started)       Description:   Instruct learner(s) on appropriate technique for monitoring, assisting and/or progressing therapeutic exercises/activities.                  Learner Progress:  Not documented in this visit.              Point: Precautions (Done)       Description:   Instruct learner(s) on prescribed precautions during self-care and functional transfers.                  Learning Progress Summary             Patient Acceptance, E, VU by  at 11/23/2023 1150    Comment: role of OT, d/c rec, goals, safety                         Point: Body mechanics (Done)       Description:   Instruct learner(s) on proper positioning and spine alignment during self-care, functional mobility activities and/or exercises.                  Learning Progress Summary             Patient Acceptance, E, VU by  at 11/23/2023 1150    Comment: role of OT, d/c rec, goals, safety                                         User Key       Initials Effective Dates Name Provider Type Discipline     08/20/21 -  Ileana Diaz OT Occupational Therapist OT                  OT Recommendation and Plan  Planned Therapy Interventions (OT): activity tolerance training, BADL retraining, neuromuscular control/coordination retraining, patient/caregiver education/training, transfer/mobility retraining, strengthening exercise, ROM/therapeutic exercise, occupation/activity based interventions, functional balance retraining  Therapy Frequency (OT): 5 times/wk  Plan of Care Review  Plan of Care Reviewed With: patient  Progress: no change  Outcome Evaluation: Pt is an 82 yo female admitted after a  fall and inability to ambulate. She is seen this date for OT nichelle, RAINA&Ox3, reports required assist at home from her daughter with dressing/bathing. Pt reports she care for her mentally disabled son. Today, pt presents with pain, poor act tolerance, strength and balance impacting overall safety and (I) with ADLs. She required min-mod A for bed mob, dep for LBD, STS with min A and min A with HHA for short distance to chair, she remains a high falls risk. She will continue to benefit from skilled OT to address goals, rec SNF at this time due to falls hx and decreased ability to care for self. Bilat shoulders are very limited as well.     Time Calculation:   Evaluation Complexity (OT)  Review Occupational Profile/Medical/Therapy History Complexity: expanded/moderate complexity  Assessment, Occupational Performance/Identification of Deficit Complexity: 5 or more performance deficits  Clinical Decision Making Complexity (OT): detailed assessment/moderate complexity  Overall Complexity of Evaluation (OT): moderate complexity     Time Calculation- OT       Row Name 11/23/23 1150             Time Calculation- OT    OT Start Time 1000  -MW      OT Stop Time 1015  -MW      OT Time Calculation (min) 15 min  -MW      Total Timed Code Minutes- OT 8 minute(s)  -MW      OT Received On 11/23/23  -MW      OT - Next Appointment 11/24/23  -MW      OT Goal Re-Cert Due Date 12/07/23  -MW         Timed Charges    59848 - OT Therapeutic Activity Minutes 8  -MW         Untimed Charges    OT Eval/Re-eval Minutes 7  -MW         Total Minutes    Timed Charges Total Minutes 8  -MW      Untimed Charges Total Minutes 7  -MW       Total Minutes 15  -MW                User Key  (r) = Recorded By, (t) = Taken By, (c) = Cosigned By      Initials Name Provider Type    Ileana Nava OT Occupational Therapist                  Therapy Charges for Today       Code Description Service Date Service Provider Modifiers Qty    [id number]  OT  THERAPEUTIC ACT EA 15 MIN 11/23/2023 Ileana Diaz OT GO 1    [id number] HC OT EVAL MOD COMPLEXITY 2 11/23/2023 Ileana Diaz OT GO 1                 Ileana Diaz OT  11/23/2023

## 2023-11-23 NOTE — H&P
Patient Name:  Carly Gotti  YOB: 1940  MRN:  [id number]  Admit Date:  11/22/2023  Patient Care Team:  Reyes, Miriam Mercado, MD as PCP - General (Family Medicine)  Gato Titus MD as Consulting Physician (Hand Surgery)  Sarkis Castellon MD as Consulting Physician (Hematology and Oncology)  Jeannine Ledezma MD as Referring Physician (Breast Surgery)  Sabrina Wallace MD as Consulting Physician (Radiation Oncology)      Subjective   History Present Illness     Chief Complaint   Patient presents with   • Fall   • Head Injury       Ms. Gotti is a 83 y.o. female with history of breast cancer, CKD 3B, bioprosthetic AVR, mitral and tricuspid valve repair, ICD placement, EMELINA, and pulmonary hypertension presenting after a fall.  Patient reports that she was walking back into her bedroom from the bathroom and tried to reach out for the bed to grab hold and unfortunately missed, falling and hitting her face on the floor.  She was unable to get up off the floor for a couple of hours per her report.  She lives with a son who has some mental health issues and apparently did not hear her fall.  She was finally able to scoot over to the phone and call another son to come and help her up.  On arrival here last night she was not noted to have any fractures on extensive imaging workup and her labs are more or less at her usual baseline other than abnormalities detailed below.  Unfortunately she is not able to ambulate and continues to complain of somewhat diffuse achy pain as well as pain in her face and nose area.  She was not felt to be safe to discharge home so was admitted to our service for further management.      Review of Systems   Constitutional:  Negative for chills, fatigue and fever.   HENT:  Positive for facial swelling. Negative for congestion and trouble swallowing.    Eyes:  Negative for visual disturbance.   Respiratory:  Negative for cough, chest tightness and shortness of  breath.    Cardiovascular:  Negative for chest pain, palpitations and leg swelling.   Gastrointestinal:  Negative for abdominal distention, abdominal pain, constipation, diarrhea, nausea and vomiting.   Genitourinary:  Negative for difficulty urinating, dysuria and frequency.   Musculoskeletal:  Positive for arthralgias.   Skin:  Negative for rash.   Neurological:  Negative for dizziness and headaches.   Psychiatric/Behavioral:  Negative for confusion.         Personal History     Past Medical History:   Diagnosis Date   • Aneurysm    • Angioedema    • Aortic valve disease    • Arthritis    • Atrophic kidney    • Bradycardia    • CAD (coronary artery disease)    • Cancer     BREAST   • CKD (chronic kidney disease) stage 4, GFR 15-29 ml/min    • Depression    • Dysphagia    • Essential hypertension    • History of anemia    • History of vitamin D deficiency    • [place] (hard of hearing)    • Hyperlipidemia    • Mitral valve disease    • EMELINA on auto CPAP 2003    Overnight polysomnogram.  Weight 154 pounds.  Severe EMELINA with AHI 51.5 events per hour.  Sleep-related hypoxia present.   • Post lumbar puncture headache 2022   • Pulmonary hypertension    • Secondary hyperparathyroidism of renal origin    • Sick sinus syndrome    • Spinal stenosis      Past Surgical History:   Procedure Laterality Date   • AORTIC VALVE REPAIR/REPLACEMENT  2015    Dr Veliz   • BREAST LUMPECTOMY Left 2022    Procedure: Left ultrasound-guided partial mastectomy;  Surgeon: Jeannine Ledezma MD;  Location: [facility];  Service: General;  Laterality: Left;   • CARDIAC CATHETERIZATION     • CARDIAC DEFIBRILLATOR PLACEMENT     •  SECTION     • COLONOSCOPY N/A 2019    [facility]   • ENDOSCOPY  2012    Hypertonic lower esophageal sphincter, gastritis. Path: Chronic gastritis, moderate.    • ENDOSCOPY N/A 2019    Web in the upper third of the esophagus. Dilated   • ENDOSCOPY N/A 2020    Procedure:  ESOPHAGOGASTRODUODENOSCOPY WITH 56FR MALDONADO DILATATION AND COLD BIOPSIES;  Surgeon: Blayne Cai MD;  Location: [facility] ENDOSCOPY;  Service: Gastroenterology;  Laterality: N/A;  PRE: DYSPHAGIA  POST: SCHATZKI'S RING   • EPIDURAL  2022   • HYSTERECTOMY     • MITRAL VALVE ANNULOPLASTY  2015    Dr Veliz   • TRICUSPID VALVE SURGERY  2015    Dr Veliz     Family History   Problem Relation Age of Onset   • ALS Mother    • Hypertension Father    • Stroke Father    • Malig Hyperthermia Neg Hx      Social History     Tobacco Use   • Smoking status: Former     Types: Cigarettes     Start date:      Quit date:      Years since quittin.9   • Smokeless tobacco: Never   Vaping Use   • Vaping Use: Never used   Substance Use Topics   • Alcohol use: Not Currently     Comment: Pt partakes occassionally   • Drug use: No     No current facility-administered medications on file prior to encounter.     Current Outpatient Medications on File Prior to Encounter   Medication Sig Dispense Refill   • aspirin 325 MG tablet Take 1 tablet by mouth Daily. WILL HOLD AS DIRECTED  Indications: Resume on Saturday, 2022.     • atorvastatin (LIPITOR) 20 MG tablet take 1 tablet by mouth at bedtime  0   • bumetanide (BUMEX) 2 MG tablet TAKE 1 TABLET BY MOUTH EVERY DAY (Patient taking differently: Take 1 tablet by mouth Daily.) 90 tablet 1   • buPROPion XL (WELLBUTRIN XL) 300 MG 24 hr tablet Take 1 tablet by mouth Daily.  0   • busPIRone (BUSPAR) 5 MG tablet Take 1 tablet by mouth 2 (Two) Times a Day.     • carvedilol (COREG) 12.5 MG tablet Take 1 tablet by mouth 2 (Two) Times a Day.     • Coenzyme Q10 200 MG tablet Take 1 tablet/day by mouth Daily.     • [facility] Senna Plus 8.6-50 MG per tablet TAKE 1 TABLET BY MOUTH EVERY DAY 30 tablet 2   • Diclofenac Sodium (VOLTAREN) 1 % gel gel PLEASE SEE ATTACHED FOR DETAILED DIRECTIONS     • donepezil (ARICEPT) 10 MG tablet Take 1 tablet by mouth Daily.     • famotidine  (PEPCID) 40 MG tablet TAKE 1 TABLET BY MOUTH TWICE A  tablet 3   • fenofibrate micronized (LOFIBRA) 67 MG capsule Take 1 capsule by mouth Daily.     • FLUoxetine (PROzac) 20 MG capsule Take 3 capsules by mouth Daily.     • HYDROcodone-acetaminophen (NORCO)  MG per tablet Take 1 tablet by mouth Every 4 (Four) Hours As Needed for Moderate Pain . 12 tablet 0   • ondansetron ODT (ZOFRAN-ODT) 8 MG disintegrating tablet Place 1 tablet on the tongue Every 6 (Six) Hours As Needed for Nausea or Vomiting. 120 tablet 3   • temazepam (RESTORIL) 15 MG capsule Take 1 capsule by mouth At Night As Needed.     • COENZYME Q-10 PO Take 1 capsule by mouth Daily.       No Known Allergies    Objective    Objective     Vital Signs  Temp:  [97.3 °F (36.3 °C)-98.8 °F (37.1 °C)] 97.3 °F (36.3 °C)  Heart Rate:  [75-89] 75  Resp:  [14-18] 18  BP: (111-178)/(66-94) 131/67  SpO2:  [90 %-96 %] 95 %  on  Flow (L/min):  [2] 2;   Device (Oxygen Therapy): room air  Body mass index is 22.41 kg/m².    Physical Exam  Vitals and nursing note reviewed.   Constitutional:       General: She is not in acute distress.     Appearance: Normal appearance.   HENT:      Head:      Comments: Facial bruising across the bridge of the nose and just inferior to each eye     Mouth/Throat:      Pharynx: No posterior oropharyngeal erythema.   Eyes:      General: No scleral icterus.  Neck:      Vascular: No JVD.   Cardiovascular:      Rate and Rhythm: Normal rate and regular rhythm.      Pulses: Normal pulses.      Heart sounds: Murmur (Systolic) heard.   Pulmonary:      Effort: Pulmonary effort is normal. No respiratory distress.      Breath sounds: Normal breath sounds.   Abdominal:      General: Bowel sounds are normal. There is no distension.      Palpations: Abdomen is soft.      Tenderness: There is no abdominal tenderness.   Musculoskeletal:         General: No swelling or tenderness.      Cervical back: Neck supple.   Skin:     General: Skin is warm.       Coloration: Skin is not jaundiced.      Findings: Bruising present. No rash.   Neurological:      General: No focal deficit present.      Mental Status: She is alert and oriented to person, place, and time.   Psychiatric:         Mood and Affect: Mood normal.         Behavior: Behavior normal.         Results Review:  I reviewed the patient's new clinical results.  I reviewed the patient's new imaging results and agree with the interpretation.  I reviewed the patient's other test results and agree with the interpretation  I personally viewed and interpreted the patient's EKG/Telemetry data  Discussed with ED provider.    Lab Results (last 24 hours)       Procedure Component Value Units Date/Time    CBC & Differential [[id number]]  (Abnormal) Collected: 11/22/23 2240    Specimen: Blood Updated: 11/22/23 2256    Narrative:      The following orders were created for panel order CBC & Differential.  Procedure                               Abnormality         Status                     ---------                               -----------         ------                     CBC Auto Differential[[id number]]        Abnormal            Final result                 Please view results for these tests on the individual orders.    CBC Auto Differential [[id number]]  (Abnormal) Collected: 11/22/23 2240    Specimen: Blood Updated: 11/22/23 2256     WBC 5.49 10*3/mm3      RBC 3.74 10*6/mm3      Hemoglobin 12.1 g/dL      Hematocrit 35.2 %      MCV 94.1 fL      MCH 32.4 pg      MCHC 34.4 g/dL      RDW 12.3 %      RDW-SD 42.1 fl      MPV 11.3 fL      Platelets 154 10*3/mm3      Neutrophil % 69.4 %      Lymphocyte % 10.4 %      Monocyte % 19.3 %      Eosinophil % 0.5 %      Basophil % 0.2 %      Immature Grans % 0.2 %      Neutrophils, Absolute 3.81 10*3/mm3      Lymphocytes, Absolute 0.57 10*3/mm3      Monocytes, Absolute 1.06 10*3/mm3      Eosinophils, Absolute 0.03 10*3/mm3      Basophils, Absolute 0.01 10*3/mm3      Immature  Grans, Absolute 0.01 10*3/mm3      nRBC 0.0 /100 WBC     Comprehensive Metabolic Panel [[id number]]  (Abnormal) Collected: 11/22/23 2240    Specimen: Blood Updated: 11/22/23 2309     Glucose 74 mg/dL      BUN 29 mg/dL      Creatinine 1.24 mg/dL      Sodium 140 mmol/L      Potassium 4.0 mmol/L      Comment: Slight hemolysis detected by analyzer. Result may be falsely elevated.        Chloride 101 mmol/L      CO2 25.9 mmol/L      Calcium 9.9 mg/dL      Total Protein 6.1 g/dL      Albumin 3.7 g/dL      ALT (SGPT) 42 U/L      AST (SGOT) 62 U/L      Comment: Slight hemolysis detected by analyzer. Result may be falsely elevated.        Alkaline Phosphatase 54 U/L      Total Bilirubin 0.3 mg/dL      Globulin 2.4 gm/dL      A/G Ratio 1.5 g/dL      BUN/Creatinine Ratio 23.4     Anion Gap 13.1 mmol/L      eGFR 42.7 mL/min/1.73     Narrative:      GFR Normal >60  Chronic Kidney Disease <60  Kidney Failure <15    The GFR formula is only valid for adults with stable renal function between ages 18 and 70.    BNP [[id number]]  (Normal) Collected: 11/22/23 2240    Specimen: Blood Updated: 11/23/23 0008     proBNP 1,058.0 pg/mL     Narrative:      This assay is used as an aid in the diagnosis of individuals suspected of having heart failure. It can be used as an aid in the diagnosis of acute decompensated heart failure (ADHF) in patients presenting with signs and symptoms of ADHF to the emergency department (ED). In addition, NT-proBNP of <300 pg/mL indicates ADHF is not likely.    Age Range Result Interpretation  NT-proBNP Concentration (pg/mL:      <50             Positive            >450                   Gray                 300-450                    Negative             <300    50-75           Positive            >900                  Gray                300-900                  Negative            <300      >75             Positive            >1800                  Gray                300-1800                  Negative             <300    Basic Metabolic Panel [[id number]]  (Abnormal) Collected: 11/23/23 0436    Specimen: Blood Updated: 11/23/23 0540     Glucose 61 mg/dL      BUN 28 mg/dL      Creatinine 1.26 mg/dL      Sodium 139 mmol/L      Potassium 3.8 mmol/L      Chloride 102 mmol/L      CO2 25.0 mmol/L      Calcium 9.7 mg/dL      BUN/Creatinine Ratio 22.2     Anion Gap 12.0 mmol/L      eGFR 41.9 mL/min/1.73     Narrative:      GFR Normal >60  Chronic Kidney Disease <60  Kidney Failure <15    The GFR formula is only valid for adults with stable renal function between ages 18 and 70.    CBC (No Diff) [[id number]]  (Abnormal) Collected: 11/23/23 0436    Specimen: Blood Updated: 11/23/23 0522     WBC 4.34 10*3/mm3      RBC 3.77 10*6/mm3      Hemoglobin 11.7 g/dL      Hematocrit 35.4 %      MCV 93.9 fL      MCH 31.0 pg      MCHC 33.1 g/dL      RDW 12.0 %      RDW-SD 41.0 fl      MPV 11.4 fL      Platelets 147 10*3/mm3             Imaging Results (Last 24 Hours)       Procedure Component Value Units Date/Time    XR Chest 1 View [[id number]] Collected: 11/23/23 0207     Updated: 11/23/23 0207    Narrative:        Patient: LEENA VILLEGAS  Time Out: 02:06  Exam(s): XR CXR 1 VIEW     EXAM:    XR Chest, 1 View    CLINICAL HISTORY:     Reason for exam: Reported sudden onset of hypoxia.    TECHNIQUE:    Frontal view of the chest.    COMPARISON:    No relevant prior studies available.    FINDINGS:    Lungs:  Unremarkable.  No consolidation.    Pleural space:  Unremarkable.  No pneumothorax.    Heart:  Cardiomegaly.  Prosthetic aortic valve.    Mediastinum:  Unremarkable.  Normal mediastinal contour.    Bones joints:  Unremarkable.  No acute fracture.    Vasculature:  Calcified aorta.    Tubes, lines and devices:  Dual lead pacemaker.    IMPRESSION:         No acute findings in the chest.      Impression:          Electronically signed by Julio C Lock MD on 11-23-23 at 0206    XR Elbow 2 View Right [[id number]] Collected: 11/22/23 2310     Updated:  11/23/23 0031    Narrative:      TWO RADIOGRAPHIC VIEWS OF THE RIGHT HUMERUS AND 2 RADIOGRAPHIC VIEWS OF  THE RIGHT ELBOW     CLINICAL HISTORY: Trauma. Evaluate for fracture.     FINDINGS:     Right humerus: Two radiographic views of the right humerus demonstrate  prominent arthritic changes within the right glenohumeral joint.  Otherwise, there is no evidence for acute fracture or bony malalignment.     Right elbow: Two radiographic views of the right elbow demonstrate no  evidence for acute fracture or bony malalignment.     This report was finalized on 11/23/2023 12:28 AM by Dr. David Michael M.D on Workstation: [id number]       XR Humerus Right [[id number]] Collected: 11/22/23 2319     Updated: 11/23/23 0031    Narrative:      TWO RADIOGRAPHIC VIEWS OF THE RIGHT HUMERUS AND 2 RADIOGRAPHIC VIEWS OF  THE RIGHT ELBOW     CLINICAL HISTORY: Trauma. Evaluate for fracture.     FINDINGS:     Right humerus: Two radiographic views of the right humerus demonstrate  prominent arthritic changes within the right glenohumeral joint.  Otherwise, there is no evidence for acute fracture or bony malalignment.     Right elbow: Two radiographic views of the right elbow demonstrate no  evidence for acute fracture or bony malalignment.     This report was finalized on 11/23/2023 12:28 AM by Dr. David Michael M.D on Workstation: [id number]       CT Head Without Contrast [[id number]] Collected: 11/23/23 0004     Updated: 11/23/23 0029    Narrative:       CT HEAD WITHOUT CONTRAST, MAXILLOFACIAL CT WITHOUT CONTRAST, AND  CERVICAL SPINE CT WITHOUT CONTRAST     CLINICAL HISTORY: Tripped and fell on carpet with bruising around bridge  of nose and headache and neck pain.     TECHNIQUE: CT scan of the head was obtained with 3 mm axial soft tissue  algorithm and 2 mm bone algorithm images. No intravenous contrast was  administered. Sagittal and coronal reconstructions were obtained.     COMPARISON: CT head dated 7/15/2022.     FINDINGS:       There  is no evidence for a calvarial fracture. There is no evidence for  an acute extra-axial hemorrhage.     The ventricles, sulci, and cisterns are age-appropriate. There are mild  changes of chronic small vessel ischemic phenomenon. The basal ganglia  and thalami are unremarkable in appearance. The gray-white matter  differentiation is within normal limits. The posterior fossa structures  are unremarkable.       Impression:         No evidence for acute traumatic intracranial pathology.        TECHNIQUE: CT scan of the maxillofacial region was obtained with 1 mm  axial, sagittal, and coronal bone algorithm images. Additionally, there  are 2 mm axial, coronal, and sagittal soft tissue algorithm images.     FINDINGS:     There is no evidence for acute fracture or bony malalignment within the  maxillofacial region. The orbits are unremarkable in appearance. No  significant soft tissue abnormality is seen within the maxillofacial  region.     IMPRESSION:     No evidence for acute fracture or bony malalignment involving the  maxillofacial region.        TECHNIQUE: CT scan of the cervical spine was obtained with 1 mm axial  bone algorithm and 2 mm axial soft tissue algorithm images. Sagittal and  coronal reconstructed images were obtained.        FINDINGS:     There is no evidence for acute fracture or bony malalignment involving  the cervical spine.     Advanced degenerative disc changes are seen at C5-6 and C6-7. There is  degenerative anterior spondylolisthesis of C4 on C5 and C7 on T1 by  approximately 3 mm. Disc osteophyte complexes at C5-6 and C6-7 result in  mild degrees of canal narrowing. Multilevel foraminal stenotic changes  are appreciated from C3-4 down to C6-7.     Incidental note is made of atherosclerotic changes within the aortic  arch and an aberrant origin of the right subclavian artery.     There is asymmetric increased density within the region of the right  tongue base measuring up to 2.1 cm in greatest  diameter and this may  simply reflect angled positioning. However, I cannot entirely exclude  the possibility of a mass at this site. I recommend further evaluation  with direct visualization.     IMPRESSION:     No evidence for acute fracture or bony malalignment involving the  cervical spine.     Incidental degenerative phenomena as discussed in detail above.     There is asymmetric increased density within the region of the right  tongue base measuring up to 2.1 cm in greatest diameter and this may  simply reflect angled positioning. However, I cannot entirely exclude  the possibility of a mass at this site. I recommend further evaluation  with direct visualization.     These findings and recommendations were discussed with Raza Littlejohn on  11/20/2023 at approximately 12:25 a.m.     This report was finalized on 11/23/2023 12:26 AM by Dr. David Michael M.D on Workstation: [id number]       CT Cervical Spine Without Contrast [[id number]] Collected: 11/23/23 0004     Updated: 11/23/23 0029    Narrative:       CT HEAD WITHOUT CONTRAST, MAXILLOFACIAL CT WITHOUT CONTRAST, AND  CERVICAL SPINE CT WITHOUT CONTRAST     CLINICAL HISTORY: Tripped and fell on carpet with bruising around bridge  of nose and headache and neck pain.     TECHNIQUE: CT scan of the head was obtained with 3 mm axial soft tissue  algorithm and 2 mm bone algorithm images. No intravenous contrast was  administered. Sagittal and coronal reconstructions were obtained.     COMPARISON: CT head dated 7/15/2022.     FINDINGS:       There is no evidence for a calvarial fracture. There is no evidence for  an acute extra-axial hemorrhage.     The ventricles, sulci, and cisterns are age-appropriate. There are mild  changes of chronic small vessel ischemic phenomenon. The basal ganglia  and thalami are unremarkable in appearance. The gray-white matter  differentiation is within normal limits. The posterior fossa structures  are unremarkable.       Impression:          No evidence for acute traumatic intracranial pathology.        TECHNIQUE: CT scan of the maxillofacial region was obtained with 1 mm  axial, sagittal, and coronal bone algorithm images. Additionally, there  are 2 mm axial, coronal, and sagittal soft tissue algorithm images.     FINDINGS:     There is no evidence for acute fracture or bony malalignment within the  maxillofacial region. The orbits are unremarkable in appearance. No  significant soft tissue abnormality is seen within the maxillofacial  region.     IMPRESSION:     No evidence for acute fracture or bony malalignment involving the  maxillofacial region.        TECHNIQUE: CT scan of the cervical spine was obtained with 1 mm axial  bone algorithm and 2 mm axial soft tissue algorithm images. Sagittal and  coronal reconstructed images were obtained.        FINDINGS:     There is no evidence for acute fracture or bony malalignment involving  the cervical spine.     Advanced degenerative disc changes are seen at C5-6 and C6-7. There is  degenerative anterior spondylolisthesis of C4 on C5 and C7 on T1 by  approximately 3 mm. Disc osteophyte complexes at C5-6 and C6-7 result in  mild degrees of canal narrowing. Multilevel foraminal stenotic changes  are appreciated from C3-4 down to C6-7.     Incidental note is made of atherosclerotic changes within the aortic  arch and an aberrant origin of the right subclavian artery.     There is asymmetric increased density within the region of the right  tongue base measuring up to 2.1 cm in greatest diameter and this may  simply reflect angled positioning. However, I cannot entirely exclude  the possibility of a mass at this site. I recommend further evaluation  with direct visualization.     IMPRESSION:     No evidence for acute fracture or bony malalignment involving the  cervical spine.     Incidental degenerative phenomena as discussed in detail above.     There is asymmetric increased density within the region of the  right  tongue base measuring up to 2.1 cm in greatest diameter and this may  simply reflect angled positioning. However, I cannot entirely exclude  the possibility of a mass at this site. I recommend further evaluation  with direct visualization.     These findings and recommendations were discussed with Raza Littlejohn on  11/20/2023 at approximately 12:25 a.m.     This report was finalized on 11/23/2023 12:26 AM by Dr. David Michael M.D on Workstation: [id number]       CT Facial Bones Without Contrast [[id number]] Collected: 11/23/23 0004     Updated: 11/23/23 0029    Narrative:       CT HEAD WITHOUT CONTRAST, MAXILLOFACIAL CT WITHOUT CONTRAST, AND  CERVICAL SPINE CT WITHOUT CONTRAST     CLINICAL HISTORY: Tripped and fell on carpet with bruising around bridge  of nose and headache and neck pain.     TECHNIQUE: CT scan of the head was obtained with 3 mm axial soft tissue  algorithm and 2 mm bone algorithm images. No intravenous contrast was  administered. Sagittal and coronal reconstructions were obtained.     COMPARISON: CT head dated 7/15/2022.     FINDINGS:       There is no evidence for a calvarial fracture. There is no evidence for  an acute extra-axial hemorrhage.     The ventricles, sulci, and cisterns are age-appropriate. There are mild  changes of chronic small vessel ischemic phenomenon. The basal ganglia  and thalami are unremarkable in appearance. The gray-white matter  differentiation is within normal limits. The posterior fossa structures  are unremarkable.       Impression:         No evidence for acute traumatic intracranial pathology.        TECHNIQUE: CT scan of the maxillofacial region was obtained with 1 mm  axial, sagittal, and coronal bone algorithm images. Additionally, there  are 2 mm axial, coronal, and sagittal soft tissue algorithm images.     FINDINGS:     There is no evidence for acute fracture or bony malalignment within the  maxillofacial region. The orbits are unremarkable in appearance.  No  significant soft tissue abnormality is seen within the maxillofacial  region.     IMPRESSION:     No evidence for acute fracture or bony malalignment involving the  maxillofacial region.        TECHNIQUE: CT scan of the cervical spine was obtained with 1 mm axial  bone algorithm and 2 mm axial soft tissue algorithm images. Sagittal and  coronal reconstructed images were obtained.        FINDINGS:     There is no evidence for acute fracture or bony malalignment involving  the cervical spine.     Advanced degenerative disc changes are seen at C5-6 and C6-7. There is  degenerative anterior spondylolisthesis of C4 on C5 and C7 on T1 by  approximately 3 mm. Disc osteophyte complexes at C5-6 and C6-7 result in  mild degrees of canal narrowing. Multilevel foraminal stenotic changes  are appreciated from C3-4 down to C6-7.     Incidental note is made of atherosclerotic changes within the aortic  arch and an aberrant origin of the right subclavian artery.     There is asymmetric increased density within the region of the right  tongue base measuring up to 2.1 cm in greatest diameter and this may  simply reflect angled positioning. However, I cannot entirely exclude  the possibility of a mass at this site. I recommend further evaluation  with direct visualization.     IMPRESSION:     No evidence for acute fracture or bony malalignment involving the  cervical spine.     Incidental degenerative phenomena as discussed in detail above.     There is asymmetric increased density within the region of the right  tongue base measuring up to 2.1 cm in greatest diameter and this may  simply reflect angled positioning. However, I cannot entirely exclude  the possibility of a mass at this site. I recommend further evaluation  with direct visualization.     These findings and recommendations were discussed with Raza Littlejohn on  11/20/2023 at approximately 12:25 a.m.     This report was finalized on 11/23/2023 12:26 AM by Dr. Hernandez  SRIDHAR Michael on Workstation: [id number]       CT Pelvis Without Contrast [[id number]] Collected: 11/22/23 2338     Updated: 11/22/23 2338    Narrative:        Patient: LEENA VILLEGAS  Time Out: 23:38  Exam(s): CT PELVIS Without Contrast     EXAM:    CT Pelvis Without Intravenous Contrast    CLINICAL HISTORY:     Reason for exam: Trauma. R o left hip pelvic fx.    TECHNIQUE:    Axial computed tomography images of the pelvis without intravenous   contrast.  CTDI is 9.27 mGy and DLP is 312 mGy-cm.  This CT exam was   performed according to the principle of ALARA (As Low As Reasonably   Achievable) by using one or more of the following dose reduction   techniques: automated exposure control, adjustment of the mA and or kV   according to patient size, and or use of iterative reconstruction   technique.    COMPARISON:    No relevant prior studies available.    FINDINGS:    Kidneys and ureters:  Severe left renal atrophy.    Bowel:  Sigmoid diverticulosis without associated inflammation.  No   obstruction.  No mucosal thickening.    Appendix:  No findings to suggest acute appendicitis.    Intraperitoneal space:  Unremarkable.  No free air.  No significant   fluid collection.    Bladder:  Unremarkable.  No stones.    Reproductive:  Hysterectomy.    Bones joints:  No acute fracture.  No dislocation.    Soft tissues:  Fat-containing left inguinal hernia.    Vasculature:  Moderate aortoiliac atherosclerosis.  No lower abdominal   aortic aneurysm.    Lymph nodes:  Unremarkable.  No enlarged lymph nodes.    IMPRESSION:         No acute findings in the pelvis.      Impression:          Electronically signed by Rodney Mancia MD on 11-22-23 at 2338                No orders to display        Assessment/Plan     Active Hospital Problems    Diagnosis  POA   • **Unable to ambulate [R26.2]  Yes   • Fall [W19.XXXA]  Yes   • Elevated liver transaminase level [R74.01]  Yes   • Pacemaker [Z95.0]  Yes   • CKD (chronic kidney disease) stage  3, GFR 30-59 ml/min [N18.30]  Yes   • Benign essential hypertension [I10]  Yes   • S/P AVR (aortic valve replacement) [Z95.2]  Not Applicable   • S/P TVR (tricuspid valve repair) [Z98.890]  Not Applicable   • S/P MVR (mitral valve repair) [Z98.890]  Not Applicable      Resolved Hospital Problems   No resolved problems to display.       Ms. Gotti is a 83 y.o. female with history of aortic valve replacement, tricuspid and mitral valve repair, pacemaker, CKD 3B and hypertension presenting after a fall with inability to ambulate    Negative imaging workup.  Patient with extensive soft tissue bruising.  Unable to ambulate and may not be safe for discharge back home.  Consulting PT and OT for evaluation.  Will need CCP eval as well to assess for safe discharge plan.  Hold statin for now.  Checking CPK given prolonged time on the floor  Mildly elevated transaminases.  Will follow-up in a.m.  Mildly hypoglycemic this morning.  She has not eaten breakfast and seems to be asymptomatic with it.  Continue to follow  Gentle hydration but plan to discontinue this afternoon.  Holding Bumex today but probably could resume tomorrow.    VTE Prophylaxis - SCDs.  Code Status - Full code.       Pierce Wood MD  [facility]  11/23/23  09:19 EST

## 2023-11-23 NOTE — THERAPY EVALUATION
Patient Name: Carly Gotti  : 1940    MRN: [id number]                              Today's Date: 2023       Admit Date: 2023    Visit Dx:     ICD-10-CM ICD-9-CM   1. Fall, initial encounter  W19.XXXA E888.9   2. Injury of head, initial encounter  S09.90XA 959.01   3. Contusion of right upper extremity, initial encounter  S40.021A 923.9   4. Contusion of left hip, initial encounter  S70.02XA 924.01     Patient Active Problem List   Diagnosis    S/P AVR (aortic valve replacement)    S/P TVR (tricuspid valve repair)    S/P MVR (mitral valve repair)    Benign essential hypertension    Mitral and aortic valve disease    CKD (chronic kidney disease) stage 3, GFR 30-59 ml/min    Small bowel obstruction    Abdominal pain    Slow transit constipation    Hyperlipidemia    Pacemaker    Colitis    Elevated lipase    C. difficile colitis    Ileus    Sepsis    Thrombocytopenia    Dysphagia    Esophageal web    Recurrent Clostridium difficile diarrhea    CKD (chronic kidney disease) stage 4, GFR 15-29 ml/min    Generalized abdominal pain    Intestinal infection due to enteropathogenic E. coli    Obstructive sleep apnea treated with auto CPAP    Nausea    Renal failure    Depression    Arthritis    Aneurysm of thoracic aorta    Anxiety    Post lumbar puncture headache    Malignant neoplasm of upper-outer quadrant of left breast in female, estrogen receptor positive    Hypoxemia associated with sleep    Unable to ambulate    Fall    Elevated liver transaminase level     Past Medical History:   Diagnosis Date    Aneurysm     Angioedema     Aortic valve disease     Arthritis     Atrophic kidney     Bradycardia     CAD (coronary artery disease)     Cancer     BREAST    CKD (chronic kidney disease) stage 4, GFR 15-29 ml/min     Depression     Dysphagia     Essential hypertension     History of anemia     History of vitamin D deficiency     [place] (hard of hearing)     Hyperlipidemia     Mitral valve disease     EMELINA  on auto CPAP 2003    Overnight polysomnogram.  Weight 154 pounds.  Severe EMELINA with AHI 51.5 events per hour.  Sleep-related hypoxia present.    Post lumbar puncture headache 2022    Pulmonary hypertension     Secondary hyperparathyroidism of renal origin     Sick sinus syndrome     Spinal stenosis      Past Surgical History:   Procedure Laterality Date    AORTIC VALVE REPAIR/REPLACEMENT  2015    Dr Veliz    BREAST LUMPECTOMY Left 2022    Procedure: Left ultrasound-guided partial mastectomy;  Surgeon: Jeannine Ledezma MD;  Location: [facility] MAIN OR;  Service: General;  Laterality: Left;    CARDIAC CATHETERIZATION      CARDIAC DEFIBRILLATOR PLACEMENT       SECTION      COLONOSCOPY N/A 2019    NB    ENDOSCOPY  2012    Hypertonic lower esophageal sphincter, gastritis. Path: Chronic gastritis, moderate.     ENDOSCOPY N/A 2019    Web in the upper third of the esophagus. Dilated    ENDOSCOPY N/A 2020    Procedure: ESOPHAGOGASTRODUODENOSCOPY WITH 56FR MALDONADO DILATATION AND COLD BIOPSIES;  Surgeon: Blayne Cai MD;  Location: [facility] ENDOSCOPY;  Service: Gastroenterology;  Laterality: N/A;  PRE: DYSPHAGIA  POST: SCHATZKI'S RING    EPIDURAL  2022    HYSTERECTOMY      MITRAL VALVE ANNULOPLASTY  2015    Dr Veliz    TRICUSPID VALVE SURGERY  2015    Dr Veliz      General Information       Row Name 23 1600          Physical Therapy Time and Intention    Document Type evaluation  -KA     Mode of Treatment individual therapy;physical therapy  -KA       Row Name 23 1600          General Information    Patient Profile Reviewed yes  -KA     Prior Level of Function independent:;gait;ADL's  uses RW/cane about 50/50  -KA     Existing Precautions/Restrictions fall  -KA     Barriers to Rehab medically complex  -KA       Row Name 23 1600          Living Environment    People in Home child(silvano), adult  Mentally disabled son- she is  caregiver  -       Row Name 11/23/23 1600          Home Main Entrance    Number of Stairs, Main Entrance three  -KA     Stair Railings, Main Entrance railings on both sides of stairs  -       Row Name 11/23/23 1600          Stairs Within Home, Primary    Number of Stairs, Within Home, Primary none  -       Row Name 11/23/23 1600          Cognition    Orientation Status (Cognition) oriented x 3  -       Row Name 11/23/23 1600          Safety Issues, Functional Mobility    Impairments Affecting Function (Mobility) balance;strength;pain;endurance/activity tolerance;postural/trunk control;range of motion (ROM)  -               User Key  (r) = Recorded By, (t) = Taken By, (c) = Cosigned By      Initials Name Provider Type    Maile Tavares, PT Physical Therapist                   Mobility       Row Name 11/23/23 1601          Bed Mobility    Comment, (Bed Mobility) Not assessed, Pt UIC on arrival, requests back to chair for lunch  -       Row Name 11/23/23 1601          Bed-Chair Transfer    Bed-Chair [place] (Transfers) not tested  -       Row Name 11/23/23 1601          Sit-Stand Transfer    Sit-Stand Harvey (Transfers) minimum assist (75% patient effort);1 person assist  -     Assistive Device (Sit-Stand Transfers) walker, front-wheeled  -       Row Name 11/23/23 1601          Gait/Stairs (Locomotion)    [place] Level (Gait) minimum assist (75% patient effort);1 person assist  -     Assistive Device (Gait) walker, front-wheeled  -     Distance in Feet (Gait) 15  -KA     Deviations/Abnormal Patterns (Gait) gait speed decreased  -KA     Bilateral Gait Deviations heel strike decreased;forward flexed posture  -     [place] Level (Stairs) not tested  -     Comment, (Gait/Stairs) Pt ambulates at very slow pace, declines further ambulation due to feeling weak, requires min A and use of RW  -               User Key  (r) = Recorded By, (t) = Taken By, (c) = Cosigned By       Initials Name Provider Type    Maile Tavares, PT Physical Therapist                   Obj/Interventions       [facility] Name 11/23/23 1603          Range of Motion Comprehensive    General Range of Motion lower extremity range of motion deficits identified  -     Comment, General Range of Motion B ankle dorsiflexion limited to neutral, knees flexed during ambulation  -[facility] Name 11/23/23 1603          Strength Comprehensive (MMT)    Comment, General Manual Muscle Testing (MMT) Assessment BLE strength grossly 3/5  -[facility] Name 11/23/23 1603          Balance    Balance Assessment standing static balance;standing dynamic balance  -     Static Standing Balance minimal assist  -     Dynamic Standing Balance minimal assist  -     Position/Device Used, Standing Balance supported;walker, front-wheeled  -               User Key  (r) = Recorded By, (t) = Taken By, (c) = Cosigned By      Initials Name Provider Type    Maile Tavares, PT Physical Therapist                   Goals/Plan       [facility] Name 11/23/23 1610          Bed Mobility Goal 1 (PT)    Activity/Assistive Device (Bed Mobility Goal 1, PT) bed mobility activities, all  -     [place] Level/Cues Needed (Bed Mobility Goal 1, PT) standby assist;verbal cues required  -     Time Frame (Bed Mobility Goal 1, PT) 2 weeks  -[facility] Name 11/23/23 1610          Transfer Goal 1 (PT)    Activity/Assistive Device (Transfer Goal 1, PT) transfers, all;walker, rolling  -KA     [place] Level/Cues Needed (Transfer Goal 1, PT) contact guard required  -     Time Frame (Transfer Goal 1, PT) 2 weeks  -[facility] Name 11/23/23 1610          Gait Training Goal 1 (PT)    Activity/Assistive Device (Gait Training Goal 1, PT) gait (walking locomotion);assistive device use;decrease fall risk;diminish gait deviation;improve balance and speed;increase endurance/gait distance;walker, rolling  -KA     [place] Level (Gait Training Goal 1,  PT) contact guard required  -     Distance (Gait Training Goal 1, PT) 100 ft  -       Row Name 11/23/23 1610          Therapy Assessment/Plan (PT)    Planned Therapy Interventions (PT) balance training;bed mobility training;gait training;home exercise program;patient/family education;postural re-education;neuromuscular re-education;ROM (range of motion);stair training;strengthening;transfer training  -               User Key  (r) = Recorded By, (t) = Taken By, (c) = Cosigned By      Initials Name Provider Type    Maile Tavares, PT Physical Therapist                   Clinical Impression       Row Name 11/23/23 1606          Pain    Pretreatment Pain Rating 4/10  -     Posttreatment Pain Rating 4/10  -     Pain Location - back  -     Pain Intervention(s) Repositioned;Ambulation/increased activity  -       Row Name 11/23/23 1606          Plan of Care Review    Plan of Care Reviewed With patient  -     Progress no change  -     Outcome Evaluation Pt seen this AM for physical therapy evaluation.  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  Today, she was able to ambulate 15 ft with RW with min A x 1, limited by pain and weakness.  Pt will benefit from skilled physical therapy while inpatient to improve strength, mobility, transfers, and gait.  Recommending SNF at discharge.  -       Row Name 11/23/23 1606          Therapy Assessment/Plan (PT)    Rehab Potential (PT) good, to achieve stated therapy goals  -     Criteria for Skilled Interventions Met (PT) yes  -     Therapy Frequency (PT) 6 times/wk  -     Predicted Duration of Therapy Intervention (PT) 2 weeks  -       Row Name 11/23/23 1606          Vital Signs    O2 Delivery Pre Treatment room air  -     O2 Delivery Intra Treatment room air  -KA     O2 Delivery Post Treatment room air  -KA      Pre Patient Position Sitting  -KA     Intra Patient Position Standing  -KA     Post Patient Position Sitting  -KA       Row Name 11/23/23 1606          Positioning and Restraints    Pre-Treatment Position sitting in chair/recliner  -KA     Post Treatment Position chair  -KA     In Chair notified nsg;encouraged to call for assist;exit alarm on;call light within reach  -KA               User Key  (r) = Recorded By, (t) = Taken By, (c) = Cosigned By      Initials Name Provider Type    Maile Tavares, PT Physical Therapist                   Outcome Measures       Row Name 11/23/23 1611 11/23/23 0830       How much help from another person do you currently need...    Turning from your back to your side while in flat bed without using bedrails? 2  -KA 2  -CD    Moving from lying on back to sitting on the side of a flat bed without bedrails? 2  -KA 2  -CD    Moving to and from a bed to a chair (including a wheelchair)? 2  -KA 2  -CD    Standing up from a chair using your arms (e.g., wheelchair, bedside chair)? 2  -KA 2  -CD    Climbing 3-5 steps with a railing? 1  -KA 1  -CD    To walk in hospital room? 3  -KA 2  -CD    AM-PAC 6 Clicks Score (PT) 12  -KA 11  -CD    Highest Level of Mobility Goal 4 --> Transfer to chair/commode  -KA 4 --> Transfer to chair/commode  -CD      Row Name 11/23/23 1611 11/23/23 1149       Functional Assessment    Outcome Measure Options AM-PAC 6 Clicks Basic Mobility (PT)  -KA AM-PAC 6 Clicks Daily Activity (OT)  -              User Key  (r) = Recorded By, (t) = Taken By, (c) = Cosigned By      Initials Name Provider Type    Dee Antonio RN Registered Nurse    Ileana Nava OT Occupational Therapist    Maile Tavares, RAMSEY Physical Therapist                                 Physical Therapy Education       Title: PT OT SLP Therapies (In Progress)       Topic: Physical Therapy (In Progress)       Point: Mobility training (In Progress)       Learning Progress Summary              Patient Acceptance, E, NR by  at 11/23/2023 1612                         Point: Home exercise program (Not Started)       Learner Progress:  Not documented in this visit.              Point: Body mechanics (In Progress)       Learning Progress Summary             Patient Acceptance, E, NR by  at 11/23/2023 1612                         Point: Precautions (Not Started)       Learner Progress:  Not documented in this visit.                              User Key       Initials Effective Dates Name Provider Type Discipline     08/24/21 -  Maile Schneider, PT Physical Therapist PT                  PT Recommendation and Plan  Planned Therapy Interventions (PT): balance training, bed mobility training, gait training, home exercise program, patient/family education, postural re-education, neuromuscular re-education, ROM (range of motion), stair training, strengthening, transfer training  Plan of Care Reviewed With: patient  Progress: no change  Outcome Evaluation: Pt seen this AM for physical therapy evaluation.  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  Today, she was able to ambulate 15 ft with RW with min A x 1, limited by pain and weakness.  Pt will benefit from skilled physical therapy while inpatient to improve strength, mobility, transfers, and gait.  Recommending SNF at discharge.     Time Calculation:         PT Charges       Row Name 11/23/23 1613 11/23/23 1612          Time Calculation    Start Time -- 1143  -KA     Stop Time -- 1204  -KA     Time Calculation (min) -- 21 min  -KA     PT Received On -- 11/23/23  -     PT - Next Appointment -- 11/24/23  -     PT Goal Re-Cert Due Date 12/07/23  -KA 11/30/23  -        Timed Charges    93024 - PT Therapeutic Activity Minutes 15  -KA --        Total Minutes    Timed Charges Total Minutes 15  -KA --       Total Minutes 15  -KA --               User Key  (r) = Recorded By, (t) = Taken By, (c) = Cosigned By      Initials Name Provider Type    Maile Tavares, PT Physical Therapist                  Therapy Charges for Today       Code Description Service Date Service Provider Modifiers Qty    95380987445  PT THERAPEUTIC ACT EA 15 MIN 11/23/2023 Maile Schneider, PT GP 1    [id number] HC PT EVAL MOD COMPLEXITY 2 11/23/2023 Maile Schneider, PT GP 1            PT G-Codes  Outcome Measure Options: AM-PAC 6 Clicks Basic Mobility (PT)  AM-PAC 6 Clicks Score (PT): 12  AM-PAC 6 Clicks Score (OT): 14  PT Discharge Summary  Anticipated Discharge Disposition (PT): skilled nursing facility    Maile Schneider PT  11/23/2023

## 2023-11-23 NOTE — ED PROVIDER NOTES
MD ATTESTATION NOTE    The ELENA and I have discussed this patient's history, physical exam, and treatment plan.  I have reviewed the documentation and personally had a face to face interaction with the patient. I affirm the documentation and agree with the treatment and plan.  The attached note describes my personal findings.      I provided a substantive portion of the care of the patient.  I personally performed the physical exam in its entirety, and below are my findings.      Brief HPI: Patient presents for evaluation after fall.  Patient states she has had some weakness she got up to go to the bathroom lost her balance and fell.  Patient struck her face.  Happened about 5 AM.  Patient also having some pain in her hip as well.    PHYSICAL EXAM  ED Triage Vitals [11/22/23 2147]   Temp Heart Rate Resp BP SpO2   98.8 °F (37.1 °C) 80 14 178/94 96 %      Temp src Heart Rate Source Patient Position BP Location FiO2 (%)   -- -- -- -- --         GENERAL: no acute distress  HENT: nares patent.  Periorbital ecchymosis bilaterally  EYES: no scleral icterus  CV: regular rhythm, normal rate  RESPIRATORY: normal effort  ABDOMEN: soft  MUSCULOSKELETAL: no deformity  NEURO: alert, moves all extremities, follows commands  PSYCH:  calm, cooperative  SKIN: warm, dry    Vital signs and nursing notes reviewed.        Plan: CT scan of the face       Amor Walker MD  11/22/23 6776

## 2023-11-23 NOTE — ED TRIAGE NOTES
To ER via EMS from home.  Pt lost balance and fell this am approx 0500.  Pt was on the ground until approx 0900 or so when she could get to a phone and call family.  Pt has become increasingly sore throughout the day.  Pt did strike face on carpet.  Pt has bruising to bridge of nose with small abrasion, forehead and around eyes.  Pt also c/o neck pain.  C-collar placed at triage.     Denies LOC.    Pt c/o arm pain and back pain.  States has arthritis.

## 2023-11-23 NOTE — PLAN OF CARE
Goal Outcome Evaluation:              Outcome Evaluation: vss. Covid isolation initiated. PO meds for c/o  pain in rt shoulder with good results. Purewick in place. Patient in chair for lunch.

## 2023-11-23 NOTE — PLAN OF CARE
Goal Outcome Evaluation:  Plan of Care Reviewed With: patient        Progress: no change  Outcome Evaluation: Adm with inability to walk after a fall at home. She  is afraid to try to get out of bed and is having much discomfort with movement. Scattered bruises from fall. Buttock  and back slightly reddenedd. No open wounds noted. Purewick placed for now and pt placed on falls protocol.

## 2023-11-23 NOTE — PLAN OF CARE
Goal Outcome Evaluation:  Plan of Care Reviewed With: patient        Progress: no change  Outcome Evaluation: Pt seen this AM for physical therapy evaluation.  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  Today, she was able to ambulate 15 ft with RW with min A x 1, limited by pain and weakness.  Pt will benefit from skilled physical therapy while inpatient to improve strength, mobility, transfers, and gait.  Recommending SNF at discharge.      Anticipated Discharge Disposition (PT): skilled nursing facility

## 2023-11-24 LAB
25(OH)D3 SERPL-MCNC: 45.3 NG/ML (ref 30–100)
ALBUMIN SERPL-MCNC: 3.2 G/DL (ref 3.5–5.2)
ALBUMIN/GLOB SERPL: 1.5 G/DL
ALP SERPL-CCNC: 48 U/L (ref 39–117)
ALT SERPL W P-5'-P-CCNC: 44 U/L (ref 1–33)
ANION GAP SERPL CALCULATED.3IONS-SCNC: 9.4 MMOL/L (ref 5–15)
AST SERPL-CCNC: 49 U/L (ref 1–32)
BILIRUB SERPL-MCNC: <0.2 MG/DL (ref 0–1.2)
BUN SERPL-MCNC: 36 MG/DL (ref 8–23)
BUN/CREAT SERPL: 26.1 (ref 7–25)
CALCIUM SPEC-SCNC: 8.8 MG/DL (ref 8.6–10.5)
CHLORIDE SERPL-SCNC: 108 MMOL/L (ref 98–107)
CO2 SERPL-SCNC: 24.6 MMOL/L (ref 22–29)
CREAT SERPL-MCNC: 1.38 MG/DL (ref 0.57–1)
CRP SERPL-MCNC: 3.51 MG/DL (ref 0–0.5)
EGFRCR SERPLBLD CKD-EPI 2021: 37.6 ML/MIN/1.73
GLOBULIN UR ELPH-MCNC: 2.1 GM/DL
GLUCOSE SERPL-MCNC: 111 MG/DL (ref 65–99)
POTASSIUM SERPL-SCNC: 3.6 MMOL/L (ref 3.5–5.2)
PROT SERPL-MCNC: 5.3 G/DL (ref 6–8.5)
SODIUM SERPL-SCNC: 142 MMOL/L (ref 136–145)
TSH SERPL DL<=0.05 MIU/L-ACNC: 1.48 UIU/ML (ref 0.27–4.2)

## 2023-11-24 PROCEDURE — 97530 THERAPEUTIC ACTIVITIES: CPT

## 2023-11-24 PROCEDURE — G0378 HOSPITAL OBSERVATION PER HR: HCPCS

## 2023-11-24 PROCEDURE — 86140 C-REACTIVE PROTEIN: CPT | Performed by: HOSPITALIST

## 2023-11-24 PROCEDURE — 82306 VITAMIN D 25 HYDROXY: CPT | Performed by: HOSPITALIST

## 2023-11-24 PROCEDURE — 84443 ASSAY THYROID STIM HORMONE: CPT | Performed by: HOSPITALIST

## 2023-11-24 PROCEDURE — 80053 COMPREHEN METABOLIC PANEL: CPT | Performed by: HOSPITALIST

## 2023-11-24 RX ORDER — POTASSIUM CHLORIDE 750 MG/1
20 TABLET, FILM COATED, EXTENDED RELEASE ORAL ONCE
Status: COMPLETED | OUTPATIENT
Start: 2023-11-24 | End: 2023-11-24

## 2023-11-24 RX ADMIN — Medication 10 ML: at 08:32

## 2023-11-24 RX ADMIN — FLUOXETINE HYDROCHLORIDE 60 MG: 20 CAPSULE ORAL at 08:30

## 2023-11-24 RX ADMIN — DIPHENHYDRAMINE HYDROCHLORIDE AND LIDOCAINE HYDROCHLORIDE AND ALUMINUM HYDROXIDE AND MAGNESIUM HYDRO 5 ML: KIT at 06:26

## 2023-11-24 RX ADMIN — DONEPEZIL HYDROCHLORIDE 10 MG: 10 TABLET, FILM COATED ORAL at 20:51

## 2023-11-24 RX ADMIN — NIRMATRELVIR AND RITONAVIR 2 TABLET: KIT at 08:30

## 2023-11-24 RX ADMIN — Medication 10 ML: at 20:59

## 2023-11-24 RX ADMIN — DIPHENHYDRAMINE HYDROCHLORIDE AND LIDOCAINE HYDROCHLORIDE AND ALUMINUM HYDROXIDE AND MAGNESIUM HYDRO 5 ML: KIT at 21:02

## 2023-11-24 RX ADMIN — BUSPIRONE HYDROCHLORIDE 5 MG: 5 TABLET ORAL at 08:30

## 2023-11-24 RX ADMIN — NIRMATRELVIR AND RITONAVIR 2 TABLET: KIT at 20:53

## 2023-11-24 RX ADMIN — CARVEDILOL 12.5 MG: 12.5 TABLET, FILM COATED ORAL at 20:51

## 2023-11-24 RX ADMIN — POTASSIUM CHLORIDE 20 MEQ: 750 TABLET, EXTENDED RELEASE ORAL at 14:14

## 2023-11-24 RX ADMIN — DICLOFENAC SODIUM 4 G: 10 GEL TOPICAL at 08:39

## 2023-11-24 RX ADMIN — BUPROPION HYDROCHLORIDE 150 MG: 150 TABLET, EXTENDED RELEASE ORAL at 08:30

## 2023-11-24 RX ADMIN — FAMOTIDINE 40 MG: 20 TABLET ORAL at 08:30

## 2023-11-24 RX ADMIN — HYDROCODONE BITARTRATE AND ACETAMINOPHEN 1 TABLET: 5; 325 TABLET ORAL at 20:59

## 2023-11-24 RX ADMIN — TEMAZEPAM 15 MG: 7.5 CAPSULE ORAL at 00:52

## 2023-11-24 RX ADMIN — DIPHENHYDRAMINE HYDROCHLORIDE AND LIDOCAINE HYDROCHLORIDE AND ALUMINUM HYDROXIDE AND MAGNESIUM HYDRO 5 ML: KIT at 17:32

## 2023-11-24 RX ADMIN — HYDROCODONE BITARTRATE AND ACETAMINOPHEN 1 TABLET: 10; 325 TABLET ORAL at 14:17

## 2023-11-24 RX ADMIN — ASPIRIN 325 MG: 325 TABLET ORAL at 08:29

## 2023-11-24 RX ADMIN — CARVEDILOL 12.5 MG: 12.5 TABLET, FILM COATED ORAL at 08:30

## 2023-11-24 RX ADMIN — DICLOFENAC SODIUM 4 G: 10 GEL TOPICAL at 20:53

## 2023-11-24 RX ADMIN — DIPHENHYDRAMINE HYDROCHLORIDE AND LIDOCAINE HYDROCHLORIDE AND ALUMINUM HYDROXIDE AND MAGNESIUM HYDRO 5 ML: KIT at 12:15

## 2023-11-24 RX ADMIN — BUSPIRONE HYDROCHLORIDE 5 MG: 5 TABLET ORAL at 20:51

## 2023-11-24 RX ADMIN — HYDROCODONE BITARTRATE AND ACETAMINOPHEN 1 TABLET: 10; 325 TABLET ORAL at 08:30

## 2023-11-24 NOTE — PLAN OF CARE
Goal Outcome Evaluation:  Plan of Care Reviewed With: patient        Progress: no change  Outcome Evaluation: Patient is agreeable to PT this AM. She reports R shoulder pain which she states is arthritis and holds RUE in dependent position against body. Education provided regarding ROM to prevent increased stiffness/contracture. She completed STS to rwx with David and ambulated 15ft using rwx requiring David. Pt sitting UIC post session with pillow under RUE and elbow in extended position. She completed seated ther ex and encouraged to to ambulate to restroom with staff as well as sit UIC 3x/day with meals. Will continue to progress as pt tolerates.      Anticipated Discharge Disposition (PT): skilled nursing facility

## 2023-11-24 NOTE — CASE MANAGEMENT/SOCIAL WORK
Continued Stay Note  [facility]     Patient Name: Carly Gotti  MRN: [id number]  Today's Date: 11/24/2023    Admit Date: 11/22/2023        Discharge Plan       Row Name 11/24/23 1227       Plan    Plan Comments pt is in covid isloation and not answering her room phone. VM left for daughter Margaret to return call to discuss d/c planning.                   Discharge Codes    No documentation.                       ZAY Davis

## 2023-11-24 NOTE — SIGNIFICANT NOTE
11/24/23 1537   Post Acute Pre-Cert Documentation   Request Submitted by Facility - Type: Hospital   Post-Acute Authorization Type Submitted: SNF   Date Post Acute Pre-Cert Inititated per Facility 11/24/23   Verification from Payer Yes   Date Post Acute Pre-Cert Completed 11/24/23   Accepting Facility [facility]   Hospital Discharge Date Requested 11/24/23   All Clinicals Submitted? Yes   Had Accepting Facility at Time of Submission Yes   Response Received from Insurance? Approval   Response Communicated to:    Authorization Number: [id number]

## 2023-11-24 NOTE — PLAN OF CARE
Goal Outcome Evaluation:  Plan of Care Reviewed With: patient        Progress: improving  Outcome Evaluation: alert, oriented, forgetful.  medicated with prn norco for bladder/urethral discomfort.  FC to BSD with CBI NS.  urine cherry red this am and more clear yellow this afternoon.  1 large clot observed passing through tubing.  optifoam dressing DI to cocyx PI.  seen by urology with recommendation that patient remain off anticoagulants for life as risks outweigh benefits.  contact isolation precautions observed.

## 2023-11-24 NOTE — THERAPY TREATMENT NOTE
Patient Name: Carly Gotti  : 1940    MRN: [id number]                              Today's Date: 2023       Admit Date: 2023    Visit Dx:     ICD-10-CM ICD-9-CM   1. Fall, initial encounter  W19.XXXA E888.9   2. Injury of head, initial encounter  S09.90XA 959.01   3. Contusion of right upper extremity, initial encounter  S40.021A 923.9   4. Contusion of left hip, initial encounter  S70.02XA 924.01     Patient Active Problem List   Diagnosis    S/P AVR (aortic valve replacement)    S/P TVR (tricuspid valve repair)    S/P MVR (mitral valve repair)    Benign essential hypertension    Mitral and aortic valve disease    CKD (chronic kidney disease) stage 3, GFR 30-59 ml/min    Small bowel obstruction    Abdominal pain    Slow transit constipation    Hyperlipidemia    Pacemaker    Colitis    Elevated lipase    C. difficile colitis    Ileus    Sepsis    Thrombocytopenia    Dysphagia    Esophageal web    Recurrent Clostridium difficile diarrhea    CKD (chronic kidney disease) stage 4, GFR 15-29 ml/min    Generalized abdominal pain    Intestinal infection due to enteropathogenic E. coli    Obstructive sleep apnea treated with auto CPAP    Nausea    Renal failure    Depression    Arthritis    Aneurysm of thoracic aorta    Anxiety    Post lumbar puncture headache    Malignant neoplasm of upper-outer quadrant of left breast in female, estrogen receptor positive    Hypoxemia associated with sleep    Unable to ambulate    Fall    Elevated liver transaminase level     Past Medical History:   Diagnosis Date    Aneurysm     Angioedema     Aortic valve disease     Arthritis     Atrophic kidney     Bradycardia     CAD (coronary artery disease)     Cancer     BREAST    CKD (chronic kidney disease) stage 4, GFR 15-29 ml/min     Depression     Dysphagia     Essential hypertension     History of anemia     History of vitamin D deficiency     Sam[place] (hard of hearing)     Hyperlipidemia     Mitral valve disease     EMELINA  on auto CPAP 2003    Overnight polysomnogram.  Weight 154 pounds.  Severe EMELINA with AHI 51.5 events per hour.  Sleep-related hypoxia present.    Post lumbar puncture headache 2022    Pulmonary hypertension     Secondary hyperparathyroidism of renal origin     Sick sinus syndrome     Spinal stenosis      Past Surgical History:   Procedure Laterality Date    AORTIC VALVE REPAIR/REPLACEMENT  2015    Dr Veliz    BREAST LUMPECTOMY Left 2022    Procedure: Left ultrasound-guided partial mastectomy;  Surgeon: Jeannine Ledezma MD;  Location: [facility] MAIN OR;  Service: General;  Laterality: Left;    CARDIAC CATHETERIZATION      CARDIAC DEFIBRILLATOR PLACEMENT       SECTION      COLONOSCOPY N/A 2019    NBIH    ENDOSCOPY  2012    Hypertonic lower esophageal sphincter, gastritis. Path: Chronic gastritis, moderate.     ENDOSCOPY N/A 2019    Web in the upper third of the esophagus. Dilated    ENDOSCOPY N/A 2020    Procedure: ESOPHAGOGASTRODUODENOSCOPY WITH 56FR MALDONADO DILATATION AND COLD BIOPSIES;  Surgeon: Blayne Cai MD;  Location: [facility] ENDOSCOPY;  Service: Gastroenterology;  Laterality: N/A;  PRE: DYSPHAGIA  POST: SCHATZKI'S RING    EPIDURAL  2022    HYSTERECTOMY      MITRAL VALVE ANNULOPLASTY  2015    Dr Veliz    TRICUSPID VALVE SURGERY  2015    Dr Veliz      General Information       Row Name 23 1126          Physical Therapy Time and Intention    Document Type therapy note (daily note)  -CB     Mode of Treatment individual therapy;physical therapy  -CB       Row Name 23 1126          General Information    Existing Precautions/Restrictions fall  -CB       Row Name 23 1126          Cognition    Orientation Status (Cognition) oriented x 3  -CB       Row Name 23 1126          Safety Issues, Functional Mobility    Impairments Affecting Function (Mobility) balance;strength;pain;endurance/activity  "tolerance;postural/trunk control;range of motion (ROM)  -CB               User Key  (r) = Recorded By, (t) = Taken By, (c) = Cosigned By      Initials Name Provider Type    Jennifer Mckeon, RAMSEY Physical Therapist                   Mobility       Row Name 11/24/23 1126          Bed Mobility    Bed Mobility supine-sit  -CB     Supine-Sit [place] (Bed Mobility) moderate assist (50% patient effort);verbal cues  -CB     Assistive Device (Bed Mobility) bed rails  -CB     Comment, (Bed Mobility) Pt hold RUE in dependent position during bed mobility as she reports pain in RUE that is \"arthritis\"  -CB       Row Name 11/24/23 1126          Sit-Stand Transfer    Sit-Stand [place] (Transfers) minimum assist (75% patient effort);1 person assist  -CB     Assistive Device (Sit-Stand Transfers) walker, front-wheeled  -CB       Row Name 11/24/23 1126          Gait/Stairs (Locomotion)    [place] Level (Gait) minimum assist (75% patient effort);1 person assist  -CB     Assistive Device (Gait) walker, front-wheeled  -CB     Distance in Feet (Gait) 15ft  -CB     Deviations/Abnormal Patterns (Gait) gait speed decreased;stride length decreased  -CB     Bilateral Gait Deviations heel strike decreased;forward flexed posture  -CB     Comment, (Gait/Stairs) cues for upright posture and assist from PT to move rwx  -CB               User Key  (r) = Recorded By, (t) = Taken By, (c) = Cosigned By      Initials Name Provider Type    Jennifer Mckeon PT Physical Therapist                   Obj/Interventions       Row Name 11/24/23 1127          Motor Skills    Therapeutic Exercise --  seated AP, LAQ, marching x10 reps  -CB       Row Name 11/24/23 1127          Balance    Balance Assessment standing static balance;standing dynamic balance  -CB     Static Standing Balance minimal assist;contact guard;verbal cues  -CB     Dynamic Standing Balance minimal assist;verbal cues  -CB     Position/Device Used, Standing Balance " supported;walker, front-wheeled  -CB     Balance Interventions sitting;standing;sit to stand;supported;static;dynamic;minimal challenge  -CB               User Key  (r) = Recorded By, (t) = Taken By, (c) = Cosigned By      Initials Name Provider Type    Jennifer Mckeon, PT Physical Therapist                   Goals/Plan    No documentation.                  Clinical Impression       Row Name 11/24/23 1128          Pain    Pretreatment Pain Rating 5/10  -CB     Posttreatment Pain Rating 7/10  -CB     Pain Location - Side/Orientation Right  -CB     Pain Location - shoulder  -CB     Pain Intervention(s) Repositioned;Rest;Ambulation/increased activity  -CB       Row Name 11/24/23 1128          Plan of Care Review    Plan of Care Reviewed With patient  -CB     Progress no change  -CB     Outcome Evaluation Patient is agreeable to PT this AM. She reports R shoulder pain which she states is arthritis and holds RUE in dependent position against body. Education provided regarding ROM to prevent increased stiffness/contracture. She completed STS to rwx with David and ambulated 15ft using rwx requiring David. Pt sitting UIC post session with pillow under RUE and elbow in extended position. She completed seated ther ex and encouraged to to ambulate to restroom with staff as well as sit UIC 3x/day with meals. Will continue to progress as pt tolerates.  -CB       Row Name 11/24/23 1128          Therapy Assessment/Plan (PT)    Therapy Frequency (PT) 5 times/wk  -CB       Row Name 11/24/23 1128          Vital Signs    O2 Delivery Pre Treatment room air  -CB     O2 Delivery Intra Treatment room air  -CB     O2 Delivery Post Treatment room air  -CB       Row Name 11/24/23 1128          Positioning and Restraints    Pre-Treatment Position in bed  -CB     Post Treatment Position chair  -CB     In Chair notified nsg;reclined;call light within reach;encouraged to call for assist;exit alarm on;RUE elevated  -CB               User Key  (r)  = Recorded By, (t) = Taken By, (c) = Cosigned By      Initials Name Provider Type    Jennifer Mckeon PT Physical Therapist                   Outcome Measures       Row Name 11/24/23 1130          How much help from another person do you currently need...    Turning from your back to your side while in flat bed without using bedrails? 3  -CB     Moving from lying on back to sitting on the side of a flat bed without bedrails? 2  -CB     Moving to and from a bed to a chair (including a wheelchair)? 3  -CB     Standing up from a chair using your arms (e.g., wheelchair, bedside chair)? 3  -CB     Climbing 3-5 steps with a railing? 2  -CB     To walk in hospital room? 3  -CB     AM-PAC 6 Clicks Score (PT) 16  -CB     Highest Level of Mobility Goal 5 --> Static standing  -CB       Row Name 11/24/23 1130          Functional Assessment    Outcome Measure Options AM-PAC 6 Clicks Basic Mobility (PT)  -CB               User Key  (r) = Recorded By, (t) = Taken By, (c) = Cosigned By      Initials Name Provider Type    Jennifer Mckeon PT Physical Therapist                                 Physical Therapy Education       Title: PT OT SLP Therapies (In Progress)       Topic: Physical Therapy (Done)       Point: Mobility training (Done)       Learning Progress Summary             Patient Acceptance, E,TB, VU,NR by ZENIA at 11/24/2023 1130    Acceptance, E,TB, VU by MT at 11/24/2023 0431    Acceptance, E, NR by REGINA at 11/23/2023 1612                         Point: Home exercise program (Done)       Learning Progress Summary             Patient Acceptance, E,TB, VU,NR by ZENIA at 11/24/2023 1130                         Point: Body mechanics (Done)       Learning Progress Summary             Patient Acceptance, E,TB, VU,NR by ZENIA at 11/24/2023 1130    Acceptance, E,TB, VU by MT at 11/24/2023 0431    Acceptance, E, NR by REGINA at 11/23/2023 1612                         Point: Precautions (Done)       Learning Progress Summary              Patient Acceptance, E,TB, VU,NR by CB at 11/24/2023 1130                                         User Key       Initials Effective Dates Name Provider Type Discipline    MT 06/16/21 -  Bruna Torres, RN Registered Nurse Nurse     10/22/21 -  Jennifer Alfred, PT Physical Therapist PT    KA 08/24/21 -  Maile Schneider PT Physical Therapist PT                  PT Recommendation and Plan     Plan of Care Reviewed With: patient  Progress: no change  Outcome Evaluation: Patient is agreeable to PT this AM. She reports R shoulder pain which she states is arthritis and holds RUE in dependent position against body. Education provided regarding ROM to prevent increased stiffness/contracture. She completed STS to rwx with David and ambulated 15ft using rwx requiring David. Pt sitting UIC post session with pillow under RUE and elbow in extended position. She completed seated ther ex and encouraged to to ambulate to restroom with staff as well as sit UIC 3x/day with meals. Will continue to progress as pt tolerates.     Time Calculation:         PT Charges       Row Name 11/24/23 1131             Time Calculation    Start Time 1045  -CB      Stop Time 1100  -CB      Time Calculation (min) 15 min  -CB      PT Received On 11/24/23  -CB      PT - Next Appointment 11/27/23  -CB         Time Calculation- PT    Total Timed Code Minutes- PT 15 minute(s)  -CB         Timed Charges    07332 - PT Therapeutic Exercise Minutes 7  -CB      83824 - PT Therapeutic Activity Minutes 8  -CB         Total Minutes    Timed Charges Total Minutes 15  -CB       Total Minutes 15  -CB                User Key  (r) = Recorded By, (t) = Taken By, (c) = Cosigned By      Initials Name Provider Type    CB Jennifer Alfred, PT Physical Therapist                  Therapy Charges for Today       Code Description Service Date Service Provider Modifiers Qty    [id number]  PT THERAPEUTIC ACT EA 15 MIN 11/24/2023 Jennifer Alfred, PT GP 1            PT  G-Codes  Outcome Measure Options: AM-PAC 6 Clicks Basic Mobility (PT)  AM-PAC 6 Clicks Score (PT): 16  AM-PAC 6 Clicks Score (OT): 14  PT Discharge Summary  Anticipated Discharge Disposition (PT): skilled nursing facility    Jennifer Alfred, PT  11/24/2023

## 2023-11-24 NOTE — PLAN OF CARE
Goal Outcome Evaluation:  Plan of Care Reviewed With: patient        Progress: no change  Outcome Evaluation: vss, norco for pain and voltaren gel to rt shoulder and elbow, gait is very unsteady, bed alarm for safety, purewick in place, maintained isolation, continue to monitor the pt.

## 2023-11-24 NOTE — DISCHARGE PLACEMENT REQUEST
"Leena Villegas (83 y.o. Female)       Date of Birth   1940    Social Security Number       [facility] [address]    Home Phone   [phone]    MRN   [id number]       [facility]    Marital Status                               Admission Date   11/22/23    Admission Type   Emergency    Admitting Provider   Pierce Wood MD    Attending Provider   Lionel Aguilar DO    Department, Room/Bed   [address], P679/1       Discharge Date       Discharge Disposition       Discharge Destination                                 Attending Provider: Lionel Aguilar DO    Allergies: No Known Allergies    Isolation: Enh Drop/Con   Infection: COVID (confirmed) (11/23/23)   Code Status: CPR    Ht: 154.9 cm (61\")   Wt: 53.8 kg (118 lb 9.7 oz)    Admission Cmt: None   Principal Problem: Unable to ambulate [R26.2]                   Active Insurance as of 11/22/2023       Primary Coverage       Payor Plan Insurance Group Employer/Plan Group    HUMANA MEDICARE REPLACEMENT HUMANA MEDICARE REPLACEMENT [id number]       Payor Plan Address Payor Plan Phone Number Payor Plan Fax Number Effective Dates    [address] [address] [phone]  8/1/2023 - None Entered    [facility] [address]         Subscriber Name Subscriber Birth Date Member ID       LEENA VILLEGAS 1940 [id number]                     Emergency Contacts        (Rel.) Home Phone Work Phone Mobile Phone    Weilage,Chena (Daughter) [phone]    justinmingo valencia (Son) [phone] -- --          "

## 2023-11-24 NOTE — CASE MANAGEMENT/SOCIAL WORK
Discharge Planning Assessment  [facility]     Patient Name: Carly Gotti  MRN: [id number]  Today's Date: 11/24/2023    Admit Date: 11/22/2023    Plan: Referral to [facility]; will need precert   Discharge Needs Assessment       Row Name 11/24/23 1420       Living Environment    People in Home alone    Current Living Arrangements home    Potentially Unsafe Housing Conditions none    Primary Care Provided by self    Provides Primary Care For no one    Family Caregiver if Needed child(silvano), adult    Quality of Family Relationships helpful;involved    Able to Return to Prior Arrangements yes       Resource/Environmental Concerns    Resource/Environmental Concerns none       Transition Planning    Patient/Family Anticipates Transition to inpatient rehabilitation facility    Patient/Family Anticipated Services at Transition skilled nursing    Transportation Anticipated health plan transportation       Discharge Needs Assessment    Readmission Within the Last 30 Days no previous admission in last 30 days    Equipment Currently Used at Home cane, straight;walker, rolling    Concerns to be Addressed discharge planning;basic needs    Equipment Needed After Discharge none    Outpatient/Agency/Support Group Needs skilled nursing facility    Discharge Facility/Level of Care Needs nursing facility, skilled    Provided Post Acute Provider List? Yes    Post Acute Provider List Nursing Home    Provided Post Acute Provider Quality & Resource List? Yes    Post Acute Provider Quality and Resource List Nursing Home    Delivered To Support Person    Method of Delivery Telephone                   Discharge Plan       Row Name 11/24/23 1416       Plan    Plan Referral to [facility]; will need precert    Patient/Family in Agreement with Plan yes    Plan Comments Spoke with pts daughter via phone and pt confirmed plan. Explained role of CCP. Confirmed facesheet correct. Pt lives alone, daughter lives 4 blocks away. Pt has  walker and cane at home. She has been to SNF in past and no HH per daughter. Daughter couldn't remember where pt went to rehab. Explained PT is recommending SNF at d/c. Reviewed SNF list and ratings and pt and daughter agreeable to [facility]. Spoke with Elke who will review referral. Pt will need precert started. packet if office.      Row Name 11/24/23 7361       Plan    Plan Comments pt is in covid isloation and not answering her room phone. VM left for daughter Margaret to return call to discuss d/c planning.                  Continued Care and Services - Admitted Since 11/22/2023       Destination       Service Provider Request Status Selected Services Address Phone Fax Patient Preferred    [facility] Pending - No Request Sent N/A [address] [facility] [address] [phone] --                     Demographic Summary    No documentation.                  Functional Status    No documentation.                  Psychosocial    No documentation.                  Abuse/Neglect    No documentation.                  Legal    No documentation.                  Substance Abuse    No documentation.                  Patient Forms    No documentation.                     ZAY Davis

## 2023-11-24 NOTE — CASE MANAGEMENT/SOCIAL WORK
Continued Stay Note  [facility]     Patient Name: Carly Gotti  MRN: [id number]  Today's Date: 11/24/2023    Admit Date: 11/22/2023    Plan: SNF pending; will need precert   Discharge Plan       Row Name 11/24/23 1432       Plan    Plan SNF pending; will need precert    Patient/Family in Agreement with Plan yes    Plan Comments Spoke with Myah, [facility] can't accept pt because she is Covid postive. She will send referral to [facility]. Spoke with pt who is agreeable to [facility] but would prefer [facility] first choice.  left for Ben. Pt will need precert.      Row Name 11/24/23 1430       Plan    Plan --    Plan Comments --      Row Name 11/24/23 1416       Plan    Plan Referral to [facility]; will need precert    Patient/Family in Agreement with Plan yes    Plan Comments Spoke with pts daughter via phone and pt confirmed plan. Explained role of CCP. Confirmed facesheet correct. Pt lives alone, daughter lives 4 blocks away. Pt has walker and cane at home. She has been to SNF in past and no HH per daughter. Daughter couldn't remember where pt went to rehab. Explained PT is recommending SNF at d/c. Reviewed SNF list and ratings and pt and daughter agreeable to [facility]. Spoke with Elke who will review referral. Pt will need precert started. packet if office.      Row Name 11/24/23 1266       Plan    Plan Comments pt is in covid isloation and not answering her room phone. VM left for daughter Margaret to return call to discuss d/c planning.                   Discharge Codes    No documentation.                       ZAY Davis

## 2023-11-24 NOTE — PROGRESS NOTES
Name: Carly Gotti ADMIT: 2023   : 1940  PCP: Reyes, Miriam Mercado, MD    MRN: [id number] LOS: 0 days   AGE/SEX: 83 y.o. female  ROOM: [facility]     Subjective   Subjective   Patient seen and examined this morning.  Hospital day 2.  At time of my examination, patient is awake, alert, resting in bed.  She continues to endorse ongoing generalized fatigue and weakness, however, stable/slightly improved compared to prior.  No acute events overnight.  She is currently on room air with O2 sats greater than 90%.       Objective   Objective   Vital Signs  Temp:  [97 °F (36.1 °C)-98.4 °F (36.9 °C)] 97.4 °F (36.3 °C)  Heart Rate:  [75-82] 75  Resp:  [15-18] 15  BP: (146-156)/(78-86) 156/84  SpO2:  [92 %-94 %] 92 %  on   ;   Device (Oxygen Therapy): room air  Body mass index is 22.41 kg/m².  Physical Exam  Vitals and nursing note reviewed.   Constitutional:       General: She is not in acute distress.     Comments: Thin/frail, chronically ill-appearing.   HENT:      Head:      Comments: Bruising in the maxillary region and across nasal bridge  Cardiovascular:      Rate and Rhythm: Normal rate and regular rhythm.      Pulses: Normal pulses.      Heart sounds: Normal heart sounds.   Pulmonary:      Effort: Pulmonary effort is normal. No respiratory distress.      Breath sounds: Normal breath sounds.   Abdominal:      General: Bowel sounds are normal. There is no distension.      Palpations: Abdomen is soft.      Tenderness: There is no abdominal tenderness.   Musculoskeletal:      Comments: SCDs   Skin:     General: Skin is warm and dry.   Neurological:      Mental Status: She is alert.       Results Review     I reviewed the patient's new clinical results.  Results from last 7 days   Lab Units 23  0436 23  2240   WBC 10*3/mm3 4.34 5.49   HEMOGLOBIN g/dL 11.7* 12.1   PLATELETS 10*3/mm3 147 154     Results from last 7 days   Lab Units 23  0554 23  0436 23  2240   SODIUM mmol/L  "142 139 140   POTASSIUM mmol/L 3.6 3.8 4.0   CHLORIDE mmol/L 108* 102 101   CO2 mmol/L 24.6 25.0 25.9   BUN mg/dL 36* 28* 29*   CREATININE mg/dL 1.38* 1.26* 1.24*   GLUCOSE mg/dL 111* 61* 74   EGFR mL/min/1.73 37.6* 41.9* 42.7*     Results from last 7 days   Lab Units 11/24/23  0554 11/22/23  2240   ALBUMIN g/dL 3.2* 3.7   BILIRUBIN mg/dL <0.2 0.3   ALK PHOS U/L 48 54   AST (SGOT) U/L 49* 62*   ALT (SGPT) U/L 44* 42*     Results from last 7 days   Lab Units 11/24/23  0554 11/23/23  0436 11/22/23  2240   CALCIUM mg/dL 8.8 9.7 9.9   ALBUMIN g/dL 3.2*  --  3.7       No results found for: \"HGBA1C\", \"POCGLU\"    XR Chest 1 View    Result Date: 11/23/2023  Electronically signed by Julio C Lock MD on 11-23-23 at 0206    CT Head Without Contrast    Result Date: 11/23/2023   No evidence for acute traumatic intracranial pathology.   TECHNIQUE: CT scan of the maxillofacial region was obtained with 1 mm axial, sagittal, and coronal bone algorithm images. Additionally, there are 2 mm axial, coronal, and sagittal soft tissue algorithm images.  FINDINGS:  There is no evidence for acute fracture or bony malalignment within the maxillofacial region. The orbits are unremarkable in appearance. No significant soft tissue abnormality is seen within the maxillofacial region.  IMPRESSION:  No evidence for acute fracture or bony malalignment involving the maxillofacial region.   TECHNIQUE: CT scan of the cervical spine was obtained with 1 mm axial bone algorithm and 2 mm axial soft tissue algorithm images. Sagittal and coronal reconstructed images were obtained.   FINDINGS:  There is no evidence for acute fracture or bony malalignment involving the cervical spine.  Advanced degenerative disc changes are seen at C5-6 and C6-7. There is degenerative anterior spondylolisthesis of C4 on C5 and C7 on T1 by approximately 3 mm. Disc osteophyte complexes at C5-6 and C6-7 result in mild degrees of canal narrowing. Multilevel foraminal stenotic " changes are appreciated from C3-4 down to C6-7.  Incidental note is made of atherosclerotic changes within the aortic arch and an aberrant origin of the right subclavian artery.  There is asymmetric increased density within the region of the right tongue base measuring up to 2.1 cm in greatest diameter and this may simply reflect angled positioning. However, I cannot entirely exclude the possibility of a mass at this site. I recommend further evaluation with direct visualization.  IMPRESSION:  No evidence for acute fracture or bony malalignment involving the cervical spine.  Incidental degenerative phenomena as discussed in detail above.  There is asymmetric increased density within the region of the right tongue base measuring up to 2.1 cm in greatest diameter and this may simply reflect angled positioning. However, I cannot entirely exclude the possibility of a mass at this site. I recommend further evaluation with direct visualization.  These findings and recommendations were discussed with Raza Littlejohn on 11/20/2023 at approximately 12:25 a.m.  This report was finalized on 11/23/2023 12:26 AM by Dr. David Michael M.D on Workstation: [id number]      CT Cervical Spine Without Contrast    Result Date: 11/23/2023   No evidence for acute traumatic intracranial pathology.   TECHNIQUE: CT scan of the maxillofacial region was obtained with 1 mm axial, sagittal, and coronal bone algorithm images. Additionally, there are 2 mm axial, coronal, and sagittal soft tissue algorithm images.  FINDINGS:  There is no evidence for acute fracture or bony malalignment within the maxillofacial region. The orbits are unremarkable in appearance. No significant soft tissue abnormality is seen within the maxillofacial region.  IMPRESSION:  No evidence for acute fracture or bony malalignment involving the maxillofacial region.   TECHNIQUE: CT scan of the cervical spine was obtained with 1 mm axial bone algorithm and 2 mm axial soft tissue  algorithm images. Sagittal and coronal reconstructed images were obtained.   FINDINGS:  There is no evidence for acute fracture or bony malalignment involving the cervical spine.  Advanced degenerative disc changes are seen at C5-6 and C6-7. There is degenerative anterior spondylolisthesis of C4 on C5 and C7 on T1 by approximately 3 mm. Disc osteophyte complexes at C5-6 and C6-7 result in mild degrees of canal narrowing. Multilevel foraminal stenotic changes are appreciated from C3-4 down to C6-7.  Incidental note is made of atherosclerotic changes within the aortic arch and an aberrant origin of the right subclavian artery.  There is asymmetric increased density within the region of the right tongue base measuring up to 2.1 cm in greatest diameter and this may simply reflect angled positioning. However, I cannot entirely exclude the possibility of a mass at this site. I recommend further evaluation with direct visualization.  IMPRESSION:  No evidence for acute fracture or bony malalignment involving the cervical spine.  Incidental degenerative phenomena as discussed in detail above.  There is asymmetric increased density within the region of the right tongue base measuring up to 2.1 cm in greatest diameter and this may simply reflect angled positioning. However, I cannot entirely exclude the possibility of a mass at this site. I recommend further evaluation with direct visualization.  These findings and recommendations were discussed with Raza Littlejohn on 11/20/2023 at approximately 12:25 a.m.  This report was finalized on 11/23/2023 12:26 AM by Dr. David Michael M.D on Workstation: [id number]      CT Facial Bones Without Contrast    Result Date: 11/23/2023   No evidence for acute traumatic intracranial pathology.   TECHNIQUE: CT scan of the maxillofacial region was obtained with 1 mm axial, sagittal, and coronal bone algorithm images. Additionally, there are 2 mm axial, coronal, and sagittal soft tissue algorithm  images.  FINDINGS:  There is no evidence for acute fracture or bony malalignment within the maxillofacial region. The orbits are unremarkable in appearance. No significant soft tissue abnormality is seen within the maxillofacial region.  IMPRESSION:  No evidence for acute fracture or bony malalignment involving the maxillofacial region.   TECHNIQUE: CT scan of the cervical spine was obtained with 1 mm axial bone algorithm and 2 mm axial soft tissue algorithm images. Sagittal and coronal reconstructed images were obtained.   FINDINGS:  There is no evidence for acute fracture or bony malalignment involving the cervical spine.  Advanced degenerative disc changes are seen at C5-6 and C6-7. There is degenerative anterior spondylolisthesis of C4 on C5 and C7 on T1 by approximately 3 mm. Disc osteophyte complexes at C5-6 and C6-7 result in mild degrees of canal narrowing. Multilevel foraminal stenotic changes are appreciated from C3-4 down to C6-7.  Incidental note is made of atherosclerotic changes within the aortic arch and an aberrant origin of the right subclavian artery.  There is asymmetric increased density within the region of the right tongue base measuring up to 2.1 cm in greatest diameter and this may simply reflect angled positioning. However, I cannot entirely exclude the possibility of a mass at this site. I recommend further evaluation with direct visualization.  IMPRESSION:  No evidence for acute fracture or bony malalignment involving the cervical spine.  Incidental degenerative phenomena as discussed in detail above.  There is asymmetric increased density within the region of the right tongue base measuring up to 2.1 cm in greatest diameter and this may simply reflect angled positioning. However, I cannot entirely exclude the possibility of a mass at this site. I recommend further evaluation with direct visualization.  These findings and recommendations were discussed with Raza Littlejohn on 11/20/2023 at  approximately 12:25 a.m.  This report was finalized on 11/23/2023 12:26 AM by Dr. David Michael M.D on Workstation: [id number]      CT Pelvis Without Contrast    Result Date: 11/22/2023  Electronically signed by Rodney Mancia MD on 11-22-23 at 2338     I have personally reviewed all medications:  Scheduled Medications  aspirin, 325 mg, Oral, Daily  buPROPion XL, 150 mg, Oral, Daily  busPIRone, 5 mg, Oral, BID  carvedilol, 12.5 mg, Oral, BID  Diclofenac Sodium, 4 g, Topical, BID  donepezil, 10 mg, Oral, Nightly  famotidine, 40 mg, Oral, Daily  First Mouthwash (Magic Mouthwash), 5 mL, Swish & Swallow, 4x Daily  FLUoxetine, 60 mg, Oral, Daily  Nirmatrelvir&Ritonavir 300/100, 2 tablet, Oral, BID  senna-docusate sodium, 2 tablet, Oral, BID  sodium chloride, 10 mL, Intravenous, Q12H    Infusions   Diet  Diet: Regular/House Diet; Texture: Regular Texture (IDDSI 7); Fluid Consistency: Thin (IDDSI 0)    I have personally reviewed:  [x]  Laboratory   [x]  Microbiology   [x]  Radiology   [x]  EKG/Telemetry  [x]  Cardiology/Vascular   []  Pathology    []  Records       Assessment/Plan     Active Hospital Problems    Diagnosis  POA   • **Unable to ambulate [R26.2]  Yes   • Fall [W19.XXXA]  Yes   • Elevated liver transaminase level [R74.01]  Yes   • Pacemaker [Z95.0]  Yes   • CKD (chronic kidney disease) stage 3, GFR 30-59 ml/min [N18.30]  Yes   • Benign essential hypertension [I10]  Yes   • S/P AVR (aortic valve replacement) [Z95.2]  Not Applicable   • S/P TVR (tricuspid valve repair) [Z98.890]  Not Applicable   • S/P MVR (mitral valve repair) [Z98.890]  Not Applicable      Resolved Hospital Problems   No resolved problems to display.     Ms. Gotti is a 83 y.o. female with history of aortic valve replacement, tricuspid and mitral valve repair, pacemaker, CKD 3B and hypertension presenting after a fall with inability to ambulate, admitted for further evaluation and management.    Fall  Unable to ambulate  - Patient with  endorsement of mechanical fall prior to arrival, stating she tripped while going to bathroom at night. She endorses generalized weakness, difficulty with ambulation. Imaging performed, including CT head, CXR, right hip and elbow x-ray, all of which were negative for acute findings.  - PT/OT consulted, patient will likely need SNF. Will follow up their recommendations and CCP plans regarding placement if needed.  - Fall precautions.    COVID-19 infection  - Patient found to be + for COVID-19 on RVP obtained on 11/23/23. Imaging negative, patient without acute respiratory complaints at present.  She is currently on room air with O2 sats greater than 90%.   - Continue to monitor, supplemental oxygen PRN to maintain O2 sat >90%, PRN medications for symptom control.   - Monitor COVID 19 associated labs.    Transaminitis  - LFT elevated on most recent labs, however, improving when compared to prior.  Home statin medication currently being held.  CK level within normal limits. No indications to warrant acute intervention for now, however, will continue to closely monitor.  - Order repeat CMP in AM for reassessment.    Hypokalemia  - K slightly low (<4) on most recent labs; Will replete via electrolyte protocol. Follow up repeat labs to guide ongoing management decisions. Replete PRN per protocol. Continue to monitor.    Chronic kidney disease stage 3B  - On most recent labs, patient's creatinine found to be stable and near apparent baseline, per review of previous lab values and outside records.  - Order repeat CMP in AM for reassessment of renal function.   - Will continue to monitor and trend creatinine to guide ongoing management decisions. Avoid nephrotoxic medications, IVF, strict I/O, daily weights.    Hypertension  - Blood pressure appears stable and acceptable acutely at this time. On Carvedilol, home Bumex being held due to concerns for slight dehydration on arrival. No indications to warrant acute  changes/intervention at this time.  - Continue current medications as prescribed. Trend BP to guide ongoing management decisions. Can resume Bumex when appropriate.    Prediabetes with Hyperglycemia  - Glucose elevated on most recent labs. Patient without known history of T2DM.  Most recent hemoglobin A1c 5.80% (2015).  - Order repeat A1c at this time.  - Monitor for now, continue with POC glucose checks, can add correctional SSI if needed.      SCDs for DVT prophylaxis.  Full code.  Discussed with patient and nursing staff.  Anticipate discharge to SNU facility timing yet to be determined.      Lionel Aguilar DO  [facility]  11/24/23

## 2023-11-24 NOTE — CASE MANAGEMENT/SOCIAL WORK
Continued Stay Note  [facility]     Patient Name: Carly Gotti  MRN: [id number]  Today's Date: 11/24/2023    Admit Date: 11/22/2023    Plan: precert started for [facility]   Discharge Plan       Row Name 11/24/23 1504       Plan    Plan precert started for [facility]    Plan Comments Spoke with Ben [facility] can accept. Email to initiate precert. Will need transport arranged.      Row Name 11/24/23 1454       Plan    Plan SNF pending precert and acceptance    Patient/Family in Agreement with Plan yes    Plan Comments Spoke with Ben/Signature he will check with his facilities ([facility] and [facility]) to see if they can accept. CCP will start precert once we have an accepting facility.      Row Name 11/24/23 1432       Plan    Plan SNF pending; will need precert    Patient/Family in Agreement with Plan yes    Plan Comments Spoke with Myah, [facility] can't accept pt because she is Covid postive. She will send referral to [facility]. Spoke with pt who is agreeable to [facility] [employer] but would prefer [facility] first choice. VM left for Ben. Pt will need precert.      Row Name 11/24/23 1430       Plan    Plan --    Plan Comments --      Row Name 11/24/23 1416       Plan    Plan Referral to [facility]; will need precert    Patient/Family in Agreement with Plan yes    Plan Comments Spoke with pts daughter via phone and pt confirmed plan. Explained role of CCP. Confirmed facesheet correct. Pt lives alone, daughter lives 4 blocks away. Pt has walker and cane at home. She has been to SNF in past and no HH per daughter. Daughter couldn't remember where pt went to rehab. Explained PT is recommending SNF at d/c. Reviewed SNF list and ratings and pt and daughter agreeable to [facility]. Spoke with Elke who will review referral. Pt will need precert started. packet if office.      Row Name 11/24/23 1227       Plan    Plan Comments pt is in covid  isloation and not answering her room phone. VM left for daughter Margaret to return call to discuss d/c planning.                   Discharge Codes    No documentation.                       ZAY Davis

## 2023-11-24 NOTE — PLAN OF CARE
Goal Outcome Evaluation:  Plan of Care Reviewed With: patient        Progress: improving  Outcome Evaluation: AOx4, pleasant, cooperative.  c/o right shoulder and elbow pain; medicated with prn norco.  voltaren ointment applied.  up to BSC/BR with assist.  up to chair with PT.  isolation precautions observed for enhanced contact/covid and hazardous med.  freq, loose, productive cough.  reports clear sputum.  no SOA.  room air sats stable.  discharge planner contacted for SNU/rehab placement.  bed/chair alarm activated for safety.

## 2023-11-24 NOTE — CASE MANAGEMENT/SOCIAL WORK
Continued Stay Note  [facility]     Patient Name: Carly Gotti  MRN: [id number]  Today's Date: 11/24/2023    Admit Date: 11/22/2023    Plan: SNF pending precert and acceptance   Discharge Plan       Row Name 11/24/23 1454       Plan    Plan SNF pending precert and acceptance    Patient/Family in Agreement with Plan yes    Plan Comments Spoke with Ben/Signature he will check with his facilities ([facility] and [facility]) to see if they can accept. CCP will start precert once we have an accepting facility.      Row Name 11/24/23 1432       Plan    Plan SNF pending; will need precert    Patient/Family in Agreement with Plan yes    Plan Comments Spoke with Myah, [facility] can't accept pt because she is Covid postive. She will send referral to [facility]. Spoke with pt who is agreeable to [place] but would prefer [facility] first choice. VM left for Ben. Pt will need precert.      Row Name 11/24/23 1430       Plan    Plan --    Plan Comments --      Row Name 11/24/23 1416       Plan    Plan Referral to [facility]; will need precert    Patient/Family in Agreement with Plan yes    Plan Comments Spoke with pts daughter via phone and pt confirmed plan. Explained role of CCP. Confirmed facesheet correct. Pt lives alone, daughter lives 4 blocks away. Pt has walker and cane at home. She has been to SNF in past and no HH per daughter. Daughter couldn't remember where pt went to rehab. Explained PT is recommending SNF at d/c. Reviewed SNF list and ratings and pt and daughter agreeable to [facility]. Spoke with Elke who will review referral. Pt will need precert started. packet if office.      Row Name 11/24/23 1227       Plan    Plan Comments pt is in covid isloation and not answering her room phone. VM left for daughter Margaret to return call to discuss d/c planning.                   Discharge Codes    No documentation.                       Ara Moe,  MSW

## 2023-11-25 LAB
ALBUMIN SERPL-MCNC: 3.6 G/DL (ref 3.5–5.2)
ALBUMIN/GLOB SERPL: 2.3 G/DL
ALP SERPL-CCNC: 44 U/L (ref 39–117)
ALT SERPL W P-5'-P-CCNC: 40 U/L (ref 1–33)
ANION GAP SERPL CALCULATED.3IONS-SCNC: 5 MMOL/L (ref 5–15)
AST SERPL-CCNC: 36 U/L (ref 1–32)
BASOPHILS # BLD AUTO: 0.01 10*3/MM3 (ref 0–0.2)
BASOPHILS NFR BLD AUTO: 0.3 % (ref 0–1.5)
BILIRUB SERPL-MCNC: 0.2 MG/DL (ref 0–1.2)
BUN SERPL-MCNC: 33 MG/DL (ref 8–23)
BUN/CREAT SERPL: 27.3 (ref 7–25)
CALCIUM SPEC-SCNC: 9.3 MG/DL (ref 8.6–10.5)
CHLORIDE SERPL-SCNC: 109 MMOL/L (ref 98–107)
CK SERPL-CCNC: 46 U/L (ref 20–180)
CO2 SERPL-SCNC: 27 MMOL/L (ref 22–29)
CREAT SERPL-MCNC: 1.21 MG/DL (ref 0.57–1)
CRP SERPL-MCNC: 1.29 MG/DL (ref 0–0.5)
DEPRECATED RDW RBC AUTO: 43.3 FL (ref 37–54)
EGFRCR SERPLBLD CKD-EPI 2021: 44 ML/MIN/1.73
EOSINOPHIL # BLD AUTO: 0.16 10*3/MM3 (ref 0–0.4)
EOSINOPHIL NFR BLD AUTO: 4.8 % (ref 0.3–6.2)
ERYTHROCYTE [DISTWIDTH] IN BLOOD BY AUTOMATED COUNT: 12.1 % (ref 12.3–15.4)
ERYTHROCYTE [SEDIMENTATION RATE] IN BLOOD: 12 MM/HR (ref 0–30)
FERRITIN SERPL-MCNC: 136 NG/ML (ref 13–150)
GLOBULIN UR ELPH-MCNC: 1.6 GM/DL
GLUCOSE SERPL-MCNC: 101 MG/DL (ref 65–99)
HBA1C MFR BLD: 5.4 % (ref 4.8–5.6)
HCT VFR BLD AUTO: 34.4 % (ref 34–46.6)
HGB BLD-MCNC: 11 G/DL (ref 12–15.9)
IMM GRANULOCYTES # BLD AUTO: 0.01 10*3/MM3 (ref 0–0.05)
IMM GRANULOCYTES NFR BLD AUTO: 0.3 % (ref 0–0.5)
LYMPHOCYTES # BLD AUTO: 0.9 10*3/MM3 (ref 0.7–3.1)
LYMPHOCYTES NFR BLD AUTO: 27.3 % (ref 19.6–45.3)
MAGNESIUM SERPL-MCNC: 1.8 MG/DL (ref 1.6–2.4)
MCH RBC QN AUTO: 31 PG (ref 26.6–33)
MCHC RBC AUTO-ENTMCNC: 32 G/DL (ref 31.5–35.7)
MCV RBC AUTO: 96.9 FL (ref 79–97)
MONOCYTES # BLD AUTO: 0.65 10*3/MM3 (ref 0.1–0.9)
MONOCYTES NFR BLD AUTO: 19.7 % (ref 5–12)
NEUTROPHILS NFR BLD AUTO: 1.57 10*3/MM3 (ref 1.7–7)
NEUTROPHILS NFR BLD AUTO: 47.6 % (ref 42.7–76)
NRBC BLD AUTO-RTO: 0 /100 WBC (ref 0–0.2)
PHOSPHATE SERPL-MCNC: 2.3 MG/DL (ref 2.5–4.5)
PLATELET # BLD AUTO: 154 10*3/MM3 (ref 140–450)
PMV BLD AUTO: 11.2 FL (ref 6–12)
POTASSIUM SERPL-SCNC: 4.7 MMOL/L (ref 3.5–5.2)
PROT SERPL-MCNC: 5.2 G/DL (ref 6–8.5)
RBC # BLD AUTO: 3.55 10*6/MM3 (ref 3.77–5.28)
SODIUM SERPL-SCNC: 141 MMOL/L (ref 136–145)
WBC NRBC COR # BLD AUTO: 3.3 10*3/MM3 (ref 3.4–10.8)

## 2023-11-25 PROCEDURE — 84100 ASSAY OF PHOSPHORUS: CPT | Performed by: STUDENT IN AN ORGANIZED HEALTH CARE EDUCATION/TRAINING PROGRAM

## 2023-11-25 PROCEDURE — 80053 COMPREHEN METABOLIC PANEL: CPT | Performed by: STUDENT IN AN ORGANIZED HEALTH CARE EDUCATION/TRAINING PROGRAM

## 2023-11-25 PROCEDURE — 82728 ASSAY OF FERRITIN: CPT | Performed by: STUDENT IN AN ORGANIZED HEALTH CARE EDUCATION/TRAINING PROGRAM

## 2023-11-25 PROCEDURE — 85652 RBC SED RATE AUTOMATED: CPT | Performed by: STUDENT IN AN ORGANIZED HEALTH CARE EDUCATION/TRAINING PROGRAM

## 2023-11-25 PROCEDURE — 86140 C-REACTIVE PROTEIN: CPT | Performed by: STUDENT IN AN ORGANIZED HEALTH CARE EDUCATION/TRAINING PROGRAM

## 2023-11-25 PROCEDURE — G0378 HOSPITAL OBSERVATION PER HR: HCPCS

## 2023-11-25 PROCEDURE — 85025 COMPLETE CBC W/AUTO DIFF WBC: CPT | Performed by: STUDENT IN AN ORGANIZED HEALTH CARE EDUCATION/TRAINING PROGRAM

## 2023-11-25 PROCEDURE — 83036 HEMOGLOBIN GLYCOSYLATED A1C: CPT | Performed by: STUDENT IN AN ORGANIZED HEALTH CARE EDUCATION/TRAINING PROGRAM

## 2023-11-25 PROCEDURE — 82550 ASSAY OF CK (CPK): CPT | Performed by: STUDENT IN AN ORGANIZED HEALTH CARE EDUCATION/TRAINING PROGRAM

## 2023-11-25 PROCEDURE — 83735 ASSAY OF MAGNESIUM: CPT | Performed by: STUDENT IN AN ORGANIZED HEALTH CARE EDUCATION/TRAINING PROGRAM

## 2023-11-25 RX ADMIN — Medication 10 ML: at 21:05

## 2023-11-25 RX ADMIN — DONEPEZIL HYDROCHLORIDE 10 MG: 10 TABLET, FILM COATED ORAL at 21:02

## 2023-11-25 RX ADMIN — BUPROPION HYDROCHLORIDE 150 MG: 150 TABLET, EXTENDED RELEASE ORAL at 09:32

## 2023-11-25 RX ADMIN — DIPHENHYDRAMINE HYDROCHLORIDE AND LIDOCAINE HYDROCHLORIDE AND ALUMINUM HYDROXIDE AND MAGNESIUM HYDRO 5 ML: KIT at 06:17

## 2023-11-25 RX ADMIN — BUSPIRONE HYDROCHLORIDE 5 MG: 5 TABLET ORAL at 23:06

## 2023-11-25 RX ADMIN — NIRMATRELVIR AND RITONAVIR 2 TABLET: KIT at 21:03

## 2023-11-25 RX ADMIN — Medication 10 ML: at 09:32

## 2023-11-25 RX ADMIN — DIPHENHYDRAMINE HYDROCHLORIDE AND LIDOCAINE HYDROCHLORIDE AND ALUMINUM HYDROXIDE AND MAGNESIUM HYDRO 5 ML: KIT at 23:06

## 2023-11-25 RX ADMIN — DICLOFENAC SODIUM 4 G: 10 GEL TOPICAL at 21:05

## 2023-11-25 RX ADMIN — HYDROCODONE BITARTRATE AND ACETAMINOPHEN 1 TABLET: 5; 325 TABLET ORAL at 14:59

## 2023-11-25 RX ADMIN — ASPIRIN 325 MG: 325 TABLET ORAL at 09:31

## 2023-11-25 RX ADMIN — BUSPIRONE HYDROCHLORIDE 5 MG: 5 TABLET ORAL at 09:32

## 2023-11-25 RX ADMIN — TEMAZEPAM 15 MG: 7.5 CAPSULE ORAL at 01:54

## 2023-11-25 RX ADMIN — FAMOTIDINE 40 MG: 20 TABLET ORAL at 09:31

## 2023-11-25 RX ADMIN — FLUOXETINE HYDROCHLORIDE 60 MG: 20 CAPSULE ORAL at 09:31

## 2023-11-25 RX ADMIN — DIPHENHYDRAMINE HYDROCHLORIDE AND LIDOCAINE HYDROCHLORIDE AND ALUMINUM HYDROXIDE AND MAGNESIUM HYDRO 5 ML: KIT at 12:43

## 2023-11-25 RX ADMIN — HYDROCODONE BITARTRATE AND ACETAMINOPHEN 1 TABLET: 5; 325 TABLET ORAL at 21:03

## 2023-11-25 RX ADMIN — DIPHENHYDRAMINE HYDROCHLORIDE AND LIDOCAINE HYDROCHLORIDE AND ALUMINUM HYDROXIDE AND MAGNESIUM HYDRO 5 ML: KIT at 18:58

## 2023-11-25 RX ADMIN — CARVEDILOL 12.5 MG: 12.5 TABLET, FILM COATED ORAL at 21:02

## 2023-11-25 RX ADMIN — CARVEDILOL 12.5 MG: 12.5 TABLET, FILM COATED ORAL at 09:31

## 2023-11-25 RX ADMIN — NIRMATRELVIR AND RITONAVIR 2 TABLET: KIT at 09:31

## 2023-11-25 NOTE — PLAN OF CARE
Goal Outcome Evaluation:  Plan of Care Reviewed With: patient           Outcome Evaluation: VSS x slightlly elevated bp. States she is feeling better. C/O pain in rt shoulder/lt knee. PO pain med given with positive results. Waiting for precert to d/c to [facility]. Covid isolation maintained

## 2023-11-25 NOTE — PROGRESS NOTES
Name: Carly Gotti ADMIT: 2023   : 1940  PCP: Reyes, Miriam Mercado, MD    MRN: [id number] LOS: 0 days   AGE/SEX: 83 y.o. female  ROOM: [facility]     Subjective   Subjective   Patient seen and examined this morning.  Hospital day 3.  At time of my examination, patient is resting comfortably in bed, she states that she feels better today when compared to prior.  No acute events overnight.  Remains on room air with O2 sats greater than 90%.       Objective   Objective   Vital Signs  Temp:  [97 °F (36.1 °C)-98.3 °F (36.8 °C)] 98.3 °F (36.8 °C)  Heart Rate:  [73-82] 78  Resp:  [15-16] 16  BP: (141-153)/(88-93) 147/92  SpO2:  [93 %-97 %] 96 %  on   ;   Device (Oxygen Therapy): room air  Body mass index is 22.41 kg/m².  Physical Exam  Vitals and nursing note reviewed.   Constitutional:       General: She is not in acute distress.     Comments: Thin/frail, chronically ill-appearing.   HENT:      Head:      Comments: Bruising in the maxillary region and across nasal bridge  Cardiovascular:      Rate and Rhythm: Normal rate and regular rhythm.      Pulses: Normal pulses.      Heart sounds: Normal heart sounds.   Pulmonary:      Effort: Pulmonary effort is normal. No respiratory distress.      Breath sounds: Normal breath sounds. No wheezing.   Abdominal:      General: Bowel sounds are normal.      Palpations: Abdomen is soft.      Tenderness: There is no abdominal tenderness.   Musculoskeletal:      Comments: SCDs   Skin:     General: Skin is warm and dry.   Neurological:      Mental Status: She is alert.       Results Review     I reviewed the patient's new clinical results.  Results from last 7 days   Lab Units 23  0457 23  0436 23  2240   WBC 10*3/mm3 3.30* 4.34 5.49   HEMOGLOBIN g/dL 11.0* 11.7* 12.1   PLATELETS 10*3/mm3 154 147 154     Results from last 7 days   Lab Units 23  0457 23  0554 23  0436 23  2240   SODIUM mmol/L 141 142 139 140   POTASSIUM mmol/L  4.7 3.6 3.8 4.0   CHLORIDE mmol/L 109* 108* 102 101   CO2 mmol/L 27.0 24.6 25.0 25.9   BUN mg/dL 33* 36* 28* 29*   CREATININE mg/dL 1.21* 1.38* 1.26* 1.24*   GLUCOSE mg/dL 101* 111* 61* 74   EGFR mL/min/1.73 44.0* 37.6* 41.9* 42.7*     Results from last 7 days   Lab Units 11/25/23  0457 11/24/23  0554 11/22/23  2240   ALBUMIN g/dL 3.6 3.2* 3.7   BILIRUBIN mg/dL 0.2 <0.2 0.3   ALK PHOS U/L 44 48 54   AST (SGOT) U/L 36* 49* 62*   ALT (SGPT) U/L 40* 44* 42*     Results from last 7 days   Lab Units 11/25/23  0457 11/24/23  0554 11/23/23  0436 11/22/23  2240   CALCIUM mg/dL 9.3 8.8 9.7 9.9   ALBUMIN g/dL 3.6 3.2*  --  3.7   MAGNESIUM mg/dL 1.8  --   --   --    PHOSPHORUS mg/dL 2.3*  --   --   --        Hemoglobin A1C   Date/Time Value Ref Range Status   11/25/2023 0457 5.40 4.80 - 5.60 % Final       No radiology results for the last day    I have personally reviewed all medications:  Scheduled Medications  aspirin, 325 mg, Oral, Daily  buPROPion XL, 150 mg, Oral, Daily  busPIRone, 5 mg, Oral, BID  carvedilol, 12.5 mg, Oral, BID  Diclofenac Sodium, 4 g, Topical, BID  donepezil, 10 mg, Oral, Nightly  famotidine, 40 mg, Oral, Daily  First Mouthwash (Magic Mouthwash), 5 mL, Swish & Swallow, 4x Daily  FLUoxetine, 60 mg, Oral, Daily  Nirmatrelvir&Ritonavir 300/100, 2 tablet, Oral, BID  senna-docusate sodium, 2 tablet, Oral, BID  sodium chloride, 10 mL, Intravenous, Q12H    Infusions   Diet  Diet: Regular/House Diet; Texture: Regular Texture (IDDSI 7); Fluid Consistency: Thin (IDDSI 0)    I have personally reviewed:  [x]  Laboratory   [x]  Microbiology   [x]  Radiology   [x]  EKG/Telemetry  [x]  Cardiology/Vascular   []  Pathology    []  Records       Assessment/Plan     Active Hospital Problems    Diagnosis  POA    **Unable to ambulate [R26.2]  Yes    Fall [W19.XXXA]  Yes    Elevated liver transaminase level [R74.01]  Yes    Pacemaker [Z95.0]  Yes    CKD (chronic kidney disease) stage 3, GFR 30-59 ml/min [N18.30]  Yes     Benign essential hypertension [I10]  Yes    S/P AVR (aortic valve replacement) [Z95.2]  Not Applicable    S/P TVR (tricuspid valve repair) [Z98.890]  Not Applicable    S/P MVR (mitral valve repair) [Z98.890]  Not Applicable      Resolved Hospital Problems   No resolved problems to display.     Ms. Gotti is a 83 y.o. female with history of aortic valve replacement, tricuspid and mitral valve repair, pacemaker, CKD 3B and hypertension presenting after a fall with inability to ambulate, admitted for further evaluation and management.    Fall  Unable to ambulate  - Patient with endorsement of mechanical fall prior to arrival, stating she tripped while going to bathroom at night. She endorses generalized weakness, difficulty with ambulation. Imaging performed, including CT head, CXR, right hip and elbow x-ray, all of which were negative for acute findings.  - PT/OT consulted, patient will likely need SNF. Will follow up their recommendations and CCP plans regarding placement if needed.  - Fall precautions.    COVID-19 infection  - Patient found to be + for COVID-19 on RVP obtained on 11/23/23. Imaging negative, patient without acute respiratory complaints at present.  She is currently on room air with O2 sats greater than 90%.   - Continue to monitor, supplemental oxygen PRN to maintain O2 sat >90%, PRN medications for symptom control.   - Monitor COVID 19 associated labs.    Transaminitis  - LFT elevated on most recent labs, however, improving when compared to prior.  Home statin medication currently being held.  CK level within normal limits. No indications to warrant acute intervention for now, however, will continue to closely monitor.  - Order repeat CMP in AM for reassessment.    Hypophosphatemia  - Phos low on most recent labs; Will replete via electrolyte protocol. Follow up repeat labs to guide ongoing management decisions. Replete PRN per protocol. Continue to monitor    Chronic kidney disease stage 3B  - On  most recent labs, patient's creatinine found to be stable and near apparent baseline, per review of previous lab values and outside records.  - Order repeat CMP in AM for reassessment of renal function.   - Will continue to monitor and trend creatinine to guide ongoing management decisions. Avoid nephrotoxic medications, IVF, strict I/O, daily weights.    Hypertension  - Blood pressure appears stable and acceptable acutely at this time. On Carvedilol, home Bumex being held due to concerns for slight dehydration on arrival. No indications to warrant acute changes/intervention at this time.  - Continue current medications as prescribed. Trend BP to guide ongoing management decisions. Can resume Bumex when appropriate.    Prediabetes with Hyperglycemia  - Glucose elevated on most recent labs. Patient without known history of T2DM.  Most recent hemoglobin A1c 5.80% (2015).  - Order repeat A1c at this time.  - Monitor for now, continue with POC glucose checks, can add correctional SSI if needed.    Hypokalemia, resolved  - K slightly low on prior labs, repleted via electrolyte protocol. Follow up repeat labs to guide ongoing management decisions. Replete PRN per protocol. Continue to monitor.    SCDs for DVT prophylaxis.  Full code.  Discussed with patient and nursing staff.  Anticipate discharge to SNU facility timing yet to be determined.      Lionel Aguilar DO  [facility]  11/25/23

## 2023-11-25 NOTE — PLAN OF CARE
Goal Outcome Evaluation:  Plan of Care Reviewed With: patient        Progress: improving  Outcome Evaluation: Alert and oriented. VSS. Room air.  Intermittent congested cough, no signs of distress. Norco given  and Voltaren Gel applied for right shoulder pain. Up with assist to the BR with gaitbelt and walker.  Isolation precatiin maintained. Slept on and off.

## 2023-11-26 LAB
ALBUMIN SERPL-MCNC: 3.8 G/DL (ref 3.5–5.2)
ALBUMIN/GLOB SERPL: 2.4 G/DL
ALP SERPL-CCNC: 50 U/L (ref 39–117)
ALT SERPL W P-5'-P-CCNC: 34 U/L (ref 1–33)
ANION GAP SERPL CALCULATED.3IONS-SCNC: 8 MMOL/L (ref 5–15)
AST SERPL-CCNC: 31 U/L (ref 1–32)
BASOPHILS # BLD AUTO: 0.01 10*3/MM3 (ref 0–0.2)
BASOPHILS NFR BLD AUTO: 0.3 % (ref 0–1.5)
BILIRUB SERPL-MCNC: 0.2 MG/DL (ref 0–1.2)
BUN SERPL-MCNC: 32 MG/DL (ref 8–23)
BUN/CREAT SERPL: 27.4 (ref 7–25)
CALCIUM SPEC-SCNC: 9.4 MG/DL (ref 8.6–10.5)
CHLORIDE SERPL-SCNC: 107 MMOL/L (ref 98–107)
CK SERPL-CCNC: 44 U/L (ref 20–180)
CO2 SERPL-SCNC: 27 MMOL/L (ref 22–29)
CREAT SERPL-MCNC: 1.17 MG/DL (ref 0.57–1)
CRP SERPL-MCNC: 0.6 MG/DL (ref 0–0.5)
DEPRECATED RDW RBC AUTO: 45.9 FL (ref 37–54)
EGFRCR SERPLBLD CKD-EPI 2021: 45.8 ML/MIN/1.73
EOSINOPHIL # BLD AUTO: 0.16 10*3/MM3 (ref 0–0.4)
EOSINOPHIL NFR BLD AUTO: 4.6 % (ref 0.3–6.2)
ERYTHROCYTE [DISTWIDTH] IN BLOOD BY AUTOMATED COUNT: 12.6 % (ref 12.3–15.4)
ERYTHROCYTE [SEDIMENTATION RATE] IN BLOOD: 13 MM/HR (ref 0–30)
FERRITIN SERPL-MCNC: 131 NG/ML (ref 13–150)
GLOBULIN UR ELPH-MCNC: 1.6 GM/DL
GLUCOSE SERPL-MCNC: 75 MG/DL (ref 65–99)
HCT VFR BLD AUTO: 34.9 % (ref 34–46.6)
HGB BLD-MCNC: 11.3 G/DL (ref 12–15.9)
IMM GRANULOCYTES # BLD AUTO: 0.01 10*3/MM3 (ref 0–0.05)
IMM GRANULOCYTES NFR BLD AUTO: 0.3 % (ref 0–0.5)
LYMPHOCYTES # BLD AUTO: 1.04 10*3/MM3 (ref 0.7–3.1)
LYMPHOCYTES NFR BLD AUTO: 29.9 % (ref 19.6–45.3)
MAGNESIUM SERPL-MCNC: 1.8 MG/DL (ref 1.6–2.4)
MCH RBC QN AUTO: 32.1 PG (ref 26.6–33)
MCHC RBC AUTO-ENTMCNC: 32.4 G/DL (ref 31.5–35.7)
MCV RBC AUTO: 99.1 FL (ref 79–97)
MONOCYTES # BLD AUTO: 0.59 10*3/MM3 (ref 0.1–0.9)
MONOCYTES NFR BLD AUTO: 17 % (ref 5–12)
NEUTROPHILS NFR BLD AUTO: 1.67 10*3/MM3 (ref 1.7–7)
NEUTROPHILS NFR BLD AUTO: 47.9 % (ref 42.7–76)
NRBC BLD AUTO-RTO: 0 /100 WBC (ref 0–0.2)
PHOSPHATE SERPL-MCNC: 1.8 MG/DL (ref 2.5–4.5)
PHOSPHATE SERPL-MCNC: 2 MG/DL (ref 2.5–4.5)
PLATELET # BLD AUTO: 162 10*3/MM3 (ref 140–450)
PMV BLD AUTO: 11.6 FL (ref 6–12)
POTASSIUM SERPL-SCNC: 4.9 MMOL/L (ref 3.5–5.2)
PROT SERPL-MCNC: 5.4 G/DL (ref 6–8.5)
RBC # BLD AUTO: 3.52 10*6/MM3 (ref 3.77–5.28)
SODIUM SERPL-SCNC: 142 MMOL/L (ref 136–145)
WBC NRBC COR # BLD AUTO: 3.48 10*3/MM3 (ref 3.4–10.8)

## 2023-11-26 PROCEDURE — 86140 C-REACTIVE PROTEIN: CPT | Performed by: STUDENT IN AN ORGANIZED HEALTH CARE EDUCATION/TRAINING PROGRAM

## 2023-11-26 PROCEDURE — 80053 COMPREHEN METABOLIC PANEL: CPT | Performed by: STUDENT IN AN ORGANIZED HEALTH CARE EDUCATION/TRAINING PROGRAM

## 2023-11-26 PROCEDURE — 85652 RBC SED RATE AUTOMATED: CPT | Performed by: STUDENT IN AN ORGANIZED HEALTH CARE EDUCATION/TRAINING PROGRAM

## 2023-11-26 PROCEDURE — 25810000003 SODIUM CHLORIDE 0.9 % SOLUTION: Performed by: STUDENT IN AN ORGANIZED HEALTH CARE EDUCATION/TRAINING PROGRAM

## 2023-11-26 PROCEDURE — 85025 COMPLETE CBC W/AUTO DIFF WBC: CPT | Performed by: STUDENT IN AN ORGANIZED HEALTH CARE EDUCATION/TRAINING PROGRAM

## 2023-11-26 PROCEDURE — 82550 ASSAY OF CK (CPK): CPT | Performed by: STUDENT IN AN ORGANIZED HEALTH CARE EDUCATION/TRAINING PROGRAM

## 2023-11-26 PROCEDURE — 82728 ASSAY OF FERRITIN: CPT | Performed by: STUDENT IN AN ORGANIZED HEALTH CARE EDUCATION/TRAINING PROGRAM

## 2023-11-26 PROCEDURE — 84100 ASSAY OF PHOSPHORUS: CPT | Performed by: STUDENT IN AN ORGANIZED HEALTH CARE EDUCATION/TRAINING PROGRAM

## 2023-11-26 PROCEDURE — 83735 ASSAY OF MAGNESIUM: CPT | Performed by: STUDENT IN AN ORGANIZED HEALTH CARE EDUCATION/TRAINING PROGRAM

## 2023-11-26 PROCEDURE — G0378 HOSPITAL OBSERVATION PER HR: HCPCS

## 2023-11-26 RX ORDER — BISACODYL 10 MG
10 SUPPOSITORY, RECTAL RECTAL DAILY PRN
Status: DISCONTINUED | OUTPATIENT
Start: 2023-11-26 | End: 2023-11-27 | Stop reason: HOSPADM

## 2023-11-26 RX ORDER — POLYETHYLENE GLYCOL 3350 17 G/17G
17 POWDER, FOR SOLUTION ORAL DAILY PRN
Status: DISCONTINUED | OUTPATIENT
Start: 2023-11-26 | End: 2023-11-27 | Stop reason: HOSPADM

## 2023-11-26 RX ORDER — BISACODYL 5 MG/1
5 TABLET, DELAYED RELEASE ORAL DAILY PRN
Status: DISCONTINUED | OUTPATIENT
Start: 2023-11-26 | End: 2023-11-27 | Stop reason: HOSPADM

## 2023-11-26 RX ORDER — SODIUM PHOSPHATE IN 0.9 % NACL 15MMOL/100
15 PLASTIC BAG, INJECTION (ML) INTRAVENOUS ONCE
Status: COMPLETED | OUTPATIENT
Start: 2023-11-26 | End: 2023-11-26

## 2023-11-26 RX ORDER — AMOXICILLIN 250 MG
2 CAPSULE ORAL 2 TIMES DAILY PRN
Status: DISCONTINUED | OUTPATIENT
Start: 2023-11-26 | End: 2023-11-27 | Stop reason: HOSPADM

## 2023-11-26 RX ADMIN — HYDROCODONE BITARTRATE AND ACETAMINOPHEN 1 TABLET: 5; 325 TABLET ORAL at 08:59

## 2023-11-26 RX ADMIN — DIPHENHYDRAMINE HYDROCHLORIDE AND LIDOCAINE HYDROCHLORIDE AND ALUMINUM HYDROXIDE AND MAGNESIUM HYDRO 5 ML: KIT at 12:19

## 2023-11-26 RX ADMIN — BUPROPION HYDROCHLORIDE 150 MG: 150 TABLET, EXTENDED RELEASE ORAL at 08:07

## 2023-11-26 RX ADMIN — Medication 10 ML: at 21:03

## 2023-11-26 RX ADMIN — DOCUSATE SODIUM 50MG AND SENNOSIDES 8.6MG 2 TABLET: 8.6; 5 TABLET, FILM COATED ORAL at 08:06

## 2023-11-26 RX ADMIN — DIPHENHYDRAMINE HYDROCHLORIDE AND LIDOCAINE HYDROCHLORIDE AND ALUMINUM HYDROXIDE AND MAGNESIUM HYDRO 5 ML: KIT at 06:19

## 2023-11-26 RX ADMIN — ASPIRIN 325 MG: 325 TABLET ORAL at 08:06

## 2023-11-26 RX ADMIN — DICLOFENAC SODIUM 4 G: 10 GEL TOPICAL at 21:03

## 2023-11-26 RX ADMIN — BUSPIRONE HYDROCHLORIDE 5 MG: 5 TABLET ORAL at 08:07

## 2023-11-26 RX ADMIN — TEMAZEPAM 15 MG: 7.5 CAPSULE ORAL at 01:28

## 2023-11-26 RX ADMIN — CARVEDILOL 12.5 MG: 12.5 TABLET, FILM COATED ORAL at 08:07

## 2023-11-26 RX ADMIN — BUSPIRONE HYDROCHLORIDE 5 MG: 5 TABLET ORAL at 21:02

## 2023-11-26 RX ADMIN — NIRMATRELVIR AND RITONAVIR 2 TABLET: KIT at 21:03

## 2023-11-26 RX ADMIN — DONEPEZIL HYDROCHLORIDE 10 MG: 10 TABLET, FILM COATED ORAL at 21:02

## 2023-11-26 RX ADMIN — FAMOTIDINE 40 MG: 20 TABLET ORAL at 08:06

## 2023-11-26 RX ADMIN — FLUOXETINE HYDROCHLORIDE 60 MG: 20 CAPSULE ORAL at 08:07

## 2023-11-26 RX ADMIN — SODIUM PHOSPHATE, MONOBASIC, MONOHYDRATE AND SODIUM PHOSPHATE, DIBASIC, ANHYDROUS 15 MMOL: 142; 276 INJECTION, SOLUTION INTRAVENOUS at 08:06

## 2023-11-26 RX ADMIN — DICLOFENAC SODIUM 4 G: 10 GEL TOPICAL at 08:08

## 2023-11-26 RX ADMIN — HYDROCODONE BITARTRATE AND ACETAMINOPHEN 1 TABLET: 5; 325 TABLET ORAL at 21:02

## 2023-11-26 RX ADMIN — DIPHENHYDRAMINE HYDROCHLORIDE AND LIDOCAINE HYDROCHLORIDE AND ALUMINUM HYDROXIDE AND MAGNESIUM HYDRO 5 ML: KIT at 17:52

## 2023-11-26 RX ADMIN — CARVEDILOL 12.5 MG: 12.5 TABLET, FILM COATED ORAL at 21:02

## 2023-11-26 RX ADMIN — Medication 10 ML: at 08:07

## 2023-11-26 RX ADMIN — NIRMATRELVIR AND RITONAVIR 2 TABLET: KIT at 08:06

## 2023-11-26 NOTE — PROGRESS NOTES
Name: Carly Gotti ADMIT: 2023   : 1940  PCP: Reyes, Miriam Mercado, MD    MRN: [id number] LOS: 0 days   AGE/SEX: 83 y.o. female  ROOM: [facility]     Subjective   Subjective   Patient seen and examined this morning.  Hospital day 4.  At time of my examination, patient is resting in bed, she continues to endorse ongoing feelings of generalized weakness and fatigue, however, relatively stable from prior.  No acute events overnight.  She remains on room air with O2 sats greater than 90%, denies acute respiratory complaints at this time.      Objective   Objective   Vital Signs  Temp:  [97 °F (36.1 °C)-97.7 °F (36.5 °C)] 97.1 °F (36.2 °C)  Heart Rate:  [76-78] 78  Resp:  [16-18] 18  BP: (157-187)/(89-94) 157/94  SpO2:  [94 %-97 %] 97 %  on   ;   Device (Oxygen Therapy): room air  Body mass index is 22.41 kg/m².  Physical Exam  Vitals and nursing note reviewed.   Constitutional:       General: She is not in acute distress.     Comments: Thin/frail, chronically ill-appearing.   HENT:      Head:      Comments: Bruising in the maxillary region and across nasal bridge  Cardiovascular:      Rate and Rhythm: Normal rate and regular rhythm.      Pulses: Normal pulses.      Heart sounds: Normal heart sounds.   Pulmonary:      Effort: Pulmonary effort is normal. No respiratory distress.      Breath sounds: Normal breath sounds. No rhonchi.   Abdominal:      General: Bowel sounds are normal. There is no distension.      Palpations: Abdomen is soft.      Tenderness: There is no abdominal tenderness.   Musculoskeletal:      Comments: SCDs   Skin:     General: Skin is warm and dry.      Findings: Bruising (scattered) present.   Neurological:      Mental Status: She is alert.       Results Review     I reviewed the patient's new clinical results.  Results from last 7 days   Lab Units 23  0345 23  0457 23  0436 23  2240   WBC 10*3/mm3 3.48 3.30* 4.34 5.49   HEMOGLOBIN g/dL 11.3* 11.0* 11.7*  12.1   PLATELETS 10*3/mm3 162 154 147 154     Results from last 7 days   Lab Units 11/26/23 0345 11/25/23 0457 11/24/23 0554 11/23/23  0436   SODIUM mmol/L 142 141 142 139   POTASSIUM mmol/L 4.9 4.7 3.6 3.8   CHLORIDE mmol/L 107 109* 108* 102   CO2 mmol/L 27.0 27.0 24.6 25.0   BUN mg/dL 32* 33* 36* 28*   CREATININE mg/dL 1.17* 1.21* 1.38* 1.26*   GLUCOSE mg/dL 75 101* 111* 61*   EGFR mL/min/1.73 45.8* 44.0* 37.6* 41.9*     Results from last 7 days   Lab Units 11/26/23 0345 11/25/23 0457 11/24/23 0554 11/22/23  2240   ALBUMIN g/dL 3.8 3.6 3.2* 3.7   BILIRUBIN mg/dL 0.2 0.2 <0.2 0.3   ALK PHOS U/L 50 44 48 54   AST (SGOT) U/L 31 36* 49* 62*   ALT (SGPT) U/L 34* 40* 44* 42*     Results from last 7 days   Lab Units 11/26/23 0345 11/25/23 0457 11/24/23 0554 11/23/23 0436 11/22/23  2240   CALCIUM mg/dL 9.4 9.3 8.8 9.7 9.9   ALBUMIN g/dL 3.8 3.6 3.2*  --  3.7   MAGNESIUM mg/dL 1.8 1.8  --   --   --    PHOSPHORUS mg/dL 1.8* 2.3*  --   --   --        Hemoglobin A1C   Date/Time Value Ref Range Status   11/25/2023 0457 5.40 4.80 - 5.60 % Final       No radiology results for the last day    I have personally reviewed all medications:  Scheduled Medications  aspirin, 325 mg, Oral, Daily  buPROPion XL, 150 mg, Oral, Daily  busPIRone, 5 mg, Oral, BID  carvedilol, 12.5 mg, Oral, BID  Diclofenac Sodium, 4 g, Topical, BID  donepezil, 10 mg, Oral, Nightly  famotidine, 40 mg, Oral, Daily  First Mouthwash (Magic Mouthwash), 5 mL, Swish & Swallow, 4x Daily  FLUoxetine, 60 mg, Oral, Daily  Nirmatrelvir&Ritonavir 300/100, 2 tablet, Oral, BID  senna-docusate sodium, 2 tablet, Oral, BID  sodium chloride, 10 mL, Intravenous, Q12H    Infusions   Diet  Diet: Regular/House Diet; Texture: Regular Texture (IDDSI 7); Fluid Consistency: Thin (IDDSI 0)    I have personally reviewed:  [x]  Laboratory   [x]  Microbiology   [x]  Radiology   [x]  EKG/Telemetry  [x]  Cardiology/Vascular   []  Pathology    []  Records       Assessment/Plan      Active Hospital Problems    Diagnosis  POA    **Unable to ambulate [R26.2]  Yes    Fall [W19.XXXA]  Yes    Elevated liver transaminase level [R74.01]  Yes    Pacemaker [Z95.0]  Yes    CKD (chronic kidney disease) stage 3, GFR 30-59 ml/min [N18.30]  Yes    Benign essential hypertension [I10]  Yes    S/P AVR (aortic valve replacement) [Z95.2]  Not Applicable    S/P TVR (tricuspid valve repair) [Z98.890]  Not Applicable    S/P MVR (mitral valve repair) [Z98.890]  Not Applicable      Resolved Hospital Problems   No resolved problems to display.     Ms. Gotti is a 83 y.o. female with history of aortic valve replacement, tricuspid and mitral valve repair, pacemaker, CKD 3B and hypertension presenting after a fall with inability to ambulate, admitted for further evaluation and management.    Fall  Unable to ambulate  Generalized weakness  - Patient with endorsement of mechanical fall prior to arrival, stating she tripped while going to bathroom at night. She endorses generalized weakness, difficulty with ambulation. Imaging performed, including CT head, CXR, right hip and elbow x-ray, all of which were negative for acute findings.  - PT/OT consulted, patient will need SNF. CCP working on this now.  - Fall precautions.    COVID-19 infection  - Patient found to be + for COVID-19 on RVP obtained on 11/23/23. Imaging negative, patient without acute respiratory complaints at present.  She is currently on room air with O2 sats greater than 90%.   - Continue to monitor, supplemental oxygen PRN to maintain O2 sat >90%, PRN medications for symptom control.   - Monitor COVID 19 associated labs.    Transaminitis  - LFT elevated on most recent labs, however, improving when compared to prior.  Home statin medication currently being held.  CK level within normal limits. No indications to warrant acute intervention for now, however, will continue to closely monitor.  - Order repeat CMP in AM for reassessment.    Hypophosphatemia  -  Phos low on most recent labs; Will replete via electrolyte protocol. Follow up repeat labs to guide ongoing management decisions. Replete PRN per protocol. Continue to monitor    Chronic kidney disease stage 3B  - On most recent labs, patient's creatinine found to be stable and near apparent baseline, per review of previous lab values and outside records.  - Order repeat CMP in AM for reassessment of renal function.   - Will continue to monitor and trend creatinine to guide ongoing management decisions. Avoid nephrotoxic medications, IVF, strict I/O, daily weights.    Hypertension  - Blood pressure appears stable and acceptable acutely at this time. On Carvedilol, home Bumex being held due to concerns for slight dehydration on arrival. No indications to warrant acute changes/intervention at this time.  - Continue current medications as prescribed. Trend BP to guide ongoing management decisions. Can resume Bumex when appropriate.    Prediabetes with Hyperglycemia  - Glucose elevated on most recent labs. Patient without known history of T2DM.  Most recent hemoglobin A1c 5.80% (2015).  - Order repeat A1c at this time.  - Monitor for now, continue with POC glucose checks, can add correctional SSI if needed.    Hypokalemia, resolved  - K slightly low on prior labs, repleted via electrolyte protocol. Follow up repeat labs to guide ongoing management decisions. Replete PRN per protocol. Continue to monitor.    SCDs for DVT prophylaxis.  Full code.  Discussed with patient and nursing staff.  Anticipate discharge to SNU facility once arrangements have been made.      Lionel Aguilar DO  [facility]  11/26/23

## 2023-11-26 NOTE — NURSING NOTE
Ordered IV sodium phosphate infused late due to issues with IV.  Lab drawn before infusion complete. phos 2.0, Protocol fired to order more phos, but last order needs to be infused first.

## 2023-11-26 NOTE — PLAN OF CARE
Goal Outcome Evaluation:  Plan of Care Reviewed With: patient        Progress: improving  Outcome Evaluation: Alert and oriented. Occasional congested cough. On room air. Afebrile. BP elevated , due Coreg given. Norco given, Voltaren gel applied to shoulder pain. Up with assist with gait belt and walker, unsteady, left leg slightly limping , falls precaution maintained.

## 2023-11-26 NOTE — CASE MANAGEMENT/SOCIAL WORK
Continued Stay Note  [facility]     Patient Name: Carly Gotti  MRN: [id number]  Today's Date: 11/26/2023    Admit Date: 11/22/2023    Plan: DC to [facility] Monday, pts daughter will transport   Discharge Plan       Row Name 11/26/23 1538       Plan    Plan DC to [facility] Monday, pts daughter will transport    Plan Comments CCP sent secure chat to MD asking if pt can transport to SNF today. He wanted to wait until Monday am. I called pts dgt and she can transport pt there.  Pt is not on O2 and she is [facility] w/meals.  I tried to call [facility] but they are closed on Sunday.  I updated facility.  CCP will follow. Thanks, Emmy STOVER                   Discharge Codes    No documentation.                       Emmy Yuen

## 2023-11-26 NOTE — PLAN OF CARE
Goal Outcome Evaluation:  Plan of Care Reviewed With: patient           Outcome Evaluation: Pt up in chair and to BR with assist. Plan to be d/c tomorrow. Phosphorus low thia AM. Infusion did not complete until 5p New infusion ordered per rpotocol

## 2023-11-27 VITALS
WEIGHT: 118.61 LBS | DIASTOLIC BLOOD PRESSURE: 93 MMHG | BODY MASS INDEX: 22.39 KG/M2 | HEIGHT: 61 IN | OXYGEN SATURATION: 97 % | SYSTOLIC BLOOD PRESSURE: 166 MMHG | HEART RATE: 89 BPM | TEMPERATURE: 97 F | RESPIRATION RATE: 16 BRPM

## 2023-11-27 LAB
ALBUMIN SERPL-MCNC: 3.5 G/DL (ref 3.5–5.2)
ALBUMIN/GLOB SERPL: 1.6 G/DL
ALP SERPL-CCNC: 51 U/L (ref 39–117)
ALT SERPL W P-5'-P-CCNC: 28 U/L (ref 1–33)
ANION GAP SERPL CALCULATED.3IONS-SCNC: 6.2 MMOL/L (ref 5–15)
AST SERPL-CCNC: 23 U/L (ref 1–32)
BASOPHILS # BLD AUTO: 0.01 10*3/MM3 (ref 0–0.2)
BASOPHILS NFR BLD AUTO: 0.3 % (ref 0–1.5)
BILIRUB SERPL-MCNC: <0.2 MG/DL (ref 0–1.2)
BUN SERPL-MCNC: 26 MG/DL (ref 8–23)
BUN/CREAT SERPL: 24.1 (ref 7–25)
CALCIUM SPEC-SCNC: 9.4 MG/DL (ref 8.6–10.5)
CHLORIDE SERPL-SCNC: 109 MMOL/L (ref 98–107)
CK SERPL-CCNC: 30 U/L (ref 20–180)
CO2 SERPL-SCNC: 25.8 MMOL/L (ref 22–29)
CREAT SERPL-MCNC: 1.08 MG/DL (ref 0.57–1)
CRP SERPL-MCNC: 0.31 MG/DL (ref 0–0.5)
DEPRECATED RDW RBC AUTO: 44.1 FL (ref 37–54)
EGFRCR SERPLBLD CKD-EPI 2021: 50.4 ML/MIN/1.73
EOSINOPHIL # BLD AUTO: 0.16 10*3/MM3 (ref 0–0.4)
EOSINOPHIL NFR BLD AUTO: 4.2 % (ref 0.3–6.2)
ERYTHROCYTE [DISTWIDTH] IN BLOOD BY AUTOMATED COUNT: 12.2 % (ref 12.3–15.4)
ERYTHROCYTE [SEDIMENTATION RATE] IN BLOOD: 14 MM/HR (ref 0–30)
FERRITIN SERPL-MCNC: 130 NG/ML (ref 13–150)
GLOBULIN UR ELPH-MCNC: 2.2 GM/DL
GLUCOSE SERPL-MCNC: 67 MG/DL (ref 65–99)
HCT VFR BLD AUTO: 36.9 % (ref 34–46.6)
HGB BLD-MCNC: 12 G/DL (ref 12–15.9)
IMM GRANULOCYTES # BLD AUTO: 0.01 10*3/MM3 (ref 0–0.05)
IMM GRANULOCYTES NFR BLD AUTO: 0.3 % (ref 0–0.5)
LYMPHOCYTES # BLD AUTO: 1 10*3/MM3 (ref 0.7–3.1)
LYMPHOCYTES NFR BLD AUTO: 26 % (ref 19.6–45.3)
MAGNESIUM SERPL-MCNC: 1.8 MG/DL (ref 1.6–2.4)
MCH RBC QN AUTO: 31.9 PG (ref 26.6–33)
MCHC RBC AUTO-ENTMCNC: 32.5 G/DL (ref 31.5–35.7)
MCV RBC AUTO: 98.1 FL (ref 79–97)
MONOCYTES # BLD AUTO: 0.58 10*3/MM3 (ref 0.1–0.9)
MONOCYTES NFR BLD AUTO: 15.1 % (ref 5–12)
NEUTROPHILS NFR BLD AUTO: 2.08 10*3/MM3 (ref 1.7–7)
NEUTROPHILS NFR BLD AUTO: 54.1 % (ref 42.7–76)
NRBC BLD AUTO-RTO: 0 /100 WBC (ref 0–0.2)
PHOSPHATE SERPL-MCNC: 2.2 MG/DL (ref 2.5–4.5)
PLATELET # BLD AUTO: 173 10*3/MM3 (ref 140–450)
PMV BLD AUTO: 10.9 FL (ref 6–12)
POTASSIUM SERPL-SCNC: 4.8 MMOL/L (ref 3.5–5.2)
PROT SERPL-MCNC: 5.7 G/DL (ref 6–8.5)
RBC # BLD AUTO: 3.76 10*6/MM3 (ref 3.77–5.28)
SODIUM SERPL-SCNC: 141 MMOL/L (ref 136–145)
WBC NRBC COR # BLD AUTO: 3.84 10*3/MM3 (ref 3.4–10.8)

## 2023-11-27 PROCEDURE — 83735 ASSAY OF MAGNESIUM: CPT | Performed by: STUDENT IN AN ORGANIZED HEALTH CARE EDUCATION/TRAINING PROGRAM

## 2023-11-27 PROCEDURE — 96365 THER/PROPH/DIAG IV INF INIT: CPT

## 2023-11-27 PROCEDURE — 85652 RBC SED RATE AUTOMATED: CPT | Performed by: STUDENT IN AN ORGANIZED HEALTH CARE EDUCATION/TRAINING PROGRAM

## 2023-11-27 PROCEDURE — G0378 HOSPITAL OBSERVATION PER HR: HCPCS

## 2023-11-27 PROCEDURE — 82728 ASSAY OF FERRITIN: CPT | Performed by: STUDENT IN AN ORGANIZED HEALTH CARE EDUCATION/TRAINING PROGRAM

## 2023-11-27 PROCEDURE — 25810000003 SODIUM CHLORIDE 0.9 % SOLUTION: Performed by: STUDENT IN AN ORGANIZED HEALTH CARE EDUCATION/TRAINING PROGRAM

## 2023-11-27 PROCEDURE — 85025 COMPLETE CBC W/AUTO DIFF WBC: CPT | Performed by: STUDENT IN AN ORGANIZED HEALTH CARE EDUCATION/TRAINING PROGRAM

## 2023-11-27 PROCEDURE — 96366 THER/PROPH/DIAG IV INF ADDON: CPT

## 2023-11-27 PROCEDURE — 86140 C-REACTIVE PROTEIN: CPT | Performed by: STUDENT IN AN ORGANIZED HEALTH CARE EDUCATION/TRAINING PROGRAM

## 2023-11-27 PROCEDURE — 82550 ASSAY OF CK (CPK): CPT | Performed by: STUDENT IN AN ORGANIZED HEALTH CARE EDUCATION/TRAINING PROGRAM

## 2023-11-27 PROCEDURE — 80053 COMPREHEN METABOLIC PANEL: CPT | Performed by: STUDENT IN AN ORGANIZED HEALTH CARE EDUCATION/TRAINING PROGRAM

## 2023-11-27 PROCEDURE — 84100 ASSAY OF PHOSPHORUS: CPT | Performed by: STUDENT IN AN ORGANIZED HEALTH CARE EDUCATION/TRAINING PROGRAM

## 2023-11-27 RX ORDER — AMOXICILLIN 250 MG
2 CAPSULE ORAL 2 TIMES DAILY PRN
Qty: 60 TABLET | Refills: 0 | Status: SHIPPED | OUTPATIENT
Start: 2023-11-27

## 2023-11-27 RX ORDER — SODIUM PHOSPHATE IN 0.9 % NACL 15MMOL/100
15 PLASTIC BAG, INJECTION (ML) INTRAVENOUS ONCE
Status: COMPLETED | OUTPATIENT
Start: 2023-11-27 | End: 2023-11-27

## 2023-11-27 RX ORDER — NALOXONE HYDROCHLORIDE 4 MG/.1ML
SPRAY NASAL
Qty: 2 EACH | Refills: 0 | Status: SHIPPED | OUTPATIENT
Start: 2023-11-27

## 2023-11-27 RX ORDER — DIPHENHYDRAMINE HYDROCHLORIDE AND LIDOCAINE HYDROCHLORIDE AND ALUMINUM HYDROXIDE AND MAGNESIUM HYDRO
5 KIT 4 TIMES DAILY
Qty: 65 ML | Refills: 0 | Status: SHIPPED | OUTPATIENT
Start: 2023-11-27 | End: 2023-12-01

## 2023-11-27 RX ORDER — TEMAZEPAM 15 MG/1
15 CAPSULE ORAL NIGHTLY PRN
Qty: 3 CAPSULE | Refills: 0 | Status: SHIPPED | OUTPATIENT
Start: 2023-11-27

## 2023-11-27 RX ORDER — HYDROCODONE BITARTRATE AND ACETAMINOPHEN 5; 325 MG/1; MG/1
1 TABLET ORAL EVERY 6 HOURS PRN
Qty: 12 TABLET | Refills: 0 | Status: SHIPPED | OUTPATIENT
Start: 2023-11-27

## 2023-11-27 RX ORDER — POLYETHYLENE GLYCOL 3350 17 G/17G
17 POWDER, FOR SOLUTION ORAL DAILY PRN
Qty: 30 EACH | Refills: 0 | Status: SHIPPED | OUTPATIENT
Start: 2023-11-27

## 2023-11-27 RX ADMIN — NIRMATRELVIR AND RITONAVIR 2 TABLET: KIT at 09:40

## 2023-11-27 RX ADMIN — DIPHENHYDRAMINE HYDROCHLORIDE AND LIDOCAINE HYDROCHLORIDE AND ALUMINUM HYDROXIDE AND MAGNESIUM HYDRO 5 ML: KIT at 00:16

## 2023-11-27 RX ADMIN — CARVEDILOL 12.5 MG: 12.5 TABLET, FILM COATED ORAL at 09:45

## 2023-11-27 RX ADMIN — FAMOTIDINE 40 MG: 20 TABLET ORAL at 09:39

## 2023-11-27 RX ADMIN — DIPHENHYDRAMINE HYDROCHLORIDE AND LIDOCAINE HYDROCHLORIDE AND ALUMINUM HYDROXIDE AND MAGNESIUM HYDRO 5 ML: KIT at 18:49

## 2023-11-27 RX ADMIN — TEMAZEPAM 15 MG: 7.5 CAPSULE ORAL at 00:15

## 2023-11-27 RX ADMIN — FLUOXETINE HYDROCHLORIDE 60 MG: 20 CAPSULE ORAL at 09:39

## 2023-11-27 RX ADMIN — ASPIRIN 325 MG: 325 TABLET ORAL at 09:39

## 2023-11-27 RX ADMIN — DIPHENHYDRAMINE HYDROCHLORIDE AND LIDOCAINE HYDROCHLORIDE AND ALUMINUM HYDROXIDE AND MAGNESIUM HYDRO 5 ML: KIT at 12:45

## 2023-11-27 RX ADMIN — HYDROCODONE BITARTRATE AND ACETAMINOPHEN 1 TABLET: 5; 325 TABLET ORAL at 12:45

## 2023-11-27 RX ADMIN — HYDROCODONE BITARTRATE AND ACETAMINOPHEN 1 TABLET: 5; 325 TABLET ORAL at 06:14

## 2023-11-27 RX ADMIN — DIPHENHYDRAMINE HYDROCHLORIDE AND LIDOCAINE HYDROCHLORIDE AND ALUMINUM HYDROXIDE AND MAGNESIUM HYDRO 5 ML: KIT at 06:14

## 2023-11-27 RX ADMIN — DICLOFENAC SODIUM 4 G: 10 GEL TOPICAL at 09:40

## 2023-11-27 RX ADMIN — Medication 10 ML: at 09:40

## 2023-11-27 RX ADMIN — BUSPIRONE HYDROCHLORIDE 5 MG: 5 TABLET ORAL at 09:39

## 2023-11-27 RX ADMIN — SODIUM PHOSPHATE, MONOBASIC, MONOHYDRATE AND SODIUM PHOSPHATE, DIBASIC, ANHYDROUS 15 MMOL: 142; 276 INJECTION, SOLUTION INTRAVENOUS at 11:04

## 2023-11-27 RX ADMIN — BUPROPION HYDROCHLORIDE 150 MG: 150 TABLET, EXTENDED RELEASE ORAL at 09:39

## 2023-11-27 NOTE — PLAN OF CARE
Goal Outcome Evaluation:  Plan of Care Reviewed With: patient        Progress: improving  Outcome Evaluation: Afebrile, BP levated. Up with assist, voided freely, had 1 loose BM. Norco and Voltaren Gel for shoulder pain.  Occasional productive cough. Isolation precaution maintained.

## 2023-11-27 NOTE — DISCHARGE SUMMARY
Patient Name: Carly Gotti  : 1940  MRN: [id number]    Date of Admission: 2023  Date of Discharge:  2023  Primary Care Physician: Reyes, Miriam Mercado, MD      Chief Complaint:   Fall and Head Injury      Discharge Diagnoses     Active Hospital Problems    Diagnosis  POA    **Unable to ambulate [R26.2]  Yes    Fall [W19.XXXA]  Yes    Elevated liver transaminase level [R74.01]  Yes    Pacemaker [Z95.0]  Yes    CKD (chronic kidney disease) stage 3, GFR 30-59 ml/min [N18.30]  Yes    Benign essential hypertension [I10]  Yes    S/P AVR (aortic valve replacement) [Z95.2]  Not Applicable    S/P TVR (tricuspid valve repair) [Z98.890]  Not Applicable    S/P MVR (mitral valve repair) [Z98.890]  Not Applicable      Resolved Hospital Problems   No resolved problems to display.        Hospital Course     Ms. Gotti is a 83 y.o. female with a history of  aortic valve replacement, tricuspid and mitral valve repair, pacemaker, CKD 3B and hypertension who presented to [facility] initially complaining of inability to ambulate after suffering a fall at home.  Please see the admitting history and physical for further details.  She was admitted to the hospital for further evaluation and treatment.      Fall  Unable to ambulate  Generalized weakness  - Patient with endorsement of mechanical fall prior to arrival, stating she tripped while going to bathroom at night. She endorsed generalized weakness, difficulty with ambulation. Imaging performed, including CT head, CXR, right hip and elbow x-ray, all of which were negative for acute findings. PT/OT consulted and evaluated, it was determined that patient was in need of subacute rehab after discharge, this was subsequently arranged.  Continue to work with PT at facility, encouraged close monitoring at facility and implementation of fall precautions.     COVID-19 infection  - Patient found to be + for COVID-19 on RVP obtained on 23.  Imaging negative, patient without acute respiratory complaints at present.  She is currently on room air with O2 sats greater than 90%. She was started on treatment with Paxlovid, which was continued throughout hospital stay and she tolerated well. Continue Paxlovid as prescribed after discharge to complete treatment course. Recommend ongoing COVID isolation precautions after discharge, per CDC guideline recommendations. Recommend close follow up with PCP within 1 week after discharge for reassessment to guide ongoing management decisions.     Transaminitis  - LFT elevated on initial labs and throughout hospital stay, CK level was within normal limits.  Her home statin medication was held, LFTs subsequently improved and were within normal limits on most recent labs prior to discharge.  Okay to resume home statin medication after discharge as previously prescribed, however, it is very important that patient have close outpatient repeat labs to help guide further management decisions.  Recommend repeat CMP with PCP within 1 week for reassessment.     Hypophosphatemia  - Phos low on labs during hospital stay, repleted via electrolyte protocol. Recommend repeat phosphorus level check with PCP within 1 week for reassessment.    Chronic kidney disease stage 3B  - On most recent labs, patient's creatinine found to be stable and near apparent baseline, per review of previous lab values and outside records. Recommend repeat CMP with PCP within 1 week for reassessment.    Hypertension  - Blood pressure appears stable and acceptable acutely at this time.  Continue home medications as previously prescribed after discharge. Recommend that patient check her blood pressure 2-3 times daily and record those values in log book and take with her to next PCP visit to allow for greater insight into treatment efficacy to guide further management decisions. Recommend heart healthy/low sodium diet.     Prediabetes with Hyperglycemia  -  Glucose elevated on most recent labs. Patient without known history of T2DM.  Most recent hemoglobin A1c 5.80% (2015).  Repeat hemoglobin A1c 5.40% (11/25/2023). Recommend that patient check her blood glucose 2-3 times daily and record those values in log book and take with her to next PCP visit to allow for greater insight into treatment efficacy to guide further management decisions.     Hypokalemia, resolved  - K slightly low on prior labs, repleted via electrolyte protocol. Recommend repeat CMP with PCP within 1 week for reassessment.    Day of Discharge     Subjective:  Patient seen and examined this morning.  Hospital day 5.  At time of my examination, patient is awake, alert, sitting up in bed, resting comfortably.  She states that she feels much better today when compared to prior, she states that she is ready to go to rehab today.    Physical Exam:  Temp:  [97.1 °F (36.2 °C)-97.7 °F (36.5 °C)] 97.7 °F (36.5 °C)  Heart Rate:  [73-83] 73  Resp:  [16-18] 16  BP: (157-179)/(76-99) 179/76  Body mass index is 22.41 kg/m².  Physical Exam  Vitals and nursing note reviewed.   Constitutional:       General: She is not in acute distress.     Comments: Thin/frail, chronically ill-appearing.   HENT:      Head:      Comments: Bruising in the maxillary region and across nasal bridge  Cardiovascular:      Rate and Rhythm: Normal rate and regular rhythm.      Pulses: Normal pulses.      Heart sounds: Normal heart sounds.   Pulmonary:      Effort: Pulmonary effort is normal. No respiratory distress.      Breath sounds: Normal breath sounds. No rhonchi.   Abdominal:      General: Bowel sounds are normal. There is no distension.      Palpations: Abdomen is soft.      Tenderness: There is no abdominal tenderness.   Skin:     General: Skin is warm and dry.   Neurological:      Mental Status: She is alert.         Consultants     Consult Orders (all) (From admission, onward)       Start     Ordered    11/23/23 1835  Inpatient  Case Management  Consult  Once        Provider:  (Not yet assigned)    11/23/23 0348    11/23/23 0052  [facility] (on-call MD unless specified) Details  Once        Specialty:  Hospitalist  Provider:  (Not yet assigned)    11/23/23 0051                  Procedures     * Surgery not found *    Imaging Results (All)       Procedure Component Value Units Date/Time    XR Chest 1 View [[id number]] Collected: 11/23/23 0207     Updated: 11/23/23 0207    Narrative:        Patient: LEENA VILLEGAS  Time Out: 02:06  Exam(s): XR CXR 1 VIEW     EXAM:    XR Chest, 1 View    CLINICAL HISTORY:     Reason for exam: Reported sudden onset of hypoxia.    TECHNIQUE:    Frontal view of the chest.    COMPARISON:    No relevant prior studies available.    FINDINGS:    Lungs:  Unremarkable.  No consolidation.    Pleural space:  Unremarkable.  No pneumothorax.    Heart:  Cardiomegaly.  Prosthetic aortic valve.    Mediastinum:  Unremarkable.  Normal mediastinal contour.    Bones joints:  Unremarkable.  No acute fracture.    Vasculature:  Calcified aorta.    Tubes, lines and devices:  Dual lead pacemaker.    IMPRESSION:         No acute findings in the chest.      Impression:          Electronically signed by Julio C Lock MD on 11-23-23 at 0206    XR Elbow 2 View Right [[id number]] Collected: 11/22/23 2319     Updated: 11/23/23 0031    Narrative:      TWO RADIOGRAPHIC VIEWS OF THE RIGHT HUMERUS AND 2 RADIOGRAPHIC VIEWS OF  THE RIGHT ELBOW     CLINICAL HISTORY: Trauma. Evaluate for fracture.     FINDINGS:     Right humerus: Two radiographic views of the right humerus demonstrate  prominent arthritic changes within the right glenohumeral joint.  Otherwise, there is no evidence for acute fracture or bony malalignment.     Right elbow: Two radiographic views of the right elbow demonstrate no  evidence for acute fracture or bony malalignment.     This report was finalized on 11/23/2023 12:28 AM by Dr. David Michael M.D on Workstation:  [id number]       XR Humerus Right [[id number]] Collected: 11/22/23 2319     Updated: 11/23/23 0031    Narrative:      TWO RADIOGRAPHIC VIEWS OF THE RIGHT HUMERUS AND 2 RADIOGRAPHIC VIEWS OF  THE RIGHT ELBOW     CLINICAL HISTORY: Trauma. Evaluate for fracture.     FINDINGS:     Right humerus: Two radiographic views of the right humerus demonstrate  prominent arthritic changes within the right glenohumeral joint.  Otherwise, there is no evidence for acute fracture or bony malalignment.     Right elbow: Two radiographic views of the right elbow demonstrate no  evidence for acute fracture or bony malalignment.     This report was finalized on 11/23/2023 12:28 AM by Dr. David Michael M.D on Workstation: [id number]       CT Head Without Contrast [[id number]] Collected: 11/23/23 0004     Updated: 11/23/23 0029    Narrative:       CT HEAD WITHOUT CONTRAST, MAXILLOFACIAL CT WITHOUT CONTRAST, AND  CERVICAL SPINE CT WITHOUT CONTRAST     CLINICAL HISTORY: Tripped and fell on carpet with bruising around bridge  of nose and headache and neck pain.     TECHNIQUE: CT scan of the head was obtained with 3 mm axial soft tissue  algorithm and 2 mm bone algorithm images. No intravenous contrast was  administered. Sagittal and coronal reconstructions were obtained.     COMPARISON: CT head dated 7/15/2022.     FINDINGS:       There is no evidence for a calvarial fracture. There is no evidence for  an acute extra-axial hemorrhage.     The ventricles, sulci, and cisterns are age-appropriate. There are mild  changes of chronic small vessel ischemic phenomenon. The basal ganglia  and thalami are unremarkable in appearance. The gray-white matter  differentiation is within normal limits. The posterior fossa structures  are unremarkable.       Impression:         No evidence for acute traumatic intracranial pathology.        TECHNIQUE: CT scan of the maxillofacial region was obtained with 1 mm  axial, sagittal, and coronal bone algorithm images.  Additionally, there  are 2 mm axial, coronal, and sagittal soft tissue algorithm images.     FINDINGS:     There is no evidence for acute fracture or bony malalignment within the  maxillofacial region. The orbits are unremarkable in appearance. No  significant soft tissue abnormality is seen within the maxillofacial  region.     IMPRESSION:     No evidence for acute fracture or bony malalignment involving the  maxillofacial region.        TECHNIQUE: CT scan of the cervical spine was obtained with 1 mm axial  bone algorithm and 2 mm axial soft tissue algorithm images. Sagittal and  coronal reconstructed images were obtained.        FINDINGS:     There is no evidence for acute fracture or bony malalignment involving  the cervical spine.     Advanced degenerative disc changes are seen at C5-6 and C6-7. There is  degenerative anterior spondylolisthesis of C4 on C5 and C7 on T1 by  approximately 3 mm. Disc osteophyte complexes at C5-6 and C6-7 result in  mild degrees of canal narrowing. Multilevel foraminal stenotic changes  are appreciated from C3-4 down to C6-7.     Incidental note is made of atherosclerotic changes within the aortic  arch and an aberrant origin of the right subclavian artery.     There is asymmetric increased density within the region of the right  tongue base measuring up to 2.1 cm in greatest diameter and this may  simply reflect angled positioning. However, I cannot entirely exclude  the possibility of a mass at this site. I recommend further evaluation  with direct visualization.     IMPRESSION:     No evidence for acute fracture or bony malalignment involving the  cervical spine.     Incidental degenerative phenomena as discussed in detail above.     There is asymmetric increased density within the region of the right  tongue base measuring up to 2.1 cm in greatest diameter and this may  simply reflect angled positioning. However, I cannot entirely exclude  the possibility of a mass at this site.  I recommend further evaluation  with direct visualization.     These findings and recommendations were discussed with Raza Littlejohn on  11/20/2023 at approximately 12:25 a.m.     This report was finalized on 11/23/2023 12:26 AM by Dr. David Michael M.D on Workstation: [id number]       CT Cervical Spine Without Contrast [[id number]] Collected: 11/23/23 0004     Updated: 11/23/23 0029    Narrative:       CT HEAD WITHOUT CONTRAST, MAXILLOFACIAL CT WITHOUT CONTRAST, AND  CERVICAL SPINE CT WITHOUT CONTRAST     CLINICAL HISTORY: Tripped and fell on carpet with bruising around bridge  of nose and headache and neck pain.     TECHNIQUE: CT scan of the head was obtained with 3 mm axial soft tissue  algorithm and 2 mm bone algorithm images. No intravenous contrast was  administered. Sagittal and coronal reconstructions were obtained.     COMPARISON: CT head dated 7/15/2022.     FINDINGS:       There is no evidence for a calvarial fracture. There is no evidence for  an acute extra-axial hemorrhage.     The ventricles, sulci, and cisterns are age-appropriate. There are mild  changes of chronic small vessel ischemic phenomenon. The basal ganglia  and thalami are unremarkable in appearance. The gray-white matter  differentiation is within normal limits. The posterior fossa structures  are unremarkable.       Impression:         No evidence for acute traumatic intracranial pathology.        TECHNIQUE: CT scan of the maxillofacial region was obtained with 1 mm  axial, sagittal, and coronal bone algorithm images. Additionally, there  are 2 mm axial, coronal, and sagittal soft tissue algorithm images.     FINDINGS:     There is no evidence for acute fracture or bony malalignment within the  maxillofacial region. The orbits are unremarkable in appearance. No  significant soft tissue abnormality is seen within the maxillofacial  region.     IMPRESSION:     No evidence for acute fracture or bony malalignment involving the  maxillofacial  region.        TECHNIQUE: CT scan of the cervical spine was obtained with 1 mm axial  bone algorithm and 2 mm axial soft tissue algorithm images. Sagittal and  coronal reconstructed images were obtained.        FINDINGS:     There is no evidence for acute fracture or bony malalignment involving  the cervical spine.     Advanced degenerative disc changes are seen at C5-6 and C6-7. There is  degenerative anterior spondylolisthesis of C4 on C5 and C7 on T1 by  approximately 3 mm. Disc osteophyte complexes at C5-6 and C6-7 result in  mild degrees of canal narrowing. Multilevel foraminal stenotic changes  are appreciated from C3-4 down to C6-7.     Incidental note is made of atherosclerotic changes within the aortic  arch and an aberrant origin of the right subclavian artery.     There is asymmetric increased density within the region of the right  tongue base measuring up to 2.1 cm in greatest diameter and this may  simply reflect angled positioning. However, I cannot entirely exclude  the possibility of a mass at this site. I recommend further evaluation  with direct visualization.     IMPRESSION:     No evidence for acute fracture or bony malalignment involving the  cervical spine.     Incidental degenerative phenomena as discussed in detail above.     There is asymmetric increased density within the region of the right  tongue base measuring up to 2.1 cm in greatest diameter and this may  simply reflect angled positioning. However, I cannot entirely exclude  the possibility of a mass at this site. I recommend further evaluation  with direct visualization.     These findings and recommendations were discussed with Raza Littlejohn on  11/20/2023 at approximately 12:25 a.m.     This report was finalized on 11/23/2023 12:26 AM by Dr. David Michael M.D on Workstation: [id number]       CT Facial Bones Without Contrast [[id number]] Collected: 11/23/23 0004     Updated: 11/23/23 0029    Narrative:       CT HEAD WITHOUT CONTRAST,  MAXILLOFACIAL CT WITHOUT CONTRAST, AND  CERVICAL SPINE CT WITHOUT CONTRAST     CLINICAL HISTORY: Tripped and fell on carpet with bruising around bridge  of nose and headache and neck pain.     TECHNIQUE: CT scan of the head was obtained with 3 mm axial soft tissue  algorithm and 2 mm bone algorithm images. No intravenous contrast was  administered. Sagittal and coronal reconstructions were obtained.     COMPARISON: CT head dated 7/15/2022.     FINDINGS:       There is no evidence for a calvarial fracture. There is no evidence for  an acute extra-axial hemorrhage.     The ventricles, sulci, and cisterns are age-appropriate. There are mild  changes of chronic small vessel ischemic phenomenon. The basal ganglia  and thalami are unremarkable in appearance. The gray-white matter  differentiation is within normal limits. The posterior fossa structures  are unremarkable.       Impression:         No evidence for acute traumatic intracranial pathology.        TECHNIQUE: CT scan of the maxillofacial region was obtained with 1 mm  axial, sagittal, and coronal bone algorithm images. Additionally, there  are 2 mm axial, coronal, and sagittal soft tissue algorithm images.     FINDINGS:     There is no evidence for acute fracture or bony malalignment within the  maxillofacial region. The orbits are unremarkable in appearance. No  significant soft tissue abnormality is seen within the maxillofacial  region.     IMPRESSION:     No evidence for acute fracture or bony malalignment involving the  maxillofacial region.        TECHNIQUE: CT scan of the cervical spine was obtained with 1 mm axial  bone algorithm and 2 mm axial soft tissue algorithm images. Sagittal and  coronal reconstructed images were obtained.        FINDINGS:     There is no evidence for acute fracture or bony malalignment involving  the cervical spine.     Advanced degenerative disc changes are seen at C5-6 and C6-7. There is  degenerative anterior spondylolisthesis  of C4 on C5 and C7 on T1 by  approximately 3 mm. Disc osteophyte complexes at C5-6 and C6-7 result in  mild degrees of canal narrowing. Multilevel foraminal stenotic changes  are appreciated from C3-4 down to C6-7.     Incidental note is made of atherosclerotic changes within the aortic  arch and an aberrant origin of the right subclavian artery.     There is asymmetric increased density within the region of the right  tongue base measuring up to 2.1 cm in greatest diameter and this may  simply reflect angled positioning. However, I cannot entirely exclude  the possibility of a mass at this site. I recommend further evaluation  with direct visualization.     IMPRESSION:     No evidence for acute fracture or bony malalignment involving the  cervical spine.     Incidental degenerative phenomena as discussed in detail above.     There is asymmetric increased density within the region of the right  tongue base measuring up to 2.1 cm in greatest diameter and this may  simply reflect angled positioning. However, I cannot entirely exclude  the possibility of a mass at this site. I recommend further evaluation  with direct visualization.     These findings and recommendations were discussed with Raza Littlejohn on  11/20/2023 at approximately 12:25 a.m.     This report was finalized on 11/23/2023 12:26 AM by Dr. David Michael M.D on Workstation: [id number]       CT Pelvis Without Contrast [[id number]] Collected: 11/22/23 2338     Updated: 11/22/23 2338    Narrative:        Patient: LEENA VILLEGAS  Time Out: 23:38  Exam(s): CT PELVIS Without Contrast     EXAM:    CT Pelvis Without Intravenous Contrast    CLINICAL HISTORY:     Reason for exam: Trauma. R o left hip pelvic fx.    TECHNIQUE:    Axial computed tomography images of the pelvis without intravenous   contrast.  CTDI is 9.27 mGy and DLP is 312 mGy-cm.  This CT exam was   performed according to the principle of ALARA (As Low As Reasonably   Achievable) by using one or more  of the following dose reduction   techniques: automated exposure control, adjustment of the mA and or kV   according to patient size, and or use of iterative reconstruction   technique.    COMPARISON:    No relevant prior studies available.    FINDINGS:    Kidneys and ureters:  Severe left renal atrophy.    Bowel:  Sigmoid diverticulosis without associated inflammation.  No   obstruction.  No mucosal thickening.    Appendix:  No findings to suggest acute appendicitis.    Intraperitoneal space:  Unremarkable.  No free air.  No significant   fluid collection.    Bladder:  Unremarkable.  No stones.    Reproductive:  Hysterectomy.    Bones joints:  No acute fracture.  No dislocation.    Soft tissues:  Fat-containing left inguinal hernia.    Vasculature:  Moderate aortoiliac atherosclerosis.  No lower abdominal   aortic aneurysm.    Lymph nodes:  Unremarkable.  No enlarged lymph nodes.    IMPRESSION:         No acute findings in the pelvis.      Impression:          Electronically signed by Rodney Mancia MD on 11-22-23 at 2338          Results for orders placed during the hospital encounter of 01/12/22    Duplex Venous Lower Extremity - Left CAR    Interpretation Summary  · Normal left lower extremity venous duplex scan.    Results for orders placed during the hospital encounter of 01/20/22    Adult Transthoracic Echo Complete w/ Color, Spectral and Contrast if Necessary Per Protocol    Interpretation Summary  · Calculated left ventricular EF = 61.2% Estimated left ventricular EF was in agreement with the calculated left ventricular EF. Left ventricular systolic function is normal.  · Left atrial volume is mildly increased.  · No aortic valve regurgitation is present. The peak aortic velocity was measured in the right parasternal view. Peak velocity of the flow distal to the aortic valve is 305.8 cm/s. Aortic valve maximum pressure gradient is 37.4 mmHg. Aortic valve mean pressure gradient is 12.8 mmHg. Aortic valve  "area is 0.98 cm2. Aortic valve dimensionless index is 0.40 . There is a 21 mm, porcine, medtronic christy bioprosthetic aortic valve present. The aortic valve peak and mean gradients are within defined limits. The prosthetic aortic valve is normal.  · Trace mitral valve regurgitation is present. No significant mitral valve stenosis is present. There is a partial mitral valve ring present. 3-D ring [employer]tronic    Pertinent Labs     Results from last 7 days   Lab Units 11/27/23  0550 11/26/23  0345 11/25/23 0457 11/23/23  0436   WBC 10*3/mm3 3.84 3.48 3.30* 4.34   HEMOGLOBIN g/dL 12.0 11.3* 11.0* 11.7*   PLATELETS 10*3/mm3 173 162 154 147     Results from last 7 days   Lab Units 11/27/23  0550 11/26/23  0345 11/25/23 0457 11/24/23  0554   SODIUM mmol/L 141 142 141 142   POTASSIUM mmol/L 4.8 4.9 4.7 3.6   CHLORIDE mmol/L 109* 107 109* 108*   CO2 mmol/L 25.8 27.0 27.0 24.6   BUN mg/dL 26* 32* 33* 36*   CREATININE mg/dL 1.08* 1.17* 1.21* 1.38*   GLUCOSE mg/dL 67 75 101* 111*   EGFR mL/min/1.73 50.4* 45.8* 44.0* 37.6*     Results from last 7 days   Lab Units 11/27/23  0550 11/26/23  0345 11/25/23 0457 11/24/23  0554   ALBUMIN g/dL 3.5 3.8 3.6 3.2*   BILIRUBIN mg/dL <0.2 0.2 0.2 <0.2   ALK PHOS U/L 51 50 44 48   AST (SGOT) U/L 23 31 36* 49*   ALT (SGPT) U/L 28 34* 40* 44*     Results from last 7 days   Lab Units 11/27/23  0550 11/26/23  1554 11/26/23  0345 11/25/23 0457 11/24/23  0554   CALCIUM mg/dL 9.4  --  9.4 9.3 8.8   ALBUMIN g/dL 3.5  --  3.8 3.6 3.2*   MAGNESIUM mg/dL 1.8  --  1.8 1.8  --    PHOSPHORUS mg/dL 2.2* 2.0* 1.8* 2.3*  --        Results from last 7 days   Lab Units 11/27/23  0550 11/26/23  0345 11/25/23  0457 11/23/23  0436 11/22/23  2240   CK TOTAL U/L 30 44 46 118  --    PROBNP pg/mL  --   --   --   --  1,058.0           Invalid input(s): \"LDLCALC\"      Results from last 7 days   Lab Units 11/23/23  1442   COVID19  Detected*       Test Results Pending at Discharge       Discharge Details      "   Discharge Medications        New Medications        Instructions Start Date   acetaminophen 500 MG tablet  Commonly known as: TYLENOL   1,000 mg, Oral, Every 6 Hours PRN      First Mouthwash (Magic Mouthwash) suspension   5 mL, Swish & Swallow, 4 Times Daily      HYDROcodone-acetaminophen 5-325 MG per tablet  Commonly known as: NORCO  Replaces: HYDROcodone-acetaminophen  MG per tablet   1 tablet, Oral, Every 6 Hours PRN      naloxone 4 MG/0.1ML nasal spray  Commonly known as: NARCAN   Call 911. Don't prime. [place] in 1 nostril for overdose. Repeat in 2-3 minutes in other nostril if no or minimal breathing/responsiveness.      Nirmatrelvir&Ritonavir 300/100 20 x 150 MG & 10 x 100MG tablet therapy pack tablet  Commonly known as: PAXLOVID   2 tablets, Oral, 2 Times Daily      polyethylene glycol 17 g packet  Commonly known as: MIRALAX   17 g, Oral, Daily PRN             Changes to Medications        Instructions Start Date   [facility] Senna Plus 8.6-50 MG per tablet  Generic drug: sennosides-docusate  What changed: Another medication with the same name was added. Make sure you understand how and when to take each.   TAKE 1 TABLET BY MOUTH EVERY DAY      sennosides-docusate 8.6-50 MG per tablet  Commonly known as: PERICOLACE  What changed: You were already taking a medication with the same name, and this prescription was added. Make sure you understand how and when to take each.   2 tablets, Oral, 2 Times Daily PRN             Continue These Medications        Instructions Start Date   aspirin 325 MG tablet   325 mg, Oral, Daily, WILL HOLD AS DIRECTED      atorvastatin 20 MG tablet  Commonly known as: LIPITOR   take 1 tablet by mouth at bedtime      bumetanide 2 MG tablet  Commonly known as: BUMEX   TAKE 1 TABLET BY MOUTH EVERY DAY      buPROPion  MG 24 hr tablet  Commonly known as: WELLBUTRIN XL   300 mg, Oral, Daily      busPIRone 5 MG tablet  Commonly known as: BUSPAR   5 mg, Oral, 2 Times Daily       carvedilol 12.5 MG tablet  Commonly known as: COREG   12.5 mg, Oral, 2 Times Daily      Coenzyme Q10 200 MG tablet   1 tablet/day, Oral, Daily      COENZYME Q-10 PO   1 capsule, Oral, Daily      Diclofenac Sodium 1 % gel gel  Commonly known as: VOLTAREN   PLEASE SEE ATTACHED FOR DETAILED DIRECTIONS      donepezil 10 MG tablet  Commonly known as: ARICEPT   10 mg, Oral, Daily      famotidine 40 MG tablet  Commonly known as: PEPCID   TAKE 1 TABLET BY MOUTH TWICE A DAY      fenofibrate micronized 67 MG capsule  Commonly known as: LOFIBRA   67 mg, Oral, Daily      FLUoxetine 20 MG capsule  Commonly known as: PROzac   60 mg, Oral, Daily      ondansetron ODT 8 MG disintegrating tablet  Commonly known as: ZOFRAN-ODT   8 mg, Translingual, Every 6 Hours PRN      temazepam 15 MG capsule  Commonly known as: RESTORIL   15 mg, Oral, Nightly PRN             Stop These Medications      HYDROcodone-acetaminophen  MG per tablet  Commonly known as: NORCO  Replaced by: HYDROcodone-acetaminophen 5-325 MG per tablet              No Known Allergies    Discharge Disposition:  Skilled Nursing Facility (DC - External)      Discharge Diet:  Diet Order   Procedures    Diet: Regular/House Diet; Texture: Regular Texture (IDDSI 7); Fluid Consistency: Thin (IDDSI 0)       Discharge Activity:   Activity Instructions       Other Activity Instructions      Activity Instructions: Per physical therapy at facility    Up WIth Assist              CODE STATUS:    Code Status and Medical Interventions:   Ordered at: 11/23/23 0413     Code Status (Patient has no pulse and is not breathing):    CPR (Attempt to Resuscitate)     Medical Interventions (Patient has pulse or is breathing):    Full Support       Future Appointments   Date Time Provider Department Center   12/21/2023  4:00 PM LAB CHAIR 6 NORRIS MENON  LAB [facility]   12/21/2023  4:20 PM Linnette Kim APRN [facility]   3/11/2024  2:40 PM Almas Rodriguez APRN [facility]  CLIN  [facility]   3/22/2024  1:30 PM [facility] L[facility] [place] DEVICE CHECK [facility] CD LCG40 None   3/22/2024  2:00 PM Zayra Hill APRN [facility] CD [facility]   7/18/2024  4:00 PM LAB CHAIR 2 [facility]  LAB [facility] [place]   7/18/2024  4:20 PM Sarkis Castellon MD [facility] CBC [facility] [place]     Additional Instructions for the Follow-ups that You Need to Schedule       Discharge Follow-up with PCP   As directed       Currently Documented PCP:    Reyes, Miriam Mercado, MD    PCP Phone Number:    [phone]     Follow Up Details: Within 1 week after discharge for reassessment, medication and symptom review, blood pressure and blood glucose check, CMP and CBC, mag and phos level check               Contact information for follow-up providers       Reyes, Miriam Mercado, MD .    Specialty: Family Medicine  Why: Within 1 week after discharge for reassessment, medication and symptom review, blood pressure and blood glucose check, CMP and CBC, mag and phos level check  Contact information:  [facility] [facility] [address]  [phone]                       Contact information for after-discharge care       Destination       [facility] .    Service: Skilled Nursing  Contact information:  [address] [facility] [address] [phone]                                   Additional Instructions for the Follow-ups that You Need to Schedule       Discharge Follow-up with PCP   As directed       Currently Documented PCP:    Reyes, Miriam Mercado, MD    PCP Phone Number:    [phone]     Follow Up Details: Within 1 week after discharge for reassessment, medication and symptom review, blood pressure and blood glucose check, CMP and CBC, mag and phos level check            Time Spent on Discharge:  Greater than 30 minutes      Lionel Aguilar DO  [facility]  11/27/23  08:52 EST

## 2023-11-27 NOTE — PROGRESS NOTES
Continued Stay Note  [facility]     Patient Name: Carly Gotti  MRN: [id number]  Today's Date: 11/27/2023    Admit Date: 11/22/2023    [facility]: [facility]   Discharge Plan       Row Name 11/27/23 1056       Plan    Plan [facility]    Plan Comments Discharge order noted. DC summary faxed. Packet given to RN. Dgt to transport. Informed Ben with [facility] of DC today.                   Discharge Codes    No documentation.                 Expected Discharge Date and Time       Expected Discharge Date Expected Discharge Time    Nov 27, 2023  8:23 AM              Bridget Mcgarry, RN

## 2023-11-28 RX ORDER — FAMOTIDINE 40 MG/1
TABLET, FILM COATED ORAL
Qty: 180 TABLET | Refills: 3 | Status: SHIPPED | OUTPATIENT
Start: 2023-11-28

## 2023-11-28 NOTE — PROGRESS NOTES
Case Management Discharge Note      Final Note: Discharged to [facility] skilled rehab. Bridget Mcgarry RN    Provided Post Acute Provider List?: Yes  Post Acute Provider List: Nursing Home  Provided Post Acute Provider Quality & Resource List?: Yes  Post Acute Provider Quality and Resource List: Nursing Home  Delivered To: Support Person  Method of Delivery: Telephone    Selected Continued Care - Discharged on 11/27/2023 Admission date: 11/22/2023 - Discharge disposition: Skilled Nursing Facility (DC - External)      Destination Coordination complete.      Service Provider Selected Services Address Phone Fax Patient Preferred    [facility] [address] [place], [place] [id number] [phone] --                 Transportation Services  Private: Car    Final Discharge Disposition Code: 03 - skilled nursing facility (SNF)

## 2023-11-28 NOTE — NURSING NOTE
BP elevated otherwise VSS. Norco given for pain. Daughter providing transport to [facility]. Discharged.

## 2023-12-06 ENCOUNTER — TELEPHONE (OUTPATIENT)
Age: 83
End: 2023-12-06
Payer: MEDICARE

## 2023-12-21 ENCOUNTER — TELEPHONE (OUTPATIENT)
Age: 83
End: 2023-12-21
Payer: MEDICARE

## 2023-12-21 NOTE — TELEPHONE ENCOUNTER
LVM for pt that home monitor is disconnected and to call for help. Per notes it has been disconnected since hospital admission last month 11/22/23

## 2024-01-01 ENCOUNTER — TELEPHONE (OUTPATIENT)
Dept: CARDIOLOGY | Facility: CLINIC | Age: 84
End: 2024-01-01
Payer: MEDICARE

## 2024-01-01 ENCOUNTER — APPOINTMENT (OUTPATIENT)
Dept: CT IMAGING | Facility: HOSPITAL | Age: 84
End: 2024-01-01
Payer: MEDICARE

## 2024-01-01 ENCOUNTER — APPOINTMENT (OUTPATIENT)
Dept: GENERAL RADIOLOGY | Facility: HOSPITAL | Age: 84
End: 2024-01-01
Payer: MEDICARE

## 2024-01-01 ENCOUNTER — HOSPITAL ENCOUNTER (EMERGENCY)
Facility: HOSPITAL | Age: 84
End: 2024-02-10
Attending: EMERGENCY MEDICINE
Payer: MEDICARE

## 2024-01-01 ENCOUNTER — TELEPHONE (OUTPATIENT)
Dept: ONCOLOGY | Facility: CLINIC | Age: 84
End: 2024-01-01

## 2024-01-01 VITALS
TEMPERATURE: 96.2 F | HEART RATE: 80 BPM | DIASTOLIC BLOOD PRESSURE: 42 MMHG | RESPIRATION RATE: 16 BRPM | OXYGEN SATURATION: 96 % | BODY MASS INDEX: 25.28 KG/M2 | WEIGHT: 133.82 LBS | SYSTOLIC BLOOD PRESSURE: 61 MMHG

## 2024-01-01 DIAGNOSIS — R10.84 GENERALIZED ABDOMINAL PAIN: ICD-10-CM

## 2024-01-01 DIAGNOSIS — I71.019 ACUTE THORACIC AORTIC DISSECTION: Primary | ICD-10-CM

## 2024-01-01 DIAGNOSIS — R20.2 PARESTHESIAS: ICD-10-CM

## 2024-01-01 DIAGNOSIS — R07.9 CHEST PAIN, UNSPECIFIED TYPE: ICD-10-CM

## 2024-01-01 DIAGNOSIS — R11.2 NAUSEA AND VOMITING, UNSPECIFIED VOMITING TYPE: ICD-10-CM

## 2024-01-01 DIAGNOSIS — J96.00 ACUTE RESPIRATORY FAILURE, UNSPECIFIED WHETHER WITH HYPOXIA OR HYPERCAPNIA: ICD-10-CM

## 2024-01-01 DIAGNOSIS — R41.82 ALTERED MENTAL STATUS, UNSPECIFIED ALTERED MENTAL STATUS TYPE: ICD-10-CM

## 2024-01-01 LAB
ALBUMIN SERPL-MCNC: 3 G/DL (ref 3.5–5.2)
ALBUMIN/GLOB SERPL: 1.9 G/DL
ALP SERPL-CCNC: 47 U/L (ref 39–117)
ALT SERPL W P-5'-P-CCNC: 19 U/L (ref 1–33)
ANION GAP SERPL CALCULATED.3IONS-SCNC: 18 MMOL/L (ref 5–15)
APTT PPP: 34.3 SECONDS (ref 22.7–35.4)
ARTERIAL PATENCY WRIST A: ABNORMAL
AST SERPL-CCNC: 45 U/L (ref 1–32)
ATMOSPHERIC PRESS: 745.7 MMHG
B PARAPERT DNA SPEC QL NAA+PROBE: NOT DETECTED
B PERT DNA SPEC QL NAA+PROBE: NOT DETECTED
BASE EXCESS BLDA CALC-SCNC: -6.5 MMOL/L (ref 0–2)
BASOPHILS # BLD AUTO: 0.02 10*3/MM3 (ref 0–0.2)
BASOPHILS NFR BLD AUTO: 0.4 % (ref 0–1.5)
BDY SITE: ABNORMAL
BILIRUB SERPL-MCNC: 0.3 MG/DL (ref 0–1.2)
BUN SERPL-MCNC: 21 MG/DL (ref 8–23)
BUN/CREAT SERPL: 14.1 (ref 7–25)
C PNEUM DNA NPH QL NAA+NON-PROBE: NOT DETECTED
CALCIUM SPEC-SCNC: 9.2 MG/DL (ref 8.6–10.5)
CHLORIDE SERPL-SCNC: 101 MMOL/L (ref 98–107)
CK SERPL-CCNC: 282 U/L (ref 20–180)
CO2 BLDA-SCNC: 16.7 MMOL/L (ref 23–27)
CO2 SERPL-SCNC: 15 MMOL/L (ref 22–29)
CREAT SERPL-MCNC: 1.49 MG/DL (ref 0.57–1)
D-LACTATE SERPL-SCNC: 4.9 MMOL/L (ref 0.5–2)
D-LACTATE SERPL-SCNC: 8.4 MMOL/L (ref 0.5–2)
DEPRECATED RDW RBC AUTO: 43.5 FL (ref 37–54)
DEVICE COMMENT: ABNORMAL
EGFRCR SERPLBLD CKD-EPI 2021: 34.3 ML/MIN/1.73
EOSINOPHIL # BLD AUTO: 0.16 10*3/MM3 (ref 0–0.4)
EOSINOPHIL NFR BLD AUTO: 2.8 % (ref 0.3–6.2)
ERYTHROCYTE [DISTWIDTH] IN BLOOD BY AUTOMATED COUNT: 12.4 % (ref 12.3–15.4)
ETHANOL BLD-MCNC: <10 MG/DL (ref 0–10)
ETHANOL UR QL: <0.01 %
FLUAV SUBTYP SPEC NAA+PROBE: NOT DETECTED
FLUBV RNA ISLT QL NAA+PROBE: NOT DETECTED
GLOBULIN UR ELPH-MCNC: 1.6 GM/DL
GLUCOSE SERPL-MCNC: 250 MG/DL (ref 65–99)
HADV DNA SPEC NAA+PROBE: NOT DETECTED
HCO3 BLDA-SCNC: 16 MMOL/L (ref 22–28)
HCOV 229E RNA SPEC QL NAA+PROBE: NOT DETECTED
HCOV HKU1 RNA SPEC QL NAA+PROBE: NOT DETECTED
HCOV NL63 RNA SPEC QL NAA+PROBE: NOT DETECTED
HCOV OC43 RNA SPEC QL NAA+PROBE: NOT DETECTED
HCT VFR BLD AUTO: 36.8 % (ref 34–46.6)
HEMODILUTION: NO
HGB BLD-MCNC: 12.2 G/DL (ref 12–15.9)
HMPV RNA NPH QL NAA+NON-PROBE: NOT DETECTED
HPIV1 RNA ISLT QL NAA+PROBE: NOT DETECTED
HPIV2 RNA SPEC QL NAA+PROBE: NOT DETECTED
HPIV3 RNA NPH QL NAA+PROBE: NOT DETECTED
HPIV4 P GENE NPH QL NAA+PROBE: NOT DETECTED
IMM GRANULOCYTES # BLD AUTO: 0.01 10*3/MM3 (ref 0–0.05)
IMM GRANULOCYTES NFR BLD AUTO: 0.2 % (ref 0–0.5)
INHALED O2 CONCENTRATION: 21 %
INR PPP: 1.02 (ref 0.9–1.1)
LIPASE SERPL-CCNC: 30 U/L (ref 13–60)
LYMPHOCYTES # BLD AUTO: 1.5 10*3/MM3 (ref 0.7–3.1)
LYMPHOCYTES NFR BLD AUTO: 26.5 % (ref 19.6–45.3)
M PNEUMO IGG SER IA-ACNC: NOT DETECTED
MAGNESIUM SERPL-MCNC: 1.9 MG/DL (ref 1.6–2.4)
MCH RBC QN AUTO: 32.1 PG (ref 26.6–33)
MCHC RBC AUTO-ENTMCNC: 33.2 G/DL (ref 31.5–35.7)
MCV RBC AUTO: 96.8 FL (ref 79–97)
MODALITY: ABNORMAL
MONOCYTES # BLD AUTO: 0.5 10*3/MM3 (ref 0.1–0.9)
MONOCYTES NFR BLD AUTO: 8.8 % (ref 5–12)
NEUTROPHILS NFR BLD AUTO: 3.48 10*3/MM3 (ref 1.7–7)
NEUTROPHILS NFR BLD AUTO: 61.3 % (ref 42.7–76)
NRBC BLD AUTO-RTO: 0 /100 WBC (ref 0–0.2)
NT-PROBNP SERPL-MCNC: 911 PG/ML (ref 0–1800)
O2 A-A PPRESDIFF RESPIRATORY: 0.9 MMHG
PCO2 BLDA: 23.5 MM HG (ref 35–45)
PH BLDA: 7.44 PH UNITS (ref 7.35–7.45)
PLATELET # BLD AUTO: 180 10*3/MM3 (ref 140–450)
PMV BLD AUTO: 12.4 FL (ref 6–12)
PO2 BLDA: 109.5 MM HG (ref 80–100)
POTASSIUM SERPL-SCNC: 4.9 MMOL/L (ref 3.5–5.2)
PROT SERPL-MCNC: 4.6 G/DL (ref 6–8.5)
PROTHROMBIN TIME: 13.6 SECONDS (ref 11.7–14.2)
QT INTERVAL: 482 MS
QTC INTERVAL: 553 MS
RBC # BLD AUTO: 3.8 10*6/MM3 (ref 3.77–5.28)
RHINOVIRUS RNA SPEC NAA+PROBE: NOT DETECTED
RSV RNA NPH QL NAA+NON-PROBE: NOT DETECTED
SAO2 % BLDCOA: 98.6 % (ref 92–98.5)
SARS-COV-2 RNA NPH QL NAA+NON-PROBE: NOT DETECTED
SODIUM SERPL-SCNC: 134 MMOL/L (ref 136–145)
TOTAL RATE: 12 BREATHS/MINUTE
TROPONIN T SERPL HS-MCNC: 1450 NG/L
TSH SERPL DL<=0.05 MIU/L-ACNC: 3.37 UIU/ML (ref 0.27–4.2)
WBC NRBC COR # BLD AUTO: 5.67 10*3/MM3 (ref 3.4–10.8)
WHOLE BLOOD HOLD COAG: NORMAL
WHOLE BLOOD HOLD SPECIMEN: NORMAL

## 2024-01-01 PROCEDURE — 25010000002 MIDAZOLAM PER 1 MG: Performed by: EMERGENCY MEDICINE

## 2024-01-01 PROCEDURE — 96361 HYDRATE IV INFUSION ADD-ON: CPT

## 2024-01-01 PROCEDURE — 94799 UNLISTED PULMONARY SVC/PX: CPT

## 2024-01-01 PROCEDURE — 0202U NFCT DS 22 TRGT SARS-COV-2: CPT | Performed by: EMERGENCY MEDICINE

## 2024-01-01 PROCEDURE — 84443 ASSAY THYROID STIM HORMONE: CPT | Performed by: EMERGENCY MEDICINE

## 2024-01-01 PROCEDURE — 82803 BLOOD GASES ANY COMBINATION: CPT | Performed by: EMERGENCY MEDICINE

## 2024-01-01 PROCEDURE — 83880 ASSAY OF NATRIURETIC PEPTIDE: CPT | Performed by: EMERGENCY MEDICINE

## 2024-01-01 PROCEDURE — 84484 ASSAY OF TROPONIN QUANT: CPT | Performed by: EMERGENCY MEDICINE

## 2024-01-01 PROCEDURE — 82550 ASSAY OF CK (CPK): CPT | Performed by: EMERGENCY MEDICINE

## 2024-01-01 PROCEDURE — 71045 X-RAY EXAM CHEST 1 VIEW: CPT

## 2024-01-01 PROCEDURE — 94002 VENT MGMT INPAT INIT DAY: CPT

## 2024-01-01 PROCEDURE — 96375 TX/PRO/DX INJ NEW DRUG ADDON: CPT

## 2024-01-01 PROCEDURE — 82565 ASSAY OF CREATININE: CPT

## 2024-01-01 PROCEDURE — 36415 COLL VENOUS BLD VENIPUNCTURE: CPT

## 2024-01-01 PROCEDURE — 93005 ELECTROCARDIOGRAM TRACING: CPT | Performed by: EMERGENCY MEDICINE

## 2024-01-01 PROCEDURE — 83690 ASSAY OF LIPASE: CPT | Performed by: EMERGENCY MEDICINE

## 2024-01-01 PROCEDURE — 99291 CRITICAL CARE FIRST HOUR: CPT

## 2024-01-01 PROCEDURE — 25010000002 MORPHINE PER 10 MG: Performed by: EMERGENCY MEDICINE

## 2024-01-01 PROCEDURE — 82077 ASSAY SPEC XCP UR&BREATH IA: CPT | Performed by: EMERGENCY MEDICINE

## 2024-01-01 PROCEDURE — 31500 INSERT EMERGENCY AIRWAY: CPT

## 2024-01-01 PROCEDURE — 96374 THER/PROPH/DIAG INJ IV PUSH: CPT

## 2024-01-01 PROCEDURE — 85730 THROMBOPLASTIN TIME PARTIAL: CPT | Performed by: EMERGENCY MEDICINE

## 2024-01-01 PROCEDURE — 96376 TX/PRO/DX INJ SAME DRUG ADON: CPT

## 2024-01-01 PROCEDURE — 25010000002 FENTANYL CITRATE (PF) 50 MCG/ML SOLUTION: Performed by: EMERGENCY MEDICINE

## 2024-01-01 PROCEDURE — 71275 CT ANGIOGRAPHY CHEST: CPT

## 2024-01-01 PROCEDURE — 25510000001 IOPAMIDOL PER 1 ML: Performed by: EMERGENCY MEDICINE

## 2024-01-01 PROCEDURE — 85025 COMPLETE CBC W/AUTO DIFF WBC: CPT | Performed by: EMERGENCY MEDICINE

## 2024-01-01 PROCEDURE — 70450 CT HEAD/BRAIN W/O DYE: CPT

## 2024-01-01 PROCEDURE — 74174 CTA ABD&PLVS W/CONTRAST: CPT

## 2024-01-01 PROCEDURE — 93010 ELECTROCARDIOGRAM REPORT: CPT | Performed by: INTERNAL MEDICINE

## 2024-01-01 PROCEDURE — 80053 COMPREHEN METABOLIC PANEL: CPT | Performed by: EMERGENCY MEDICINE

## 2024-01-01 PROCEDURE — 25810000003 SODIUM CHLORIDE 0.9 % SOLUTION: Performed by: EMERGENCY MEDICINE

## 2024-01-01 PROCEDURE — 83735 ASSAY OF MAGNESIUM: CPT | Performed by: EMERGENCY MEDICINE

## 2024-01-01 PROCEDURE — 83605 ASSAY OF LACTIC ACID: CPT | Performed by: EMERGENCY MEDICINE

## 2024-01-01 PROCEDURE — 85610 PROTHROMBIN TIME: CPT | Performed by: EMERGENCY MEDICINE

## 2024-01-01 PROCEDURE — 36600 WITHDRAWAL OF ARTERIAL BLOOD: CPT | Performed by: EMERGENCY MEDICINE

## 2024-01-01 RX ORDER — ROCURONIUM BROMIDE 10 MG/ML
50 INJECTION, SOLUTION INTRAVENOUS ONCE
Status: COMPLETED | OUTPATIENT
Start: 2024-01-01 | End: 2024-01-01

## 2024-01-01 RX ORDER — MORPHINE SULFATE 2 MG/ML
2 INJECTION, SOLUTION INTRAMUSCULAR; INTRAVENOUS ONCE
Status: COMPLETED | OUTPATIENT
Start: 2024-01-01 | End: 2024-01-01

## 2024-01-01 RX ORDER — ETOMIDATE 2 MG/ML
0.3 INJECTION INTRAVENOUS ONCE
Status: COMPLETED | OUTPATIENT
Start: 2024-01-01 | End: 2024-01-01

## 2024-01-01 RX ORDER — SODIUM CHLORIDE 9 MG/ML
250 INJECTION, SOLUTION INTRAVENOUS CONTINUOUS
Status: DISCONTINUED | OUTPATIENT
Start: 2024-01-01 | End: 2024-01-01 | Stop reason: HOSPADM

## 2024-01-01 RX ORDER — FENTANYL CITRATE 50 UG/ML
100 INJECTION, SOLUTION INTRAMUSCULAR; INTRAVENOUS ONCE
Status: COMPLETED | OUTPATIENT
Start: 2024-01-01 | End: 2024-01-01

## 2024-01-01 RX ORDER — MIDAZOLAM HYDROCHLORIDE 1 MG/ML
1 INJECTION INTRAMUSCULAR; INTRAVENOUS ONCE
Status: COMPLETED | OUTPATIENT
Start: 2024-01-01 | End: 2024-01-01

## 2024-01-01 RX ORDER — MIDAZOLAM HYDROCHLORIDE 1 MG/ML
2 INJECTION INTRAMUSCULAR; INTRAVENOUS ONCE
Status: COMPLETED | OUTPATIENT
Start: 2024-01-01 | End: 2024-01-01

## 2024-01-01 RX ORDER — ONDANSETRON 2 MG/ML
4 INJECTION INTRAMUSCULAR; INTRAVENOUS ONCE
Status: DISCONTINUED | OUTPATIENT
Start: 2024-01-01 | End: 2024-01-01 | Stop reason: HOSPADM

## 2024-01-01 RX ADMIN — MIDAZOLAM 2 MG: 1 INJECTION INTRAMUSCULAR; INTRAVENOUS at 21:41

## 2024-01-01 RX ADMIN — MORPHINE SULFATE 2 MG: 2 INJECTION, SOLUTION INTRAMUSCULAR; INTRAVENOUS at 23:57

## 2024-01-01 RX ADMIN — SODIUM CHLORIDE 500 ML: 9 INJECTION, SOLUTION INTRAVENOUS at 21:39

## 2024-01-01 RX ADMIN — IOPAMIDOL 95 ML: 755 INJECTION, SOLUTION INTRAVENOUS at 21:09

## 2024-01-01 RX ADMIN — ROCURONIUM BROMIDE 50 MG: 10 INJECTION, SOLUTION INTRAVENOUS at 21:30

## 2024-01-01 RX ADMIN — FENTANYL CITRATE 100 MCG: 50 INJECTION, SOLUTION INTRAMUSCULAR; INTRAVENOUS at 21:40

## 2024-01-01 RX ADMIN — MIDAZOLAM 1 MG: 1 INJECTION INTRAMUSCULAR; INTRAVENOUS at 23:56

## 2024-01-01 RX ADMIN — ETOMIDATE 18.22 MG: 2 INJECTION, SOLUTION INTRAVENOUS at 21:29

## 2024-01-01 RX ADMIN — SODIUM CHLORIDE 250 ML/HR: 9 INJECTION, SOLUTION INTRAVENOUS at 23:00

## 2024-01-01 RX ADMIN — SODIUM CHLORIDE 500 ML: 9 INJECTION, SOLUTION INTRAVENOUS at 21:40

## 2024-01-10 NOTE — TELEPHONE ENCOUNTER
Caller: Weilage,Chena    Relationship to patient: Emergency Contact    Best call back number:     [phone]     Chief complaint: PT HAS [employer] AND WILL BE OUT OF NETWORK AND NEEDS TO CANCEL APPTS FOR TOMORROW AND HER OTHER APPTS.     Type of visit: LAB AND FOLLOW UPS    Additional notes: PT WOULD ALSO LIKE TO SEE IF DR. SPRAGUE COULD REFER HER TO ANOTHER

## 2024-01-11 DIAGNOSIS — C50.412 MALIGNANT NEOPLASM OF UPPER-OUTER QUADRANT OF LEFT BREAST IN FEMALE, ESTROGEN RECEPTOR POSITIVE: Primary | ICD-10-CM

## 2024-01-11 DIAGNOSIS — Z17.0 MALIGNANT NEOPLASM OF UPPER-OUTER QUADRANT OF LEFT BREAST IN FEMALE, ESTROGEN RECEPTOR POSITIVE: Primary | ICD-10-CM

## 2024-01-11 NOTE — PROGRESS NOTES
Per pt request, referral entered for [facility] as her insurance not longer valid with [employer]. Message sent to referral coordinator to arrange.

## 2024-02-08 NOTE — TELEPHONE ENCOUNTER
Caller: Weilage,Chena    Relationship: Emergency Contact    Best call back number:  [phone]    What is the best time to reach you: ANYTIME, PLEASE LEAVE A MESSAGE      What was the call regarding: PATIENT NEEDS NPI # AND TAX # FOR WAIVER THROUGH [employer]. PLEASE REACH OUT TO PATIENT'S DAUGHTER.    Is it okay if the provider responds through MyChart: NO   diffuse

## 2024-02-10 NOTE — CONSULTS
Patient Care Team:  Reyes, Miriam Mercado, MD as PCP - General (Family Medicine)  Gato Titus MD as Consulting Physician (Hand Surgery)  Sarkis Castellon MD as Consulting Physician (Hematology and Oncology)  Jeannine Ledezma MD as Referring Physician (Breast Surgery)  Sabrina Wallace MD as Consulting Physician (Radiation Oncology)    Chief complaint  Chest and Abdominal pain.     Subjective     History of Present Illness  Patient is a 83 y.o. female with history of AVR/ MVR/ TVR 2016 come to the ER with abdominal pain and chest pain. At the time of my assessment she is intubated, unresponsive and hypotensive. CT scan in the ER showed a Type A aortic dissection with abdominal aorta collapse with ischemic bowel and lower extremities. Lactic is elevated.     Review of Systems     Past Medical History:   Diagnosis Date    Aneurysm     Angioedema     Aortic valve disease     Arthritis     Atrophic kidney     Bradycardia     CAD (coronary artery disease)     Cancer     BREAST    CKD (chronic kidney disease) stage 4, GFR 15-29 ml/min     Depression     Dysphagia     Essential hypertension     History of anemia     History of vitamin D deficiency     [place] (hard of hearing)     Hyperlipidemia     Mitral valve disease     EMELINA on auto CPAP 2003    Overnight polysomnogram.  Weight 154 pounds.  Severe EMELINA with AHI 51.5 events per hour.  Sleep-related hypoxia present.    Post lumbar puncture headache 2022    Pulmonary hypertension     Secondary hyperparathyroidism of renal origin     Sick sinus syndrome     Spinal stenosis      Past Surgical History:   Procedure Laterality Date    AORTIC VALVE REPAIR/REPLACEMENT  2015    Dr Veliz    BREAST LUMPECTOMY Left 2022    Procedure: Left ultrasound-guided partial mastectomy;  Surgeon: Jeannine Ledezma MD;  Location: [facility];  Service: General;  Laterality: Left;    CARDIAC CATHETERIZATION      CARDIAC DEFIBRILLATOR PLACEMENT        SECTION      COLONOSCOPY N/A 2019    [facility]    ENDOSCOPY  2012    Hypertonic lower esophageal sphincter, gastritis. Path: Chronic gastritis, moderate.     ENDOSCOPY N/A 2019    Web in the upper third of the esophagus. Dilated    ENDOSCOPY N/A 2020    Procedure: ESOPHAGOGASTRODUODENOSCOPY WITH 56FR MALDONADO DILATATION AND COLD BIOPSIES;  Surgeon: Blayne Cai MD;  Location: [facility] ENDOSCOPY;  Service: Gastroenterology;  Laterality: N/A;  PRE: DYSPHAGIA  POST: SCHATZKI'S RING    EPIDURAL  2022    HYSTERECTOMY      MITRAL VALVE ANNULOPLASTY  2015    Dr Veliz    TRICUSPID VALVE SURGERY  2015    Dr Veliz     Family History   Problem Relation Age of Onset    ALS Mother     Hypertension Father     Stroke Father     Malig Hyperthermia Neg Hx      Social History     Tobacco Use    Smoking status: Former     Types: Cigarettes     Start date:      Quit date:      Years since quittin.1    Smokeless tobacco: Never   Vaping Use    Vaping Use: Never used   Substance Use Topics    Alcohol use: Not Currently     Comment: Pt partakes occassionally    Drug use: No     (Not in a hospital admission)    ondansetron, 4 mg, Intravenous, Once  sodium chloride, 500 mL, Intravenous, Once  sodium chloride, 500 mL, Intravenous, Once  sodium chloride, 500 mL, Intravenous, Once      Allergies:  Patient has no known allergies.    Objective      Vital Signs  Temp:  [96.2 °F (35.7 °C)] 96.2 °F (35.7 °C)  Heart Rate:  [] 76  Resp:  [18] 18  BP: ()/() 95/45    Flowsheet Rows      Flowsheet Row First Filed Value   Admission Height --   Admission Weight 60.7 kg (133 lb 13.1 oz) Documented at 2024               Physical Exam    Results Review:   Lab Results (last 24 hours)       Procedure Component Value Units Date/Time    Lavender Top [[id number]] Collected: 24    Specimen: Blood Updated: 24    [place] Draw [[id number]] Collected: 24     Specimen: Blood Updated: 02/09/24 2154    Narrative:      The following orders were created for panel order [place] Draw.  Procedure                               Abnormality         Status                     ---------                               -----------         ------                     Lavender Top[[id number]]                                     In process                 Light Blue Top[[id number]]                                   In process                   Please view results for these tests on the individual orders.    Light Blue Top [[id number]] Collected: 02/09/24 2154    Specimen: Blood Updated: 02/09/24 2154    Magnesium [[id number]] Collected: 02/09/24 2143    Specimen: Blood from Arm, Left Updated: 02/09/24 2151    Comprehensive Metabolic Panel [[id number]] Collected: 02/09/24 2143    Specimen: Blood from Arm, Left Updated: 02/09/24 2151    High Sensitivity Troponin T [[id number]] Collected: 02/09/24 2143    Specimen: Blood from Arm, Left Updated: 02/09/24 2151    Ethanol [[id number]] Collected: 02/09/24 2143    Specimen: Blood from Arm, Left Updated: 02/09/24 2151    CK [[id number]] Collected: 02/09/24 2143    Specimen: Blood from Arm, Left Updated: 02/09/24 2151    STAT Lactic Acid, Reflex [[id number]] Collected: 02/09/24 2143    Specimen: Blood from Arm, Left Updated: 02/09/24 2151    Respiratory Panel PCR w/COVID-19(SARS-CoV-2) [facility]/MEY/GETACHEW/PAD/COR/JEFFREY In-House, NP Swab in UTM/VTM, 2 HR TAT - Swab, Nasopharynx [[id number]]  (Normal) Collected: 02/09/24 2020    Specimen: Swab from Nasopharynx Updated: 02/09/24 2128     ADENOVIRUS, PCR Not Detected     Coronavirus 229E Not Detected     Coronavirus HKU1 Not Detected     Coronavirus NL63 Not Detected     Coronavirus OC43 Not Detected     COVID19 Not Detected     Human Metapneumovirus Not Detected     Human Rhinovirus/Enterovirus Not Detected     Influenza A PCR Not Detected     Influenza B PCR Not Detected     Parainfluenza Virus 1 Not Detected      Parainfluenza Virus 2 Not Detected     Parainfluenza Virus 3 Not Detected     Parainfluenza Virus 4 Not Detected     RSV, PCR Not Detected     Bordetella pertussis pcr Not Detected     Bordetella parapertussis PCR Not Detected     Chlamydophila pneumoniae PCR Not Detected     Mycoplasma pneumo by PCR Not Detected    Narrative:      In the setting of a positive respiratory panel with a viral infection PLUS a negative procalcitonin without other underlying concern for bacterial infection, consider observing off antibiotics or discontinuation of antibiotics and continue supportive care. If the respiratory panel is positive for atypical bacterial infection (Bordetella pertussis, Chlamydophila pneumoniae, or Mycoplasma pneumoniae), consider antibiotic de-escalation to target atypical bacterial infection.    BNP [[id number]]  (Normal) Collected: 02/09/24 2018    Specimen: Blood Updated: 02/09/24 2101     proBNP 911.0 pg/mL     Narrative:      This assay is used as an aid in the diagnosis of individuals suspected of having heart failure. It can be used as an aid in the diagnosis of acute decompensated heart failure (ADHF) in patients presenting with signs and symptoms of ADHF to the emergency department (ED). In addition, NT-proBNP of <300 pg/mL indicates ADHF is not likely.    Age Range Result Interpretation  NT-proBNP Concentration (pg/mL:      <50             Positive            >450                   Gray                 300-450                    Negative             <300    50-75           Positive            >900                  Gray                300-900                  Negative            <300      >75             Positive            >1800                  Gray                300-1800                  Negative            <300    TSH [[id number]]  (Normal) Collected: 02/09/24 2018    Specimen: Blood Updated: 02/09/24 2101     TSH 3.370 uIU/mL     Lipase [[id number]]  (Normal) Collected: 02/09/24 2018    Specimen:  Blood Updated: 02/09/24 2058     Lipase 30 U/L     Protime-INR [[id number]]  (Normal) Collected: 02/09/24 2018    Specimen: Blood Updated: 02/09/24 2054     Protime 13.6 Seconds      INR 1.02    aPTT [[id number]]  (Normal) Collected: 02/09/24 2018    Specimen: Blood Updated: 02/09/24 2054     PTT 34.3 seconds     Lactic Acid, Plasma [[id number]]  (Abnormal) Collected: 02/09/24 2018    Specimen: Blood Updated: 02/09/24 2052     Lactate 4.9 mmol/L     CBC & Differential [[id number]]  (Abnormal) Collected: 02/09/24 2018    Specimen: Blood Updated: 02/09/24 2046    Narrative:      The following orders were created for panel order CBC & Differential.  Procedure                               Abnormality         Status                     ---------                               -----------         ------                     CBC Auto Differential[[id number]]        Abnormal            Final result                 Please view results for these tests on the individual orders.    CBC Auto Differential [[id number]]  (Abnormal) Collected: 02/09/24 2018    Specimen: Blood Updated: 02/09/24 2046     WBC 5.67 10*3/mm3      RBC 3.80 10*6/mm3      Hemoglobin 12.2 g/dL      Hematocrit 36.8 %      MCV 96.8 fL      MCH 32.1 pg      MCHC 33.2 g/dL      RDW 12.4 %      RDW-SD 43.5 fl      MPV 12.4 fL      Platelets 180 10*3/mm3      Neutrophil % 61.3 %      Lymphocyte % 26.5 %      Monocyte % 8.8 %      Eosinophil % 2.8 %      Basophil % 0.4 %      Immature Grans % 0.2 %      Neutrophils, Absolute 3.48 10*3/mm3      Lymphocytes, Absolute 1.50 10*3/mm3      Monocytes, Absolute 0.50 10*3/mm3      Eosinophils, Absolute 0.16 10*3/mm3      Basophils, Absolute 0.02 10*3/mm3      Immature Grans, Absolute 0.01 10*3/mm3      nRBC 0.0 /100 WBC     Blood Gas, Arterial - [[id number]]  (Abnormal) Collected: 02/09/24 2031    Specimen: Arterial Blood Updated: 02/09/24 2035     Site Left Brachial     Roberto's Test N/A     pH, Arterial 7.440 pH units      pCO2,  Arterial 23.5 mm Hg      pO2, Arterial 109.5 mm Hg      HCO3, Arterial 16.0 mmol/L      Base Excess, Arterial -6.5 mmol/L      Comment: Serial Number: [id number]:  [id number]        O2 Saturation, Arterial 98.6 %      A-a DO2 0.9 mmHg      CO2 Content 16.7 mmol/L      Barometric Pressure for Blood Gas 745.7000 mmHg      Modality Room Air     FIO2 21 %      Rate 12 Breaths/minute      Hemodilution No     Device Comment SpO2 100%                Assessment & Plan       * No active hospital problems. *      Assessment & Plan    - Type A aortic dissection   - Mesenteric mal perfusion  - Lower extremities mal perfusion    82 yo female with history of AVR/ MVR/ TVR who presents with a Type A aortic dissection. Very frail patient. With clinical and CT signs of mesenteric and lower extremities mal perfusion with ischemic bowel. Elevated lactic acid that correlates with the findings of the CT. She is intubated and hypotensive. She is not a surgical candidate because she will not survive any surgical intervention. I explained this to her daughter. She is very sad, but unfortunately she will not survive any intervention.    Thank you for allowing us to participate in the care of this patient.      Kai Puentes MD  02/09/24  22:00 EST

## 2024-02-10 NOTE — ED NOTES
This RN spoke w/ ALEKS preston/ VIRGIL @ this time to report pt death. Per ALEKS preston/ VIRGIL pt not a potential candidate for organ donation. Pt released by VIRGIL @ this time.     Referral# [id number]

## 2024-02-10 NOTE — ED PROVIDER NOTES
EMERGENCY DEPARTMENT ENCOUNTER    Room Number:  MARLI/MARLI  PCP: Reyes, Miriam Mercado, MD  Independent Historians: EMS    HPI:  Chief Complaint: Chest pain, abdominal pain, altered mental status  A complete HPI/ROS/PMH/PSH/SH/FH are unobtainable due to: Poor historian and Acutely ill and not able to provide history    Chronic or social conditions impacting patient care (Social Determinants of Health): None      Context: Carly Gotti is a 83 y.o. female with a medical history of valvular heart disease and chronic kidney disease who presents to the ED c/o acute chest pain, abdominal pain, and altered mental status just prior to arrival.  Per EMS patient was reportedly riding in the car with her daughter speaking and acting normally.  She then suddenly complained of chest pain and nausea and seemed confused.  Daughter pulled the car over and pulled patient out onto the ground because she thought she was going to vomit.  Patient continued to complain of chest pain and abdominal pain and was not acting herself.  When EMS arrived patient was moaning but not answering any questions.          Review of prior external notes (non-ED) -and- Review of prior external test results outside of this encounter:   I reviewed patient's last discharge summary from hospitalization November 27, 2023.  Patient was noted at that time to have had a fall and was unable to ambulate.  Patient was evaluated for head injury and noted to be COVID-positive.  She never required any advanced therapies for COVID as she was never hypoxic but was started on Paxlovid.  Patient spent 5 days in the hospital and on day of discharge was feeling much better and ambulatory.    Prescription drug monitoring program review:     N/A    PAST MEDICAL HISTORY  Active Ambulatory Problems     Diagnosis Date Noted    S/P AVR (aortic valve replacement) 04/04/2016    S/P TVR (tricuspid valve repair) 04/04/2016    S/P MVR (mitral valve repair) 04/04/2016    Benign  essential hypertension 05/03/2016    Mitral and aortic valve disease 05/03/2016    CKD (chronic kidney disease) stage 3, GFR 30-59 ml/min 11/01/2016    Small bowel obstruction 12/21/2016    Abdominal pain 12/22/2016    Slow transit constipation 12/27/2016    Hyperlipidemia 05/02/2017    Pacemaker 12/06/2017    Colitis 03/18/2019    Elevated lipase 03/18/2019    C. difficile colitis 03/18/2019    Ileus 03/18/2019    Sepsis 03/19/2019    Thrombocytopenia 03/20/2019    Dysphagia 05/23/2019    Esophageal web 05/23/2019    Recurrent Clostridium difficile diarrhea 06/27/2019    CKD (chronic kidney disease) stage 4, GFR 15-29 ml/min 06/27/2019    Generalized abdominal pain 06/27/2019    Intestinal infection due to enteropathogenic E. coli 06/28/2019    Obstructive sleep apnea treated with auto CPAP 12/19/2019    Nausea 08/27/2020    Renal failure 06/15/2021    Depression 06/15/2021    Arthritis 06/15/2021    Aneurysm of thoracic aorta 06/15/2021    Anxiety 06/15/2021    Post lumbar puncture headache 05/04/2022    Malignant neoplasm of upper-outer quadrant of left breast in female, estrogen receptor positive 10/21/2022    Hypoxemia associated with sleep 10/22/2022    Unable to ambulate 11/23/2023    Fall 11/23/2023    Elevated liver transaminase level 11/23/2023     Resolved Ambulatory Problems     Diagnosis Date Noted    Chest tightness 05/03/2016     Past Medical History:   Diagnosis Date    Aneurysm     Angioedema     Aortic valve disease     Atrophic kidney     Bradycardia     CAD (coronary artery disease)     Cancer     Essential hypertension     History of anemia     History of vitamin D deficiency     [place] (hard of hearing)     Mitral valve disease     EMELINA on auto CPAP 09/30/2003    Pulmonary hypertension     Secondary hyperparathyroidism of renal origin     Sick sinus syndrome     Spinal stenosis          PAST SURGICAL HISTORY  Past Surgical History:   Procedure Laterality Date    AORTIC VALVE REPAIR/REPLACEMENT   2015    Dr Veliz    BREAST LUMPECTOMY Left 2022    Procedure: Left ultrasound-guided partial mastectomy;  Surgeon: Jeannine Ledezma MD;  Location: [facility] MAIN OR;  Service: General;  Laterality: Left;    CARDIAC CATHETERIZATION      CARDIAC DEFIBRILLATOR PLACEMENT       SECTION      COLONOSCOPY N/A 2019    [facility]    ENDOSCOPY  2012    Hypertonic lower esophageal sphincter, gastritis. Path: Chronic gastritis, moderate.     ENDOSCOPY N/A 2019    Web in the upper third of the esophagus. Dilated    ENDOSCOPY N/A 2020    Procedure: ESOPHAGOGASTRODUODENOSCOPY WITH 56FR MALDONADO DILATATION AND COLD BIOPSIES;  Surgeon: Blayne Cai MD;  Location: [facility] ENDOSCOPY;  Service: Gastroenterology;  Laterality: N/A;  PRE: DYSPHAGIA  POST: SCHATZKI'S RING    EPIDURAL  2022    HYSTERECTOMY      MITRAL VALVE ANNULOPLASTY  2015    Dr Veliz    TRICUSPID VALVE SURGERY  2015    Dr Veliz         FAMILY HISTORY  Family History   Problem Relation Age of Onset    ALS Mother     Hypertension Father     Stroke Father     Malig Hyperthermia Neg Hx          SOCIAL HISTORY  Social History     Socioeconomic History    Marital status:     Number of children: 3   Tobacco Use    Smoking status: Former     Types: Cigarettes     Start date:      Quit date:      Years since quittin.1    Smokeless tobacco: Never   Vaping Use    Vaping Use: Never used   Substance and Sexual Activity    Alcohol use: Not Currently     Comment: Pt partakes occassionally    Drug use: No    Sexual activity: Defer         ALLERGIES  Patient has no known allergies.        REVIEW OF SYSTEMS  Review of Systems  Included in HPI  All systems reviewed and negative except for those discussed in HPI.      PHYSICAL EXAM    I have reviewed the triage vital signs and nursing notes.    ED Triage Vitals   Temp Heart Rate Resp BP SpO2   -- 24     114 18 142/78 98 %      Temp src Heart Rate Source Patient Position BP Location FiO2 (%)   -- -- -- -- --              Physical Exam  GENERAL: Ill-appearing and moaning  female, awake and eyes open and makes eye contact nodding her head yes and no to my questions but not verbally answering anything initially  SKIN: Cool extremities  HENT: Normocephalic, atraumatic  EYES: no scleral icterus, no conjunctivitis  CV: regular rhythm, regular rate, positive murmur  RESPIRATORY: normal effort, diminished at the bases but no wheezing and no rhonchi  ABDOMEN: soft, diffusely tender to palpation with no rebound and no guarding, nondistended, no palpable masses and in particular no pulsatile masses  MUSCULOSKELETAL: no deformity, bilateral lower extremity nonpitting edema, cool extremities, prolonged capillary refill  NEURO: Awake, moves all extremities, face symmetric, initially nonverbal but making eye contact and nodding her head yes and no to my questions, thereafter answering verbally yes and no and repeating that she is in pain and needs help                                                                 LAB RESULTS  Recent Results (from the past 24 hour(s))   ECG 12 Lead Chest Pain    Collection Time: 02/09/24  8:12 PM   Result Value Ref Range    QT Interval 482 ms    QTC Interval 553 ms   Protime-INR    Collection Time: 02/09/24  8:18 PM    Specimen: Blood   Result Value Ref Range    Protime 13.6 11.7 - 14.2 Seconds    INR 1.02 0.90 - 1.10   aPTT    Collection Time: 02/09/24  8:18 PM    Specimen: Blood   Result Value Ref Range    PTT 34.3 22.7 - 35.4 seconds   Lipase    Collection Time: 02/09/24  8:18 PM    Specimen: Blood   Result Value Ref Range    Lipase 30 13 - 60 U/L   BNP    Collection Time: 02/09/24  8:18 PM    Specimen: Blood   Result Value Ref Range    proBNP 911.0 0.0 - 1,800.0 pg/mL   Lactic Acid, Plasma    Collection Time: 02/09/24  8:18 PM    Specimen: Blood   Result Value Ref Range    Lactate  4.9 (C) 0.5 - 2.0 mmol/L   TSH    Collection Time: 02/09/24  8:18 PM    Specimen: Blood   Result Value Ref Range    TSH 3.370 0.270 - 4.200 uIU/mL   CBC Auto Differential    Collection Time: 02/09/24  8:18 PM    Specimen: Blood   Result Value Ref Range    WBC 5.67 3.40 - 10.80 10*3/mm3    RBC 3.80 3.77 - 5.28 10*6/mm3    Hemoglobin 12.2 12.0 - 15.9 g/dL    Hematocrit 36.8 34.0 - 46.6 %    MCV 96.8 79.0 - 97.0 fL    MCH 32.1 26.6 - 33.0 pg    MCHC 33.2 31.5 - 35.7 g/dL    RDW 12.4 12.3 - 15.4 %    RDW-SD 43.5 37.0 - 54.0 fl    MPV 12.4 (H) 6.0 - 12.0 fL    Platelets 180 140 - 450 10*3/mm3    Neutrophil % 61.3 42.7 - 76.0 %    Lymphocyte % 26.5 19.6 - 45.3 %    Monocyte % 8.8 5.0 - 12.0 %    Eosinophil % 2.8 0.3 - 6.2 %    Basophil % 0.4 0.0 - 1.5 %    Immature Grans % 0.2 0.0 - 0.5 %    Neutrophils, Absolute 3.48 1.70 - 7.00 10*3/mm3    Lymphocytes, Absolute 1.50 0.70 - 3.10 10*3/mm3    Monocytes, Absolute 0.50 0.10 - 0.90 10*3/mm3    Eosinophils, Absolute 0.16 0.00 - 0.40 10*3/mm3    Basophils, Absolute 0.02 0.00 - 0.20 10*3/mm3    Immature Grans, Absolute 0.01 0.00 - 0.05 10*3/mm3    nRBC 0.0 0.0 - 0.2 /100 WBC   Respiratory Panel PCR w/COVID-19(SARS-CoV-2) BEKAH/MEY/GETACHEW/PAD/COR/JEFFREY In-House, NP Swab in [place], 2 HR TAT - Swab, Nasopharynx    Collection Time: 02/09/24  8:20 PM    Specimen: Nasopharynx; Swab   Result Value Ref Range    ADENOVIRUS, PCR Not Detected Not Detected    Coronavirus 229E Not Detected Not Detected    Coronavirus HKU1 Not Detected Not Detected    Coronavirus NL63 Not Detected Not Detected    Coronavirus OC43 Not Detected Not Detected    COVID19 Not Detected Not Detected - Ref. Range    Human Metapneumovirus Not Detected Not Detected    Human Rhinovirus/Enterovirus Not Detected Not Detected    Influenza A PCR Not Detected Not Detected    Influenza B PCR Not Detected Not Detected    Parainfluenza Virus 1 Not Detected Not Detected    Parainfluenza Virus 2 Not Detected Not Detected     Parainfluenza Virus 3 Not Detected Not Detected    Parainfluenza Virus 4 Not Detected Not Detected    RSV, PCR Not Detected Not Detected    Bordetella pertussis pcr Not Detected Not Detected    Bordetella parapertussis PCR Not Detected Not Detected    Chlamydophila pneumoniae PCR Not Detected Not Detected    Mycoplasma pneumo by PCR Not Detected Not Detected   Blood Gas, Arterial -    Collection Time: 02/09/24  8:31 PM    Specimen: Arterial Blood   Result Value Ref Range    Site Left Brachial     Roberto's Test N/A     pH, Arterial 7.440 7.350 - 7.450 pH units    pCO2, Arterial 23.5 (L) 35.0 - 45.0 mm Hg    pO2, Arterial 109.5 (H) 80.0 - 100.0 mm Hg    HCO3, Arterial 16.0 (L) 22.0 - 28.0 mmol/L    Base Excess, Arterial -6.5 (L) 0.0 - 2.0 mmol/L    O2 Saturation, Arterial 98.6 (H) 92.0 - 98.5 %    A-a DO2 0.9 mmHg    CO2 Content 16.7 (L) 23 - 27 mmol/L    Barometric Pressure for Blood Gas 745.7000 mmHg    Modality Room Air     FIO2 21 %    Rate 12 Breaths/minute    Hemodilution No     Device Comment SpO2 100%    Comprehensive Metabolic Panel    Collection Time: 02/09/24  9:43 PM    Specimen: Arm, Left; Blood   Result Value Ref Range    Glucose 250 (H) 65 - 99 mg/dL    BUN 21 8 - 23 mg/dL    Creatinine 1.49 (H) 0.57 - 1.00 mg/dL    Sodium 134 (L) 136 - 145 mmol/L    Potassium 4.9 3.5 - 5.2 mmol/L    Chloride 101 98 - 107 mmol/L    CO2 15.0 (L) 22.0 - 29.0 mmol/L    Calcium 9.2 8.6 - 10.5 mg/dL    Total Protein 4.6 (L) 6.0 - 8.5 g/dL    Albumin 3.0 (L) 3.5 - 5.2 g/dL    ALT (SGPT) 19 1 - 33 U/L    AST (SGOT) 45 (H) 1 - 32 U/L    Alkaline Phosphatase 47 39 - 117 U/L    Total Bilirubin 0.3 0.0 - 1.2 mg/dL    Globulin 1.6 gm/dL    A/G Ratio 1.9 g/dL    BUN/Creatinine Ratio 14.1 7.0 - 25.0    Anion Gap 18.0 (H) 5.0 - 15.0 mmol/L    eGFR 34.3 (L) >60.0 mL/min/1.73   High Sensitivity Troponin T    Collection Time: 02/09/24  9:43 PM    Specimen: Arm, Left; Blood   Result Value Ref Range    HS Troponin T 1,450 (C) <14 ng/L    Ethanol    Collection Time: 02/09/24  9:43 PM    Specimen: Arm, Left; Blood   Result Value Ref Range    Ethanol <10 0 - 10 mg/dL    Ethanol % <0.010 %   CK    Collection Time: 02/09/24  9:43 PM    Specimen: Arm, Left; Blood   Result Value Ref Range    Creatine Kinase 282 (H) 20 - 180 U/L   Magnesium    Collection Time: 02/09/24  9:43 PM    Specimen: Arm, Left; Blood   Result Value Ref Range    Magnesium 1.9 1.6 - 2.4 mg/dL   STAT Lactic Acid, Reflex    Collection Time: 02/09/24  9:43 PM    Specimen: Arm, Left; Blood   Result Value Ref Range    Lactate 8.4 (C) 0.5 - 2.0 mmol/L   Lavender Top    Collection Time: 02/09/24  9:54 PM   Result Value Ref Range    Extra Tube hold for add-on    Light Blue Top    Collection Time: 02/09/24  9:54 PM   Result Value Ref Range    Extra Tube Hold for add-ons.          RADIOLOGY  XR Chest 1 View    Result Date: 2/9/2024  XR CHEST 1 VW-  INDICATION: Post intubation  COMPARISON: 2/9/2024 and 11 23 2023      Endotracheal tube with tip approximately 2 cm above the concetta. No pneumothorax. No focal consolidation or pleural effusion. Enlarged cardiomediastinal silhouette corresponding with known dissection and hemopericardium. Prosthetic aortic and mitral valves with tricuspid valve repair. Dual-chamber pacemaker. No focal osseous abnormality.  This report was finalized on 2/9/2024 11:01 PM by Dr. Pierce Quevedo M.D on Workstation: [id number]      CT Head Without Contrast    Result Date: 2/9/2024  Patient: LEENA VILLEGAS  Time Out: 22:43 Exam(s): CT HEAD Without Contrast EXAM:   CT Head Without Intravenous Contrast CLINICAL HISTORY:   Mental statuse change and confussiuon pt is a poor historian unable to us the length of time TECHNIQUE:   Axial computed tomography images of the head brain without intravenous contrast.  CTDI is 55.18 mGy and DLP is 979.1 mGy-cm.  This CT exam was performed according to the principle of ALARA (As Low As Reasonably Achievable) by using one or more of the  following dose reduction techniques: automated exposure control, adjustment of the mA and or kV according to patient size, and or use of iterative reconstruction technique. COMPARISON:   No relevant prior studies available. FINDINGS:   Brain:  Unremarkable.  No hemorrhage.  No significant white matter disease.  No edema.   Ventricles:  Unremarkable.  No ventriculomegaly.   Bones joints:  Unremarkable.  No acute fracture.   Soft tissues:  Unremarkable.   Sinuses:  Unremarkable as visualized.  No acute sinusitis.   Mastoid air cells:  Unremarkable as visualized.  No mastoid effusion. IMPRESSION:       Normal head brain CT.     Electronically signed by Spike Piper MD on 02-09-24 at 2243    CT Angiogram Chest, CT Angiogram Abdomen Pelvis    Result Date: 2/9/2024  CT ANGIOGRAM CHEST-, CT ANGIOGRAM ABDOMEN PELVIS-  HISTORY: Acute aortic syndrome.  TECHNIQUE: Radiation dose reduction techniques were utilized, including automated exposure control and exposure modulation based on body size. 2 mm images were obtained through the chest abdomen and pelvis after the administration of IV contrast.  COMPARISON: None available.   FINDINGS: Aneurysmal ascending thoracic aorta measuring up to 5 cm. Dissection flap beginning at the aortic root extending throughout the thoracic aorta to at least the level of the esophageal hiatus. Lack of contrast in the abdominal aorta limits evaluation of inferior extent. However, displaced abdominal aortic atherosclerotic calcifications past the level of the renal vasculature (series 1/image 146). Conventional three-vessel aortic arch with all great vessels arising off the false lumen. Pulmonary artery shared sheath rupture with severe stenosis of the main and right main pulmonary arteries (series 1/image 62). Moderate volume hemopericardium. Atrial and ventricular chambers are all decreased in size.  Trachea and main bronchi are patent. No segmental or lobar consolidation. No pleural  effusion. Limited evaluation of the abdominal pelvic organs and bowel given lack of intravenous contrast. Atrophic left kidney. Left inguinal hernia containing a short segment of normal-appearing sigmoid colon. Sigmoid colon diverticulosis. Remaining solid organs and bowel are normal in limited noncontrast CT appearance. No abdominal pelvic adenopathy. No focal osseous abnormality.       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 Pulmonary artery shared sheath rupture causing severe stenosis of the main and right main pulmonary arteries. Moderate volume hemopericardium with CT findings concerning for cardiac tamponade. Lack of intravenous contrast in the abdominal aorta limits evaluation of inferior dissection extent. However, abdominal aortic calcifications are displaced to at least below the level of the renal arteries.    Above findings were discussed with Dr. Zapata at 10:30 p.m. on 2/9/2024.  This report was finalized on 2/9/2024 10:41 PM by Dr. Pierce Quevedo M.D on Workstation: [id number]      XR Chest 1 View    Result Date: 2/9/2024  XR CHEST 1 VW-  HISTORY: Female who is 83 years-old, chest pain  TECHNIQUE: Frontal view of the chest  COMPARISON: 11/23/2023  FINDINGS: The heart size is borderline. Left-sided pacemaker, cardiac leads, sternotomy wires, cardiac valve markers are noted. Aorta is tortuous, calcified. Pulmonary vasculature is unremarkable. Heart, mediastinum and pulmonary vasculature are unremarkable. No focal pulmonary consolidation, pleural effusion, or pneumothorax. No acute osseous process.      No focal pulmonary consolidation. Borderline heart size. Tortuous aorta. Follow-up as clinically indicated.    This report was finalized on 2/9/2024 8:58 PM by Dr. Isak Mirza M.D on Workstation: [id number]         MEDICATIONS GIVEN IN ER  Medications   sodium chloride 0.9 % bolus 500 mL (0 mL Intravenous Stopped 2/9/24 2210)   sodium chloride 0.9 % bolus 500 mL (0 mL Intravenous  Stopped 2/9/24 2209)   iopamidol (ISOVUE-370) 76 % injection 100 mL (95 mL Intravenous Given 2/9/24 2109)   sodium chloride 0.9 % bolus 500 mL (0 mL Intravenous Stopped 2/9/24 2209)   etomidate (AMIDATE) injection 18.22 mg (18.22 mg Intravenous Given by Other 2/9/24 2129)   rocuronium (ZEMURON) injection 50 mg (50 mg Intravenous Given by Other 2/9/24 2130)   midazolam (VERSED) injection 2 mg (2 mg Intravenous Given 2/9/24 2141)   fentaNYL citrate (PF) (SUBLIMAZE) injection 100 mcg (100 mcg Intravenous Given 2/9/24 2140)   midazolam (VERSED) injection 1 mg (1 mg Intravenous Given 2/9/24 2356)   morphine injection 2 mg (2 mg Intravenous Given 2/9/24 2357)         ORDERS PLACED DURING THIS VISIT:  Orders Placed This Encounter   Procedures    Intubation    Fast Ultrasound    Respiratory Panel PCR w/COVID-19(SARS-CoV-2) BEKAH/MEY/GETACHEW/PAD/COR/JEFFREY In-House, NP Swab in UTM/VTM, 2 HR TAT - Swab, Nasopharynx    XR Chest 1 View    CT Head Without Contrast    CT Angiogram Chest    CT Angiogram Abdomen Pelvis    XR Chest 1 View    Comprehensive Metabolic Panel    Protime-INR    aPTT    Lipase    Blood Gas, Arterial -    BNP    High Sensitivity Troponin T    Lactic Acid, Plasma    Ethanol    CK    Magnesium    TSH    CBC Auto Differential    STAT Lactic Acid, Reflex    [place] Draw    ECG 12 Lead Chest Pain    CBC & Differential    Lavender Top    Light Blue Top         OUTPATIENT MEDICATION MANAGEMENT:  No current Epic-ordered facility-administered medications on file.     Current Outpatient Medications Ordered in Epic   Medication Sig Dispense Refill    acetaminophen (TYLENOL) 500 MG tablet Take 2 tablets by mouth Every 6 (Six) Hours As Needed for Moderate Pain. 30 tablet 0    aspirin 325 MG tablet Take 1 tablet by mouth Daily. WILL HOLD AS DIRECTED  Indications: Resume on Saturday, 12/24/2022.      atorvastatin (LIPITOR) 20 MG tablet take 1 tablet by mouth at bedtime  0    bumetanide (BUMEX) 2 MG tablet TAKE 1 TABLET BY MOUTH  EVERY DAY (Patient taking differently: Take 1 tablet by mouth Daily.) 90 tablet 1    buPROPion XL (WELLBUTRIN XL) 300 MG 24 hr tablet Take 1 tablet by mouth Daily.  0    busPIRone (BUSPAR) 5 MG tablet Take 1 tablet by mouth 2 (Two) Times a Day.      carvedilol (COREG) 12.5 MG tablet Take 1 tablet by mouth 2 (Two) Times a Day.      COENZYME Q-10 PO Take 1 capsule by mouth Daily.      Coenzyme Q10 200 MG tablet Take 1 tablet/day by mouth Daily.      [facility] Senna Plus 8.6-50 MG per tablet TAKE 1 TABLET BY MOUTH EVERY DAY 30 tablet 2    Diclofenac Sodium (VOLTAREN) 1 % gel gel PLEASE SEE ATTACHED FOR DETAILED DIRECTIONS      donepezil (ARICEPT) 10 MG tablet Take 1 tablet by mouth Daily.      famotidine (PEPCID) 40 MG tablet TAKE 1 TABLET BY MOUTH TWICE A  tablet 3    fenofibrate micronized (LOFIBRA) 67 MG capsule Take 1 capsule by mouth Daily.      FLUoxetine (PROzac) 20 MG capsule Take 3 capsules by mouth Daily.      HYDROcodone-acetaminophen (NORCO) 5-325 MG per tablet Take 1 tablet by mouth Every 6 (Six) Hours As Needed for Moderate Pain. 12 tablet 0    naloxone (NARCAN) 4 MG/0.1ML nasal spray Call 911. Don't prime. [place] in 1 nostril for overdose. Repeat in 2-3 minutes in other nostril if no or minimal breathing/responsiveness. 2 each 0    ondansetron ODT (ZOFRAN-ODT) 8 MG disintegrating tablet Place 1 tablet on the tongue Every 6 (Six) Hours As Needed for Nausea or Vomiting. 120 tablet 3    polyethylene glycol (MIRALAX) 17 g packet Take 17 g by mouth Daily As Needed (Use if senna-docusate is ineffective). 30 each 0    sennosides-docusate (PERICOLACE) 8.6-50 MG per tablet Take 2 tablets by mouth 2 (Two) Times a Day As Needed for Constipation. 60 tablet 0    temazepam (RESTORIL) 15 MG capsule Take 1 capsule by mouth At Night As Needed for Sleep or Anxiety. 3 capsule 0         PROCEDURES  Intubation    Date/Time: 2/9/2024 9:30 PM    Performed by: Addie Zapata MD  Authorized by: Addie Zapata MD     Consent:     Consent obtained:  Emergent situation    Risks discussed:  Aspiration    Alternatives discussed:  Delayed treatment  Pre-procedure details:     Indications: airway protection and altered consciousness      Patient status:  Altered mental status    Look externally: no concerns      Mouth opening - incisor distance:  3 or more finger widths    Hyoid-mental distance: 3 or more finger widths      Hyoid-thyroid distance: 2 or more finger widths      Mallampati score:  III    Obstruction: none      Neck mobility: normal      Pharmacologic strategy: RSI      Induction agents:  Etomidate    Paralytics:  Rocuronium  Procedure details:     Preoxygenation:  Bag valve mask    CPR in progress: no      Number of attempts:  1  Successful intubation attempt details:     Intubation method:  Oral    Intubation technique: video assisted      Laryngoscope blade:  Mac 4    Bougie used: no      Grade view comment:  Full view of vocal cords    Tube size (mm):  7.5    Tube type:  Cuffed    Tube visualized through cords: yes    Placement assessment:     ETT at teeth/gumline (cm):  21    Tube secured with:  ETT ugalde    Breath sounds:  Equal    Placement verification: chest rise and CXR verification      CXR findings:  Appropriate position  Post-procedure details:     Procedure completion:  Tolerated well, no immediate complications  Fast Ultrasound    Date/Time: 2/9/2024 8:45 PM    Performed by: Addie Zapata MD  Authorized by: Addie Zapata MD    Procedure details:     Indications comment:  Abdominal pain and chest pain with leg numbness so concern for AAA rupture    Assess for:  Intra-abdominal fluid    Technique:  Abdominal  Abdominal findings:     L kidney:  Visualized    R kidney:  Visualized    Liver:  Visualized    Bladder:  Visualized    Hepatorenal space visualized: identified      Splenorenal space: identified      Rectovesical free fluid: not identified      Splenorenal free fluid: not identified       Pouch of Victorino free fluid: not identified          Critical care provider statement:    Critical care time (minutes): 77.   Critical care time was exclusive of:  Separately billable procedures and treating other patients   Critical care was necessary to treat or prevent imminent or life-threatening deterioration of the following conditions:  CNS Failure, Metabolic Failure, and Severe Shock   Critical care was time spent personally by me on the following activities:  Development of treatment plan with patient or surrogate, discussions with consultants, evaluation of patient's response to treatment, examination of patient, obtaining history from patient or surrogate, ordering and performing treatments and interventions, ordering and review of laboratory studies, ordering and review of radiographic studies, pulse oximetry, re-evaluation of patient's condition and review of old charts. Critical Care indicators: Acidosis, Altered Mental Status, Elderly, Delirium, and Shock, unresponsive patient  multiple reevaluations, multiple evaluations of radiologic studies along with constant attendance while patient getting radiologic studies done, several conversations with family members at bedside about grave condition, consultations with radiologist and CT surgery      PROGRESS, DATA ANALYSIS, CONSULTS, AND MEDICAL DECISION MAKING  All labs have been independently interpreted by me.  All radiology studies have been reviewed by me. All EKG's have been independently viewed and interpreted by me.  Discussion below represents my analysis of pertinent findings related to patient's condition, differential diagnosis, treatment plan and final disposition.    Differential diagnosis includes but is not limited to   Intracranial hemorrhage, aortic emergency including aortic dissection or aortic aneurysm with rupture, ischemic bowel, or other vascular catastrophe.    Clinical Scores:                  ED Course as of 02/10/24 0836  "  Fri Feb 09, 2024 2020 EKG ER MD interpretation   Time: 2012  Rhythm and rate: Ventricularly paced rhythm at a rate of 79 [AR]   2045 Patient still altered but saying words to her family member in the room recognize her and calls her by name Gabriela but repeats that she is \"dying\" and that she is \"numb all over\".  Bedside fast ultrasound with no blood collection in the abdomen.  Emergent CT scan to evaluate patient's aorta given her multitude of complaints.  I also discussed with family member elective intubation and the possibility of patient decompensating quickly.  She prefer not to intubate her preemptively.  Patient is a full code. [AR]   2114 I accompanied patient to CT scan.  Patient still awake and repeating that she is numb and moaning.  CT head on my interpretation has no obvious intracranial hemorrhage nor midline shift.  Patient's CTA chest abdomen pelvis are being loaded the patient did appear to have a dissection on just quick glance through the study as it was being shot. [AR]   2120 I discussed grave concerns with patient's daughter at bedside.  Plan for elective intubation now with likely surgical plan given initial assessment of patient's CT scan significant for aortic dissection.  Daughter tearful but amenable to plan. [AR]   2137 Patient intubated and x-ray in the room.  Initial blood pressure postintubation 75/43.  Plan IV fluids to help with obtaining more reassuring MAP.  During intubation with Dr. Veliz with cardiothoracic surgery called back and planned to review patient's CT scan. [AR]   2150 Cardiothoracic surgery Dr. Puentes at bedside and discussion with family at bedside.  Patient not a surgical candidate and already has signs of ischemic bowel and ischemic legs.  Even if surgery were to be performed patient would not survive. [AR]   2201 Awaiting patient's son and other family to arrive. [AR]   2225 Call from radiologist regarding patient's CTA.  He just wanted to add that " patient has hemopericardium with compression of the right ventricle noting that that likely does not change patient's plan or expected outcome.  Call to Dr. Puentes to update. [AR]   2233 I discussed this additional finding with Dr. Veliz who had noted this finding on CT and it does not change patient's management at all. [AR]   2319 Patient hypotensive and remains temporarily on the ventilator per family request.  They are waiting for patient's son to arrive then wish to extubate patient and make her comfort care officially. [AR]   2326 Patient's son has now arrived.  Plan for terminal extubation.  Call to palliative care for palliative bed arrangement. I discussed case with Dr. Schneider who is amenable to arranging for a palliative bed if patient requires further care after extubation [AR]   Sat Feb 10, 2024   0016 Appropriate family members at bedside and respiratory at bedside for terminal extubation at 2354.  Patient had slowly bradycardia down after that with no respiratory effort of her own and degradation into asystole with time of death 00: 10.  Family at bedside and then escorted to the family room to fill out appropriate paperwork for patient death in the ED after decision for comfort care and terminal extubation. [AR]      ED Course User Index  [AR] Addie Zapata MD             AS OF 08:36 EST VITALS:    BP - (!) 61/42  HR - 80  TEMP - 96.2 °F (35.7 °C) (Tympanic)  O2 SATS - 96%      COMPLEXITY OF CARE  The patient requires admission.    DIAGNOSIS  Final diagnoses:   Acute thoracic aortic dissection   Chest pain, unspecified type   Generalized abdominal pain   Paresthesias   Nausea and vomiting, unspecified vomiting type   Altered mental status, unspecified altered mental status type   Acute respiratory failure, unspecified whether with hypoxia or hypercapnia         DISPOSITION  ED Disposition       ED Disposition       Condition   --    Comment   --                Please note  that portions of this document were completed with a voice recognition program.    Note Disclaimer: At [facility], we believe that sharing information builds trust and better relationships. You are receiving this note because you recently visited [facility]. It is possible you will see health information before a provider has talked with you about it. This kind of information can be easy to misunderstand. To help you fully understand what it means for your health, we urge you to discuss this note with your provider.         Addie Zapata MD  02/10/24 0023       Addie Zapata MD  02/10/24 0838

## 2024-02-10 NOTE — ED NOTES
This RN spoke w/  Alice @ this time to report pt death. Per Alice, once family returns tomorrow to fill out paperwork & report correct  home,  needs to be notified w/ info. Charge nurse notified.

## 2024-02-14 LAB — CREAT BLDA-MCNC: 1.5 MG/DL (ref 0.6–1.3)

## 2024-03-08 NOTE — TELEPHONE ENCOUNTER
Called pt and advised of the result notes. She verb understanding and follow-up made for 11/18   4 = No assist / stand by assistance

## (undated) DEVICE — ADHS SKIN SURG TISS VISC PREMIERPRO EXOFIN HI/VISC FAST/DRY

## (undated) DEVICE — CANNULA,ADULT,SOFT-TOUCH,7'TUBE,UC: Brand: PENDING

## (undated) DEVICE — TUBING, SUCTION, 1/4" X 10', STRAIGHT: Brand: MEDLINE

## (undated) DEVICE — GLV SURG SENSICARE POLYISPRN W/ALOE PF LF 6.5 GRN STRL

## (undated) DEVICE — BITEBLOCK OMNI BLOC

## (undated) DEVICE — ELECTRD BLD EZ CLN MOD XLNG 2.75IN

## (undated) DEVICE — FRCP BX RADJAW4 NDL 2.8 240CM LG OG BX40

## (undated) DEVICE — LEGGINGS, PAIR, 31X48, STERILE: Brand: MEDLINE

## (undated) DEVICE — TBG PENCL TELESCP MEGADYNE SMOKE EVAC 10FT

## (undated) DEVICE — PATIENT RETURN ELECTRODE, SINGLE-USE, CONTACT QUALITY MONITORING, ADULT, WITH 9FT CORD, FOR PATIENTS WEIGING OVER 33LBS. (15KG): Brand: MEGADYNE

## (undated) DEVICE — ADAPT CLN BIOGUARD AIR/H2O DISP

## (undated) DEVICE — Device: Brand: DEFENDO AIR/WATER/SUCTION AND BIOPSY VALVE

## (undated) DEVICE — SENSR O2 OXIMAX FNGR A/ 18IN NONSTR

## (undated) DEVICE — GLV SURG SENSICARE PI MIC PF SZ6.5 LF STRL

## (undated) DEVICE — SINGLE-USE BIOPSY FORCEPS: Brand: RADIAL JAW 4

## (undated) DEVICE — MSK ENDO PORT O2 POM ELITE CURAPLEX A/

## (undated) DEVICE — STPLR SKIN VISISTAT WD 35CT

## (undated) DEVICE — SYR LL TP 10ML STRL

## (undated) DEVICE — KT ORCA ORCAPOD DISP STRL

## (undated) DEVICE — KT INK TISS MARGINMARKER STD 6COLOR

## (undated) DEVICE — SUT MNCRYL PLS ANTIB UD 4/0 PS2 18IN

## (undated) DEVICE — PK UNIV COMPL 40

## (undated) DEVICE — TRAP FLD MINIVAC MEGADYNE 100ML

## (undated) DEVICE — THE TORRENT IRRIGATION SCOPE CONNECTOR IS USED WITH THE TORRENT IRRIGATION TUBING TO PROVIDE IRRIGATION FLUIDS SUCH AS STERILE WATER DURING GASTROINTESTINAL ENDOSCOPIC PROCEDURES WHEN USED IN CONJUNCTION WITH AN IRRIGATION PUMP (OR ELECTROSURGICAL UNIT).: Brand: TORRENT

## (undated) DEVICE — LN SMPL CO2 SHTRM SD STREAM W/M LUER

## (undated) DEVICE — ANTIBACTERIAL UNDYED BRAIDED (POLYGLACTIN 910), SYNTHETIC ABSORBABLE SUTURE: Brand: COATED VICRYL

## (undated) DEVICE — NDL HYPO ECLPS SFTY 22G 1 1/2IN

## (undated) DEVICE — GOWN,SIRUS,NON REINFRCD,LARGE,SET IN SL: Brand: MEDLINE

## (undated) DEVICE — APPL CHLORAPREP HI/LITE 26ML ORNG